# Patient Record
Sex: FEMALE | Employment: OTHER | URBAN - METROPOLITAN AREA
[De-identification: names, ages, dates, MRNs, and addresses within clinical notes are randomized per-mention and may not be internally consistent; named-entity substitution may affect disease eponyms.]

---

## 2018-02-24 ENCOUNTER — HOSPITAL ENCOUNTER (INPATIENT)
Age: 63
LOS: 12 days | Discharge: HOME | DRG: 853 | End: 2018-03-08
Attending: EMERGENCY MEDICINE | Admitting: HOSPITALIST
Payer: COMMERCIAL

## 2018-02-24 ENCOUNTER — APPOINTMENT (OUTPATIENT)
Dept: GENERAL RADIOLOGY | Age: 63
DRG: 853 | End: 2018-02-24
Attending: EMERGENCY MEDICINE
Payer: COMMERCIAL

## 2018-02-24 ENCOUNTER — APPOINTMENT (OUTPATIENT)
Dept: CT IMAGING | Age: 63
DRG: 853 | End: 2018-02-24
Attending: EMERGENCY MEDICINE
Payer: COMMERCIAL

## 2018-02-24 DIAGNOSIS — K51.00 PANCOLITIS (HCC): Primary | ICD-10-CM

## 2018-02-24 PROBLEM — E86.0 DEHYDRATION: Status: ACTIVE | Noted: 2018-02-24

## 2018-02-24 PROBLEM — R65.20 SEVERE SEPSIS (HCC): Status: ACTIVE | Noted: 2018-02-24

## 2018-02-24 PROBLEM — K52.9 COLITIS: Status: ACTIVE | Noted: 2018-02-24

## 2018-02-24 PROBLEM — E87.6 HYPOKALEMIA: Status: ACTIVE | Noted: 2018-02-24

## 2018-02-24 PROBLEM — E83.51 HYPOCALCEMIA: Status: ACTIVE | Noted: 2018-02-24

## 2018-02-24 PROBLEM — A41.9 SEVERE SEPSIS (HCC): Status: ACTIVE | Noted: 2018-02-24

## 2018-02-24 LAB
ALBUMIN SERPL-MCNC: 1.6 G/DL (ref 3.5–5)
ALBUMIN/GLOB SERPL: 0.6 {RATIO} (ref 1.1–2.2)
ALP SERPL-CCNC: 66 U/L (ref 45–117)
ALT SERPL-CCNC: 16 U/L (ref 12–78)
ANION GAP SERPL CALC-SCNC: 13 MMOL/L (ref 5–15)
ARTERIAL PATENCY WRIST A: ABNORMAL
AST SERPL-CCNC: 12 U/L (ref 15–37)
BASE DEFICIT BLDV-SCNC: 2 MMOL/L
BASOPHILS # BLD: 0 K/UL (ref 0–0.1)
BASOPHILS NFR BLD: 0 % (ref 0–1)
BDY SITE: ABNORMAL
BILIRUB DIRECT SERPL-MCNC: 0.4 MG/DL (ref 0–0.2)
BILIRUB SERPL-MCNC: 1.7 MG/DL (ref 0.2–1)
BUN SERPL-MCNC: 9 MG/DL (ref 6–20)
BUN/CREAT SERPL: 14 (ref 12–20)
CALCIUM SERPL-MCNC: 5.6 MG/DL (ref 8.5–10.1)
CHLORIDE SERPL-SCNC: 112 MMOL/L (ref 97–108)
CO2 SERPL-SCNC: 17 MMOL/L (ref 21–32)
CREAT SERPL-MCNC: 0.65 MG/DL (ref 0.55–1.02)
DIFFERENTIAL METHOD BLD: ABNORMAL
EOSINOPHIL # BLD: 0 K/UL (ref 0–0.4)
EOSINOPHIL NFR BLD: 0 % (ref 0–7)
ERYTHROCYTE [DISTWIDTH] IN BLOOD BY AUTOMATED COUNT: 11.4 % (ref 11.5–14.5)
GAS FLOW.O2 O2 DELIVERY SYS: ABNORMAL L/MIN
GLOBULIN SER CALC-MCNC: 2.6 G/DL (ref 2–4)
GLUCOSE SERPL-MCNC: 96 MG/DL (ref 65–100)
HCO3 BLDV-SCNC: 20.4 MMOL/L (ref 23–28)
HCT VFR BLD AUTO: 38.4 % (ref 35–47)
HGB BLD-MCNC: 13.3 G/DL (ref 11.5–16)
IMM GRANULOCYTES # BLD: 0 K/UL
IMM GRANULOCYTES NFR BLD AUTO: 0 %
INR PPP: 1.3 (ref 0.9–1.1)
LACTATE SERPL-SCNC: 1.5 MMOL/L (ref 0.4–2)
LACTATE SERPL-SCNC: 4.8 MMOL/L (ref 0.4–2)
LIPASE SERPL-CCNC: 49 U/L (ref 73–393)
LYMPHOCYTES # BLD: 0.8 K/UL (ref 0.8–3.5)
LYMPHOCYTES NFR BLD: 2 % (ref 12–49)
MAGNESIUM SERPL-MCNC: 1.6 MG/DL (ref 1.6–2.4)
MCH RBC QN AUTO: 32.1 PG (ref 26–34)
MCHC RBC AUTO-ENTMCNC: 34.6 G/DL (ref 30–36.5)
MCV RBC AUTO: 92.8 FL (ref 80–99)
MONOCYTES # BLD: 1.9 K/UL (ref 0–1)
MONOCYTES NFR BLD: 5 % (ref 5–13)
NEUTS BAND NFR BLD MANUAL: 10 % (ref 0–6)
NEUTS SEG # BLD: 36.2 K/UL (ref 1.8–8)
NEUTS SEG NFR BLD: 83 % (ref 32–75)
NRBC # BLD: 0 K/UL (ref 0–0.01)
NRBC BLD-RTO: 0 PER 100 WBC
PATH REV BLD -IMP: ABNORMAL
PCO2 BLDV: 24 MMHG (ref 41–51)
PH BLDV: 7.54 [PH] (ref 7.32–7.42)
PHOSPHATE SERPL-MCNC: 1.9 MG/DL (ref 2.6–4.7)
PLATELET # BLD AUTO: 367 K/UL (ref 150–400)
PMV BLD AUTO: 11.2 FL (ref 8.9–12.9)
PO2 BLDV: <19 MMHG (ref 25–40)
POTASSIUM SERPL-SCNC: 2.2 MMOL/L (ref 3.5–5.1)
PROT SERPL-MCNC: 4.2 G/DL (ref 6.4–8.2)
PROTHROMBIN TIME: 12.8 SEC (ref 9–11.1)
RBC # BLD AUTO: 4.14 M/UL (ref 3.8–5.2)
RBC MORPH BLD: ABNORMAL
SODIUM SERPL-SCNC: 142 MMOL/L (ref 136–145)
SPECIMEN TYPE: ABNORMAL
WBC # BLD AUTO: 38.9 K/UL (ref 3.6–11)
WBC MORPH BLD: ABNORMAL

## 2018-02-24 PROCEDURE — 83690 ASSAY OF LIPASE: CPT | Performed by: EMERGENCY MEDICINE

## 2018-02-24 PROCEDURE — 74011250636 HC RX REV CODE- 250/636: Performed by: HOSPITALIST

## 2018-02-24 PROCEDURE — 84100 ASSAY OF PHOSPHORUS: CPT | Performed by: HOSPITALIST

## 2018-02-24 PROCEDURE — 99285 EMERGENCY DEPT VISIT HI MDM: CPT

## 2018-02-24 PROCEDURE — 74011250636 HC RX REV CODE- 250/636: Performed by: EMERGENCY MEDICINE

## 2018-02-24 PROCEDURE — 87040 BLOOD CULTURE FOR BACTERIA: CPT | Performed by: EMERGENCY MEDICINE

## 2018-02-24 PROCEDURE — 74011000250 HC RX REV CODE- 250: Performed by: HOSPITALIST

## 2018-02-24 PROCEDURE — 74177 CT ABD & PELVIS W/CONTRAST: CPT

## 2018-02-24 PROCEDURE — 83735 ASSAY OF MAGNESIUM: CPT | Performed by: EMERGENCY MEDICINE

## 2018-02-24 PROCEDURE — 71045 X-RAY EXAM CHEST 1 VIEW: CPT

## 2018-02-24 PROCEDURE — 82803 BLOOD GASES ANY COMBINATION: CPT

## 2018-02-24 PROCEDURE — 80048 BASIC METABOLIC PNL TOTAL CA: CPT | Performed by: EMERGENCY MEDICINE

## 2018-02-24 PROCEDURE — 80076 HEPATIC FUNCTION PANEL: CPT | Performed by: EMERGENCY MEDICINE

## 2018-02-24 PROCEDURE — 83605 ASSAY OF LACTIC ACID: CPT | Performed by: EMERGENCY MEDICINE

## 2018-02-24 PROCEDURE — 85610 PROTHROMBIN TIME: CPT | Performed by: EMERGENCY MEDICINE

## 2018-02-24 PROCEDURE — 74011000258 HC RX REV CODE- 258: Performed by: HOSPITALIST

## 2018-02-24 PROCEDURE — 93005 ELECTROCARDIOGRAM TRACING: CPT

## 2018-02-24 PROCEDURE — 96374 THER/PROPH/DIAG INJ IV PUSH: CPT

## 2018-02-24 PROCEDURE — 74011250637 HC RX REV CODE- 250/637: Performed by: HOSPITALIST

## 2018-02-24 PROCEDURE — 87493 C DIFF AMPLIFIED PROBE: CPT | Performed by: EMERGENCY MEDICINE

## 2018-02-24 PROCEDURE — 86900 BLOOD TYPING SEROLOGIC ABO: CPT | Performed by: EMERGENCY MEDICINE

## 2018-02-24 PROCEDURE — 74011000250 HC RX REV CODE- 250: Performed by: EMERGENCY MEDICINE

## 2018-02-24 PROCEDURE — 96361 HYDRATE IV INFUSION ADD-ON: CPT

## 2018-02-24 PROCEDURE — 65660000000 HC RM CCU STEPDOWN

## 2018-02-24 PROCEDURE — 96375 TX/PRO/DX INJ NEW DRUG ADDON: CPT

## 2018-02-24 PROCEDURE — 74011636320 HC RX REV CODE- 636/320: Performed by: EMERGENCY MEDICINE

## 2018-02-24 PROCEDURE — 74011000258 HC RX REV CODE- 258: Performed by: EMERGENCY MEDICINE

## 2018-02-24 PROCEDURE — 3E03329 INTRODUCTION OF OTHER ANTI-INFECTIVE INTO PERIPHERAL VEIN, PERCUTANEOUS APPROACH: ICD-10-PCS | Performed by: HOSPITALIST

## 2018-02-24 PROCEDURE — 74011250637 HC RX REV CODE- 250/637: Performed by: EMERGENCY MEDICINE

## 2018-02-24 PROCEDURE — 85025 COMPLETE CBC W/AUTO DIFF WBC: CPT | Performed by: EMERGENCY MEDICINE

## 2018-02-24 PROCEDURE — 36415 COLL VENOUS BLD VENIPUNCTURE: CPT | Performed by: EMERGENCY MEDICINE

## 2018-02-24 PROCEDURE — 87045 FECES CULTURE AEROBIC BACT: CPT | Performed by: HOSPITALIST

## 2018-02-24 RX ORDER — ASPIRIN 81 MG/1
81 TABLET ORAL EVERY OTHER DAY
COMMUNITY
End: 2019-06-28 | Stop reason: DRUGHIGH

## 2018-02-24 RX ORDER — METRONIDAZOLE 500 MG/100ML
500 INJECTION, SOLUTION INTRAVENOUS EVERY 8 HOURS
Status: DISCONTINUED | OUTPATIENT
Start: 2018-02-24 | End: 2018-03-09 | Stop reason: HOSPADM

## 2018-02-24 RX ORDER — ACETAMINOPHEN 325 MG/1
650 TABLET ORAL
Status: DISCONTINUED | OUTPATIENT
Start: 2018-02-24 | End: 2018-03-09 | Stop reason: HOSPADM

## 2018-02-24 RX ORDER — SODIUM CHLORIDE 0.9 % (FLUSH) 0.9 %
5-10 SYRINGE (ML) INJECTION EVERY 8 HOURS
Status: DISCONTINUED | OUTPATIENT
Start: 2018-02-24 | End: 2018-03-09 | Stop reason: HOSPADM

## 2018-02-24 RX ORDER — METRONIDAZOLE 500 MG/100ML
500 INJECTION, SOLUTION INTRAVENOUS
Status: COMPLETED | OUTPATIENT
Start: 2018-02-24 | End: 2018-02-24

## 2018-02-24 RX ORDER — ONDANSETRON 2 MG/ML
4 INJECTION INTRAMUSCULAR; INTRAVENOUS
Status: DISCONTINUED | OUTPATIENT
Start: 2018-02-24 | End: 2018-03-09 | Stop reason: HOSPADM

## 2018-02-24 RX ORDER — MAGNESIUM SULFATE HEPTAHYDRATE 40 MG/ML
2 INJECTION, SOLUTION INTRAVENOUS ONCE
Status: COMPLETED | OUTPATIENT
Start: 2018-02-24 | End: 2018-02-24

## 2018-02-24 RX ORDER — SIMVASTATIN 20 MG/1
20 TABLET, FILM COATED ORAL
COMMUNITY
End: 2018-07-11

## 2018-02-24 RX ORDER — SODIUM CHLORIDE 0.9 % (FLUSH) 0.9 %
10 SYRINGE (ML) INJECTION
Status: COMPLETED | OUTPATIENT
Start: 2018-02-24 | End: 2018-02-24

## 2018-02-24 RX ORDER — THERA TABS 400 MCG
1 TAB ORAL DAILY
COMMUNITY
End: 2018-04-05

## 2018-02-24 RX ORDER — HYDROMORPHONE HYDROCHLORIDE 1 MG/ML
1 INJECTION, SOLUTION INTRAMUSCULAR; INTRAVENOUS; SUBCUTANEOUS
Status: COMPLETED | OUTPATIENT
Start: 2018-02-24 | End: 2018-02-24

## 2018-02-24 RX ORDER — SIMVASTATIN 20 MG/1
20 TABLET, FILM COATED ORAL
Status: DISCONTINUED | OUTPATIENT
Start: 2018-02-24 | End: 2018-03-09 | Stop reason: HOSPADM

## 2018-02-24 RX ORDER — POTASSIUM CHLORIDE 750 MG/1
80 TABLET, FILM COATED, EXTENDED RELEASE ORAL
Status: COMPLETED | OUTPATIENT
Start: 2018-02-24 | End: 2018-02-24

## 2018-02-24 RX ORDER — SODIUM CHLORIDE 9 MG/ML
150 INJECTION, SOLUTION INTRAVENOUS CONTINUOUS
Status: DISCONTINUED | OUTPATIENT
Start: 2018-02-24 | End: 2018-02-25

## 2018-02-24 RX ORDER — SODIUM CHLORIDE 0.9 % (FLUSH) 0.9 %
10 SYRINGE (ML) INJECTION
Status: DISCONTINUED | OUTPATIENT
Start: 2018-02-24 | End: 2018-02-24

## 2018-02-24 RX ORDER — GLUCOSAMINE/CHONDR SU A SOD 750-600 MG
5000 TABLET ORAL
COMMUNITY
End: 2018-04-05

## 2018-02-24 RX ORDER — POTASSIUM CHLORIDE 14.9 MG/ML
10 INJECTION INTRAVENOUS
Status: COMPLETED | OUTPATIENT
Start: 2018-02-24 | End: 2018-02-25

## 2018-02-24 RX ORDER — SODIUM CHLORIDE 0.9 % (FLUSH) 0.9 %
5-10 SYRINGE (ML) INJECTION AS NEEDED
Status: DISCONTINUED | OUTPATIENT
Start: 2018-02-24 | End: 2018-03-09 | Stop reason: HOSPADM

## 2018-02-24 RX ORDER — ONDANSETRON 2 MG/ML
4 INJECTION INTRAMUSCULAR; INTRAVENOUS
Status: COMPLETED | OUTPATIENT
Start: 2018-02-24 | End: 2018-02-24

## 2018-02-24 RX ORDER — HYDROMORPHONE HYDROCHLORIDE 2 MG/ML
1 INJECTION, SOLUTION INTRAMUSCULAR; INTRAVENOUS; SUBCUTANEOUS
Status: DISCONTINUED | OUTPATIENT
Start: 2018-02-24 | End: 2018-02-27 | Stop reason: SDUPTHER

## 2018-02-24 RX ADMIN — IOPAMIDOL 100 ML: 755 INJECTION, SOLUTION INTRAVENOUS at 12:29

## 2018-02-24 RX ADMIN — Medication 10 ML: at 17:20

## 2018-02-24 RX ADMIN — SODIUM CHLORIDE 150 ML/HR: 900 INJECTION, SOLUTION INTRAVENOUS at 17:15

## 2018-02-24 RX ADMIN — SIMVASTATIN 20 MG: 20 TABLET, FILM COATED ORAL at 22:02

## 2018-02-24 RX ADMIN — VANCOMYCIN HYDROCHLORIDE 250 MG: 1 INJECTION, POWDER, LYOPHILIZED, FOR SOLUTION INTRAVENOUS at 19:59

## 2018-02-24 RX ADMIN — HYDROMORPHONE HYDROCHLORIDE 1 MG: 1 INJECTION, SOLUTION INTRAMUSCULAR; INTRAVENOUS; SUBCUTANEOUS at 12:12

## 2018-02-24 RX ADMIN — VANCOMYCIN HYDROCHLORIDE 250 MG: 1 INJECTION, POWDER, LYOPHILIZED, FOR SOLUTION INTRAVENOUS at 13:57

## 2018-02-24 RX ADMIN — Medication 10 ML: at 22:09

## 2018-02-24 RX ADMIN — SODIUM CHLORIDE 1000 ML: 900 INJECTION, SOLUTION INTRAVENOUS at 13:50

## 2018-02-24 RX ADMIN — ONDANSETRON 4 MG: 2 INJECTION INTRAMUSCULAR; INTRAVENOUS at 18:22

## 2018-02-24 RX ADMIN — Medication 1 CAPSULE: at 17:20

## 2018-02-24 RX ADMIN — HYDROMORPHONE HYDROCHLORIDE 1 MG: 2 INJECTION INTRAMUSCULAR; INTRAVENOUS; SUBCUTANEOUS at 18:22

## 2018-02-24 RX ADMIN — SODIUM CHLORIDE 1000 ML: 900 INJECTION, SOLUTION INTRAVENOUS at 12:49

## 2018-02-24 RX ADMIN — POTASSIUM CHLORIDE 10 MEQ: 200 INJECTION, SOLUTION INTRAVENOUS at 18:00

## 2018-02-24 RX ADMIN — SODIUM CHLORIDE 100 ML: 900 INJECTION, SOLUTION INTRAVENOUS at 12:29

## 2018-02-24 RX ADMIN — POTASSIUM CHLORIDE 10 MEQ: 200 INJECTION, SOLUTION INTRAVENOUS at 19:59

## 2018-02-24 RX ADMIN — ONDANSETRON 4 MG: 2 INJECTION INTRAMUSCULAR; INTRAVENOUS at 12:13

## 2018-02-24 RX ADMIN — MAGNESIUM SULFATE HEPTAHYDRATE 2 G: 40 INJECTION, SOLUTION INTRAVENOUS at 14:41

## 2018-02-24 RX ADMIN — METRONIDAZOLE 500 MG: 500 INJECTION, SOLUTION INTRAVENOUS at 22:02

## 2018-02-24 RX ADMIN — POTASSIUM CHLORIDE 10 MEQ: 200 INJECTION, SOLUTION INTRAVENOUS at 18:07

## 2018-02-24 RX ADMIN — SODIUM CHLORIDE 1000 ML: 900 INJECTION, SOLUTION INTRAVENOUS at 12:13

## 2018-02-24 RX ADMIN — METRONIDAZOLE 500 MG: 500 INJECTION, SOLUTION INTRAVENOUS at 13:53

## 2018-02-24 RX ADMIN — POTASSIUM CHLORIDE 10 MEQ: 200 INJECTION, SOLUTION INTRAVENOUS at 16:40

## 2018-02-24 RX ADMIN — POTASSIUM CHLORIDE 80 MEQ: 750 TABLET, EXTENDED RELEASE ORAL at 13:50

## 2018-02-24 RX ADMIN — CALCIUM GLUCONATE 2 G: 98 INJECTION, SOLUTION INTRAVENOUS at 14:36

## 2018-02-24 RX ADMIN — Medication 10 ML: at 12:29

## 2018-02-24 NOTE — CONSULTS
Chief Complaint:  Colitis    HPI:  60 yo woman with no significant PMH who presented to ED with abdominal pain. Pt reportedly was diagnosed with Strept 10 days ago and prescribed an antibiotic. Pt developed diarrhea and abdominal pain on Monday. Pt then developed worsening abdominal pain with nausea and vomiting. She went to urgent care center and was noted to have leukocytosis so was sent to [facility]. She was noted to have diffuse abdominal pain. CT scan showed pancolitis without free air. She does have leukocytosis. Past Medical History:   Diagnosis Date    Hypercholesteremia        Past Surgical History:   Procedure Laterality Date    HX TUBAL LIGATION         No current facility-administered medications on file prior to encounter. No current outpatient prescriptions on file prior to encounter. No Known Allergies    Visit Vitals    /50    Pulse 64    Temp 98 °F (36.7 °C)    Resp 22    Ht 5' 4\" (1.626 m)    Wt 134 lb (60.8 kg)    SpO2 99%    BMI 23 kg/m2         Physical Exam:    Gen:  NAD  Pulm:  Unlabored  Abd:  S/ND/mild TTP in LLQ without guarding or rebound    Recent Results (from the past 24 hour(s))   MAGNESIUM    Collection Time: 02/24/18 12:21 PM   Result Value Ref Range    Magnesium 1.6 1.6 - 2.4 mg/dL   LACTIC ACID    Collection Time: 02/24/18 12:21 PM   Result Value Ref Range    Lactic acid 4.8 (HH) 0.4 - 2.0 MMOL/L   LIPASE    Collection Time: 02/24/18 12:21 PM   Result Value Ref Range    Lipase 49 (L) 73 - 393 U/L   HEPATIC FUNCTION PANEL    Collection Time: 02/24/18 12:21 PM   Result Value Ref Range    Protein, total 4.2 (L) 6.4 - 8.2 g/dL    Albumin 1.6 (L) 3.5 - 5.0 g/dL    Globulin 2.6 2.0 - 4.0 g/dL    A-G Ratio 0.6 (L) 1.1 - 2.2      Bilirubin, total 1.7 (H) 0.2 - 1.0 MG/DL    Bilirubin, direct 0.4 (H) 0.0 - 0.2 MG/DL    Alk.  phosphatase 66 45 - 117 U/L    AST (SGOT) 12 (L) 15 - 37 U/L    ALT (SGPT) 16 12 - 78 U/L   CBC WITH AUTOMATED DIFF    Collection Time: 02/24/18 12:21 PM   Result Value Ref Range    WBC 38.9 (H) 3.6 - 11.0 K/uL    RBC 4.14 3.80 - 5.20 M/uL    HGB 13.3 11.5 - 16.0 g/dL    HCT 38.4 35.0 - 47.0 %    MCV 92.8 80.0 - 99.0 FL    MCH 32.1 26.0 - 34.0 PG    MCHC 34.6 30.0 - 36.5 g/dL    RDW 11.4 (L) 11.5 - 14.5 %    PLATELET 038 735 - 829 K/uL    MPV 11.2 8.9 - 12.9 FL    NRBC 0.0 0  WBC    ABSOLUTE NRBC 0.00 0.00 - 0.01 K/uL    NEUTROPHILS 83 (H) 32 - 75 %    BAND NEUTROPHILS 10 (H) 0 - 6 %    LYMPHOCYTES 2 (L) 12 - 49 %    MONOCYTES 5 5 - 13 %    EOSINOPHILS 0 0 - 7 %    BASOPHILS 0 0 - 1 %    IMMATURE GRANULOCYTES 0 %    ABS. NEUTROPHILS 36.2 (H) 1.8 - 8.0 K/UL    ABS. LYMPHOCYTES 0.8 0.8 - 3.5 K/UL    ABS. MONOCYTES 1.9 (H) 0.0 - 1.0 K/UL    ABS. EOSINOPHILS 0.0 0.0 - 0.4 K/UL    ABS. BASOPHILS 0.0 0.0 - 0.1 K/UL    ABS. IMM.  GRANS. 0.0 K/UL    DF MANUAL      RBC COMMENTS OVALOCYTES  PRESENT        WBC COMMENTS Pathology Review Requested     METABOLIC PANEL, BASIC    Collection Time: 02/24/18 12:21 PM   Result Value Ref Range    Sodium 142 136 - 145 mmol/L    Potassium 2.2 (LL) 3.5 - 5.1 mmol/L    Chloride 112 (H) 97 - 108 mmol/L    CO2 17 (L) 21 - 32 mmol/L    Anion gap 13 5 - 15 mmol/L    Glucose 96 65 - 100 mg/dL    BUN 9 6 - 20 MG/DL    Creatinine 0.65 0.55 - 1.02 MG/DL    BUN/Creatinine ratio 14 12 - 20      GFR est AA >60 >60 ml/min/1.73m2    GFR est non-AA >60 >60 ml/min/1.73m2    Calcium 5.6 (LL) 8.5 - 10.1 MG/DL   POC VENOUS BLOOD GAS    Collection Time: 02/24/18 12:21 PM   Result Value Ref Range    Device: ROOM AIR      pH, venous (POC) 7.537 (HH) 7.32 - 7.42      pCO2, venous (POC) 24.0 (L) 41 - 51 MMHG    pO2, venous (POC) <19 (L) 25 - 40 mmHg    HCO3, venous (POC) 20.4 (L) 23.0 - 28.0 MMOL/L    Base deficit, venous (POC) 2 mmol/L    Allens test (POC) N/A      Site OTHER      Specimen type (POC) VENOUS BLOOD     EKG, 12 LEAD, INITIAL    Collection Time: 02/24/18 12:55 PM   Result Value Ref Range    Ventricular Rate 79 BPM    Atrial Rate 79 BPM    P-R Interval 152 ms    QRS Duration 74 ms    Q-T Interval 536 ms    QTC Calculation (Bezet) 614 ms    Calculated P Axis 69 degrees    Calculated R Axis 72 degrees    Calculated T Axis 63 degrees    Diagnosis       Normal sinus rhythm  T wave abnormality, consider anterior ischemia  Prolonged QT  No previous ECGs available         AP: 62 yo woman with pancolitis    - Pancolitis:  No acute indication for operation.   Pain symptoms have significantly improved since being in ED and pt is afebrile with normal hemodynamics  - Recommend continuing with IV flagyl and oral vancomycin  - Recommend ID consult for assistance with treatment of pancolitis  - No acute indication for operation

## 2018-02-24 NOTE — PROGRESS NOTES
Admission Medication Reconciliation:    Information obtained from:  Patient and a     Comments/Recommendations: Updated PTA meds/reviewed patient's allergies. 1) Medications Added:  -Simvastatin  -Aspirin  -Biotin  -Multivitamin    2)  Pt reported  Cefuroxime RX 500mg BID on 2/14 (7 day course) for suspected strip throat of which she completed all by the last dose. Significant PMH/Disease States:   Past Medical History:   Diagnosis Date    Hypercholesteremia        Chief Complaint for this Admission:    Chief Complaint   Patient presents with    Abdominal Pain    Nausea    Vomiting    Diarrhea       Allergies:  Review of patient's allergies indicates no known allergies. Prior to Admission Medications:   Prior to Admission Medications   Prescriptions Last Dose Informant Patient Reported? Taking? Biotin 2,500 mcg cap   Yes Yes   Sig: Take 5,000 mcg by mouth. aspirin delayed-release 81 mg tablet 2/22/2018  Yes Yes   Sig: Take 81 mg by mouth daily. simvastatin (ZOCOR) 20 mg tablet 2/21/2018  Yes Yes   Sig: Take 20 mg by mouth nightly. therapeutic multivitamin [facility]) tablet   Yes Yes   Sig: Take 1 Tab by mouth daily.       Facility-Administered Medications: None

## 2018-02-24 NOTE — PROGRESS NOTES
TRANSFER - IN REPORT:    Verbal report received from Julia Munguia 69 (name) on Vimal Munroe  being received from ED (unit) for routine progression of care      Report consisted of patients Situation, Background, Assessment and   Recommendations(SBAR). Information from the following report(s) ED Summary was reviewed with the receiving nurse. Opportunity for questions and clarification was provided. Assessment completed upon patients arrival to unit and care assumed.

## 2018-02-24 NOTE — ED TRIAGE NOTES
Triage note: Pt arrived via EMS from [facility] for N/V/D since Monday. LLQ pain that is constant cramping.

## 2018-02-24 NOTE — ED NOTES
TRANSFER - OUT REPORT:    Verbal report given to SYDNEE Millan (name) on Maurilio Black  being transferred to [facility] (unit) for routine progression of care       Report consisted of patients Situation, Background, Assessment and   Recommendations(SBAR). Information from the following report(s) SBAR, Kardex, ED Summary, Intake/Output, MAR, Recent Results and Cardiac Rhythm NSR was reviewed with the receiving nurse. Lines:   Peripheral IV 02/24/18 Left Wrist (Active)   Site Assessment Clean, dry, & intact 2/24/2018 11:45 AM   Phlebitis Assessment 0 2/24/2018 11:45 AM   Infiltration Assessment 0 2/24/2018 11:45 AM   Dressing Status Clean, dry, & intact 2/24/2018 11:45 AM   Dressing Type Transparent 2/24/2018 11:45 AM   Hub Color/Line Status Flushed 2/24/2018 11:45 AM       Peripheral IV 02/24/18 Right Antecubital (Active)   Site Assessment Clean, dry, & intact 2/24/2018 12:23 PM   Phlebitis Assessment 0 2/24/2018 12:23 PM   Infiltration Assessment 0 2/24/2018 12:23 PM   Dressing Status Clean, dry, & intact 2/24/2018 12:23 PM       Peripheral IV 02/24/18 Left Antecubital (Active)   Site Assessment Clean, dry, & intact 2/24/2018  2:41 PM   Phlebitis Assessment 0 2/24/2018  2:41 PM   Infiltration Assessment 0 2/24/2018  2:41 PM   Dressing Status Clean, dry, & intact 2/24/2018  2:41 PM   Dressing Type Transparent 2/24/2018  2:41 PM   Hub Color/Line Status Blue 2/24/2018  2:41 PM   Action Taken Blood drawn 2/24/2018  2:41 PM        Opportunity for questions and clarification was provided.       Patient transported with:   Monitor

## 2018-02-24 NOTE — IP AVS SNAPSHOT
[address] 
[phone] Patient: Pastor Lentz MRN: [id number] AKV:[id number] You are allergic to the following No active allergies Recent Documentation Height Weight BMI Smoking Status 1.626 m 63.9 kg 24.17 kg/m2 Never Smoker Emergency Contacts  (Rel.) Home Phone Work Phone Mobile Phone Kim Solano (Spouse) -- -- [phone] About your hospitalization You were admitted on:  February 24, 2018 You last received care in the:  [facility] You were discharged on:  March 8, 2018 Why you were hospitalized Your primary diagnosis was:  Colitis Your diagnoses also included:  Severe Sepsis (Hcc), Dehydration, Hypokalemia, Hypocalcemia Providers Seen During Your Hospitalization Provider Specialty Primary office phone Keli Sheriff DO Emergency Medicine [phone] Shannan Godfrey MD Hospitalist [phone] [facility] MD Chloe Internal Medicine [phone] Leonor Woody MD Internal Medicine [phone] Riri Boswell MD Internal Medicine [phone] Your Primary Care Physician (PCP) Primary Care Physician Office Phone Office Fax NONE ** None ** ** None ** Follow-up Information Follow up With Details Comments Contact Info Call and schedule appointment for Cardiac Rehab at [id number] [place] [address] Call in 1 week after d/c to schedule appointment [facility]  Cardiac Rehab contact is Elise Monroy [phone] Arlington Favre, MD On 3/14/2018 9 AM.  Please arrive 20 min early. [place] [address] [address] [place] [address] 
[phone] None   None (395) Patient stated that they have no PCP Johnathon Arzate. Total out-of-pocket cost is $64.17.   Prescription will be ready for pick-up at the Pharmacy at [address] [facility]. The [address] [place] [address] is located at: [address] [facility], [address] and the phone number is [phone]. [facility] Schedule an appointment as soon as possible for a visit in 5 days check bmp to monitor creatinine and potassium and repeat cxr to check on effusions. Gonzales Lopez MD Schedule an appointment as soon as possible for a visit in 2 weeks for follow up on pleural effusion [address] [facility] [address] [place] [address] 
[phone] Appointments for Next 14 days 3/14/2018  9:00 AM  ESTABLISHED PATIENT [facility] My Medications TAKE these medications as instructed Instructions Each Dose to Equal  
 Morning Noon Evening Bedtime  
 aspirin delayed-release 81 mg tablet Your last dose was:  3/8/18 Your next dose is:  3/9/18 Take 81 mg by mouth daily. 81 mg Biotin 2,500 mcg Cap Take 5,000 mcg by mouth. 5000 mcg  
    
   
   
   
  
 furosemide 40 mg tablet Commonly known as:  LASIX Start taking on:  3/9/2018 Your last dose was:  3/8/18 Your next dose is:  3/9/18 Take 1 Tab by mouth daily for 5 days. 40 mg  
    
  
   
   
   
  
 L. acidoph & paracasei- S therm- Bifido 8 billion cell Cap cap Commonly known as:  OSCAR-Q/RISAQUAD Start taking on:  3/9/2018 Your last dose was:  3/8/18 Your next dose is:  3/9/18 Take 1 Cap by mouth daily for 30 days. 1 Cap  
    
  
   
   
   
  
 metoprolol tartrate 50 mg tablet Commonly known as:  LOPRESSOR Your last dose was:  3/8/18 at [address] next dose is:  3/8/18 at 9PM  
   
 Take 1 Tab by mouth two (2) times a day for 30 days. 50 mg  
    
  
   
   
   
  
  
 potassium chloride SR 20 mEq tablet Commonly known as:  K-TAB Your last dose was:  3/8/18 at [address] next dose is:  3/9/18 in AM  
   
 Take 2 Tabs by mouth two (2) times a day for 5 days. Indications: hypokalemia prevention 40 mEq  
    
  
   
   
   
  
  
 prasugrel 10 mg tablet Commonly known as:  EFFIENT Start taking on:  3/9/2018 Your last dose was:  3/8/18 Your next dose is:  3/9/18 Take 1 Tab by mouth daily for 30 days. 10 mg  
    
  
   
   
   
  
 simvastatin 20 mg tablet Commonly known as:  ZOCOR Your last dose was:  3/7/18 Your next dose is:  3/8/18 Take 20 mg by mouth nightly. 20 mg  
    
   
   
   
  
  
 therapeutic multivitamin tablet Commonly known as:  [facility] Your last dose was:  3/8/18 Your next dose is:  3/9/18 Take 1 Tab by mouth daily. 1 Tab  
    
  
   
   
   
  
 vancomycin 50 mg/mL oral solution (compounded) Your last dose was:  3/8/18 at [address] next dose is:  3/8/18 at 8PM 
  
   
 Take 10 mL by mouth every six (6) hours for 10 days. 500 mg Where to Get Your Medications Information on where to get these meds will be given to you by the nurse or doctor. ! Ask your nurse or doctor about these medications  
  furosemide 40 mg tablet L. acidoph & paracasei- S therm- Bifido 8 billion cell Cap cap  
 metoprolol tartrate 50 mg tablet  
 potassium chloride SR 20 mEq tablet  
 prasugrel 10 mg tablet  
 vancomycin 50 mg/mL oral solution (compounded) Discharge Instructions Discharge Instructions PATIENT ID: Yana Wilkins MRN: [id number] YOB: 1955 DATE OF ADMISSION: 2/24/2018 11:29 AM   
DATE OF DISCHARGE: 3/8/2018 PRIMARY CARE PROVIDER: None ATTENDING PHYSICIAN: Kemar Noyola MD 
DISCHARGING PROVIDER: Kemar Noyola MD   
To contact this individual call [phone] and ask the  to page. If unavailable ask to be transferred the Adult Hospitalist Department. DISCHARGE DIAGNOSES Principal Problem: 
  Colitis (2/24/2018) Active Problems: 
  Severe sepsis ([facility] [address] (2/24/2018) Dehydration (2/24/2018) Hypokalemia (2/24/2018) Hypocalcemia (2/24/2018) CONSULTATIONS:  
IP CONSULT TO GENERAL SURGERY 
IP CONSULT TO HOSPITALIST 
IP CONSULT TO CARDIOLOGY 
IP CONSULT TO INFECTIOUS DISEASES 
IP CONSULT TO PULMONOLOGY PROCEDURES/SURGERIES: 
 * No surgery found * PENDING TEST RESULTS:  
At the time of discharge the following test results are still pending: none FOLLOW UP APPOINTMENTS:  
Follow-up Information Follow up With Details Comments Contact Info Call and schedule appointment for Cardiac Rehab at [facility] Call in 1 week after d/c to schedule appointment [facility]  Cardiac Rehab contact is Carina Barrett [phone] Riana Isbell MD On 3/14/2018 9 AM.  Please arrive 20 min early. [place] [address] [address] [place] [address] 
[phone] None   None (395) Patient stated that they have no PCP Johnathon 110. Total out-of-pocket cost is $64.17. Prescription will be ready for pick-up at the Pharmacy at [facility] is located at: [address] [place] [address]. [facility] and the phone number is [phone]. [facility] Schedule an appointment as soon as possible for a visit in 5 days check bmp to monitor creatinine and potassium and repeat cxr to check on effusions. Camryn Bean MD Schedule an appointment as soon as possible for a visit in 2 weeks for follow up on pleural effusion [address] [facility] [address] [place] [address] 
[phone] ADDITIONAL CARE RECOMMENDATIONS:  
1. Have pcp or primary gynecologist evaluate right adnexal cyst.   
 
DIET: resume prior diet as tolerated. ACTIVITY: Activity as tolerated WOUND CARE: none EQUIPMENT needed: none DISCHARGE MEDICATIONS: 
 See Medication Reconciliation Form · It is important that you take the medication exactly as they are prescribed. · Keep your medication in the bottles provided by the pharmacist and keep a list of the medication names, dosages, and times to be taken in your wallet. · Do not take other medications without consulting your doctor. Furosemide (Lasix) - (By mouth) Why this medicine is used:  
Treats fluid retention and high blood pressure. Contact a nurse or doctor right away if you have: · Blistering, peeling, red skin rash · Lightheadedness, dizziness, fainting · Dry mouth, increased thirst, muscle cramps, nausea or vomiting · Uneven heartbeat · Hearing loss, ringing in the ears Common side effects: 
· Loss of appetite, stomach cramps © 2017 [employer] [facility] Information is for End User's use only and may not be sold, redistributed or otherwise used for commercial purposes. Metoprolol (Lopressor, Toprol XL) - (By mouth) Why this medicine is used:  
Treats high blood pressure, chest pain, and heart failure. Contact a nurse or doctor right away if you have: · Lightheadedness, dizziness, fainting · Rapid weight gain; swelling in your hands, ankles, or feet Common side effects: · Mild dizziness · Tiredness © 2017 [employer] [facility] Information is for End User's use only and may not be sold, redistributed or otherwise used for commercial purposes. Probiotic (Acidophilus Probiotic Blend, Culturelle, Adult Probiotic, Bifidonate) - (By mouth) Why this medicine is used: May increase the number of healthy bacteria in your stomach and intestines. Common side effects: · Mild diarrhea, constipation, nausea, vomiting · Mild gas or cramps © 2017 [employer] [facility] Information is for End User's use only and may not be sold, redistributed or otherwise used for commercial purposes. Amoxicillin/Clavulanate Potassium (Augmentin, Augmentin ES-600, Augmentin XR) - (By mouth) Why this medicine is used:  
Treats infections. This medicine is a penicillin antibiotic. Contact a nurse or doctor right away if you have: · Blistering, peeling, red skin rash · Dark urine or pale stools, nausea, vomiting, loss of appetite, stomach pain, yellow eyes or skin · Severe diarrhea, especially if bloody or ongoing Common side effects: 
· Diarrhea, nausea, vomiting · Diaper rash · Tooth discoloration (in children) © 2017 [address] Information is for End User's use only and may not be sold, redistributed or otherwise used for commercial purposes. Prasugrel (Effient) - (By mouth) Why this medicine is used:  
Prevents blood clots. Contact a nurse or doctor right away if you have: 
· Sudden or severe headache · Bloody vomit or vomit that looks like coffee grounds; bloody or black, tarry stools · Bleeding that does not stop or bruises that do not heal  
 
Common side effects: · Minor bleeding or bruising © 2017 [address] Information is for End User's use only and may not be sold, redistributed or otherwise used for commercial purposes. NOTIFY YOUR PHYSICIAN FOR ANY OF THE FOLLOWING:  
Fever over 101 degrees for 24 hours. Chest pain, shortness of breath, fever, chills, nausea, vomiting, diarrhea, change in mentation, falling, weakness, bleeding. Severe pain or pain not relieved by medications. Or, any other signs or symptoms that you may have questions about. DISPOSITION: 
  Home With: 
 OT  PT  HH  RN  
  
 SNF/Inpatient Rehab/LTAC  
x Independent/assisted living Hospice Other:  
 
 
 
Signed:  
Brittany Hoffman MD 
3/8/2018 
4:03 PM 
 
Discharge Orders None [facility] Announcement We are excited to announce that we are making your provider's discharge notes available to you in [employer]. You will see these notes when they are completed and signed by the physician that discharged you from your recent hospital stay.   If you have any questions or concerns about any information you see in [employer], please call the Health Information Department where you were seen or reach out to your Primary Care Provider for more information about your plan of care. Introducing [facility]! Taty Malone introduces [employer] patient portal. Now you can access parts of your medical record, email your doctor's office, and request medication refills online. 1. In your internet browser, go to [email or web address] 2. Click on the First Time User? Click Here link in the Sign In box. You will see the New Member Sign Up page. 3. Enter your [employer]ge Access Code exactly as it appears below. You will not need to use this code after youve completed the sign-up process. If you do not sign up before the expiration date, you must request a new code. · [employer] Access Code: [email or web address] Expires: 6/2/2018 10:18 AM 
 
4. Enter the last four digits of your Social Security Number (xxxx) and Date of Birth (mm/dd/yyyy) as indicated and click Submit. You will be taken to the next sign-up page. 5. Create a [employer] ID. This will be your [employer] login ID and cannot be changed, so think of one that is secure and easy to remember. 6. Create a [employer] password. You can change your password at any time. 7. Enter your Password Reset Question and Answer. This can be used at a later time if you forget your password. 8. Enter your e-mail address. You will receive e-mail notification when new information is available in [address] 9. Click Sign Up. You can now view and download portions of your medical record. 10. Click the Download Summary menu link to download a portable copy of your medical information. If you have questions, please visit the Frequently Asked Questions section of the [employer] website. Remember, [employer] is NOT to be used for urgent needs. For medical emergencies, dial 911. Now available from your iPhone and Android! General Information Please provide this summary of care documentation to your next provider. Patient Signature:  ____________________________________________________________ Date:  ____________________________________________________________  
  
Sun Marita Provider Signature:  ____________________________________________________________ Date:  ____________________________________________________________

## 2018-02-24 NOTE — H&P
HISTORY AND PHYSICAL      PCP: None  History source: the patient      CC: abdominal pain      HPI: this is a 61 y.o lady who presents with abdominal pain and diarrhea. She developed a sore throat and was diagnosed with strep throat about 10 days ago and was prescribed cefdinir. She took this as prescribed and about a week later developed diffuse abdominal pain and loose watery diarrhea. The pain is dull and constant, associated with a fever of 101.3F, frequent moderate-volume watery diarrhea, nausea and vomiting. There are no known exacerbating or alleviating factors. Her PO intake has been poor during this time. She denies recent travel or sick contacts, denies weight loss or bleeding. PMH/PSH:  Past Medical History:   Diagnosis Date    Hypercholesteremia      Past Surgical History:   Procedure Laterality Date    HX TUBAL LIGATION         Home meds:   Prior to Admission medications    Medication Sig Start Date End Date Taking? Authorizing Provider   simvastatin (ZOCOR) 20 mg tablet Take 20 mg by mouth nightly. Yes Historical Provider   aspirin delayed-release 81 mg tablet Take 81 mg by mouth daily. Yes Historical Provider   Biotin 2,500 mcg cap Take 5,000 mcg by mouth. Yes Historical Provider   therapeutic multivitamin (THERAGRAN) tablet Take 1 Tab by mouth daily. Yes Historical Provider       Allergies:  No Known Allergies    FH:  History reviewed. No pertinent family history.     SH:  Social History   Substance Use Topics    Smoking status: Never Smoker    Smokeless tobacco: Never Used    Alcohol use No       ROS: Constitutional: negative  Eyes: negative  Ears, nose, mouth, throat, and face: negative  Respiratory: negative  Cardiovascular: negative  Gastrointestinal: negative  Genitourinary:negative  Integument/breast: negative  Hematologic/lymphatic: negative  Musculoskeletal:negative  Neurological: negative  Behavioral/Psych: negative  Endocrine: negative  Allergic/Immunologic: negative  Negative except as mentioned in the HPI    PHYSICAL EXAM:  Visit Vitals    /55    Pulse 80    Temp 98 °F (36.7 °C)    Resp 22    Ht 5' 4\" (1.626 m)    Wt 60.8 kg (134 lb)    SpO2 100%    BMI 23 kg/m2       Gen: NAD, appears ill  HEENT: anicteric sclerae, pale conjunctiva, oropharynx clear, MM dry  Neck: supple, trachea midline, no adenopathy  Heart: RRR, no MRG, no JVD, no peripheral edema  Lungs: CTA b/l, non-labored respirations  Abd: soft, mild diffuse tenderness most notably in both lower quadrants, ND, BS+, no organomegaly  Extr: warm  Skin: dry, no rash, normal turgor but delayed capillary refill  Neuro: CN II-XII grossly intact, normal speech, moves all extremities  Psych: normal mood, appropriate affect      Labs/Imaging:  Recent Labs      02/24/18   1221   WBC  38.9*   HGB  13.3   HCT  38.4   PLT  367     Recent Labs      02/24/18   1221   NA  142   K  2.2*   CL  112*   CO2  17*   BUN  9   CREA  0.65   GLU  96   CA  5.6*   MG  1.6     Recent Labs      02/24/18   1221   SGOT  12*   ALT  16   AP  66   TBILI  1.7*   TP  4.2*   ALB  1.6*   GLOB  2.6   LPSE  49*       Recent Labs      02/24/18   1221   INR  1.3*   PTP  12.8*      Lactic acid 4.3    CT abdomen:  1. Pancolitis. 2. Small right effusion. 3. Ascites. 4. Right adnexal cyst.  5. Cholelithiasis.         Assessment & Plan: this is a 61 y.o lady who presents with sepsis due to colitis after taking cefdinir for strep throat. Severe sepsis without septic shock: (POA) as evidenced by leukocytosis, lactic acidosis, history of fever. Due to colitis. High risk for c diff with recent antibiotic use  -blood cultures, stool cultures, c diff assay  -IVNS  -continue PO vancomycin and IV metronidazole. Avoiding levofloxacin due to prolonged QTc.  If c diff is negative will change abx to IV Zosyn  -probiotic  -general surgery consulted  -IV Dilaudid PRN for pain    Hypokalemia, hypocalcemia: (POA) albumin 1.6 corrects Ca to 7.7  -aggressive repletion and monitoring    Lactic acidosis: (POA) due to dehydration and sepsis  -monitor with resuscitation    Hyperbilirubinemia: mildly elevated, predominantly indirect  -monitor bilirubin, consider liver US    Right adnexal cyst noted on CT. Also notes ascites and severe hypoalbuminemia. And small right effusion.   -consider TTE depending on progression    Prolonged QTc: 614, in the setting of hypocalcemia and hypokalemia  -re-check ECG after resuscitation    Reported history of recent strep throat s/p cefdinir. Seems resolved.     Hyperlipidemia:  -continue home statin    DVT ppx: SCDs  Code status: full  Disposition: home when medically appropriate    Signed By: Fely Davis MD     February 24, 2018

## 2018-02-25 LAB
ABO + RH BLD: NORMAL
ALBUMIN SERPL-MCNC: 1.8 G/DL (ref 3.5–5)
ALBUMIN/GLOB SERPL: 0.5 {RATIO} (ref 1.1–2.2)
ALP SERPL-CCNC: 84 U/L (ref 45–117)
ALT SERPL-CCNC: 19 U/L (ref 12–78)
ANION GAP SERPL CALC-SCNC: 9 MMOL/L (ref 5–15)
AST SERPL-CCNC: 18 U/L (ref 15–37)
BILIRUB DIRECT SERPL-MCNC: 0.3 MG/DL (ref 0–0.2)
BILIRUB SERPL-MCNC: 1.1 MG/DL (ref 0.2–1)
BLOOD GROUP ANTIBODIES SERPL: NORMAL
BUN SERPL-MCNC: 10 MG/DL (ref 6–20)
BUN/CREAT SERPL: 18 (ref 12–20)
C DIFF TOX GENS STL QL NAA+PROBE: POSITIVE
CALCIUM SERPL-MCNC: 7.6 MG/DL (ref 8.5–10.1)
CHLORIDE SERPL-SCNC: 116 MMOL/L (ref 97–108)
CO2 SERPL-SCNC: 20 MMOL/L (ref 21–32)
CREAT SERPL-MCNC: 0.55 MG/DL (ref 0.55–1.02)
ERYTHROCYTE [DISTWIDTH] IN BLOOD BY AUTOMATED COUNT: 12 % (ref 11.5–14.5)
GLOBULIN SER CALC-MCNC: 3.4 G/DL (ref 2–4)
GLUCOSE SERPL-MCNC: 119 MG/DL (ref 65–100)
HCT VFR BLD AUTO: 40.6 % (ref 35–47)
HGB BLD-MCNC: 13.8 G/DL (ref 11.5–16)
LACTATE SERPL-SCNC: 1.2 MMOL/L (ref 0.4–2)
MAGNESIUM SERPL-MCNC: 1.9 MG/DL (ref 1.6–2.4)
MCH RBC QN AUTO: 32.6 PG (ref 26–34)
MCHC RBC AUTO-ENTMCNC: 34 G/DL (ref 30–36.5)
MCV RBC AUTO: 96 FL (ref 80–99)
NRBC # BLD: 0 K/UL (ref 0–0.01)
NRBC BLD-RTO: 0 PER 100 WBC
PHOSPHATE SERPL-MCNC: 2 MG/DL (ref 2.6–4.7)
PLATELET # BLD AUTO: 420 K/UL (ref 150–400)
PMV BLD AUTO: 11 FL (ref 8.9–12.9)
POTASSIUM SERPL-SCNC: 4.1 MMOL/L (ref 3.5–5.1)
PROT SERPL-MCNC: 5.2 G/DL (ref 6.4–8.2)
RBC # BLD AUTO: 4.23 M/UL (ref 3.8–5.2)
SODIUM SERPL-SCNC: 145 MMOL/L (ref 136–145)
SPECIMEN EXP DATE BLD: NORMAL
WBC # BLD AUTO: 36.8 K/UL (ref 3.6–11)

## 2018-02-25 PROCEDURE — 85027 COMPLETE CBC AUTOMATED: CPT | Performed by: HOSPITALIST

## 2018-02-25 PROCEDURE — 74011250636 HC RX REV CODE- 250/636: Performed by: HOSPITALIST

## 2018-02-25 PROCEDURE — 65660000000 HC RM CCU STEPDOWN

## 2018-02-25 PROCEDURE — 84100 ASSAY OF PHOSPHORUS: CPT | Performed by: HOSPITALIST

## 2018-02-25 PROCEDURE — 74011250637 HC RX REV CODE- 250/637: Performed by: HOSPITALIST

## 2018-02-25 PROCEDURE — 74011000258 HC RX REV CODE- 258: Performed by: HOSPITALIST

## 2018-02-25 PROCEDURE — 80076 HEPATIC FUNCTION PANEL: CPT | Performed by: HOSPITALIST

## 2018-02-25 PROCEDURE — 83605 ASSAY OF LACTIC ACID: CPT | Performed by: HOSPITALIST

## 2018-02-25 PROCEDURE — 36415 COLL VENOUS BLD VENIPUNCTURE: CPT | Performed by: HOSPITALIST

## 2018-02-25 PROCEDURE — 80048 BASIC METABOLIC PNL TOTAL CA: CPT | Performed by: HOSPITALIST

## 2018-02-25 PROCEDURE — 74011000250 HC RX REV CODE- 250: Performed by: HOSPITALIST

## 2018-02-25 PROCEDURE — 93005 ELECTROCARDIOGRAM TRACING: CPT

## 2018-02-25 PROCEDURE — 83735 ASSAY OF MAGNESIUM: CPT | Performed by: HOSPITALIST

## 2018-02-25 RX ORDER — SODIUM CHLORIDE 450 MG/100ML
75 INJECTION, SOLUTION INTRAVENOUS CONTINUOUS
Status: DISCONTINUED | OUTPATIENT
Start: 2018-02-25 | End: 2018-02-28

## 2018-02-25 RX ORDER — SODIUM,POTASSIUM PHOSPHATES 280-250MG
2 POWDER IN PACKET (EA) ORAL
Status: COMPLETED | OUTPATIENT
Start: 2018-02-25 | End: 2018-02-25

## 2018-02-25 RX ADMIN — Medication 1 CAPSULE: at 09:05

## 2018-02-25 RX ADMIN — HYDROMORPHONE HYDROCHLORIDE 1 MG: 2 INJECTION INTRAMUSCULAR; INTRAVENOUS; SUBCUTANEOUS at 06:42

## 2018-02-25 RX ADMIN — METRONIDAZOLE 500 MG: 500 INJECTION, SOLUTION INTRAVENOUS at 13:24

## 2018-02-25 RX ADMIN — Medication 10 ML: at 06:19

## 2018-02-25 RX ADMIN — ACETAMINOPHEN 650 MG: 325 TABLET, FILM COATED ORAL at 17:32

## 2018-02-25 RX ADMIN — SIMVASTATIN 20 MG: 20 TABLET, FILM COATED ORAL at 20:22

## 2018-02-25 RX ADMIN — VANCOMYCIN HYDROCHLORIDE 250 MG: 1 INJECTION, POWDER, LYOPHILIZED, FOR SOLUTION INTRAVENOUS at 01:56

## 2018-02-25 RX ADMIN — HYDROMORPHONE HYDROCHLORIDE 1 MG: 2 INJECTION INTRAMUSCULAR; INTRAVENOUS; SUBCUTANEOUS at 20:22

## 2018-02-25 RX ADMIN — VANCOMYCIN HYDROCHLORIDE 250 MG: 1 INJECTION, POWDER, LYOPHILIZED, FOR SOLUTION INTRAVENOUS at 20:22

## 2018-02-25 RX ADMIN — SODIUM CHLORIDE 150 ML/HR: 900 INJECTION, SOLUTION INTRAVENOUS at 06:19

## 2018-02-25 RX ADMIN — ONDANSETRON 4 MG: 2 INJECTION INTRAMUSCULAR; INTRAVENOUS at 01:59

## 2018-02-25 RX ADMIN — SODIUM CHLORIDE 150 ML/HR: 900 INJECTION, SOLUTION INTRAVENOUS at 00:11

## 2018-02-25 RX ADMIN — METRONIDAZOLE 500 MG: 500 INJECTION, SOLUTION INTRAVENOUS at 21:02

## 2018-02-25 RX ADMIN — Medication 10 ML: at 11:41

## 2018-02-25 RX ADMIN — ONDANSETRON 4 MG: 2 INJECTION INTRAMUSCULAR; INTRAVENOUS at 20:33

## 2018-02-25 RX ADMIN — HYDROMORPHONE HYDROCHLORIDE 1 MG: 2 INJECTION INTRAMUSCULAR; INTRAVENOUS; SUBCUTANEOUS at 11:39

## 2018-02-25 RX ADMIN — PIPERACILLIN AND TAZOBACTAM 10.12 G: 3; .375 INJECTION, POWDER, FOR SOLUTION INTRAVENOUS at 14:18

## 2018-02-25 RX ADMIN — METRONIDAZOLE 500 MG: 500 INJECTION, SOLUTION INTRAVENOUS at 06:19

## 2018-02-25 RX ADMIN — POTASSIUM & SODIUM PHOSPHATES POWDER PACK 280-160-250 MG 2 PACKET: 280-160-250 PACK at 09:05

## 2018-02-25 RX ADMIN — HYDROMORPHONE HYDROCHLORIDE 1 MG: 2 INJECTION INTRAMUSCULAR; INTRAVENOUS; SUBCUTANEOUS at 01:59

## 2018-02-25 RX ADMIN — SODIUM CHLORIDE 75 ML/HR: 450 INJECTION, SOLUTION INTRAVENOUS at 09:04

## 2018-02-25 RX ADMIN — ONDANSETRON 4 MG: 2 INJECTION INTRAMUSCULAR; INTRAVENOUS at 11:39

## 2018-02-25 RX ADMIN — PIPERACILLIN SODIUM,TAZOBACTAM SODIUM 3.38 G: 3; .375 INJECTION, POWDER, FOR SOLUTION INTRAVENOUS at 12:25

## 2018-02-25 RX ADMIN — VANCOMYCIN HYDROCHLORIDE 250 MG: 1 INJECTION, POWDER, LYOPHILIZED, FOR SOLUTION INTRAVENOUS at 09:09

## 2018-02-25 NOTE — PROGRESS NOTES
Progress Note    Patient: Delmar Silva MRN: [id number]  SSN: [id number]    YOB: 1955  Age: 61 y.o. Sex: female      Admit Date: 2018        Subjective:     No acute surgical issues. Pt reported pain has improved. Still having diarrhea. Pt also reporting some nausea. WBC trending down. No fevers. Objective:     Visit Vitals    /76 (BP 1 Location: Right arm, BP Patient Position: Sitting)    Pulse 81    Temp 97.9 °F (36.6 °C)    Resp 17    Ht 5' 4\" (1.626 m)    Wt 133 lb 2.5 oz (60.4 kg)    SpO2 100%    BMI 22.86 kg/m2       Temp (24hrs), Av.2 °F (36.8 °C), Min:97.9 °F (36.6 °C), Max:99 °F (37.2 °C)        Physical Exam:    Gen:  NAD  Pulm:  Unlabored  Abd:  S/mildly distended/mild TTP without guarding or rebound    Recent Results (from the past 24 hour(s))   MAGNESIUM    Collection Time: 18 12:21 PM   Result Value Ref Range    Magnesium 1.6 1.6 - 2.4 mg/dL   LACTIC ACID    Collection Time: 18 12:21 PM   Result Value Ref Range    Lactic acid 4.8 (HH) 0.4 - 2.0 MMOL/L   PROTHROMBIN TIME + INR    Collection Time: 18 12:21 PM   Result Value Ref Range    INR 1.3 (H) 0.9 - 1.1      Prothrombin time 12.8 (H) 9.0 - 11.1 sec   CULTURE, BLOOD, PAIRED    Collection Time: 18 12:21 PM   Result Value Ref Range    Special Requests: NO SPECIAL REQUESTS      Culture result: NO GROWTH AFTER 16 HOURS     LIPASE    Collection Time: 18 12:21 PM   Result Value Ref Range    Lipase 49 (L) 73 - 393 U/L   HEPATIC FUNCTION PANEL    Collection Time: 18 12:21 PM   Result Value Ref Range    Protein, total 4.2 (L) 6.4 - 8.2 g/dL    Albumin 1.6 (L) 3.5 - 5.0 g/dL    Globulin 2.6 2.0 - 4.0 g/dL    A-G Ratio 0.6 (L) 1.1 - 2.2      Bilirubin, total 1.7 (H) 0.2 - 1.0 MG/DL    Bilirubin, direct 0.4 (H) 0.0 - 0.2 MG/DL    Alk.  phosphatase 66 45 - 117 U/L    AST (SGOT) 12 (L) 15 - 37 U/L    ALT (SGPT) 16 12 - 78 U/L   TYPE & SCREEN    Collection Time: 18 12:21 PM Result Value Ref Range    Crossmatch Expiration 02/27/2018     ABO/Rh(D) A POSITIVE     Antibody screen NEG    CBC WITH AUTOMATED DIFF    Collection Time: 02/24/18 12:21 PM   Result Value Ref Range    WBC 38.9 (H) 3.6 - 11.0 K/uL    RBC 4.14 3.80 - 5.20 M/uL    HGB 13.3 11.5 - 16.0 g/dL    HCT 38.4 35.0 - 47.0 %    MCV 92.8 80.0 - 99.0 FL    MCH 32.1 26.0 - 34.0 PG    MCHC 34.6 30.0 - 36.5 g/dL    RDW 11.4 (L) 11.5 - 14.5 %    PLATELET 192 091 - 717 K/uL    MPV 11.2 8.9 - 12.9 FL    NRBC 0.0 0  WBC    ABSOLUTE NRBC 0.00 0.00 - 0.01 K/uL    NEUTROPHILS 83 (H) 32 - 75 %    BAND NEUTROPHILS 10 (H) 0 - 6 %    LYMPHOCYTES 2 (L) 12 - 49 %    MONOCYTES 5 5 - 13 %    EOSINOPHILS 0 0 - 7 %    BASOPHILS 0 0 - 1 %    IMMATURE GRANULOCYTES 0 %    ABS. NEUTROPHILS 36.2 (H) 1.8 - 8.0 K/UL    ABS. LYMPHOCYTES 0.8 0.8 - 3.5 K/UL    ABS. MONOCYTES 1.9 (H) 0.0 - 1.0 K/UL    ABS. EOSINOPHILS 0.0 0.0 - 0.4 K/UL    ABS. BASOPHILS 0.0 0.0 - 0.1 K/UL    ABS. IMM. GRANS. 0.0 K/UL    DF MANUAL      RBC COMMENTS OVALOCYTES  PRESENT        WBC COMMENTS Pathology Review Requested      Pathologist review       Leukocytosis with neutrophilia. WBC morphology is unremarkable. No circulating immature cells. Per Dr. Aramis Huitron.    METABOLIC PANEL, BASIC    Collection Time: 02/24/18 12:21 PM   Result Value Ref Range    Sodium 142 136 - 145 mmol/L    Potassium 2.2 (LL) 3.5 - 5.1 mmol/L    Chloride 112 (H) 97 - 108 mmol/L    CO2 17 (L) 21 - 32 mmol/L    Anion gap 13 5 - 15 mmol/L    Glucose 96 65 - 100 mg/dL    BUN 9 6 - 20 MG/DL    Creatinine 0.65 0.55 - 1.02 MG/DL    BUN/Creatinine ratio 14 12 - 20      GFR est AA >60 >60 ml/min/1.73m2    GFR est non-AA >60 >60 ml/min/1.73m2    Calcium 5.6 (LL) 8.5 - 10.1 MG/DL   POC VENOUS BLOOD GAS    Collection Time: 02/24/18 12:21 PM   Result Value Ref Range    Device: ROOM AIR      pH, venous (POC) 7.537 (HH) 7.32 - 7.42      pCO2, venous (POC) 24.0 (L) 41 - 51 MMHG    pO2, venous (POC) <19 (L) 25 - 40 mmHg    HCO3, venous (POC) 20.4 (L) 23.0 - 28.0 MMOL/L    Base deficit, venous (POC) 2 mmol/L    Allens test (POC) N/A      Site OTHER      Specimen type (POC) VENOUS BLOOD     PHOSPHORUS    Collection Time: 02/24/18 12:21 PM   Result Value Ref Range    Phosphorus 1.9 (L) 2.6 - 4.7 MG/DL   EKG, 12 LEAD, INITIAL    Collection Time: 02/24/18 12:55 PM   Result Value Ref Range    Ventricular Rate 79 BPM    Atrial Rate 79 BPM    P-R Interval 152 ms    QRS Duration 74 ms    Q-T Interval 536 ms    QTC Calculation (Bezet) 614 ms    Calculated P Axis 69 degrees    Calculated R Axis 72 degrees    Calculated T Axis 63 degrees    Diagnosis       Normal sinus rhythm  T wave abnormality, consider anterior ischemia  Prolonged QT  No previous ECGs available     LACTIC ACID    Collection Time: 02/24/18  2:42 PM   Result Value Ref Range    Lactic acid 1.5 0.4 - 2.0 MMOL/L   CULTURE, STOOL    Collection Time: 02/24/18  4:34 PM   Result Value Ref Range    Special Requests: NO SPECIAL REQUESTS      Culture result:        NO ROUTINE ENTERIC PATHOGENS ISOLATED INCLUDING SALMONELLA, SHIGELLA, YERSINIA, VIBRIO OR SHIGA TOXIN PRODUCING E. COLI , SO FAR   LACTIC ACID    Collection Time: 02/25/18  2:27 AM   Result Value Ref Range    Lactic acid 1.2 0.4 - 2.0 MMOL/L   METABOLIC PANEL, BASIC    Collection Time: 02/25/18  2:27 AM   Result Value Ref Range    Sodium 145 136 - 145 mmol/L    Potassium 4.1 3.5 - 5.1 mmol/L    Chloride 116 (H) 97 - 108 mmol/L    CO2 20 (L) 21 - 32 mmol/L    Anion gap 9 5 - 15 mmol/L    Glucose 119 (H) 65 - 100 mg/dL    BUN 10 6 - 20 MG/DL    Creatinine 0.55 0.55 - 1.02 MG/DL    BUN/Creatinine ratio 18 12 - 20      GFR est AA >60 >60 ml/min/1.73m2    GFR est non-AA >60 >60 ml/min/1.73m2    Calcium 7.6 (L) 8.5 - 10.1 MG/DL   MAGNESIUM    Collection Time: 02/25/18  2:27 AM   Result Value Ref Range    Magnesium 1.9 1.6 - 2.4 mg/dL   PHOSPHORUS    Collection Time: 02/25/18  2:27 AM   Result Value Ref Range    Phosphorus 2.0 (L) 2.6 - 4.7 MG/DL   HEPATIC FUNCTION PANEL    Collection Time: 02/25/18  2:27 AM   Result Value Ref Range    Protein, total 5.2 (L) 6.4 - 8.2 g/dL    Albumin 1.8 (L) 3.5 - 5.0 g/dL    Globulin 3.4 2.0 - 4.0 g/dL    A-G Ratio 0.5 (L) 1.1 - 2.2      Bilirubin, total 1.1 (H) 0.2 - 1.0 MG/DL    Bilirubin, direct 0.3 (H) 0.0 - 0.2 MG/DL    Alk. phosphatase 84 45 - 117 U/L    AST (SGOT) 18 15 - 37 U/L    ALT (SGPT) 19 12 - 78 U/L   CBC W/O DIFF    Collection Time: 02/25/18  9:18 AM   Result Value Ref Range    WBC 36.8 (H) 3.6 - 11.0 K/uL    RBC 4.23 3.80 - 5.20 M/uL    HGB 13.8 11.5 - 16.0 g/dL    HCT 40.6 35.0 - 47.0 %    MCV 96.0 80.0 - 99.0 FL    MCH 32.6 26.0 - 34.0 PG    MCHC 34.0 30.0 - 36.5 g/dL    RDW 12.0 11.5 - 14.5 %    PLATELET 571 (H) 857 - 400 K/uL    MPV 11.0 8.9 - 12.9 FL    NRBC 0.0 0  WBC    ABSOLUTE NRBC 0.00 0.00 - 0.01 K/uL           Assessment:     Hospital Problems  Never Reviewed          Codes Class Noted POA    * (Principal)Colitis ICD-10-CM: K52.9  ICD-9-CM: 558.9  2/24/2018 Unknown        Severe sepsis ([facility] ICD-10-CM: A41.9, R65.20  ICD-9-CM: 038.9, 995.92  2/24/2018 Unknown        Dehydration ICD-10-CM: E86.0  ICD-9-CM: 276.51  2/24/2018 Unknown        Hypokalemia ICD-10-CM: E87.6  ICD-9-CM: 276.8  2/24/2018 Unknown        Hypocalcemia ICD-10-CM: E83.51  ICD-9-CM: 275.41  2/24/2018 Unknown              Plan/Recommendations/Medical Decision Making:     - Infectious colitis:  Pt is clinically improving. No acute indication for colectomy  - continue antibiotic therapy  - Advance diet when nausea improves. - will follow on periphery.   Please call with questions    Signed By: Ok Hayward MD     February 25, 2018

## 2018-02-25 NOTE — PROGRESS NOTES
Bedside shift change report given to Kallie Galeazzi (oncoming nurse) by Jacob Dalal (offgoing nurse). Report included the following information SBAR, Intake/Output, MAR, Recent Results and Cardiac Rhythm NSR.

## 2018-02-25 NOTE — PROGRESS NOTES
Hospitalist Progress Note      Hospital summary: 61 y.o lady who presents with sepsis due to colitis after taking cefdinir for strep throat. Assessment/Plan:  Severe sepsis without septic shock: (POA) as evidenced by leukocytosis, lactic acidosis, history of fever. Due to colitis. High risk for c diff with recent antibiotic use  -f/u blood cx, stool cx, c diff assay  -continue supportive care with IV fluids and pain control  -continue PO vancomycin and IV metronidazole. Given persistent leukocytosis and lack of symptomatic improvement I will start zosyn empirically. I called the lab and the c diff result should be in later today  -probiotic  -general surgery consulted  -IV Dilaudid PRN for pain     Hypokalemia, hypocalcemia: (POA) albumin 1.6 corrects Ca to 7.7  -replenished; monitor     Lactic acidosis: (POA) due to dehydration and sepsis  -resolved     Hyperbilirubinemia: mildly elevated, predominantly indirect     Right adnexal cyst noted on CT. Also notes ascites and severe hypoalbuminemia. And small right effusion.      Prolonged QTc: 614, in the setting of hypocalcemia and hypokalemia  -repeat ECG today     Reported history of recent strep throat s/p cefdinir. Seems resolved.     Hyperlipidemia:  -continue home statin    Code status: full  DVT prophylaxis: SCDs  Disposition: TBD  ----------------------------------------------    CC: abdominal pain and diarrhea    S: states abd pain is improved but diarrhea is unchanged, no dyspnea, has mild nausea, no emesis     Review of Systems:  Pertinent items are noted in HPI.     O:  Visit Vitals    /59 (BP 1 Location: Right arm, BP Patient Position: At rest)    Pulse 87    Temp 98.1 °F (36.7 °C)    Resp 16    Ht 5' 4\" (1.626 m)    Wt 60.4 kg (133 lb 2.5 oz)    SpO2 97%    BMI 22.86 kg/m2       PHYSICAL EXAM:  Gen: NAD, appears ill  HEENT: anicteric sclerae, pale conjunctiva, oropharynx clear, MM dry  Neck: supple, trachea midline, no adenopathy  Heart: RRR, no MRG, no JVD, no peripheral edema  Lungs: CTA b/l, non-labored respirations  Abd: soft, mild diffuse tenderness most notably in both lower quadrants improved, ND, BS+  Extr: warm  Skin: dry, no rash, normal turgor  Neuro: CN II-XII grossly intact, normal speech, moves all extremities  Psych: normal mood, appropriate affect      Intake/Output Summary (Last 24 hours) at 02/25/18 1125  Last data filed at 02/25/18 0857   Gross per 24 hour   Intake           1037.5 ml   Output             1400 ml   Net           -362.5 ml        Recent labs & imaging reviewed:  Recent Labs      02/25/18   0918  02/24/18   1221   WBC  36.8*  38.9*   HGB  13.8  13.3   HCT  40.6  38.4   PLT  420*  367     Recent Labs      02/25/18   0227  02/24/18   1221   NA  145  142   K  4.1  2.2*   CL  116*  112*   CO2  20*  17*   BUN  10  9   CREA  0.55  0.65   GLU  119*  96   CA  7.6*  5.6*   MG  1.9  1.6   PHOS  2.0*  1.9*     Recent Labs      02/25/18   0227  02/24/18   1221   SGOT  18  12*   ALT  19  16   AP  84  66   TBILI  1.1*  1.7*   TP  5.2*  4.2*   ALB  1.8*  1.6*   GLOB  3.4  2.6   LPSE   --   49*     Recent Labs      02/24/18   1221   INR  1.3*   PTP  12.8*      No results for input(s): FE, TIBC, PSAT, FERR in the last 72 hours. No results found for: FOL, RBCF   No results for input(s): PH, PCO2, PO2 in the last 72 hours. No results for input(s): CPK, CKNDX, TROIQ in the last 72 hours.     No lab exists for component: CPKMB  No results found for: CHOL, CHOLX, CHLST, CHOLV, HDL, LDL, LDLC, DLDLP, TGLX, TRIGL, TRIGP, CHHD, CHHDX  No results found for: GLUCPOC  No results found for: COLOR, APPRN, SPGRU, REFSG, JASON, PROTU, GLUCU, KETU, BILU, UROU, KATHIA, LEUKU, GLUKE, EPSU, BACTU, WBCU, RBCU, CASTS, UCRY    Med list reviewed  Current Facility-Administered Medications   Medication Dose Route Frequency    0.45% sodium chloride infusion  75 mL/hr IntraVENous CONTINUOUS    lactobac ac& [email or web address] (OSCAR Q/RISAQUAD)  1 Cap Oral DAILY    simvastatin (ZOCOR) tablet 20 mg  20 mg Oral QHS    metroNIDAZOLE (FLAGYL) IVPB premix 500 mg  500 mg IntraVENous Q8H    vancomycin 50 mg/mL oral solution (compounded) 250 mg  250 mg Oral Q6H    sodium chloride (NS) flush 5-10 mL  5-10 mL IntraVENous Q8H    sodium chloride (NS) flush 5-10 mL  5-10 mL IntraVENous PRN    ondansetron (ZOFRAN) injection 4 mg  4 mg IntraVENous Q4H PRN    HYDROmorphone (DILAUDID) injection 1 mg  1 mg IntraVENous Q3H PRN    acetaminophen (TYLENOL) tablet 650 mg  650 mg Oral Q6H PRN       Care Plan discussed with:  Patient/Family    Mima Brown MD  Internal Medicine  Date of Service: 2/25/2018

## 2018-02-25 NOTE — PROGRESS NOTES
Primary Nurse Laura Ramsey, RN and Rylan allen RN performed a dual skin assessment on this patient Impairment noted- see wound doc flow sheet (mild erythema around anus). Brandyn score is 20    Bedside and Verbal shift change report given to [employer]. Report included the following information SBAR.

## 2018-02-26 LAB
ATRIAL RATE: 79 BPM
BACTERIA SPEC CULT: NORMAL
C JEJUNI+C COLI AG STL QL: NEGATIVE
CALCULATED P AXIS, ECG09: 69 DEGREES
CALCULATED R AXIS, ECG10: 72 DEGREES
CALCULATED T AXIS, ECG11: 63 DEGREES
DIAGNOSIS, 93000: NORMAL
E COLI SXT1+2 STL IA: NEGATIVE
P-R INTERVAL, ECG05: 152 MS
Q-T INTERVAL, ECG07: 536 MS
QRS DURATION, ECG06: 74 MS
QTC CALCULATION (BEZET), ECG08: 614 MS
SERVICE CMNT-IMP: NORMAL
VENTRICULAR RATE, ECG03: 79 BPM

## 2018-02-26 PROCEDURE — 74011000250 HC RX REV CODE- 250: Performed by: HOSPITALIST

## 2018-02-26 PROCEDURE — 74011250637 HC RX REV CODE- 250/637: Performed by: HOSPITALIST

## 2018-02-26 PROCEDURE — 74011000258 HC RX REV CODE- 258: Performed by: HOSPITALIST

## 2018-02-26 PROCEDURE — 74011250636 HC RX REV CODE- 250/636: Performed by: HOSPITALIST

## 2018-02-26 PROCEDURE — 65270000032 HC RM SEMIPRIVATE

## 2018-02-26 RX ORDER — SODIUM,POTASSIUM PHOSPHATES 280-250MG
2 POWDER IN PACKET (EA) ORAL
Status: COMPLETED | OUTPATIENT
Start: 2018-02-26 | End: 2018-02-26

## 2018-02-26 RX ADMIN — ONDANSETRON 4 MG: 2 INJECTION INTRAMUSCULAR; INTRAVENOUS at 08:36

## 2018-02-26 RX ADMIN — POTASSIUM & SODIUM PHOSPHATES POWDER PACK 280-160-250 MG 2 PACKET: 280-160-250 PACK at 08:36

## 2018-02-26 RX ADMIN — Medication 10 ML: at 14:07

## 2018-02-26 RX ADMIN — VANCOMYCIN HYDROCHLORIDE 500 MG: 1 INJECTION, POWDER, LYOPHILIZED, FOR SOLUTION INTRAVENOUS at 14:16

## 2018-02-26 RX ADMIN — METRONIDAZOLE 500 MG: 500 INJECTION, SOLUTION INTRAVENOUS at 05:08

## 2018-02-26 RX ADMIN — HYDROMORPHONE HYDROCHLORIDE 1 MG: 2 INJECTION INTRAMUSCULAR; INTRAVENOUS; SUBCUTANEOUS at 20:01

## 2018-02-26 RX ADMIN — SIMVASTATIN 20 MG: 20 TABLET, FILM COATED ORAL at 22:21

## 2018-02-26 RX ADMIN — HYDROMORPHONE HYDROCHLORIDE 1 MG: 2 INJECTION INTRAMUSCULAR; INTRAVENOUS; SUBCUTANEOUS at 16:26

## 2018-02-26 RX ADMIN — ONDANSETRON 4 MG: 2 INJECTION INTRAMUSCULAR; INTRAVENOUS at 01:35

## 2018-02-26 RX ADMIN — HYDROMORPHONE HYDROCHLORIDE 1 MG: 2 INJECTION INTRAMUSCULAR; INTRAVENOUS; SUBCUTANEOUS at 12:18

## 2018-02-26 RX ADMIN — METRONIDAZOLE 500 MG: 500 INJECTION, SOLUTION INTRAVENOUS at 22:21

## 2018-02-26 RX ADMIN — ONDANSETRON 4 MG: 2 INJECTION INTRAMUSCULAR; INTRAVENOUS at 23:40

## 2018-02-26 RX ADMIN — HYDROMORPHONE HYDROCHLORIDE 1 MG: 2 INJECTION INTRAMUSCULAR; INTRAVENOUS; SUBCUTANEOUS at 01:35

## 2018-02-26 RX ADMIN — Medication 10 ML: at 22:22

## 2018-02-26 RX ADMIN — VANCOMYCIN HYDROCHLORIDE 500 MG: 1 INJECTION, POWDER, LYOPHILIZED, FOR SOLUTION INTRAVENOUS at 08:36

## 2018-02-26 RX ADMIN — ONDANSETRON 4 MG: 2 INJECTION INTRAMUSCULAR; INTRAVENOUS at 16:26

## 2018-02-26 RX ADMIN — Medication 1 CAPSULE: at 08:36

## 2018-02-26 RX ADMIN — SODIUM CHLORIDE 75 ML/HR: 450 INJECTION, SOLUTION INTRAVENOUS at 14:17

## 2018-02-26 RX ADMIN — METRONIDAZOLE 500 MG: 500 INJECTION, SOLUTION INTRAVENOUS at 14:03

## 2018-02-26 RX ADMIN — ONDANSETRON 4 MG: 2 INJECTION INTRAMUSCULAR; INTRAVENOUS at 12:18

## 2018-02-26 RX ADMIN — HYDROMORPHONE HYDROCHLORIDE 1 MG: 2 INJECTION INTRAMUSCULAR; INTRAVENOUS; SUBCUTANEOUS at 08:36

## 2018-02-26 RX ADMIN — VANCOMYCIN HYDROCHLORIDE 500 MG: 1 INJECTION, POWDER, LYOPHILIZED, FOR SOLUTION INTRAVENOUS at 22:22

## 2018-02-26 RX ADMIN — VANCOMYCIN HYDROCHLORIDE 250 MG: 1 INJECTION, POWDER, LYOPHILIZED, FOR SOLUTION INTRAVENOUS at 01:40

## 2018-02-26 RX ADMIN — ONDANSETRON 4 MG: 2 INJECTION INTRAMUSCULAR; INTRAVENOUS at 20:00

## 2018-02-26 RX ADMIN — HYDROMORPHONE HYDROCHLORIDE 1 MG: 2 INJECTION INTRAMUSCULAR; INTRAVENOUS; SUBCUTANEOUS at 23:41

## 2018-02-26 RX ADMIN — Medication 10 ML: at 05:20

## 2018-02-26 NOTE — CDMP QUERY
The diagnosis of Severe Sepsis (POA) has been documented for your patient. Currently, the documentation does not meet criteria for this diagnosis. Based on the current documentation, the diagnosis of Severe Sepsis (POA)  may be challenged by an external reviewer. Please remember to include the clinical indicators to support this diagnosis. Current Documentation:Severe sepsis without septic shock: (POA) as evidenced by leukocytosis, lactic acidosis, history of fever. Due to colitis    Criteria:    1. Diagnostic Criteria for Severe Sepsis (2012)  Severe Sepsis is sepsis-induced tissue hypoperfusion or organ dysfunction, i.e., any of the following thought to be due to the infection:  sepsis-induced hypotension   lactate above upper limits of normal   urine output <0.5 mL/kg/hr for more than two hours despite adequate fluid resuscitation   acute lung injury with PaO2/FiO2 <250 in the absence of pneumonia as the infection source   acute lung injury with PaO2/FiO2 <200 in the presence of pneumonia as the infection source   creatinine >2.0 mg/dL (176.8 u mol/L)   bilirubin >2 mg/dL (34.2 u mol/L)   platelet count <913,224 u L   coagulopathy (international normalized ratio >1.5)    AND    2. Documented source of infection    Based on the noted clinical findings, the diagnosis of Severe Sepsis may be challenged by an external reviewer.    Please provide documentation to support this diagnosis or rule out this diagnosis    Thank you,    Lindsay Bryson RN, BSN, [place] [address], [facility]  [phone]

## 2018-02-26 NOTE — PROGRESS NOTES
TRANSFER - IN REPORT:    Verbal report received from ana(name) on Abhay Smith  being received from [facility](unit) for routine progression of care      Report consisted of patients Situation, Background, Assessment and   Recommendations(SBAR). Information from the following report(s) SBAR was reviewed with the receiving nurse. Opportunity for questions and clarification was provided.           Primary Nurse Rolan Conde RN and Moy Young RN performed a dual skin assessment on this patient No impairment noted  Brandyn score is 22    Bruising noted to upper extremities

## 2018-02-26 NOTE — PROGRESS NOTES
Bedside shift change report given to eliane (oncoming nurse) by Clarice Boo (offgoing nurse). Report included the following information SBAR, Kardex, MAR and Recent Results.

## 2018-02-26 NOTE — PROGRESS NOTES
Care Management: reviewed chart independently and demographics with . Couple are new to this area just 3 weeks. PCP to be determined. Have [facility] and plan to use [facility].  and wife are supportive of each other. No DME equipment.  will be transport. Emergency Contact is spouse : Laura Mahan: mobile: [phone]. To follow transition of care. Care Management Interventions  PCP Verified by CM: No (new to area by 3 weeks and will be deciding on one before discharge.)  Palliative Care Criteria Met (RRAT>21 & CHF Dx)?: No  Mode of Transport at Discharge: Other (see comment) (TBD / private auto wiht .)  Transition of Care Consult (CM Consult):  Other (TBD / preferably home.)  MyChart Signup: No  Discharge Durable Medical Equipment: No  Physical Therapy Consult: No  Occupational Therapy Consult: No  Speech Therapy Consult: No  Current Support Network: Lives with Spouse  Confirm Follow Up Transport: Family  Plan discussed with Pt/Family/Caregiver: Yes   Resource Information Provided?: No  Discharge Location  Discharge Placement: [address] RN BSN CM

## 2018-02-26 NOTE — CDMP QUERY
There is documentation of the  pt having colitis. After further review can this be further specified as:     => Enterocolitis due to Clostridium difficile (POA), not specified as recurrent in the setting of abd w/ + c-diff test post recent abx for strep requiring po Vanc and IV Flagyl, IV fluids and pain control  => Other explanation of clinical findings  => Unable to determine (no explanation for clinical findings)    The medical record reflects the following:    Risk Factors: Recently taking abx for + strep culture     Clinical Indicators: Pt c/o abd pain with watery stools. C-diff culture +. Treatment: PO Vanc, IV Flagyl, IV fluids and pain control    Please clarify and document your clinical opinion in the progress notes and discharge summary including the definitive and/or presumptive diagnosis, (suspected or probable), related to the above clinical findings. Please include clinical findings supporting your diagnosis.     Thank you,    Aditya Menendez RN, BSN, [facility], [address]  [phone]

## 2018-02-26 NOTE — PROGRESS NOTES
Bedside and Verbal shift change report given to Kirby Corbin (oncoming nurse) by Marva No (offgoing nurse). Report included the following information SBAR, ED Summary, Intake/Output, MAR, Accordion, Recent Results, Med Rec Status and Cardiac Rhythm Normal Sinus.

## 2018-02-26 NOTE — PROGRESS NOTES
TRANSFER - OUT REPORT:    Verbal report given to Donna RANDHAWA(name) on Yana Wilkins  being transferred to [facility](unit) for routine progression of care       Report consisted of patients Situation, Background, Assessment and   Recommendations(SBAR). Information from the following report(s) SBAR, Kardex and ED Summary was reviewed with the receiving nurse. Lines:   Peripheral IV 02/24/18 Right Antecubital (Active)   Site Assessment Clean, dry, & intact 2/26/2018  8:00 AM   Phlebitis Assessment 0 2/26/2018  8:00 AM   Infiltration Assessment 0 2/26/2018  8:00 AM   Dressing Status Clean, dry, & intact 2/26/2018  8:00 AM   Dressing Type Transparent 2/26/2018  8:00 AM   Hub Color/Line Status Pink; Infusing 2/26/2018  8:00 AM   Action Taken Open ports on tubing capped 2/26/2018  8:00 AM   Alcohol Cap Used Yes 2/26/2018  8:00 AM        Opportunity for questions and clarification was provided.       Patient transported with:   Tech, IV pole

## 2018-02-26 NOTE — PROGRESS NOTES
Problem: Falls - Risk of  Goal: *Absence of Falls  Document Marquita Fall Risk and appropriate interventions in the flowsheet.    Outcome: Progressing Towards Goal  Fall Risk Interventions:            Medication Interventions: Teach patient to arise slowly

## 2018-02-26 NOTE — PROGRESS NOTES
Hospitalist Progress Note      Hospital summary: 61 y.o lady who presents with sepsis due to colitis after taking cefdinir for strep throat. Assessment/Plan:  Severe sepsis without septic shock: (POA) as evidenced by leukocytosis, lactic acidosis, history of fever. Due to c. diff colitis. High risk for c diff with recent antibiotic use  -c diff (+)  -continue supportive care with IV fluids and pain control  -continue PO vancomycin and IV metronidazole. Increase PO vancomycin dose to 500 mg  -probiotic  -general surgery consulted  -IV Dilaudid PRN for pain  -if no significant improvement by tomorrow will consult ID     Hypokalemia, hypocalcemia, hypophosphatemia: (POA)  -replete phos today     Lactic acidosis: (POA) due to dehydration and sepsis  -resolved     Hyperbilirubinemia: mildly elevated, predominantly indirect     Right adnexal cyst noted on CT. Also notes ascites and severe hypoalbuminemia. And small right effusion.      Prolonged QTc: 614, in the setting of hypocalcemia and hypokalemia. ECG after electrolyte repletion shows improvement of QTc     Reported history of recent strep throat s/p cefdinir. Seems resolved.     Hyperlipidemia:  -continue home statin    Code status: full  DVT prophylaxis: SCDs  Disposition: TBD  ----------------------------------------------    CC: abdominal pain and diarrhea    S: states abd pain is improved as is diarrhea, no dyspnea, has mild nausea, no emesis     Review of Systems:  Pertinent items are noted in HPI.     O:  Visit Vitals    /66 (BP 1 Location: Left arm, BP Patient Position: At rest)    Pulse 93    Temp 98.1 °F (36.7 °C)    Resp 20    Ht 5' 4\" (1.626 m)    Wt 67.9 kg (149 lb 11.1 oz)    SpO2 96%    BMI 25.69 kg/m2       PHYSICAL EXAM:  Gen: NAD  HEENT: anicteric sclerae, pale conjunctiva, oropharynx clear, MM dry  Neck: supple, trachea midline, no adenopathy  Heart: RRR, no MRG, no JVD, no peripheral edema  Lungs: CTA b/l, non-labored respirations  Abd: soft, mild diffuse tenderness most notably in both lower quadrants improved, improved, ND, BS+  Extr: warm  Skin: dry, no rash  Neuro: CN II-XII grossly intact, normal speech, moves all extremities  Psych: normal mood, appropriate affect      Intake/Output Summary (Last 24 hours) at 02/26/18 1026  Last data filed at 02/25/18 1505   Gross per 24 hour   Intake          1051.25 ml   Output                0 ml   Net          1051.25 ml        Recent labs & imaging reviewed:  Recent Labs      02/25/18   0918  02/24/18   1221   WBC  36.8*  38.9*   HGB  13.8  13.3   HCT  40.6  38.4   PLT  420*  367     Recent Labs      02/25/18   0227  02/24/18   1221   NA  145  142   K  4.1  2.2*   CL  116*  112*   CO2  20*  17*   BUN  10  9   CREA  0.55  0.65   GLU  119*  96   CA  7.6*  5.6*   MG  1.9  1.6   PHOS  2.0*  1.9*     Recent Labs      02/25/18   0227  02/24/18   1221   SGOT  18  12*   ALT  19  16   AP  84  66   TBILI  1.1*  1.7*   TP  5.2*  4.2*   ALB  1.8*  1.6*   GLOB  3.4  2.6   LPSE   --   49*     Recent Labs      02/24/18   1221   INR  1.3*   PTP  12.8*      No results for input(s): FE, TIBC, PSAT, FERR in the last 72 hours. No results found for: FOL, RBCF   No results for input(s): PH, PCO2, PO2 in the last 72 hours. No results for input(s): CPK, CKNDX, TROIQ in the last 72 hours.     No lab exists for component: CPKMB  No results found for: CHOL, CHOLX, CHLST, CHOLV, HDL, LDL, LDLC, DLDLP, TGLX, TRIGL, TRIGP, CHHD, CHHDX  No results found for: GLUCPOC  No results found for: COLOR, APPRN, SPGRU, REFSG, JASON, PROTU, GLUCU, KETU, BILU, UROU, KATHIA, LEUKU, GLUKE, EPSU, BACTU, WBCU, RBCU, CASTS, UCRY    Med list reviewed  Current Facility-Administered Medications   Medication Dose Route Frequency    vancomycin 50 mg/mL oral solution (compounded) 500 mg  500 mg Oral Q6H    0.45% sodium chloride infusion  75 mL/hr IntraVENous CONTINUOUS    lactobac ac& pc-s.therm-b.anim (OSCAR Q/RISAQUAD)  1 Cap Oral DAILY    simvastatin (ZOCOR) tablet 20 mg  20 mg Oral QHS    metroNIDAZOLE (FLAGYL) IVPB premix 500 mg  500 mg IntraVENous Q8H    sodium chloride (NS) flush 5-10 mL  5-10 mL IntraVENous Q8H    sodium chloride (NS) flush 5-10 mL  5-10 mL IntraVENous PRN    ondansetron (ZOFRAN) injection 4 mg  4 mg IntraVENous Q4H PRN    HYDROmorphone (DILAUDID) injection 1 mg  1 mg IntraVENous Q3H PRN    acetaminophen (TYLENOL) tablet 650 mg  650 mg Oral Q6H PRN       Care Plan discussed with:  Patient/Family    Joey Velazquez MD  Internal Medicine  Date of Service: 2/26/2018

## 2018-02-26 NOTE — ROUTINE PROCESS
Bedside shift change report given to Froy Gomez (oncoming nurse) by Michael Gracia RN (offgoing nurse). Report included the following information SBAR, ED Summary, Intake/Output, Recent Results, Med Rec Status and Cardiac Rhythm NSR/ sinus tachycardia when moving .

## 2018-02-27 LAB
ALBUMIN SERPL-MCNC: 1.6 G/DL (ref 3.5–5)
ALBUMIN/GLOB SERPL: 0.5 {RATIO} (ref 1.1–2.2)
ALP SERPL-CCNC: 63 U/L (ref 45–117)
ALT SERPL-CCNC: 24 U/L (ref 12–78)
AMYLASE SERPL-CCNC: 53 U/L (ref 25–115)
ANION GAP SERPL CALC-SCNC: 10 MMOL/L (ref 5–15)
ANION GAP SERPL CALC-SCNC: 13 MMOL/L (ref 5–15)
AST SERPL-CCNC: 91 U/L (ref 15–37)
ATRIAL RATE: 103 BPM
ATRIAL RATE: 107 BPM
ATRIAL RATE: 90 BPM
ATRIAL RATE: 95 BPM
BASOPHILS # BLD: 0 K/UL (ref 0–0.1)
BASOPHILS NFR BLD: 0 % (ref 0–1)
BILIRUB SERPL-MCNC: 0.4 MG/DL (ref 0.2–1)
BUN SERPL-MCNC: 16 MG/DL (ref 6–20)
BUN SERPL-MCNC: 17 MG/DL (ref 6–20)
BUN/CREAT SERPL: 23 (ref 12–20)
BUN/CREAT SERPL: 24 (ref 12–20)
CALCIUM SERPL-MCNC: 7.2 MG/DL (ref 8.5–10.1)
CALCIUM SERPL-MCNC: 7.7 MG/DL (ref 8.5–10.1)
CALCULATED P AXIS, ECG09: 40 DEGREES
CALCULATED P AXIS, ECG09: 53 DEGREES
CALCULATED P AXIS, ECG09: 57 DEGREES
CALCULATED P AXIS, ECG09: 59 DEGREES
CALCULATED R AXIS, ECG10: 44 DEGREES
CALCULATED R AXIS, ECG10: 52 DEGREES
CALCULATED R AXIS, ECG10: 68 DEGREES
CALCULATED R AXIS, ECG10: 70 DEGREES
CALCULATED T AXIS, ECG11: 2 DEGREES
CALCULATED T AXIS, ECG11: 55 DEGREES
CALCULATED T AXIS, ECG11: 66 DEGREES
CALCULATED T AXIS, ECG11: 67 DEGREES
CHLORIDE SERPL-SCNC: 106 MMOL/L (ref 97–108)
CHLORIDE SERPL-SCNC: 107 MMOL/L (ref 97–108)
CK MB CFR SERPL CALC: 16.8 % (ref 0–2.5)
CK MB CFR SERPL CALC: 9.4 % (ref 0–2.5)
CK MB SERPL-MCNC: 128.3 NG/ML (ref 5–25)
CK MB SERPL-MCNC: 19 NG/ML (ref 5–25)
CK SERPL-CCNC: 202 U/L (ref 26–192)
CK SERPL-CCNC: 762 U/L (ref 26–192)
CO2 SERPL-SCNC: 18 MMOL/L (ref 21–32)
CO2 SERPL-SCNC: 19 MMOL/L (ref 21–32)
CREAT SERPL-MCNC: 0.68 MG/DL (ref 0.55–1.02)
CREAT SERPL-MCNC: 0.75 MG/DL (ref 0.55–1.02)
D DIMER PPP FEU-MCNC: 8.51 MG/L FEU (ref 0–0.65)
DIAGNOSIS, 93000: NORMAL
DIFFERENTIAL METHOD BLD: ABNORMAL
EOSINOPHIL # BLD: 0 K/UL (ref 0–0.4)
EOSINOPHIL NFR BLD: 0 % (ref 0–7)
ERYTHROCYTE [DISTWIDTH] IN BLOOD BY AUTOMATED COUNT: 12.2 % (ref 11.5–14.5)
ERYTHROCYTE [DISTWIDTH] IN BLOOD BY AUTOMATED COUNT: 12.2 % (ref 11.5–14.5)
GLOBULIN SER CALC-MCNC: 3 G/DL (ref 2–4)
GLUCOSE BLD STRIP.AUTO-MCNC: 110 MG/DL (ref 65–100)
GLUCOSE SERPL-MCNC: 112 MG/DL (ref 65–100)
GLUCOSE SERPL-MCNC: 124 MG/DL (ref 65–100)
HCT VFR BLD AUTO: 37.9 % (ref 35–47)
HCT VFR BLD AUTO: 39.6 % (ref 35–47)
HGB BLD-MCNC: 13 G/DL (ref 11.5–16)
HGB BLD-MCNC: 13.4 G/DL (ref 11.5–16)
IMM GRANULOCYTES # BLD: 0 K/UL
IMM GRANULOCYTES NFR BLD AUTO: 0 %
LIPASE SERPL-CCNC: 85 U/L (ref 73–393)
LYMPHOCYTES # BLD: 1.1 K/UL (ref 0.8–3.5)
LYMPHOCYTES NFR BLD: 4 % (ref 12–49)
MAGNESIUM SERPL-MCNC: 1.8 MG/DL (ref 1.6–2.4)
MAGNESIUM SERPL-MCNC: 1.9 MG/DL (ref 1.6–2.4)
MAGNESIUM SERPL-MCNC: 1.9 MG/DL (ref 1.6–2.4)
MCH RBC QN AUTO: 31.6 PG (ref 26–34)
MCH RBC QN AUTO: 31.9 PG (ref 26–34)
MCHC RBC AUTO-ENTMCNC: 33.8 G/DL (ref 30–36.5)
MCHC RBC AUTO-ENTMCNC: 34.3 G/DL (ref 30–36.5)
MCV RBC AUTO: 92.2 FL (ref 80–99)
MCV RBC AUTO: 94.3 FL (ref 80–99)
METAMYELOCYTES NFR BLD MANUAL: 1 %
MONOCYTES # BLD: 0.8 K/UL (ref 0–1)
MONOCYTES NFR BLD: 3 % (ref 5–13)
NEUTS BAND NFR BLD MANUAL: 5 % (ref 0–6)
NEUTS SEG # BLD: 24.2 K/UL (ref 1.8–8)
NEUTS SEG NFR BLD: 87 % (ref 32–75)
NRBC # BLD: 0 K/UL (ref 0–0.01)
NRBC # BLD: 0 K/UL (ref 0–0.01)
NRBC BLD-RTO: 0 PER 100 WBC
NRBC BLD-RTO: 0 PER 100 WBC
P-R INTERVAL, ECG05: 128 MS
P-R INTERVAL, ECG05: 128 MS
P-R INTERVAL, ECG05: 132 MS
P-R INTERVAL, ECG05: 136 MS
PHOSPHATE SERPL-MCNC: 2.6 MG/DL (ref 2.6–4.7)
PHOSPHATE SERPL-MCNC: 2.9 MG/DL (ref 2.6–4.7)
PLATELET # BLD AUTO: 549 K/UL (ref 150–400)
PLATELET # BLD AUTO: 572 K/UL (ref 150–400)
PMV BLD AUTO: 10.3 FL (ref 8.9–12.9)
PMV BLD AUTO: 10.8 FL (ref 8.9–12.9)
POTASSIUM SERPL-SCNC: 3.5 MMOL/L (ref 3.5–5.1)
POTASSIUM SERPL-SCNC: 3.8 MMOL/L (ref 3.5–5.1)
PROT SERPL-MCNC: 4.6 G/DL (ref 6.4–8.2)
Q-T INTERVAL, ECG07: 364 MS
Q-T INTERVAL, ECG07: 374 MS
Q-T INTERVAL, ECG07: 406 MS
Q-T INTERVAL, ECG07: 412 MS
QRS DURATION, ECG06: 110 MS
QRS DURATION, ECG06: 110 MS
QRS DURATION, ECG06: 76 MS
QRS DURATION, ECG06: 78 MS
QTC CALCULATION (BEZET), ECG08: 476 MS
QTC CALCULATION (BEZET), ECG08: 496 MS
QTC CALCULATION (BEZET), ECG08: 499 MS
QTC CALCULATION (BEZET), ECG08: 517 MS
RBC # BLD AUTO: 4.11 M/UL (ref 3.8–5.2)
RBC # BLD AUTO: 4.2 M/UL (ref 3.8–5.2)
RBC MORPH BLD: ABNORMAL
SERVICE CMNT-IMP: ABNORMAL
SODIUM SERPL-SCNC: 136 MMOL/L (ref 136–145)
SODIUM SERPL-SCNC: 137 MMOL/L (ref 136–145)
TROPONIN I SERPL-MCNC: 2.15 NG/ML
TROPONIN I SERPL-MCNC: 20.3 NG/ML
TSH SERPL DL<=0.05 MIU/L-ACNC: 0.56 UIU/ML (ref 0.36–3.74)
VENTRICULAR RATE, ECG03: 103 BPM
VENTRICULAR RATE, ECG03: 107 BPM
VENTRICULAR RATE, ECG03: 90 BPM
VENTRICULAR RATE, ECG03: 95 BPM
WBC # BLD AUTO: 26.3 K/UL (ref 3.6–11)
WBC # BLD AUTO: 28.7 K/UL (ref 3.6–11)
WBC MORPH BLD: ABNORMAL

## 2018-02-27 PROCEDURE — 74011636320 HC RX REV CODE- 636/320: Performed by: SPECIALIST

## 2018-02-27 PROCEDURE — 85027 COMPLETE CBC AUTOMATED: CPT | Performed by: HOSPITALIST

## 2018-02-27 PROCEDURE — 80053 COMPREHEN METABOLIC PANEL: CPT | Performed by: INTERNAL MEDICINE

## 2018-02-27 PROCEDURE — 84100 ASSAY OF PHOSPHORUS: CPT | Performed by: HOSPITALIST

## 2018-02-27 PROCEDURE — 82150 ASSAY OF AMYLASE: CPT | Performed by: INTERNAL MEDICINE

## 2018-02-27 PROCEDURE — 93454 CORONARY ARTERY ANGIO S&I: CPT

## 2018-02-27 PROCEDURE — 77030013797 HC KT TRNSDUC PRSSR EDWD -A

## 2018-02-27 PROCEDURE — 74011000258 HC RX REV CODE- 258: Performed by: HOSPITALIST

## 2018-02-27 PROCEDURE — B2111ZZ FLUOROSCOPY OF MULTIPLE CORONARY ARTERIES USING LOW OSMOLAR CONTRAST: ICD-10-PCS | Performed by: SPECIALIST

## 2018-02-27 PROCEDURE — 85025 COMPLETE CBC W/AUTO DIFF WBC: CPT | Performed by: INTERNAL MEDICINE

## 2018-02-27 PROCEDURE — 77030039046 HC PAD DEFIB RADIOTRNSPNT CNMD -B

## 2018-02-27 PROCEDURE — 74011000250 HC RX REV CODE- 250: Performed by: HOSPITALIST

## 2018-02-27 PROCEDURE — 85379 FIBRIN DEGRADATION QUANT: CPT | Performed by: INTERNAL MEDICINE

## 2018-02-27 PROCEDURE — 77030004533 HC CATH ANGI DX IMP BSC -B

## 2018-02-27 PROCEDURE — C1725 CATH, TRANSLUMIN NON-LASER: HCPCS

## 2018-02-27 PROCEDURE — C1894 INTRO/SHEATH, NON-LASER: HCPCS

## 2018-02-27 PROCEDURE — 80048 BASIC METABOLIC PNL TOTAL CA: CPT | Performed by: HOSPITALIST

## 2018-02-27 PROCEDURE — 84484 ASSAY OF TROPONIN QUANT: CPT | Performed by: HOSPITALIST

## 2018-02-27 PROCEDURE — 83690 ASSAY OF LIPASE: CPT | Performed by: INTERNAL MEDICINE

## 2018-02-27 PROCEDURE — 83735 ASSAY OF MAGNESIUM: CPT | Performed by: HOSPITALIST

## 2018-02-27 PROCEDURE — 84100 ASSAY OF PHOSPHORUS: CPT | Performed by: INTERNAL MEDICINE

## 2018-02-27 PROCEDURE — 74011000250 HC RX REV CODE- 250

## 2018-02-27 PROCEDURE — 93005 ELECTROCARDIOGRAM TRACING: CPT

## 2018-02-27 PROCEDURE — 74011000258 HC RX REV CODE- 258: Performed by: SPECIALIST

## 2018-02-27 PROCEDURE — 83735 ASSAY OF MAGNESIUM: CPT | Performed by: INTERNAL MEDICINE

## 2018-02-27 PROCEDURE — 74011250637 HC RX REV CODE- 250/637

## 2018-02-27 PROCEDURE — 74011250636 HC RX REV CODE- 250/636: Performed by: SPECIALIST

## 2018-02-27 PROCEDURE — 36415 COLL VENOUS BLD VENIPUNCTURE: CPT | Performed by: INTERNAL MEDICINE

## 2018-02-27 PROCEDURE — 4A023N7 MEASUREMENT OF CARDIAC SAMPLING AND PRESSURE, LEFT HEART, PERCUTANEOUS APPROACH: ICD-10-PCS | Performed by: SPECIALIST

## 2018-02-27 PROCEDURE — 74011000250 HC RX REV CODE- 250: Performed by: SPECIALIST

## 2018-02-27 PROCEDURE — 77030013406 HC CATH CTRL EDWD -B

## 2018-02-27 PROCEDURE — 82550 ASSAY OF CK (CPK): CPT | Performed by: HOSPITALIST

## 2018-02-27 PROCEDURE — 74011250637 HC RX REV CODE- 250/637: Performed by: HOSPITALIST

## 2018-02-27 PROCEDURE — 74011250636 HC RX REV CODE- 250/636: Performed by: NURSE PRACTITIONER

## 2018-02-27 PROCEDURE — 84443 ASSAY THYROID STIM HORMONE: CPT | Performed by: INTERNAL MEDICINE

## 2018-02-27 PROCEDURE — 74011250636 HC RX REV CODE- 250/636: Performed by: HOSPITALIST

## 2018-02-27 PROCEDURE — 3E03317 INTRODUCTION OF OTHER THROMBOLYTIC INTO PERIPHERAL VEIN, PERCUTANEOUS APPROACH: ICD-10-PCS | Performed by: SPECIALIST

## 2018-02-27 PROCEDURE — 74011250637 HC RX REV CODE- 250/637: Performed by: SPECIALIST

## 2018-02-27 PROCEDURE — 027034Z DILATION OF CORONARY ARTERY, ONE ARTERY WITH DRUG-ELUTING INTRALUMINAL DEVICE, PERCUTANEOUS APPROACH: ICD-10-PCS | Performed by: SPECIALIST

## 2018-02-27 PROCEDURE — C1769 GUIDE WIRE: HCPCS

## 2018-02-27 PROCEDURE — 77030013744

## 2018-02-27 PROCEDURE — 77030013715 HC INFL SYS MRTM -B

## 2018-02-27 PROCEDURE — 93306 TTE W/DOPPLER COMPLETE: CPT

## 2018-02-27 PROCEDURE — 77030002996 HC SUT SLK J&J -A

## 2018-02-27 PROCEDURE — 93041 RHYTHM ECG TRACING: CPT

## 2018-02-27 PROCEDURE — 82962 GLUCOSE BLOOD TEST: CPT

## 2018-02-27 PROCEDURE — C1887 CATHETER, GUIDING: HCPCS

## 2018-02-27 PROCEDURE — C1874 STENT, COATED/COV W/DEL SYS: HCPCS

## 2018-02-27 PROCEDURE — 77030032490 HC SLV COMPR SCD KNE COVD -B

## 2018-02-27 PROCEDURE — 65620000000 HC RM CCU GENERAL

## 2018-02-27 RX ORDER — HYDROMORPHONE HYDROCHLORIDE 1 MG/ML
1 INJECTION, SOLUTION INTRAMUSCULAR; INTRAVENOUS; SUBCUTANEOUS
Status: DISCONTINUED | OUTPATIENT
Start: 2018-02-27 | End: 2018-02-28

## 2018-02-27 RX ORDER — SODIUM CHLORIDE 0.9 % (FLUSH) 0.9 %
20 SYRINGE (ML) INJECTION
Status: COMPLETED | OUTPATIENT
Start: 2018-02-27 | End: 2018-02-27

## 2018-02-27 RX ORDER — POTASSIUM CHLORIDE 14.9 MG/ML
10 INJECTION INTRAVENOUS
Status: COMPLETED | OUTPATIENT
Start: 2018-02-27 | End: 2018-02-27

## 2018-02-27 RX ORDER — HEPARIN SODIUM 10000 [USP'U]/100ML
12-25 INJECTION, SOLUTION INTRAVENOUS
Status: DISCONTINUED | OUTPATIENT
Start: 2018-02-27 | End: 2018-03-01

## 2018-02-27 RX ORDER — HEPARIN SODIUM 200 [USP'U]/100ML
2000 INJECTION, SOLUTION INTRAVENOUS ONCE
Status: COMPLETED | OUTPATIENT
Start: 2018-02-27 | End: 2018-02-27

## 2018-02-27 RX ORDER — GUAIFENESIN 100 MG/5ML
81 LIQUID (ML) ORAL DAILY
Status: DISCONTINUED | OUTPATIENT
Start: 2018-02-27 | End: 2018-03-09 | Stop reason: HOSPADM

## 2018-02-27 RX ORDER — HEPARIN SODIUM 1000 [USP'U]/ML
5000 INJECTION, SOLUTION INTRAVENOUS; SUBCUTANEOUS AS NEEDED
Status: DISCONTINUED | OUTPATIENT
Start: 2018-02-27 | End: 2018-02-27

## 2018-02-27 RX ORDER — SODIUM CHLORIDE 9 MG/ML
1.5 INJECTION, SOLUTION INTRAVENOUS CONTINUOUS
Status: DISPENSED | OUTPATIENT
Start: 2018-02-27 | End: 2018-02-27

## 2018-02-27 RX ORDER — PRASUGREL 10 MG/1
10 TABLET, FILM COATED ORAL DAILY
Status: DISCONTINUED | OUTPATIENT
Start: 2018-02-28 | End: 2018-03-09 | Stop reason: HOSPADM

## 2018-02-27 RX ORDER — PRASUGREL 10 MG/1
60 TABLET, FILM COATED ORAL ONCE
Status: DISCONTINUED | OUTPATIENT
Start: 2018-02-27 | End: 2018-02-27

## 2018-02-27 RX ORDER — CLOPIDOGREL 300 MG/1
600 TABLET, FILM COATED ORAL AS NEEDED
Status: DISCONTINUED | OUTPATIENT
Start: 2018-02-27 | End: 2018-02-27

## 2018-02-27 RX ORDER — PHENYLEPHRINE HCL IN 0.9% NACL 0.4MG/10ML
100-400 SYRINGE (ML) INTRAVENOUS ONCE
Status: DISPENSED | OUTPATIENT
Start: 2018-02-27 | End: 2018-02-27

## 2018-02-27 RX ORDER — LIDOCAINE HYDROCHLORIDE 10 MG/ML
5-30 INJECTION INFILTRATION; PERINEURAL AS NEEDED
Status: DISCONTINUED | OUTPATIENT
Start: 2018-02-27 | End: 2018-02-27

## 2018-02-27 RX ORDER — METOPROLOL TARTRATE 25 MG/1
25 TABLET, FILM COATED ORAL EVERY 12 HOURS
Status: DISCONTINUED | OUTPATIENT
Start: 2018-02-27 | End: 2018-02-28

## 2018-02-27 RX ORDER — SODIUM CHLORIDE 9 MG/ML
1.5 INJECTION, SOLUTION INTRAVENOUS CONTINUOUS
Status: DISCONTINUED | OUTPATIENT
Start: 2018-02-27 | End: 2018-02-27 | Stop reason: HOSPADM

## 2018-02-27 RX ORDER — GUAIFENESIN 100 MG/5ML
324 LIQUID (ML) ORAL
Status: COMPLETED | OUTPATIENT
Start: 2018-02-27 | End: 2018-02-27

## 2018-02-27 RX ORDER — BACITRACIN 500 UNIT/G
PACKET (EA) TOPICAL
Status: COMPLETED
Start: 2018-02-27 | End: 2018-02-27

## 2018-02-27 RX ORDER — MIDAZOLAM HYDROCHLORIDE 1 MG/ML
1-10 INJECTION, SOLUTION INTRAMUSCULAR; INTRAVENOUS
Status: DISCONTINUED | OUTPATIENT
Start: 2018-02-27 | End: 2018-02-27

## 2018-02-27 RX ORDER — MAGNESIUM SULFATE HEPTAHYDRATE 40 MG/ML
2 INJECTION, SOLUTION INTRAVENOUS ONCE
Status: COMPLETED | OUTPATIENT
Start: 2018-02-27 | End: 2018-02-27

## 2018-02-27 RX ORDER — NITROGLYCERIN 20 MG/100ML
10 INJECTION INTRAVENOUS CONTINUOUS
Status: DISCONTINUED | OUTPATIENT
Start: 2018-02-27 | End: 2018-03-01

## 2018-02-27 RX ORDER — SODIUM CHLORIDE 0.9 % (FLUSH) 0.9 %
10 SYRINGE (ML) INJECTION AS NEEDED
Status: DISCONTINUED | OUTPATIENT
Start: 2018-02-27 | End: 2018-02-27 | Stop reason: HOSPADM

## 2018-02-27 RX ORDER — ATROPINE SULFATE 0.1 MG/ML
0.4 INJECTION INTRAVENOUS AS NEEDED
Status: DISCONTINUED | OUTPATIENT
Start: 2018-02-27 | End: 2018-02-27

## 2018-02-27 RX ORDER — SODIUM CHLORIDE 9 MG/ML
3 INJECTION, SOLUTION INTRAVENOUS CONTINUOUS
Status: DISCONTINUED | OUTPATIENT
Start: 2018-02-27 | End: 2018-02-27 | Stop reason: HOSPADM

## 2018-02-27 RX ORDER — PRASUGREL 10 MG/1
TABLET, FILM COATED ORAL
Status: COMPLETED
Start: 2018-02-27 | End: 2018-02-27

## 2018-02-27 RX ORDER — FENTANYL CITRATE 50 UG/ML
25-200 INJECTION, SOLUTION INTRAMUSCULAR; INTRAVENOUS
Status: DISCONTINUED | OUTPATIENT
Start: 2018-02-27 | End: 2018-02-27

## 2018-02-27 RX ORDER — METOPROLOL TARTRATE 5 MG/5ML
2.5 INJECTION INTRAVENOUS EVERY 6 HOURS
Status: DISCONTINUED | OUTPATIENT
Start: 2018-02-27 | End: 2018-02-27

## 2018-02-27 RX ADMIN — Medication 10 ML: at 06:30

## 2018-02-27 RX ADMIN — Medication 10 ML: at 21:05

## 2018-02-27 RX ADMIN — SODIUM CHLORIDE 1.5 ML/KG/HR: 900 INJECTION, SOLUTION INTRAVENOUS at 09:25

## 2018-02-27 RX ADMIN — ONDANSETRON 4 MG: 2 INJECTION INTRAMUSCULAR; INTRAVENOUS at 10:05

## 2018-02-27 RX ADMIN — HYDROMORPHONE HYDROCHLORIDE 1 MG: 1 INJECTION, SOLUTION INTRAMUSCULAR; INTRAVENOUS; SUBCUTANEOUS at 23:26

## 2018-02-27 RX ADMIN — Medication 20 ML: at 14:51

## 2018-02-27 RX ADMIN — HYDROMORPHONE HYDROCHLORIDE 1 MG: 2 INJECTION INTRAMUSCULAR; INTRAVENOUS; SUBCUTANEOUS at 04:57

## 2018-02-27 RX ADMIN — LIDOCAINE HYDROCHLORIDE 10 ML: 10 INJECTION, SOLUTION INFILTRATION; PERINEURAL at 08:04

## 2018-02-27 RX ADMIN — SIMVASTATIN 20 MG: 20 TABLET, FILM COATED ORAL at 21:05

## 2018-02-27 RX ADMIN — MIDAZOLAM HYDROCHLORIDE 2 MG: 1 INJECTION, SOLUTION INTRAMUSCULAR; INTRAVENOUS at 08:04

## 2018-02-27 RX ADMIN — BACITRACIN 1 PACKET: 500 OINTMENT TOPICAL at 14:23

## 2018-02-27 RX ADMIN — ONDANSETRON 4 MG: 2 INJECTION INTRAMUSCULAR; INTRAVENOUS at 15:40

## 2018-02-27 RX ADMIN — SODIUM CHLORIDE 75 ML/HR: 450 INJECTION, SOLUTION INTRAVENOUS at 06:26

## 2018-02-27 RX ADMIN — VANCOMYCIN HYDROCHLORIDE 500 MG: 1 INJECTION, POWDER, LYOPHILIZED, FOR SOLUTION INTRAVENOUS at 03:03

## 2018-02-27 RX ADMIN — VANCOMYCIN HYDROCHLORIDE 500 MG: 1 INJECTION, POWDER, LYOPHILIZED, FOR SOLUTION INTRAVENOUS at 15:01

## 2018-02-27 RX ADMIN — METRONIDAZOLE 500 MG: 500 INJECTION, SOLUTION INTRAVENOUS at 21:05

## 2018-02-27 RX ADMIN — IOPAMIDOL 95 ML: 755 INJECTION, SOLUTION INTRAVENOUS at 08:36

## 2018-02-27 RX ADMIN — PRASUGREL HYDROCHLORIDE 60 MG: 10 TABLET, FILM COATED ORAL at 08:36

## 2018-02-27 RX ADMIN — IOPAMIDOL 50 ML: 755 INJECTION, SOLUTION INTRAVENOUS at 08:30

## 2018-02-27 RX ADMIN — METOPROLOL TARTRATE 25 MG: 25 TABLET ORAL at 11:06

## 2018-02-27 RX ADMIN — POTASSIUM CHLORIDE 10 MEQ: 200 INJECTION, SOLUTION INTRAVENOUS at 16:58

## 2018-02-27 RX ADMIN — ASPIRIN 81 MG 81 MG: 81 TABLET ORAL at 06:26

## 2018-02-27 RX ADMIN — MAGNESIUM SULFATE HEPTAHYDRATE 2 G: 40 INJECTION, SOLUTION INTRAVENOUS at 15:43

## 2018-02-27 RX ADMIN — METOPROLOL TARTRATE 25 MG: 25 TABLET ORAL at 21:05

## 2018-02-27 RX ADMIN — FENTANYL CITRATE 25 MCG: 50 INJECTION, SOLUTION INTRAMUSCULAR; INTRAVENOUS at 08:04

## 2018-02-27 RX ADMIN — ASPIRIN 81 MG CHEWABLE TABLET 324 MG: 81 TABLET CHEWABLE at 08:07

## 2018-02-27 RX ADMIN — ONDANSETRON 4 MG: 2 INJECTION INTRAMUSCULAR; INTRAVENOUS at 04:55

## 2018-02-27 RX ADMIN — HYDROMORPHONE HYDROCHLORIDE 1 MG: 2 INJECTION INTRAMUSCULAR; INTRAVENOUS; SUBCUTANEOUS at 09:53

## 2018-02-27 RX ADMIN — SODIUM CHLORIDE 3 ML/KG/HR: 900 INJECTION, SOLUTION INTRAVENOUS at 08:00

## 2018-02-27 RX ADMIN — HYDROMORPHONE HYDROCHLORIDE 1 MG: 1 INJECTION, SOLUTION INTRAMUSCULAR; INTRAVENOUS; SUBCUTANEOUS at 16:11

## 2018-02-27 RX ADMIN — Medication 10 ML: at 14:51

## 2018-02-27 RX ADMIN — METRONIDAZOLE 500 MG: 500 INJECTION, SOLUTION INTRAVENOUS at 14:30

## 2018-02-27 RX ADMIN — SODIUM CHLORIDE 1.5 ML: 9 INJECTION INTRAMUSCULAR; INTRAVENOUS; SUBCUTANEOUS at 11:15

## 2018-02-27 RX ADMIN — POTASSIUM CHLORIDE 10 MEQ: 200 INJECTION, SOLUTION INTRAVENOUS at 18:00

## 2018-02-27 RX ADMIN — SODIUM CHLORIDE 75 ML/HR: 450 INJECTION, SOLUTION INTRAVENOUS at 14:28

## 2018-02-27 RX ADMIN — BIVALIRUDIN 1.75 MG/KG/HR: 250 INJECTION, POWDER, LYOPHILIZED, FOR SOLUTION INTRAVENOUS at 07:51

## 2018-02-27 RX ADMIN — METRONIDAZOLE 500 MG: 500 INJECTION, SOLUTION INTRAVENOUS at 06:26

## 2018-02-27 RX ADMIN — HEPARIN SODIUM 2000 UNITS: 200 INJECTION, SOLUTION INTRAVENOUS at 07:59

## 2018-02-27 RX ADMIN — Medication 1 CAPSULE: at 09:43

## 2018-02-27 RX ADMIN — VANCOMYCIN HYDROCHLORIDE 500 MG: 1 INJECTION, POWDER, LYOPHILIZED, FOR SOLUTION INTRAVENOUS at 11:24

## 2018-02-27 RX ADMIN — VANCOMYCIN HYDROCHLORIDE 500 MG: 1 INJECTION, POWDER, LYOPHILIZED, FOR SOLUTION INTRAVENOUS at 21:05

## 2018-02-27 NOTE — PROGRESS NOTES
Hospitalist Progress Note  Reji Aguirre MD  Answering service: [phone] from in house phone  Cell: [phone]      Date of Service:  2018  NAME:  Mari Vazquez  :  1955  MRN:  [id number]      Admission Summary:   A 61 y.o lady who presents with sepsis due to colitis after taking cefdinir for strep throat. Interval history / Subjective:   No chest pain, had watery diarrhea and abdominal discomfort     Assessment & Plan:     NSTEMI  -cardiologist is consulted and s/p cardiac cath, s/p stent distal RCA  -on aspirin, metoprolol, effient, zocor  -cardiology on board    Severe sepsis without septic shock (POA) as evidenced by leukocytosis, lactic acidosis, history of fever. Due to c. diff colitis. High risk for c diff with recent antibiotic use  -c diff (+)  -continue supportive care with IV fluids and pain control  -continue PO vancomycin and IV metronidazole. Increase PO vancomycin dose to 500 mg  -probiotic  -general surgery consulted  -IV Dilaudid PRN for pain  -if no significant improvement by tomorrow will consult ID     Hypokalemia, hypocalcemia, hypophosphatemia: (POA)  -replete   -repeat electrolytle in am      Lactic acidosis: (POA) due to dehydration and sepsis  -resolved      Hyperbilirubinemia:   -mildly elevated, predominantly indirect      Right adnexal cyst noted on CT. Also notes ascites and severe hypoalbuminemia. And small right effusion.       Prolonged QTc: 614, in the setting of hypocalcemia and hypokalemia. ECG after electrolyte repletion shows improvement of QTc      Reported history of recent strep throat s/p cefdinir.  Seems resolved.      Hyperlipidemia:  -continue home statin      Code status: Full Code  DVT prophylaxis: SCD    Care Plan discussed with: Patient/Family, Nurse and   Disposition: TBD     Hospital Problems  Never Reviewed          Codes Class Noted POA    * (Principal)Colitis ICD-10-CM: K52.9  ICD-9-CM: 558.9  2/24/2018 Unknown        Severe sepsis ([facility] [address] ICD-10-CM: A41.9, R65.20  ICD-9-CM: 038.9, 995.92  2/24/2018 Unknown        Dehydration ICD-10-CM: E86.0  ICD-9-CM: 276.51  2/24/2018 Unknown        Hypokalemia ICD-10-CM: E87.6  ICD-9-CM: 276.8  2/24/2018 Unknown        Hypocalcemia ICD-10-CM: E83.51  ICD-9-CM: 275.41  2/24/2018 Unknown                Vital Signs:    Last 24hrs VS reviewed since prior progress note. Most recent are:  Visit Vitals    /76    Pulse 90    Temp 98.5 °F (36.9 °C)    Resp 26    Ht 5' 4\" (1.626 m)    Wt 69.8 kg (153 lb 12.8 oz)    SpO2 97%    BMI 26.4 kg/m2         Intake/Output Summary (Last 24 hours) at 02/27/18 1719  Last data filed at 02/27/18 1543   Gross per 24 hour   Intake          1173.04 ml   Output                0 ml   Net          1173.04 ml        Physical Examination:             Constitutional:  No acute distress, cooperative, pleasant    ENT:  Oral mucous moist, oropharynx benign. Neck supple,    Resp:  CTA bilaterally. No wheezing/rhonchi/rales. No accessory muscle use   CV:  Regular rhythm, normal rate, no murmurs, gallops, rubs    GI:  Soft, non distended, non tender. normoactive bowel sounds, no hepatosplenomegaly     Musculoskeletal:  No edema    Neurologic:  Moves all extremities.   AAOx3, CN II-XII reviewed     Skin:  Good turgor, no rashes or ulcers       Data Review:    Review and/or order of clinical lab test      Labs:     Recent Labs      02/27/18   1232  02/27/18   0328   WBC  26.3*  28.7*   HGB  13.0  13.4   HCT  37.9  39.6   PLT  549*  572*     Recent Labs      02/27/18   1232  02/27/18   0456  02/27/18   0328  02/25/18   0227   NA  136   --   137  145   K  3.8   --   3.5  4.1   CL  107   --   106  116*   CO2  19*   --   18*  20*   BUN  16   --   17  10   CREA  0.68   --   0.75  0.55   GLU  124*   --   112*  119*   CA  7.2*   --   7.7*  7.6*   MG  1.9  1.9  1.8  1.9   PHOS  2.6   --   2.9  2.0* Recent Labs      02/27/18   1232  02/25/18   0227   SGOT  91*  18   ALT  24  19   AP  63  84   TBILI  0.4  1.1*   TP  4.6*  5.2*   ALB  1.6*  1.8*   GLOB  3.0  3.4   AML  53   --    LPSE  85   --      No results for input(s): INR, PTP, APTT in the last 72 hours. No lab exists for component: INREXT   No results for input(s): FE, TIBC, PSAT, FERR in the last 72 hours. No results found for: FOL, RBCF   No results for input(s): PH, PCO2, PO2 in the last 72 hours.   Recent Labs      02/27/18   1232  02/27/18   0456   CPK  762*  202*   CKNDX  16.8*  9.4*   TROIQ  20.30*  2.15*     No results found for: CHOL, CHOLX, CHLST, CHOLV, HDL, LDL, LDLC, DLDLP, TGLX, TRIGL, TRIGP, CHHD, CHHDX  Lab Results   Component Value Date/Time    Glucose (POC) 110 (H) 02/27/2018 04:49 AM     No results found for: COLOR, APPRN, SPGRU, REFSG, JASON, PROTU, GLUCU, KETU, BILU, UROU, KATHIA, LEUKU, GLUKE, EPSU, BACTU, WBCU, RBCU, CASTS, UCRY      Medications Reviewed:     Current Facility-Administered Medications   Medication Dose Route Frequency    aspirin chewable tablet 81 mg  81 mg Oral DAILY    heparin 25,000 units in D5W 250 ml infusion  12-25 Units/kg/hr IntraVENous TITRATE    nitroglycerin (Tridil) 200 mcg/ml infusion  10 mcg/min IntraVENous CONTINUOUS    PHENYLephrine (NEOSYNEPHRINE) in NS syringe 100-400 mcg  100-400 mcg IntraVENous ONCE    metoprolol tartrate (LOPRESSOR) tablet 25 mg  25 mg Oral Q12H    [START ON 2/28/2018] prasugrel (EFFIENT) tablet 10 mg  10 mg Oral DAILY    HYDROmorphone (PF) (DILAUDID) injection 1 mg  1 mg IntraVENous Q3H PRN    vancomycin 50 mg/mL oral solution (compounded) 500 mg  500 mg Oral Q6H    0.45% sodium chloride infusion  75 mL/hr IntraVENous CONTINUOUS    lactobac ac& pc-s.therm-b.anim (OSCAR Q/RISAQUAD)  1 Cap Oral DAILY    simvastatin (ZOCOR) tablet 20 mg  20 mg Oral QHS    metroNIDAZOLE (FLAGYL) IVPB premix 500 mg  500 mg IntraVENous Q8H    sodium chloride (NS) flush 5-10 mL  5-10 mL IntraVENous Q8H    sodium chloride (NS) flush 5-10 mL  5-10 mL IntraVENous PRN    ondansetron (ZOFRAN) injection 4 mg  4 mg IntraVENous Q4H PRN    acetaminophen (TYLENOL) tablet 650 mg  650 mg Oral Q6H PRN     ______________________________________________________________________  EXPECTED LENGTH OF STAY: 3d 16h  ACTUAL LENGTH OF STAY:          3                 Bradley Soliz MD

## 2018-02-27 NOTE — CONSULTS
Date of  Admission: 2/24/2018 11:29 AM     Oleg Mendez is a 61 y.o. female admitted for Colitis  Subjective: patient with h/o HLD, seen by cardiologist about 15 years ago for ventricular bigeminy recently moved to [facility], presented to [facility] with nausea vomiting and diarrhea, she had some abx for sore throat and then developed above symptoms  She ruled in for cdiff and treated accordingly  Cardiology called for onset of chest pain early this am  Pain is now completely  Subsided  Initially felt like indigestion  Not radiating no similar symptoms in the past  She does not smoke  She has fh for cad in both her parents in their 62s    reports chest pain, chest pressure/discomfort. Cardiac risk factors: family history, dyslipidemia, post-menopausal.    Assessment/Plan: abnormal ECG with NSR ILBBB nstt , cpt previous ILBBB now present , also previous ecg  with AT , in conjunction with her cp and abnormal troponin I suspect underlying CAD of significance   Transfer to CCU/CVSU  Start IV NTG, IV heparin ( no bleeding issues reported) aspirin and IV lopressor  Given occasional N/V iv meds preferred for now  She is currently completely pain free she tells me  she will need cardiac catheterization possibly today  Keep npo  Follow troponin  Obtain echo  Ongoing c diff colitis clearly of concern  abd ct noted as well    Patient Active Problem List    Diagnosis Date Noted    Colitis 02/24/2018    Severe sepsis ([address] 02/24/2018    Dehydration 02/24/2018    Hypokalemia 02/24/2018    Hypocalcemia 02/24/2018      None  Past Medical History:   Diagnosis Date    Hypercholesteremia       Past Surgical History:   Procedure Laterality Date    HX TUBAL LIGATION       No Known Allergies   History reviewed. No pertinent family history.    Current Facility-Administered Medications   Medication Dose Route Frequency    metoprolol (LOPRESSOR) injection 2.5 mg  2.5 mg IntraVENous Q6H    aspirin chewable tablet 81 mg  81 mg Oral DAILY    heparin 25,000 units in D5W 250 ml infusion  12-25 Units/kg/hr IntraVENous TITRATE    nitroglycerin (Tridil) 200 mcg/ml infusion  10 mcg/min IntraVENous CONTINUOUS    vancomycin 50 mg/mL oral solution (compounded) 500 mg  500 mg Oral Q6H    0.45% sodium chloride infusion  75 mL/hr IntraVENous CONTINUOUS    lactobac ac& pc-s.therm-b.anim (OSCAR Q/RISAQUAD)  1 Cap Oral DAILY    simvastatin (ZOCOR) tablet 20 mg  20 mg Oral QHS    metroNIDAZOLE (FLAGYL) IVPB premix 500 mg  500 mg IntraVENous Q8H    sodium chloride (NS) flush 5-10 mL  5-10 mL IntraVENous Q8H    sodium chloride (NS) flush 5-10 mL  5-10 mL IntraVENous PRN    ondansetron (ZOFRAN) injection 4 mg  4 mg IntraVENous Q4H PRN    HYDROmorphone (DILAUDID) injection 1 mg  1 mg IntraVENous Q3H PRN    acetaminophen (TYLENOL) tablet 650 mg  650 mg Oral Q6H PRN         Review of Symptoms:  Pertinent items are noted in HPI. Physical Exam    Visit Vitals    /84    Pulse (!) 110    Temp 97.1 °F (36.2 °C)    Resp 18    Ht 5' 4\" (1.626 m)    Wt 67.9 kg (149 lb 11.1 oz)    SpO2 94%    BMI 25.69 kg/m2     Visit Vitals    /84    Pulse (!) 110    Temp 97.1 °F (36.2 °C)    Resp 18    Ht 5' 4\" (1.626 m)    Wt 67.9 kg (149 lb 11.1 oz)    SpO2 94%    BMI 25.69 kg/m2     General Appearance:  Well developed, well nourished,alert and oriented x 3, and individual in no acute distress. Ears/Nose/Mouth/Throat:   Hearing grossly normal.         Neck: Supple. Chest:   Lungs clear to auscultation bilaterally. Cardiovascular:  Regular rate and rhythm, S1, S2 normal, no murmur. Abdomen:   Soft, non-tender, bowel sounds are active. Extremities: No edema bilaterally. Skin: Warm and dry.                Cardiographics    Telemetry: normal sinus rhythm  ECG: nonspecific ST and T waves changes, sinus tachycardia, LBBB  Echocardiogram: Not done    Recent radiology, intake/output and wt reviewed    Labs:   Lab Results   Component Value Date/Time    WBC 28.7 (H) 02/27/2018 03:28 AM    HGB 13.4 02/27/2018 03:28 AM    HCT 39.6 02/27/2018 03:28 AM    PLATELET 169 (H) 82/43/0971 03:28 AM    MCV 94.3 02/27/2018 03:28 AM     Lab Results   Component Value Date/Time    Sodium 137 02/27/2018 03:28 AM    Potassium 3.5 02/27/2018 03:28 AM    Chloride 106 02/27/2018 03:28 AM    CO2 18 (L) 02/27/2018 03:28 AM    Anion gap 13 02/27/2018 03:28 AM    Glucose 112 (H) 02/27/2018 03:28 AM    BUN 17 02/27/2018 03:28 AM    Creatinine 0.75 02/27/2018 03:28 AM    BUN/Creatinine ratio 23 (H) 02/27/2018 03:28 AM    GFR est AA >60 02/27/2018 03:28 AM    GFR est non-AA >60 02/27/2018 03:28 AM    Calcium 7.7 (L) 02/27/2018 03:28 AM     Lab Results   Component Value Date/Time     (H) 02/27/2018 04:56 AM    CK - MB 19.0 (H) 02/27/2018 04:56 AM    CK-MB Index 9.4 (H) 02/27/2018 04:56 AM    Troponin-I, Qt. 2.15 (H) 02/27/2018 04:56 AM     Lab Results   Component Value Date/Time    ALT (SGPT) 19 02/25/2018 02:27 AM    AST (SGOT) 18 02/25/2018 02:27 AM    Alk.  phosphatase 84 02/25/2018 02:27 AM    Bilirubin, direct 0.3 (H) 02/25/2018 02:27 AM    Bilirubin, total 1.1 (H) 02/25/2018 02:27 AM

## 2018-02-27 NOTE — PROGRESS NOTES
0845: TRANSFER - OUT REPORT:    Verbal report given to Caden Kim RN (name) on Em Plant  being transferred to CCU (unit) for routine progression of care       Report consisted of patients Situation, Background, Assessment and   Recommendations(SBAR). Information from the following report(s) Procedure Summary, MAR and Cardiac Rhythm ST was reviewed with the receiving nurse. Lines:   Peripheral IV 02/26/18 Left;Mid Forearm (Active)   Site Assessment Clean, dry, & intact 2/27/2018  3:12 AM   Phlebitis Assessment 0 2/26/2018  4:26 PM   Infiltration Assessment 0 2/26/2018  4:26 PM   Dressing Status Clean, dry, & intact 2/27/2018  3:12 AM   Dressing Type Transparent 2/27/2018  3:12 AM   Hub Color/Line Status Yellow; Infusing 2/27/2018  3:12 AM   Action Taken Open ports on tubing capped 2/27/2018  3:12 AM   Alcohol Cap Used Yes 2/27/2018  3:12 AM        Opportunity for questions and clarification was provided.       Patient transported with:   Monitor  O2 @ 2 liters  Registered Nurse

## 2018-02-27 NOTE — INTERDISCIPLINARY ROUNDS
IDR/SLIDR Summary          Patient: Charmaine Escobar MRN: [id number]    Age: 61 y.o. YOB: 1955 Room/Bed: [address] [place] [address]   Admit Diagnosis: Colitis  Principal Diagnosis: Colitis   Goals: No chest pain  Readmission: NO  Quality Measure: SEPSIS  VTE Prophylaxis: Mechanical  Influenza Vaccine screening completed? YES  Pneumococcal Vaccine screening completed? YES  Mobility needs: No   Nutrition plan:Yes  Consults:Case Management    Financial concerns:No  Escalated to CM? YES  RRAT Score: 5   Interventions:H2H  Testing due for pt today?  YES  LOS: 3 days Expected length of stay 3.7 days  Discharge plan: Home   PCP: None  Transportation needs: No    Days before discharge:two or more days before discharge   Discharge disposition: Home    Signed:     Michelle Reyez  2/27/2018  6:22 PM

## 2018-02-27 NOTE — PROGRESS NOTES
S: RRT called for patient because of chest pain. Patient was admitted for evaluation of C-diff associated diarrhea. The chest pain is located  at the center of chest,no radiation, no associated nausea and vomiting, 5/10 in severity.  First set of troponin is positive  O: HEENT: Normocephalic, atraumatic  Chest: Clear breath sounds  Heart: Normal S1 and S2 regular  Abdomen: Soft, non tender, normal BS  CNS: Alert, oriented X 3  Ext: No edema, pulses 2+  A/P : NSTEMI  Will transfer to tele, cardiologist on board  Will place on heparin drip, check amylase, lipase, D-dimer for atypical causes of chest pain

## 2018-02-27 NOTE — PROGRESS NOTES
Problem: Diarrhea (Adult and Pediatrics)  Goal: *Absence of diarrhea  Outcome: Not Progressing Towards Goal  Patient is being treated for cdiff

## 2018-02-27 NOTE — PROGRESS NOTES
Problem: Falls - Risk of  Goal: *Absence of Falls  Document Marquita Fall Risk and appropriate interventions in the flowsheet.    Outcome: Progressing Towards Goal  Fall Risk Interventions:            Medication Interventions: Patient to call before getting OOB, Teach patient to arise slowly    Elimination Interventions: Call light in reach

## 2018-02-27 NOTE — PROGRESS NOTES
0900 patient received from cath lab. States she has chest pain 4/10, states it is tolerable and feels better than prior to going to the cath lab. EKG obtained. 0935 EKG changes noted on the monitor with ST elevation. Suhail Reyes NP at bedside notified of changes. EKG obtained. Dr. Zoraida Berry notified of the changes. CODE STEMI called. 1592 Dr. Zoraida Berry at bedside. Does not think pt needs to return to cath lab at this time. Instructed RNs to keep the sheath in for now and treat pain with Dilaudid. Also stated there was distal embolization and small occlusion he could not fix. Will continue to monitor pt.    1157 Accelerated IVR noted on monitor, pt. Asymptomatic. EKG performed and Dr. Zoraida Berry notified. 1444 Sheath pulled, manual pressure applied for 15 mins. Primary Nurse Darlene Roy and Yousuf Hoyt RN performed a dual skin assessment on this patient No impairment noted  Brandyn score is 18. Pt on total care sport bed. Bedside and Verbal shift change report given to Maida Tatum RN (oncoming nurse) by Lula Millan RN and Yousuf Hoyt RN (offgoing nurse). Report included the following information SBAR, Kardex, Intake/Output, Recent Results and Cardiac Rhythm NSR.

## 2018-02-27 NOTE — PROGRESS NOTES
Approx 0430, pt called out c/o chest pain. Pt assessed and stated it was different from indigestion as experienced yesterday. VS documented; no change in VS. Rapid response called. Troponin was collected/sent, EKG taken(noted BBB by CCU nurse), and placed on 2L NC. Pt also given Zofran and Dilaudid. Pt did begin to feel relief. Dr Andrei Moya called about changes in EKG and TO for cardiology consult placed. Consult called--Dr Ramesh Romero called. Lab called and reported Troponin 2.15 and Dr Andrei Moya updated. Told pt needs telemetry bed. Nursing supervisor made aware. Dr Ramesh Romero visited pt at 36. Awaiting supervisor for placement. 0630, pt seen by nurse practitioner who stated pt is going for heart catheterization; Consent retrieved. Report given to CCU nurse, Moreno Melton at 0730. Pt being prepared for procedure.

## 2018-02-27 NOTE — PROCEDURES
Cardiac Catheterization Procedure Note   Patient: Eloy Echevarria  MRN: [id number]  SSN: [id number]   YOB: 1955 Age: 61 y.o. Sex: female    Date of Procedure: 2/27/2018   Pre-procedure Diagnosis: NSTEMI  Post-procedure Diagnosis: Coronary Artery Disease  Procedure: coronaries, michelle to distal rca  :  Dr. Lacho Verdin MD    Assistant(s):  None  Anesthesia: Moderate Sedation   Estimated Blood Loss: Less than 10 mL   Specimens Removed: None  Findings: left main ok, lad minor irregs, lcx small, occluded distally with faint right to left collaterals. 30% prox rca, 50% mid pda, 85% distal rca treated with 3.25 by 18mm michelle to 10 david.  No lv gram.  Complications: None   Implants:  None  Signed by:  Lacho Verdin MD  2/27/2018  8:55 AM

## 2018-02-27 NOTE — PROGRESS NOTES
0994: TRANSFER - IN REPORT:    Verbal report received from Jayy Tan RN(name) on Elizabeth Days  being received from 6E(unit) for routine progression of care      Report consisted of patients Situation, Background, Assessment and   Recommendations(SBAR). Information from the following report(s) SBAR, Kardex, ED Summary, Procedure Summary, Intake/Output, MAR, Recent Results and Cardiac Rhythm   was reviewed with the receiving nurse. Opportunity for questions and clarification was provided. Assessment completed upon patients arrival to unit and care assumed. 7914: TRANSFER - IN REPORT:    Verbal report received from Yandy Shelby RN (name) on Elizabeth Days  being received from Cath Lab(unit) for routine progression of care      Report consisted of patients Situation, Background, Assessment and   Recommendations(SBAR). Information from the following report(s) SBAR, Kardex, Procedure Summary, MAR and Cardiac Rhythm NSr was reviewed with the receiving nurse. Opportunity for questions and clarification was provided. Assessment completed upon patients arrival to unit and care assumed.

## 2018-02-28 ENCOUNTER — APPOINTMENT (OUTPATIENT)
Dept: GENERAL RADIOLOGY | Age: 63
DRG: 853 | End: 2018-02-28
Attending: NURSE PRACTITIONER
Payer: COMMERCIAL

## 2018-02-28 LAB
ALBUMIN SERPL-MCNC: 1.5 G/DL (ref 3.5–5)
ALBUMIN/GLOB SERPL: 0.5 {RATIO} (ref 1.1–2.2)
ALP SERPL-CCNC: 58 U/L (ref 45–117)
ALT SERPL-CCNC: 35 U/L (ref 12–78)
ANION GAP SERPL CALC-SCNC: 11 MMOL/L (ref 5–15)
AST SERPL-CCNC: 127 U/L (ref 15–37)
BILIRUB SERPL-MCNC: 0.4 MG/DL (ref 0.2–1)
BUN SERPL-MCNC: 19 MG/DL (ref 6–20)
BUN/CREAT SERPL: 28 (ref 12–20)
CALCIUM SERPL-MCNC: 7.3 MG/DL (ref 8.5–10.1)
CHLORIDE SERPL-SCNC: 104 MMOL/L (ref 97–108)
CO2 SERPL-SCNC: 20 MMOL/L (ref 21–32)
CREAT SERPL-MCNC: 0.69 MG/DL (ref 0.55–1.02)
ERYTHROCYTE [DISTWIDTH] IN BLOOD BY AUTOMATED COUNT: 12.1 % (ref 11.5–14.5)
GLOBULIN SER CALC-MCNC: 3 G/DL (ref 2–4)
GLUCOSE SERPL-MCNC: 98 MG/DL (ref 65–100)
HCT VFR BLD AUTO: 40.7 % (ref 35–47)
HGB BLD-MCNC: 13.8 G/DL (ref 11.5–16)
MAGNESIUM SERPL-MCNC: 2.3 MG/DL (ref 1.6–2.4)
MCH RBC QN AUTO: 31.8 PG (ref 26–34)
MCHC RBC AUTO-ENTMCNC: 33.9 G/DL (ref 30–36.5)
MCV RBC AUTO: 93.8 FL (ref 80–99)
NRBC # BLD: 0 K/UL (ref 0–0.01)
NRBC BLD-RTO: 0 PER 100 WBC
PLATELET # BLD AUTO: 550 K/UL (ref 150–400)
PMV BLD AUTO: 10.4 FL (ref 8.9–12.9)
POTASSIUM SERPL-SCNC: 3.8 MMOL/L (ref 3.5–5.1)
PROT SERPL-MCNC: 4.5 G/DL (ref 6.4–8.2)
RBC # BLD AUTO: 4.34 M/UL (ref 3.8–5.2)
SODIUM SERPL-SCNC: 135 MMOL/L (ref 136–145)
TROPONIN I SERPL-MCNC: 29.5 NG/ML
WBC # BLD AUTO: 27 K/UL (ref 3.6–11)

## 2018-02-28 PROCEDURE — 83735 ASSAY OF MAGNESIUM: CPT | Performed by: HOSPITALIST

## 2018-02-28 PROCEDURE — 71045 X-RAY EXAM CHEST 1 VIEW: CPT

## 2018-02-28 PROCEDURE — 80053 COMPREHEN METABOLIC PANEL: CPT | Performed by: HOSPITALIST

## 2018-02-28 PROCEDURE — 74011250636 HC RX REV CODE- 250/636: Performed by: HOSPITALIST

## 2018-02-28 PROCEDURE — 85027 COMPLETE CBC AUTOMATED: CPT | Performed by: HOSPITALIST

## 2018-02-28 PROCEDURE — 74011250637 HC RX REV CODE- 250/637: Performed by: HOSPITALIST

## 2018-02-28 PROCEDURE — 65620000000 HC RM CCU GENERAL

## 2018-02-28 PROCEDURE — 84484 ASSAY OF TROPONIN QUANT: CPT | Performed by: NURSE PRACTITIONER

## 2018-02-28 PROCEDURE — 36415 COLL VENOUS BLD VENIPUNCTURE: CPT | Performed by: NURSE PRACTITIONER

## 2018-02-28 PROCEDURE — 74011000250 HC RX REV CODE- 250: Performed by: HOSPITALIST

## 2018-02-28 PROCEDURE — 74011250637 HC RX REV CODE- 250/637: Performed by: NURSE PRACTITIONER

## 2018-02-28 PROCEDURE — 74011250637 HC RX REV CODE- 250/637: Performed by: SPECIALIST

## 2018-02-28 PROCEDURE — 74011250636 HC RX REV CODE- 250/636: Performed by: PHYSICIAN ASSISTANT

## 2018-02-28 PROCEDURE — 74011000258 HC RX REV CODE- 258: Performed by: HOSPITALIST

## 2018-02-28 RX ORDER — METOPROLOL TARTRATE 25 MG/1
25 TABLET, FILM COATED ORAL EVERY 6 HOURS
Status: DISCONTINUED | OUTPATIENT
Start: 2018-02-28 | End: 2018-03-02

## 2018-02-28 RX ORDER — FUROSEMIDE 10 MG/ML
40 INJECTION INTRAMUSCULAR; INTRAVENOUS 2 TIMES DAILY
Status: DISCONTINUED | OUTPATIENT
Start: 2018-02-28 | End: 2018-02-28

## 2018-02-28 RX ORDER — FUROSEMIDE 10 MG/ML
40 INJECTION INTRAMUSCULAR; INTRAVENOUS 2 TIMES DAILY
Status: DISCONTINUED | OUTPATIENT
Start: 2018-02-28 | End: 2018-03-01

## 2018-02-28 RX ADMIN — HYDROMORPHONE HYDROCHLORIDE 1 MG: 1 INJECTION, SOLUTION INTRAMUSCULAR; INTRAVENOUS; SUBCUTANEOUS at 06:09

## 2018-02-28 RX ADMIN — VANCOMYCIN HYDROCHLORIDE 500 MG: 1 INJECTION, POWDER, LYOPHILIZED, FOR SOLUTION INTRAVENOUS at 02:26

## 2018-02-28 RX ADMIN — Medication 10 ML: at 06:10

## 2018-02-28 RX ADMIN — METOPROLOL TARTRATE 25 MG: 25 TABLET ORAL at 08:30

## 2018-02-28 RX ADMIN — VANCOMYCIN HYDROCHLORIDE 500 MG: 1 INJECTION, POWDER, LYOPHILIZED, FOR SOLUTION INTRAVENOUS at 08:30

## 2018-02-28 RX ADMIN — ONDANSETRON 4 MG: 2 INJECTION INTRAMUSCULAR; INTRAVENOUS at 09:41

## 2018-02-28 RX ADMIN — Medication 10 ML: at 21:36

## 2018-02-28 RX ADMIN — HYDROMORPHONE HYDROCHLORIDE 1 MG: 1 INJECTION, SOLUTION INTRAMUSCULAR; INTRAVENOUS; SUBCUTANEOUS at 13:08

## 2018-02-28 RX ADMIN — ACETAMINOPHEN 650 MG: 325 TABLET, FILM COATED ORAL at 09:41

## 2018-02-28 RX ADMIN — ONDANSETRON 4 MG: 2 INJECTION INTRAMUSCULAR; INTRAVENOUS at 20:30

## 2018-02-28 RX ADMIN — SIMVASTATIN 20 MG: 20 TABLET, FILM COATED ORAL at 21:36

## 2018-02-28 RX ADMIN — Medication 1 CAPSULE: at 08:30

## 2018-02-28 RX ADMIN — Medication 10 ML: at 13:08

## 2018-02-28 RX ADMIN — FUROSEMIDE 40 MG: 10 INJECTION, SOLUTION INTRAMUSCULAR; INTRAVENOUS at 15:09

## 2018-02-28 RX ADMIN — ACETAMINOPHEN 650 MG: 325 TABLET, FILM COATED ORAL at 18:33

## 2018-02-28 RX ADMIN — METRONIDAZOLE 500 MG: 500 INJECTION, SOLUTION INTRAVENOUS at 21:36

## 2018-02-28 RX ADMIN — METOPROLOL TARTRATE 25 MG: 25 TABLET ORAL at 13:08

## 2018-02-28 RX ADMIN — METRONIDAZOLE 500 MG: 500 INJECTION, SOLUTION INTRAVENOUS at 13:08

## 2018-02-28 RX ADMIN — METRONIDAZOLE 500 MG: 500 INJECTION, SOLUTION INTRAVENOUS at 06:10

## 2018-02-28 RX ADMIN — METOPROLOL TARTRATE 25 MG: 25 TABLET ORAL at 20:30

## 2018-02-28 RX ADMIN — PRASUGREL 10 MG: 10 TABLET, FILM COATED ORAL at 08:30

## 2018-02-28 RX ADMIN — VANCOMYCIN HYDROCHLORIDE 500 MG: 1 INJECTION, POWDER, LYOPHILIZED, FOR SOLUTION INTRAVENOUS at 13:08

## 2018-02-28 RX ADMIN — SODIUM CHLORIDE 75 ML/HR: 450 INJECTION, SOLUTION INTRAVENOUS at 09:39

## 2018-02-28 RX ADMIN — ASPIRIN 81 MG 81 MG: 81 TABLET ORAL at 08:31

## 2018-02-28 RX ADMIN — VANCOMYCIN HYDROCHLORIDE 500 MG: 1 INJECTION, POWDER, LYOPHILIZED, FOR SOLUTION INTRAVENOUS at 20:30

## 2018-02-28 NOTE — PROGRESS NOTES
Problem: Falls - Risk of  Goal: *Absence of Falls  Document Marquita Fall Risk and appropriate interventions in the flowsheet.    Outcome: Progressing Towards Goal  Fall Risk Interventions:            Medication Interventions: Evaluate medications/consider consulting pharmacy, Assess postural VS orthostatic hypotension, Patient to call before getting OOB, Teach patient to arise slowly    Elimination Interventions: Call light in reach, Patient to call for help with toileting needs, Toilet paper/wipes in reach

## 2018-02-28 NOTE — PROGRESS NOTES
Cardiology Progress Note            Admit Date: 2/24/2018  Admit Diagnosis: Colitis  Date: 2/28/2018     Time: 10:33 AM    HPI: 61 y.o. Female w/ PMH of HLD, seen by  cardiologist about 15 years ago for ventricular bigeminy recently moved to [facility], presented to [facility] with N&V and diarrhea. Had some abx for sore throat and then developed above symptoms. Found to have Cdiff. Cardiology called for chest pain early a.m. 2/27. Had troponin of 2.15. Has abnormal ECG with NSR  incomplete LBBB. CP had subsided. Pt was to tx to CCU but prior to transfer, Indigestion discomfort returned. EKG changes noted and pt taken to cath lab with SHASHI stent to distal RCA to 85% distal RCA. left main ok, lad minor irregs, lcx small, occluded distally with faint right to left collaterals. 30% prox rca, 50% mid pda,  No lv gram.  Post procedure pt had continued but improved chest indigestion. EKG with transient IVCD. Dr. Rafael Reyes and Dr. Cindi Langley re-evaluated pt- Chest discomfort resolved by afternoon. Post procedure troponin 20. Cardiology following for new acute MI. She does not smoke  She has fh for cad in both her parents in their 62s        Assessment and Plan     1. Anterior wall MI:  AWMI likely as finding of apical hypokinesis on TTE.    -No further chest discomfort/indigestion/pain   -Troponin 29.5 post procedure. No further troponins needed   -Continue ASA, Effient, statin (aware of mildly elevated AST)   -Increase metoprolol to q 6 hours with hold parameters due to elevated HR.   -TTE 2/27: EF 50-55%, dyskinesias of apical wall (Dr. Cindi Langley does not suspect aneurysmal formation of apical wall). -IP consult to cardiac rehab- done    2. SOB with activity:  C/o SOB with turning   -Will check CXR   -had elevated D-dimer but is nonspecific and could be related to active infection/cdiff- will check CXR first before further study.     3. Hypoalbuminemia:  Albumin 1.5    4. Cdiff:  Diarrhea and abdominal discomfort improving.   -management per primary team.    Further plan per Dr. Melissa Belle. I have seen and examined the patient and agree with N.P. assessment. Cardiac wise post mi with no recurrent symptoms  EF preserved with RWMA continue meds needs lower hr increasing bblockers   CXR with b/l pleural effusion moderate  Diarrhea better  Stop IVF + FB>4000 (although difficult to calculate due to diarrhea) hold diuretics for now  Discussed at length with  and patient      Cardiac testing:  LV:  EF 50 % to 55 %. There was dyskinesis, possibly aneurysmal formation of the apical wall(s). Doppler parameters were  consistent with abnormal left ventricular relaxation (grade 1 diastolic  dysfunction). Mild TR    Subjective:   Shay Garcia denies chest pain/pressure indigestion. No palpitations. States that she feels much better than yesterday. Does c/o SOB with turning from side to side, getting on bedpan. No SOB at rest.  States abdominal pain much better than it was. No nausea now. Has not been out of bed yet. Objective:      Physical Exam:                Visit Vitals    /71    Pulse 82    Temp 97.9 °F (36.6 °C)    Resp 16    Ht 5' 4\" (1.626 m)    Wt 69.7 kg (153 lb 10.6 oz)    SpO2 95%    BMI 26.38 kg/m2          General Appearance:   Well developed, well nourished,alert and oriented x 3, and   individual in no acute distress. Ears/Nose/Mouth/Throat:    Hearing grossly normal.         Neck:  Supple. Chest:    Lungs clear to auscultation bilaterally, diminished bases. Cardiovascular:   Regular rate and rhythm, S1, S2 normal, no murmur. Abdomen:    Soft, mild TTP. Extremities:  No edema bilaterally. Skin:  Warm and dry. Telemetry: normal sinus rhythm  Had reperfusion arrhythmias yesterday.            Data Review:    Labs:    Recent Results (from the past 24 hour(s))   TROPONIN I    Collection Time: 02/28/18 2: 27 AM   Result Value Ref Range    Troponin-I, Qt. 29.50 (H) <0.05 ng/mL   MAGNESIUM    Collection Time: 02/28/18  2:27 AM   Result Value Ref Range    Magnesium 2.3 1.6 - 2.4 mg/dL   METABOLIC PANEL, COMPREHENSIVE    Collection Time: 02/28/18  2:27 AM   Result Value Ref Range    Sodium 135 (L) 136 - 145 mmol/L    Potassium 3.8 3.5 - 5.1 mmol/L    Chloride 104 97 - 108 mmol/L    CO2 20 (L) 21 - 32 mmol/L    Anion gap 11 5 - 15 mmol/L    Glucose 98 65 - 100 mg/dL    BUN 19 6 - 20 MG/DL    Creatinine 0.69 0.55 - 1.02 MG/DL    BUN/Creatinine ratio 28 (H) 12 - 20      GFR est AA >60 >60 ml/min/1.73m2    GFR est non-AA >60 >60 ml/min/1.73m2    Calcium 7.3 (L) 8.5 - 10.1 MG/DL    Bilirubin, total 0.4 0.2 - 1.0 MG/DL    ALT (SGPT) 35 12 - 78 U/L    AST (SGOT) 127 (H) 15 - 37 U/L    Alk.  phosphatase 58 45 - 117 U/L    Protein, total 4.5 (L) 6.4 - 8.2 g/dL    Albumin 1.5 (L) 3.5 - 5.0 g/dL    Globulin 3.0 2.0 - 4.0 g/dL    A-G Ratio 0.5 (L) 1.1 - 2.2     CBC W/O DIFF    Collection Time: 02/28/18  2:27 AM   Result Value Ref Range    WBC 27.0 (H) 3.6 - 11.0 K/uL    RBC 4.34 3.80 - 5.20 M/uL    HGB 13.8 11.5 - 16.0 g/dL    HCT 40.7 35.0 - 47.0 %    MCV 93.8 80.0 - 99.0 FL    MCH 31.8 26.0 - 34.0 PG    MCHC 33.9 30.0 - 36.5 g/dL    RDW 12.1 11.5 - 14.5 %    PLATELET 384 (H) 689 - 400 K/uL    MPV 10.4 8.9 - 12.9 FL    NRBC 0.0 0  WBC    ABSOLUTE NRBC 0.00 0.00 - 0.01 K/uL          Radiology:        Current Facility-Administered Medications   Medication Dose Route Frequency    metoprolol tartrate (LOPRESSOR) tablet 25 mg  25 mg Oral Q6H    aspirin chewable tablet 81 mg  81 mg Oral DAILY    heparin 25,000 units in D5W 250 ml infusion  12-25 Units/kg/hr IntraVENous TITRATE    nitroglycerin (Tridil) 200 mcg/ml infusion  10 mcg/min IntraVENous CONTINUOUS    prasugrel (EFFIENT) tablet 10 mg  10 mg Oral DAILY    HYDROmorphone (PF) (DILAUDID) injection 1 mg  1 mg IntraVENous Q3H PRN    prochlorperazine (COMPAZINE) with saline injection 5 mg  5 mg IntraVENous Q6H PRN    vancomycin 50 mg/mL oral solution (compounded) 500 mg  500 mg Oral Q6H    0.45% sodium chloride infusion  75 mL/hr IntraVENous CONTINUOUS    lactobac ac& pc-s.therm-b.anim (OSCAR Q/RISAQUAD)  1 Cap Oral DAILY    simvastatin (ZOCOR) tablet 20 mg  20 mg Oral QHS    metroNIDAZOLE (FLAGYL) IVPB premix 500 mg  500 mg IntraVENous Q8H    sodium chloride (NS) flush 5-10 mL  5-10 mL IntraVENous Q8H    sodium chloride (NS) flush 5-10 mL  5-10 mL IntraVENous PRN    ondansetron (ZOFRAN) injection 4 mg  4 mg IntraVENous Q4H PRN    acetaminophen (TYLENOL) tablet 650 mg  650 mg Oral Q6H PRN          Rachel Haq MD     [facility] [address] [place] [address] 13, [address] [facility] [address]   [address] [facility]   [phone]

## 2018-02-28 NOTE — ADT AUTH CERT NOTES
Patient Demographics        Patient Name [address] Sex  Address Phone       Eyal Zimmerman [phone] Female 1955 [address] [place]  [phone] (Home)  [phone] (Mobile)           CSN:       [id number]           Admit Date: Admit Time Room Bed       2018 11:29 AM 4220 []  []           Attending Providers        Provider Pager From To       Sury Peters DO  18       Fely Davis MD  18       Naye Ramsey MD  18            Emergency Contact(s)        Name Relation Home Work [address] Spouse   [phone]         Utilization Review           Myocardial Infarction - Care Day 4 (2018) by Rebekah Pina        Review Entered Review Status       2018 Completed       Details              Care Day: 4 Care Date: 2018 Level of Care: Telemetry       Guideline Day 1        Level Of Care       (X) ICU, [A] or intermediate care [B] after emergency treatment       (X) Discharge planning              Clinical Status       (X) * Clinical Indications met [C]              Activity       (X) Bed rest       (X) Possible commode privileges              Interventions       (X) ECG, cardiac biomarkers       (X) Fibrinolysis or percutaneous coronary intervention              Medications       (X) Anticoagulation       (X) Aspirin              2018 10:22 AM EST by Kathy Al       Subject: Additional Clinical Information       18:         RRT called for patient because of chest pain. Patient was admitted for evaluation of C-diff associated diarrhea. The chest pain is located at the center of chest,no radiation, no associated nausea and vomiting, 5/10 in severity. First set of troponin is positive. T 98.4; /86; HR 98; RR 25; 98% 2L NCECHO: LEFT VENTRICLE: Size was normal. Systolic function was normal. Ejectionfraction was estimated in the range of 50 % to 55 %.  There was dyskinesis,possibly aneurysmal formation of the apical wall(s). Wall thickness wasnormal. DOPPLER: There was an increased relative contribution of atrialcontraction to ventricular filling. Doppler parameters were consistentwith abnormal left ventricular relaxation (grade 1 diastolic dysfunction). EKG: Sinus tachycardia  Anterior infarct , age undetermined  When compared with ECG of 24-FEB-2018 12:55,  QRS duration has increased  Anterior infarct is now present  ST elevation now present in Lateral leads  Nonspecific T wave abnormality now evident in Inferior leads  T wave inversion no longer evident in Anterior leads  T wave inversion now evident in Lateral leads  QT has shortened CARDIAC CATH Findings: left main ok, lad minor irregs, lcx small, occluded distally with faint right to left collaterals. 30% prox rca, 50% mid pda, 85% distal rca treated with 3.25 by 18mm michelle to 10 david. No lv gram.LABS: ; CKMB 19; CKMB INDEX 9.4; TROPONIN 2.15. MEDS: NS @ 75ML/HR; ASA 81MG PO QD; DILAUDID 1MG IV X4; FLAGYL 500MG IV Q8HRS; ZOFRAN 4MG IV X3; CONTINUOUS HEPARIN GTT                                          * Milestone                  Gastroenteritis - Care Day 2 (2/25/2018) by Coral Romero        Review Entered Review Status       2/28/2018 Completed       Details              Care Day: 2 Care Date: 2/25/2018 Level of Care:        Guideline Day 2        Clinical Status       (X) * Hemodynamic stability       ( ) * Bleeding and vomiting absent or managed       (X) * Fever absent or improved       (X) * Electrolyte levels normal or acceptable for outpatient treatment       ( ) * Discharge plans and education understood              Activity       (X) * Ambulatory              Routes       ( ) * Oral hydration       ( ) * Liquid or advanced diet              Interventions       (X) Electrolytes              2/28/2018 10:16 AM EST by Starla Dupree       Subject: Additional Clinical Information       2/25/18:  Chris Brown 97.9; /76; HR 81; RR 17; 100%         Pt reported pain has improved. Still having diarrhea. Pt also reporting some nausea. WBC trending down. Continue antibiotic therapy.  Advance diet when nausea improves.         LABS: WBC 36.8; ; ; CA 7.6; PHOS 2; TBILI 1.1; TP 5.2; ALBUMIN 1.8         MEDS: 1/2 NACL @ 75ML/HR; FLAGYL 500MG IV Q8HRS; ZOFRAN 4MG IV X3; ZOSYN 3.375G IV X1; NS @ 150ML/HRDILAUDID 1MG IV X4; ZOSYN 10.125G IV X1                                          * Milestone

## 2018-02-28 NOTE — INTERDISCIPLINARY ROUNDS
IDR/SLIDR Summary          Patient: Parveen Bose MRN: [id number]    Age: 61 y.o. YOB: 1955 Room/Bed: [address] [place] [address]   Admit Diagnosis: Colitis  Principal Diagnosis: Colitis   Goals: No chest pain, stable heart rhythm   Readmission: NO  Quality Measure: SEPSIS  VTE Prophylaxis: Mechanical  Influenza Vaccine screening completed? YES  Pneumococcal Vaccine screening completed? YES  Mobility needs: No   Nutrition plan:Yes  Consults:Case Management    Financial concerns:No  Escalated to CM? YES  RRAT Score: 5   Interventions:H2H  Testing due for pt today?  YES  LOS: 3 days Expected length of stay 3.7 days  Discharge plan: Home   PCP: None  Transportation needs: No    Days before discharge:two or more days before discharge   Discharge disposition: Home    Signed:     Valente Sprague RN  2/27/2018  6:22 PM

## 2018-02-28 NOTE — PROGRESS NOTES
0730: Report received from Saul Choudhary RN.     7859: Dr. Shad Dick, cardiology, at bedside. 1930: Bedside shift change report given to Ibis Melchor RN (oncoming nurse) by Luann Howard RN (offgoing nurse). Report included the following information SBAR, Kardex, ED Summary, Procedure Summary, Intake/Output, MAR, Recent Results, Med Rec Status and Cardiac Rhythm NSR.

## 2018-02-28 NOTE — PROGRESS NOTES
Hospitalist Progress Note  Leah Gardner MD  Answering service: [phone] from in house phone  Cell: [phone]      Date of Service:  2018  NAME:  Hank David  :  1955  MRN:  [id number]      Admission Summary:   A 61 y.o lady who presents with sepsis due to colitis after taking cefdinir for strep throat. Interval history / Subjective:   No chest pain, abdominal pain improved, bowel movement frequency improving     Assessment & Plan:     NSTEMI  -cardiologist is consulted and s/p cardiac cath, s/p stent distal RCA  -on aspirin, metoprolol, effient, zocor  -cardiology on board    Severe sepsis without septic shock C Diff Colitis (POA)   -as evidenced by leukocytosis, lactic acidosis, history of fever due to c. diff colitis. High risk for c diff with recent antibiotic use  -c diff (+)  -continue PO vancomycin and IV metronidazole. Increase PO vancomycin dose to 500 mg and probiotic  -Leukocytosis worse on , but clinically some improvement  -continue supportive care with IV fluids and pain control  -she said she feels better, less frequent bowel movment  -general surgery consulted     Hypokalemia, hypocalcemia, hypophosphatemia: (POA)  -replete   -improved      Lactic acidosis: (POA) due to dehydration and sepsis  -resolved      Hyperbilirubinemia:   -mildly elevated, predominantly indirect  -resolved      Right adnexal cyst noted on CT. Also notes ascites and severe hypoalbuminemia. And small right effusion.       Prolonged QTc: 614, in the setting of hypocalcemia and hypokalemia. ECG after electrolyte repletion shows improvement of QTc      Reported history of recent strep throat s/p cefdinir.    Seems resolved.      Hyperlipidemia:  -continue home statin      Code status: Full Code  DVT prophylaxis: SCD    Care Plan discussed with: Patient/Family, Nurse and   Disposition: TBD     Hospital Problems Never Reviewed          Codes Class Noted POA    * (Principal)Colitis ICD-10-CM: K52.9  ICD-9-CM: 558.9  2/24/2018 Unknown        Severe sepsis ([facility] [address] ICD-10-CM: A41.9, R65.20  ICD-9-CM: 038.9, 995.92  2/24/2018 Unknown        Dehydration ICD-10-CM: E86.0  ICD-9-CM: 276.51  2/24/2018 Unknown        Hypokalemia ICD-10-CM: E87.6  ICD-9-CM: 276.8  2/24/2018 Unknown        Hypocalcemia ICD-10-CM: E83.51  ICD-9-CM: 275.41  2/24/2018 Unknown                Vital Signs:    Last 24hrs VS reviewed since prior progress note. Most recent are:  Visit Vitals    /71    Pulse 82    Temp 97.9 °F (36.6 °C)    Resp 16    Ht 5' 4\" (1.626 m)    Wt 69.7 kg (153 lb 10.6 oz)    SpO2 95%    BMI 26.38 kg/m2         Intake/Output Summary (Last 24 hours) at 02/28/18 1501  Last data filed at 02/28/18 0700   Gross per 24 hour   Intake              275 ml   Output                0 ml   Net              275 ml        Physical Examination:             Constitutional:  No acute distress, cooperative, pleasant    ENT:  Oral mucous moist, oropharynx benign. Neck supple,    Resp:  CTA bilaterally. No wheezing/rhonchi/rales. No accessory muscle use   CV:  Regular rhythm, normal rate, no murmurs, gallops, rubs    GI:  Soft, non distended, non tender. normoactive bowel sounds, no hepatosplenomegaly     Musculoskeletal:  No edema    Neurologic:  Moves all extremities.   AAOx3, CN II-XII reviewed     Skin:  Good turgor, no rashes or ulcers       Data Review:    Review and/or order of clinical lab test      Labs:     Recent Labs      02/28/18 0227 02/27/18   1232   WBC  27.0*  26.3*   HGB  13.8  13.0   HCT  40.7  37.9   PLT  550*  549*     Recent Labs      02/28/18 0227 02/27/18   1232  02/27/18   0456  02/27/18   0328   NA  135*  136   --   137   K  3.8  3.8   --   3.5   CL  104  107   --   106   CO2  20*  19*   --   18*   BUN  19  16   --   17   CREA  0.69  0.68   --   0.75   GLU  98  124*   --   112*   CA  7.3*  7.2*   --   7.7*   MG 2.3  1.9  1.9  1.8   PHOS   --   2.6   --   2.9     Recent Labs      02/28/18 0227 02/27/18   1232   SGOT  127*  91*   ALT  35  24   AP  58  63   TBILI  0.4  0.4   TP  4.5*  4.6*   ALB  1.5*  1.6*   GLOB  3.0  3.0   AML   --   53   LPSE   --   85     No results for input(s): INR, PTP, APTT in the last 72 hours. No lab exists for component: INREXT, INREXT   No results for input(s): FE, TIBC, PSAT, FERR in the last 72 hours. No results found for: FOL, RBCF   No results for input(s): PH, PCO2, PO2 in the last 72 hours.   Recent Labs      02/28/18 0227 02/27/18 1232 02/27/18   0456   CPK   --   762*  202*   CKNDX   --   16.8*  9.4*   TROIQ  29.50*  20.30*  2.15*     No results found for: CHOL, CHOLX, CHLST, CHOLV, HDL, LDL, LDLC, DLDLP, TGLX, TRIGL, TRIGP, CHHD, CHHDX  Lab Results   Component Value Date/Time    Glucose (POC) 110 (H) 02/27/2018 04:49 AM     No results found for: COLOR, APPRN, SPGRU, REFSG, JASON, PROTU, GLUCU, KETU, BILU, UROU, KATHIA, LEUKU, GLUKE, EPSU, BACTU, WBCU, RBCU, CASTS, UCRY      Medications Reviewed:     Current Facility-Administered Medications   Medication Dose Route Frequency    metoprolol tartrate (LOPRESSOR) tablet 25 mg  25 mg Oral Q6H    furosemide (LASIX) injection 40 mg  40 mg IntraVENous BID    aspirin chewable tablet 81 mg  81 mg Oral DAILY    heparin 25,000 units in D5W 250 ml infusion  12-25 Units/kg/hr IntraVENous TITRATE    nitroglycerin (Tridil) 200 mcg/ml infusion  10 mcg/min IntraVENous CONTINUOUS    prasugrel (EFFIENT) tablet 10 mg  10 mg Oral DAILY    HYDROmorphone (PF) (DILAUDID) injection 1 mg  1 mg IntraVENous Q3H PRN    prochlorperazine (COMPAZINE) with saline injection 5 mg  5 mg IntraVENous Q6H PRN    vancomycin 50 mg/mL oral solution (compounded) 500 mg  500 mg Oral Q6H    lactobac ac& pc-s.therm-b.anim (OSCAR Q/RISAQUAD)  1 Cap Oral DAILY    simvastatin (ZOCOR) tablet 20 mg  20 mg Oral QHS    metroNIDAZOLE (FLAGYL) IVPB premix 500 mg  500 mg IntraVENous Q8H    sodium chloride (NS) flush 5-10 mL  5-10 mL IntraVENous Q8H    sodium chloride (NS) flush 5-10 mL  5-10 mL IntraVENous PRN    ondansetron (ZOFRAN) injection 4 mg  4 mg IntraVENous Q4H PRN    acetaminophen (TYLENOL) tablet 650 mg  650 mg Oral Q6H PRN     ______________________________________________________________________  EXPECTED LENGTH OF STAY: 3d 16h  ACTUAL LENGTH OF STAY:          4                 Mary Corado MD

## 2018-02-28 NOTE — PROGRESS NOTES
Problem: Falls - Risk of  Goal: *Absence of Falls  Document Marquita Fall Risk and appropriate interventions in the flowsheet. Outcome: Progressing Towards Goal  Fall Risk Interventions:            Medication Interventions: Evaluate medications/consider consulting pharmacy, Patient to call before getting OOB    Elimination Interventions: Call light in reach, Patient to call for help with toileting needs, Toileting schedule/hourly rounds             Problem: Pressure Injury - Risk of  Goal: *Prevention of pressure ulcer  Outcome: Progressing Towards Goal  Patient educated on repositioning every two hours.

## 2018-02-28 NOTE — PROGRESS NOTES
1930: SBAR received from Tish/Michelle RN. 2000: Patient alert and oriented, cath site clean, dry, and intact. Pulses palpable. VSS. Call bell within reach. [id number]:  Patient had uneventful nigh with intermittent abdominal pain which was treated with PRN medication with relief. No chest pain throughout night. 0730: Bedside and Verbal shift change report given to [address] [place] [address]  (oncoming nurse) by Regina Vaz (offgoing nurse). Report included the following information SBAR, Kardex, Intake/Output, MAR and Recent Results.

## 2018-02-28 NOTE — CARDIO/PULMONARY
Cardiac Rehab: MI education folder, with catheterization brochure, to bedside of Yeison Vigil. Educated using teach back method. Reviewed MI diagnosis definition and purpose of intervention. Discussed risk factors for CAD to include the following: family history, elevated BMI, hyperlipidemia, hypertension, diabetes, stress, and smoking. . Discussed Heart Healthy/Low Sodium (2000 mg) diet. Reviewed the importance of medication compliance, the purpose of effient, lopressor, and potential side effects. Discussed follow up appointments with cardiologist, signs and symptoms of angina, and what to report to physician after discharge. Emphasized the value of cardiac rehab. Discussed Cardiac Rehab Program format, benefits, and encouraged enrollment to assist with risk modification and management. She has just moved here and lives closer to [facility] so the contact information will be documented on her AVS.    Yeison Vigil verbalized understanding with questions answered.   Asia Goldstein RN

## 2018-03-01 ENCOUNTER — APPOINTMENT (OUTPATIENT)
Dept: GENERAL RADIOLOGY | Age: 63
DRG: 853 | End: 2018-03-01
Attending: PHYSICIAN ASSISTANT
Payer: COMMERCIAL

## 2018-03-01 ENCOUNTER — APPOINTMENT (OUTPATIENT)
Dept: GENERAL RADIOLOGY | Age: 63
DRG: 853 | End: 2018-03-01
Attending: SPECIALIST
Payer: COMMERCIAL

## 2018-03-01 LAB
ALBUMIN SERPL-MCNC: 1.6 G/DL (ref 3.5–5)
ALBUMIN/GLOB SERPL: 0.6 {RATIO} (ref 1.1–2.2)
ALP SERPL-CCNC: 50 U/L (ref 45–117)
ALT SERPL-CCNC: 25 U/L (ref 12–78)
ANION GAP SERPL CALC-SCNC: 10 MMOL/L (ref 5–15)
AST SERPL-CCNC: 46 U/L (ref 15–37)
ATRIAL RATE: 52 BPM
ATRIAL RATE: 84 BPM
BACTERIA SPEC CULT: NORMAL
BILIRUB SERPL-MCNC: 0.5 MG/DL (ref 0.2–1)
BUN SERPL-MCNC: 18 MG/DL (ref 6–20)
BUN/CREAT SERPL: 28 (ref 12–20)
CALCIUM SERPL-MCNC: 7.2 MG/DL (ref 8.5–10.1)
CALCULATED P AXIS, ECG09: 65 DEGREES
CALCULATED R AXIS, ECG10: -105 DEGREES
CALCULATED R AXIS, ECG10: 82 DEGREES
CALCULATED T AXIS, ECG11: 64 DEGREES
CALCULATED T AXIS, ECG11: 77 DEGREES
CHLORIDE SERPL-SCNC: 106 MMOL/L (ref 97–108)
CO2 SERPL-SCNC: 22 MMOL/L (ref 21–32)
CREAT SERPL-MCNC: 0.64 MG/DL (ref 0.55–1.02)
DIAGNOSIS, 93000: NORMAL
DIAGNOSIS, 93000: NORMAL
ERYTHROCYTE [DISTWIDTH] IN BLOOD BY AUTOMATED COUNT: 12.2 % (ref 11.5–14.5)
GLOBULIN SER CALC-MCNC: 2.7 G/DL (ref 2–4)
GLUCOSE SERPL-MCNC: 99 MG/DL (ref 65–100)
HCT VFR BLD AUTO: 37.3 % (ref 35–47)
HGB BLD-MCNC: 13 G/DL (ref 11.5–16)
MCH RBC QN AUTO: 32.3 PG (ref 26–34)
MCHC RBC AUTO-ENTMCNC: 34.9 G/DL (ref 30–36.5)
MCV RBC AUTO: 92.8 FL (ref 80–99)
NRBC # BLD: 0.02 K/UL (ref 0–0.01)
NRBC BLD-RTO: 0.1 PER 100 WBC
P-R INTERVAL, ECG05: 136 MS
PHOSPHATE SERPL-MCNC: 2.1 MG/DL (ref 2.6–4.7)
PLATELET # BLD AUTO: 543 K/UL (ref 150–400)
PMV BLD AUTO: 10.2 FL (ref 8.9–12.9)
POTASSIUM SERPL-SCNC: 3.2 MMOL/L (ref 3.5–5.1)
PROT SERPL-MCNC: 4.3 G/DL (ref 6.4–8.2)
Q-T INTERVAL, ECG07: 388 MS
Q-T INTERVAL, ECG07: 458 MS
QRS DURATION, ECG06: 144 MS
QRS DURATION, ECG06: 62 MS
QTC CALCULATION (BEZET), ECG08: 458 MS
QTC CALCULATION (BEZET), ECG08: 581 MS
RBC # BLD AUTO: 4.02 M/UL (ref 3.8–5.2)
SERVICE CMNT-IMP: NORMAL
SODIUM SERPL-SCNC: 138 MMOL/L (ref 136–145)
VENTRICULAR RATE, ECG03: 84 BPM
VENTRICULAR RATE, ECG03: 97 BPM
WBC # BLD AUTO: 24.6 K/UL (ref 3.6–11)

## 2018-03-01 PROCEDURE — 74011000250 HC RX REV CODE- 250: Performed by: HOSPITALIST

## 2018-03-01 PROCEDURE — 74011250636 HC RX REV CODE- 250/636: Performed by: HOSPITALIST

## 2018-03-01 PROCEDURE — 65660000000 HC RM CCU STEPDOWN

## 2018-03-01 PROCEDURE — 71045 X-RAY EXAM CHEST 1 VIEW: CPT

## 2018-03-01 PROCEDURE — 74011250636 HC RX REV CODE- 250/636: Performed by: PHYSICIAN ASSISTANT

## 2018-03-01 PROCEDURE — 80053 COMPREHEN METABOLIC PANEL: CPT | Performed by: HOSPITALIST

## 2018-03-01 PROCEDURE — 74011250637 HC RX REV CODE- 250/637: Performed by: NURSE PRACTITIONER

## 2018-03-01 PROCEDURE — 36415 COLL VENOUS BLD VENIPUNCTURE: CPT | Performed by: HOSPITALIST

## 2018-03-01 PROCEDURE — 74011250637 HC RX REV CODE- 250/637: Performed by: SPECIALIST

## 2018-03-01 PROCEDURE — 85027 COMPLETE CBC AUTOMATED: CPT | Performed by: HOSPITALIST

## 2018-03-01 PROCEDURE — 77030020291 HC FLEXSEAL FMS BMS -C

## 2018-03-01 PROCEDURE — 84100 ASSAY OF PHOSPHORUS: CPT | Performed by: NURSE PRACTITIONER

## 2018-03-01 PROCEDURE — 74019 RADEX ABDOMEN 2 VIEWS: CPT

## 2018-03-01 PROCEDURE — 74011250637 HC RX REV CODE- 250/637: Performed by: HOSPITALIST

## 2018-03-01 RX ORDER — SIMETHICONE 80 MG
80 TABLET,CHEWABLE ORAL
Status: DISCONTINUED | OUTPATIENT
Start: 2018-03-01 | End: 2018-03-09 | Stop reason: HOSPADM

## 2018-03-01 RX ORDER — MAGNESIUM SULFATE HEPTAHYDRATE 40 MG/ML
2 INJECTION, SOLUTION INTRAVENOUS ONCE
Status: COMPLETED | OUTPATIENT
Start: 2018-03-01 | End: 2018-03-04

## 2018-03-01 RX ORDER — HYDROMORPHONE HYDROCHLORIDE 1 MG/ML
1 INJECTION, SOLUTION INTRAMUSCULAR; INTRAVENOUS; SUBCUTANEOUS
Status: DISCONTINUED | OUTPATIENT
Start: 2018-03-01 | End: 2018-03-09 | Stop reason: HOSPADM

## 2018-03-01 RX ORDER — POTASSIUM CHLORIDE 14.9 MG/ML
10 INJECTION INTRAVENOUS
Status: DISCONTINUED | OUTPATIENT
Start: 2018-03-01 | End: 2018-03-01

## 2018-03-01 RX ORDER — POTASSIUM CHLORIDE 750 MG/1
40 TABLET, FILM COATED, EXTENDED RELEASE ORAL
Status: COMPLETED | OUTPATIENT
Start: 2018-03-01 | End: 2018-03-01

## 2018-03-01 RX ADMIN — VANCOMYCIN HYDROCHLORIDE 500 MG: 1 INJECTION, POWDER, LYOPHILIZED, FOR SOLUTION INTRAVENOUS at 07:44

## 2018-03-01 RX ADMIN — MAGNESIUM SULFATE HEPTAHYDRATE 2 G: 40 INJECTION, SOLUTION INTRAVENOUS at 07:45

## 2018-03-01 RX ADMIN — VANCOMYCIN HYDROCHLORIDE 500 MG: 1 INJECTION, POWDER, LYOPHILIZED, FOR SOLUTION INTRAVENOUS at 03:00

## 2018-03-01 RX ADMIN — HYDROMORPHONE HYDROCHLORIDE 1 MG: 1 INJECTION, SOLUTION INTRAMUSCULAR; INTRAVENOUS; SUBCUTANEOUS at 07:44

## 2018-03-01 RX ADMIN — SIMVASTATIN 20 MG: 20 TABLET, FILM COATED ORAL at 21:16

## 2018-03-01 RX ADMIN — Medication 10 ML: at 14:43

## 2018-03-01 RX ADMIN — HYDROMORPHONE HYDROCHLORIDE 1 MG: 1 INJECTION, SOLUTION INTRAMUSCULAR; INTRAVENOUS; SUBCUTANEOUS at 12:37

## 2018-03-01 RX ADMIN — METRONIDAZOLE 500 MG: 500 INJECTION, SOLUTION INTRAVENOUS at 14:42

## 2018-03-01 RX ADMIN — HYDROMORPHONE HYDROCHLORIDE 1 MG: 1 INJECTION, SOLUTION INTRAMUSCULAR; INTRAVENOUS; SUBCUTANEOUS at 21:16

## 2018-03-01 RX ADMIN — METOPROLOL TARTRATE 25 MG: 25 TABLET ORAL at 21:16

## 2018-03-01 RX ADMIN — METRONIDAZOLE 500 MG: 500 INJECTION, SOLUTION INTRAVENOUS at 21:16

## 2018-03-01 RX ADMIN — VANCOMYCIN HYDROCHLORIDE 500 MG: 1 INJECTION, POWDER, LYOPHILIZED, FOR SOLUTION INTRAVENOUS at 14:42

## 2018-03-01 RX ADMIN — FUROSEMIDE 40 MG: 10 INJECTION, SOLUTION INTRAMUSCULAR; INTRAVENOUS at 08:00

## 2018-03-01 RX ADMIN — ONDANSETRON 4 MG: 2 INJECTION INTRAMUSCULAR; INTRAVENOUS at 12:37

## 2018-03-01 RX ADMIN — PRASUGREL 10 MG: 10 TABLET, FILM COATED ORAL at 08:00

## 2018-03-01 RX ADMIN — METOPROLOL TARTRATE 25 MG: 25 TABLET ORAL at 03:00

## 2018-03-01 RX ADMIN — Medication 10 ML: at 21:18

## 2018-03-01 RX ADMIN — Medication 1 CAPSULE: at 08:00

## 2018-03-01 RX ADMIN — SIMETHICONE CHEW TAB 80 MG 80 MG: 80 TABLET ORAL at 21:16

## 2018-03-01 RX ADMIN — METOPROLOL TARTRATE 25 MG: 25 TABLET ORAL at 14:42

## 2018-03-01 RX ADMIN — METOPROLOL TARTRATE 25 MG: 25 TABLET ORAL at 07:45

## 2018-03-01 RX ADMIN — POTASSIUM CHLORIDE 40 MEQ: 750 TABLET, EXTENDED RELEASE ORAL at 11:35

## 2018-03-01 RX ADMIN — ASPIRIN 81 MG 81 MG: 81 TABLET ORAL at 08:00

## 2018-03-01 RX ADMIN — ONDANSETRON 4 MG: 2 INJECTION INTRAMUSCULAR; INTRAVENOUS at 17:22

## 2018-03-01 RX ADMIN — VANCOMYCIN HYDROCHLORIDE 500 MG: 1 INJECTION, POWDER, LYOPHILIZED, FOR SOLUTION INTRAVENOUS at 21:16

## 2018-03-01 RX ADMIN — METRONIDAZOLE 500 MG: 500 INJECTION, SOLUTION INTRAVENOUS at 06:48

## 2018-03-01 RX ADMIN — HYDROMORPHONE HYDROCHLORIDE 1 MG: 1 INJECTION, SOLUTION INTRAMUSCULAR; INTRAVENOUS; SUBCUTANEOUS at 17:22

## 2018-03-01 RX ADMIN — Medication 10 ML: at 06:48

## 2018-03-01 NOTE — PROGRESS NOTES
1930 Bedside shift change report given to Ramila Ortega (oncoming nurse) by Christiano Lloyd (offgoing nurse). Report included the following information SBAR, Kardex, Procedure Summary, Intake/Output, MAR, Recent Results and Cardiac Rhythm NSR.    2000 Patient awake and alert. Complaining of nausea. 0100 Patient has stooled 10x since 2000. Called hospitalist. Order for flexiseal.    0500 Patient awake. Complaining of 10/10 pain in abdomen. 0730 Bedside shift change report given to Nery Dueñas (oncoming nurse) by Ramila Ortega (offgoing nurse).  Report included the following information SBAR, Kardex, Procedure Summary, Intake/Output, MAR, Recent Results and Cardiac Rhythm SB / NSR.

## 2018-03-01 NOTE — PROGRESS NOTES
0730 Bedside and Verbal shift change report given to Paulina Nickerson (oncoming nurse) by Lisa Betancourt (offgoing nurse). Report included the following information SBAR, Kardex, ED Summary, Procedure Summary, Intake/Output, MAR, Accordion, Recent Results and Cardiac Rhythm NSR.     1930 Bedside and Verbal shift change report given to Lisa Betancourt (oncoming nurse) by Paulina Nickerson (offgoing nurse). Report included the following information SBAR, Kardex, Procedure Summary, Intake/Output, MAR, Accordion and Recent Results.

## 2018-03-01 NOTE — PROGRESS NOTES
CM continues to follow for transition of care needs. Patient lives with her  Braydon Certain [phone]. PCP appointment will be set up by CM specialist Zohra Arias prior to discharge as patient is new to the area and has no PCP. Patient plans to return home when discharged. Cm remains available to assist with any needs that may arise.

## 2018-03-01 NOTE — PROGRESS NOTES
Hospitalist Progress Note  Naye Ramsey MD  Answering service: [phone] from in house phone  Cell: [phone]      Date of Service:  3/1/2018  NAME:  Yisel Perdomo  :  1955  MRN:  [id number]      Admission Summary:   A 61 y.o lady who presents with sepsis due to colitis after taking cefdinir for strep throat. Interval history / Subjective: Had more frequent diarrhea last night and flex seal placed, patient stated that she feels better, abdominal pain 5/5 with movement     Assessment & Plan:     NSTEMI  -cardiologist is consulted and s/p cardiac cath, s/p stent distal RCA  -on aspirin, metoprolol, effient and zocor  -no chest pain  -cardiology on board    Severe sepsis without septic shock C Diff Colitis (POA)   -as evidenced by leukocytosis, lactic acidosis, history of fever due to c. diff colitis. High risk for c diff with recent antibiotic use  -c diff (+)  -continue PO vancomycin and IV metronidazole and vancomycin dose to 500 mg and probiotic  -Leukocytosis trending down  -continue supportive care with IV fluids and pain control  -she said she feels better, less frequent bowel movment  -general surgery consulted  -Consult ID     Hypokalemia, hypocalcemia, hypophosphatemia: (POA)  -replete   -improved      Lactic acidosis (POA) due to dehydration and sepsis  -resolved      Hyperbilirubinemia:   -mildly elevated, predominantly indirect  -resolved      Right adnexal cyst noted on CT. Also notes ascites and severe hypoalbuminemia. And small right effusion.       Prolonged QTc: 614, in the setting of hypocalcemia and hypokalemia. ECG after electrolyte repletion shows improvement of QTc      Reported history of recent strep throat s/p cefdinir.    Seems resolved.      Hyperlipidemia:  -continue home statin      Code status: Full Code  DVT prophylaxis: SCD    Care Plan discussed with: Patient/Family, Nurse and Case Manager  Disposition: TBD    at [address] [place] [address] Problems  Never Reviewed          Codes Class Noted POA    * (Principal)Colitis ICD-10-CM: K52.9  ICD-9-CM: 558.9  2/24/2018 Unknown        Severe sepsis ([facility] [address] ICD-10-CM: A41.9, R65.20  ICD-9-CM: 038.9, 995.92  2/24/2018 Unknown        Dehydration ICD-10-CM: E86.0  ICD-9-CM: 276.51  2/24/2018 Unknown        Hypokalemia ICD-10-CM: E87.6  ICD-9-CM: 276.8  2/24/2018 Unknown        Hypocalcemia ICD-10-CM: E83.51  ICD-9-CM: 275.41  2/24/2018 Unknown                Vital Signs:    Last 24hrs VS reviewed since prior progress note. Most recent are:  Visit Vitals    /70    Pulse 84    Temp 97.3 °F (36.3 °C)    Resp 17    Ht 5' 4\" (1.626 m)    Wt 70.3 kg (155 lb)    SpO2 94%    BMI 26.61 kg/m2         Intake/Output Summary (Last 24 hours) at 03/01/18 1035  Last data filed at 03/01/18 0803   Gross per 24 hour   Intake              300 ml   Output              450 ml   Net             -150 ml        Physical Examination:             Constitutional:  No acute distress, cooperative, pleasant    ENT:  Oral mucous moist, oropharynx benign. Neck supple,    Resp:  CTA bilaterally. No wheezing/rhonchi/rales. No accessory muscle use   CV:  Regular rhythm, normal rate, no murmurs, gallops, rubs    GI:  Soft, non distended, non tender. normoactive bowel sounds, no hepatosplenomegaly     Musculoskeletal:  No edema    Neurologic:  Moves all extremities.   AAOx3, CN II-XII reviewed     Skin:  Good turgor, no rashes or ulcers       Data Review:    Review and/or order of clinical lab test      Labs:     Recent Labs      03/01/18 0343  02/28/18   0227   WBC  24.6*  27.0*   HGB  13.0  13.8   HCT  37.3  40.7   PLT  543*  550*     Recent Labs      03/01/18   0343  02/28/18   0227  02/27/18   1232  02/27/18   0456  02/27/18   0328   NA  138  135*  136   --   137   K  3.2*  3.8  3.8   --   3.5   CL  106  104  107   --   106   CO2  22  20*  19*   --   18*   BUN  18  19  16 --   17   CREA  0.64  0.69  0.68   --   0.75   GLU  99  98  124*   --   112*   CA  7.2*  7.3*  7.2*   --   7.7*   MG   --   2.3  1.9  1.9  1.8   PHOS   --    --   2.6   --   2.9     Recent Labs      03/01/18   0343  02/28/18 0227 02/27/18   1232   SGOT  46*  127*  91*   ALT  25  35  24   AP  50  58  63   TBILI  0.5  0.4  0.4   TP  4.3*  4.5*  4.6*   ALB  1.6*  1.5*  1.6*   GLOB  2.7  3.0  3.0   AML   --    --   53   LPSE   --    --   85     No results for input(s): INR, PTP, APTT in the last 72 hours. No lab exists for component: INREXT, INREXT   No results for input(s): FE, TIBC, PSAT, FERR in the last 72 hours. No results found for: FOL, RBCF   No results for input(s): PH, PCO2, PO2 in the last 72 hours.   Recent Labs      02/28/18 0227 02/27/18   1232  02/27/18   0456   CPK   --   762*  202*   CKNDX   --   16.8*  9.4*   TROIQ  29.50*  20.30*  2.15*     No results found for: CHOL, CHOLX, CHLST, CHOLV, HDL, LDL, LDLC, DLDLP, TGLX, TRIGL, TRIGP, CHHD, CHHDX  Lab Results   Component Value Date/Time    Glucose (POC) 110 (H) 02/27/2018 04:49 AM     No results found for: COLOR, APPRN, SPGRU, REFSG, JASON, PROTU, GLUCU, KETU, BILU, UROU, KATHIA, LEUKU, GLUKE, EPSU, BACTU, WBCU, RBCU, CASTS, UCRY      Medications Reviewed:     Current Facility-Administered Medications   Medication Dose Route Frequency    HYDROmorphone (PF) (DILAUDID) injection 1 mg  1 mg IntraVENous Q4H PRN    potassium chloride 10 mEq in 50 ml IVPB  10 mEq IntraVENous Q1H    metoprolol tartrate (LOPRESSOR) tablet 25 mg  25 mg Oral Q6H    aspirin chewable tablet 81 mg  81 mg Oral DAILY    prasugrel (EFFIENT) tablet 10 mg  10 mg Oral DAILY    prochlorperazine (COMPAZINE) with saline injection 5 mg  5 mg IntraVENous Q6H PRN    vancomycin 50 mg/mL oral solution (compounded) 500 mg  500 mg Oral Q6H    lactobac ac& pc-s.therm-b.anim (OSCAR Q/RISAQUAD)  1 Cap Oral DAILY    simvastatin (ZOCOR) tablet 20 mg  20 mg Oral QHS    metroNIDAZOLE (FLAGYL) IVPB premix 500 mg  500 mg IntraVENous Q8H    sodium chloride (NS) flush 5-10 mL  5-10 mL IntraVENous Q8H    sodium chloride (NS) flush 5-10 mL  5-10 mL IntraVENous PRN    ondansetron (ZOFRAN) injection 4 mg  4 mg IntraVENous Q4H PRN    acetaminophen (TYLENOL) tablet 650 mg  650 mg Oral Q6H PRN     ______________________________________________________________________  EXPECTED LENGTH OF STAY: 3d 16h  ACTUAL LENGTH OF STAY:          5                 Mcneil Harada, MD

## 2018-03-01 NOTE — PROGRESS NOTES
Problem: Falls - Risk of  Goal: *Absence of Falls  Document Marquita Fall Risk and appropriate interventions in the flowsheet.    Outcome: Progressing Towards Goal  Fall Risk Interventions:            Medication Interventions: Assess postural VS orthostatic hypotension, Evaluate medications/consider consulting pharmacy, Patient to call before getting OOB    Elimination Interventions: Call light in reach, Patient to call for help with toileting needs

## 2018-03-01 NOTE — PROGRESS NOTES
Problem: Falls - Risk of  Goal: *Absence of Falls  Document Marquita Fall Risk and appropriate interventions in the flowsheet.    Outcome: Progressing Towards Goal  Fall Risk Interventions:            Medication Interventions: Evaluate medications/consider consulting pharmacy    Elimination Interventions: Call light in reach

## 2018-03-01 NOTE — PROGRESS NOTES
[facility] Progress Note         NAME:  Amanda Noe   :   1955   MRN:   [id number]     Assessment/Plan:  1. Anterior wall MI:  AWMI likely as finding of apical hypokinesis on TTE. This was likely related to large RCA wrapping around apex with a small LAD                        -No further chest discomfort/indigestion/pain                        -Troponin 29.5 post procedure. No further troponins needed                        -Continue ASA, Effient, statin (AST back to normal )                        -continue q 6 h metoprolol                        -TTE : EF 50-55%, dyskinesias of apical wall                        -IP consult to cardiac rehab- done     2. SOB with activity:  better and much less sob, I suspect related to pleural effusions. Pleural effusions related to low albumin/ascites and ivf. Nonetheless she continues to have frequent diarrhea, to avoid dehydration I will stop diuretics and possible we will need to resume  IVF if po intake not matching output, check cxr again and abd xray also given some abdominal discomfort    3. Hypoalbuminemia:  Albumin 1.6 may need albumin if no improvement soon     4. Cdiff:  Diarrhea and abdominal discomfort still present unfortunately.                        -management per primary team but also Consider ID/GI involvement as well  Obtain abdominal xray to determine if any colon distension  No bloody diarrhea reported  5. Replete k     Subjective:   Cardiac ROS:sob better, no cp or indigestion reported. Review of Systems:   Pertinent items are noted in the History of Present Illness.      Objective:       701 - 1900  In: -   Out: 150 [Urine:150]    1901 -  07  In: 500 [I.V.:500]  Out: 300 [Drains:300]    Telemetry: normal sinus rhythm    Physical Exam:  Visit Vitals    /70    Pulse 84    Temp 97.3 °F (36.3 °C)    Resp 17    Ht 5' 4\" (1.626 m)    Wt 70.3 kg (155 lb)  SpO2 94%    BMI 26.61 kg/m2       Neck: no JVD  Heart: regular rate and rhythm  Lungs: diminished breath sounds R base, L base  Abdomen: abnormal findings:  tenderness mild in the periumbilical area  Extremities: no edema    Additional comments: None    Care Plan discussed with:    Comments   Patient x    Family  x    RN x    Care Manager                    Consultant:        Data Review:     No results for input(s): TNIPOC in the last 72 hours. No lab exists for component: ITNL   Recent Labs      02/28/18 0227 02/27/18   1232  02/27/18 0456   CPK   --   762*  202*   CKMB   --   128.3*  19.0*   TROIQ  29.50*  20.30*  2.15*     Recent Labs      03/01/18   0343  02/28/18 0227 02/27/18 1232 02/27/18 0456  02/27/18 0328   NA  138  135*  136   --   137   K  3.2*  3.8  3.8   --   3.5   CL  106  104  107   --   106   CO2  22  20*  19*   --   18*   BUN  18  19  16   --   17   CREA  0.64  0.69  0.68   --   0.75   GLU  99  98  124*   --   112*   PHOS   --    --   2.6   --   2.9   MG   --   2.3  1.9  1.9  1.8   ALB  1.6*  1.5*  1.6*   --    --    WBC  24.6*  27.0*  26.3*   --   28.7*   HGB  13.0  13.8  13.0   --   13.4   HCT  37.3  40.7  37.9   --   39.6   PLT  543*  550*  549*   --   572*     No results for input(s): INR, PTP, APTT in the last 72 hours.     No lab exists for component: INREXT    Medications reviewed  Current Facility-Administered Medications   Medication Dose Route Frequency    HYDROmorphone (PF) (DILAUDID) injection 1 mg  1 mg IntraVENous Q4H PRN    metoprolol tartrate (LOPRESSOR) tablet 25 mg  25 mg Oral Q6H    aspirin chewable tablet 81 mg  81 mg Oral DAILY    prasugrel (EFFIENT) tablet 10 mg  10 mg Oral DAILY    prochlorperazine (COMPAZINE) with saline injection 5 mg  5 mg IntraVENous Q6H PRN    vancomycin 50 mg/mL oral solution (compounded) 500 mg  500 mg Oral Q6H    lactobac ac& pc-s.therm-b.anim (OSCAR Q/RISAQUAD)  1 Cap Oral DAILY    simvastatin (ZOCOR) tablet 20 mg  20 mg Oral QHS    metroNIDAZOLE (FLAGYL) IVPB premix 500 mg  500 mg IntraVENous Q8H    sodium chloride (NS) flush 5-10 mL  5-10 mL IntraVENous Q8H    sodium chloride (NS) flush 5-10 mL  5-10 mL IntraVENous PRN    ondansetron (ZOFRAN) injection 4 mg  4 mg IntraVENous Q4H PRN    acetaminophen (TYLENOL) tablet 650 mg  650 mg Oral Q6H PRN       Data Reviewed: current meds, labs,recent radiology, intake/output/weight and problem list reviewed    Rachel Haq MD

## 2018-03-01 NOTE — INTERDISCIPLINARY ROUNDS
IDR/SLIDR Summary          Patient: Amanda Noe MRN: [id number]    Age: 61 y.o. YOB: 1955 Room/Bed: [address] [place] [address]   Admit Diagnosis: Colitis  Principal Diagnosis: Colitis   Goals: No chest pain, stable heart rhythm   Readmission: NO  Quality Measure: SEPSIS  VTE Prophylaxis: Mechanical  Influenza Vaccine screening completed? YES  Pneumococcal Vaccine screening completed? YES  Mobility needs: No   Nutrition plan:Yes  Consults:Case Management    Financial concerns:No  Escalated to CM? YES  RRAT Score: 5   Interventions:H2H  Testing due for pt today?  YES  LOS: 4 days Expected length of stay 3.7 days  Discharge plan: Home   PCP: None  Transportation needs: No    Days before discharge:two or more days before discharge   Discharge disposition: Home    Signed:     Baldemar Gomez RN  2/28/2018  6:22 PM

## 2018-03-02 ENCOUNTER — APPOINTMENT (OUTPATIENT)
Dept: CT IMAGING | Age: 63
DRG: 853 | End: 2018-03-02
Attending: HOSPITALIST
Payer: COMMERCIAL

## 2018-03-02 LAB
ALBUMIN SERPL-MCNC: 1.5 G/DL (ref 3.5–5)
ALBUMIN/GLOB SERPL: 0.5 {RATIO} (ref 1.1–2.2)
ALP SERPL-CCNC: 44 U/L (ref 45–117)
ALT SERPL-CCNC: 21 U/L (ref 12–78)
ANION GAP SERPL CALC-SCNC: 8 MMOL/L (ref 5–15)
AST SERPL-CCNC: 36 U/L (ref 15–37)
ATRIAL RATE: 73 BPM
BILIRUB SERPL-MCNC: 0.4 MG/DL (ref 0.2–1)
BUN SERPL-MCNC: 20 MG/DL (ref 6–20)
BUN/CREAT SERPL: 35 (ref 12–20)
CALCIUM SERPL-MCNC: 7.4 MG/DL (ref 8.5–10.1)
CALCULATED P AXIS, ECG09: 39 DEGREES
CALCULATED R AXIS, ECG10: 62 DEGREES
CALCULATED T AXIS, ECG11: 66 DEGREES
CHLORIDE SERPL-SCNC: 104 MMOL/L (ref 97–108)
CO2 SERPL-SCNC: 24 MMOL/L (ref 21–32)
CREAT SERPL-MCNC: 0.57 MG/DL (ref 0.55–1.02)
DIAGNOSIS, 93000: NORMAL
ERYTHROCYTE [DISTWIDTH] IN BLOOD BY AUTOMATED COUNT: 12.3 % (ref 11.5–14.5)
GLOBULIN SER CALC-MCNC: 3 G/DL (ref 2–4)
GLUCOSE SERPL-MCNC: 118 MG/DL (ref 65–100)
HCT VFR BLD AUTO: 38 % (ref 35–47)
HGB BLD-MCNC: 13 G/DL (ref 11.5–16)
MAGNESIUM SERPL-MCNC: 2.2 MG/DL (ref 1.6–2.4)
MCH RBC QN AUTO: 31.5 PG (ref 26–34)
MCHC RBC AUTO-ENTMCNC: 34.2 G/DL (ref 30–36.5)
MCV RBC AUTO: 92 FL (ref 80–99)
NRBC # BLD: 0 K/UL (ref 0–0.01)
NRBC BLD-RTO: 0 PER 100 WBC
P-R INTERVAL, ECG05: 140 MS
PLATELET # BLD AUTO: 610 K/UL (ref 150–400)
PMV BLD AUTO: 10.2 FL (ref 8.9–12.9)
POTASSIUM SERPL-SCNC: 3.5 MMOL/L (ref 3.5–5.1)
PROT SERPL-MCNC: 4.5 G/DL (ref 6.4–8.2)
Q-T INTERVAL, ECG07: 446 MS
QRS DURATION, ECG06: 72 MS
QTC CALCULATION (BEZET), ECG08: 491 MS
RBC # BLD AUTO: 4.13 M/UL (ref 3.8–5.2)
SODIUM SERPL-SCNC: 136 MMOL/L (ref 136–145)
VENTRICULAR RATE, ECG03: 73 BPM
WBC # BLD AUTO: 26.1 K/UL (ref 3.6–11)

## 2018-03-02 PROCEDURE — 74011000250 HC RX REV CODE- 250: Performed by: HOSPITALIST

## 2018-03-02 PROCEDURE — 74176 CT ABD & PELVIS W/O CONTRAST: CPT

## 2018-03-02 PROCEDURE — 36415 COLL VENOUS BLD VENIPUNCTURE: CPT | Performed by: HOSPITALIST

## 2018-03-02 PROCEDURE — 74011250637 HC RX REV CODE- 250/637: Performed by: NURSE PRACTITIONER

## 2018-03-02 PROCEDURE — 65660000000 HC RM CCU STEPDOWN

## 2018-03-02 PROCEDURE — 74011250636 HC RX REV CODE- 250/636: Performed by: HOSPITALIST

## 2018-03-02 PROCEDURE — 80053 COMPREHEN METABOLIC PANEL: CPT | Performed by: HOSPITALIST

## 2018-03-02 PROCEDURE — P9047 ALBUMIN (HUMAN), 25%, 50ML: HCPCS | Performed by: HOSPITALIST

## 2018-03-02 PROCEDURE — 74011250637 HC RX REV CODE- 250/637: Performed by: SPECIALIST

## 2018-03-02 PROCEDURE — 83735 ASSAY OF MAGNESIUM: CPT | Performed by: HOSPITALIST

## 2018-03-02 PROCEDURE — 74011250637 HC RX REV CODE- 250/637: Performed by: HOSPITALIST

## 2018-03-02 PROCEDURE — 85027 COMPLETE CBC AUTOMATED: CPT | Performed by: HOSPITALIST

## 2018-03-02 PROCEDURE — 71250 CT THORAX DX C-: CPT

## 2018-03-02 PROCEDURE — 93005 ELECTROCARDIOGRAM TRACING: CPT

## 2018-03-02 RX ORDER — ALBUMIN HUMAN 250 G/1000ML
25 SOLUTION INTRAVENOUS EVERY 6 HOURS
Status: DISCONTINUED | OUTPATIENT
Start: 2018-03-02 | End: 2018-03-02

## 2018-03-02 RX ORDER — METOPROLOL TARTRATE 50 MG/1
50 TABLET ORAL 2 TIMES DAILY
Status: DISCONTINUED | OUTPATIENT
Start: 2018-03-02 | End: 2018-03-09 | Stop reason: HOSPADM

## 2018-03-02 RX ORDER — IPRATROPIUM BROMIDE AND ALBUTEROL SULFATE 2.5; .5 MG/3ML; MG/3ML
3 SOLUTION RESPIRATORY (INHALATION)
Status: DISCONTINUED | OUTPATIENT
Start: 2018-03-02 | End: 2018-03-07

## 2018-03-02 RX ORDER — FUROSEMIDE 10 MG/ML
40 INJECTION INTRAMUSCULAR; INTRAVENOUS 2 TIMES DAILY
Status: DISCONTINUED | OUTPATIENT
Start: 2018-03-02 | End: 2018-03-07

## 2018-03-02 RX ORDER — ALBUMIN HUMAN 250 G/1000ML
25 SOLUTION INTRAVENOUS EVERY 6 HOURS
Status: DISPENSED | OUTPATIENT
Start: 2018-03-02 | End: 2018-03-03

## 2018-03-02 RX ADMIN — FUROSEMIDE 40 MG: 10 INJECTION, SOLUTION INTRAMUSCULAR; INTRAVENOUS at 19:09

## 2018-03-02 RX ADMIN — PRASUGREL 10 MG: 10 TABLET, FILM COATED ORAL at 09:26

## 2018-03-02 RX ADMIN — METRONIDAZOLE 500 MG: 500 INJECTION, SOLUTION INTRAVENOUS at 14:32

## 2018-03-02 RX ADMIN — Medication 10 ML: at 21:09

## 2018-03-02 RX ADMIN — ONDANSETRON 4 MG: 2 INJECTION INTRAMUSCULAR; INTRAVENOUS at 18:12

## 2018-03-02 RX ADMIN — VANCOMYCIN HYDROCHLORIDE 500 MG: 1 INJECTION, POWDER, LYOPHILIZED, FOR SOLUTION INTRAVENOUS at 03:25

## 2018-03-02 RX ADMIN — METOPROLOL TARTRATE 25 MG: 25 TABLET ORAL at 03:25

## 2018-03-02 RX ADMIN — HYDROMORPHONE HYDROCHLORIDE 1 MG: 1 INJECTION, SOLUTION INTRAMUSCULAR; INTRAVENOUS; SUBCUTANEOUS at 16:31

## 2018-03-02 RX ADMIN — HYDROMORPHONE HYDROCHLORIDE 1 MG: 1 INJECTION, SOLUTION INTRAMUSCULAR; INTRAVENOUS; SUBCUTANEOUS at 06:35

## 2018-03-02 RX ADMIN — METRONIDAZOLE 500 MG: 500 INJECTION, SOLUTION INTRAVENOUS at 06:35

## 2018-03-02 RX ADMIN — ASPIRIN 81 MG 81 MG: 81 TABLET ORAL at 09:26

## 2018-03-02 RX ADMIN — VANCOMYCIN HYDROCHLORIDE 500 MG: 1 INJECTION, POWDER, LYOPHILIZED, FOR SOLUTION INTRAVENOUS at 10:46

## 2018-03-02 RX ADMIN — ALBUMIN (HUMAN) 25 G: 0.25 INJECTION, SOLUTION INTRAVENOUS at 18:14

## 2018-03-02 RX ADMIN — Medication 1 CAPSULE: at 09:26

## 2018-03-02 RX ADMIN — Medication 10 ML: at 14:32

## 2018-03-02 RX ADMIN — METOPROLOL TARTRATE 50 MG: 50 TABLET ORAL at 18:11

## 2018-03-02 RX ADMIN — VANCOMYCIN HYDROCHLORIDE 500 MG: 1 INJECTION, POWDER, LYOPHILIZED, FOR SOLUTION INTRAVENOUS at 21:08

## 2018-03-02 RX ADMIN — ONDANSETRON 4 MG: 2 INJECTION INTRAMUSCULAR; INTRAVENOUS at 09:22

## 2018-03-02 RX ADMIN — Medication 10 ML: at 09:27

## 2018-03-02 RX ADMIN — SIMETHICONE CHEW TAB 80 MG 80 MG: 80 TABLET ORAL at 03:25

## 2018-03-02 RX ADMIN — METRONIDAZOLE 500 MG: 500 INJECTION, SOLUTION INTRAVENOUS at 21:09

## 2018-03-02 RX ADMIN — HYDROMORPHONE HYDROCHLORIDE 1 MG: 1 INJECTION, SOLUTION INTRAMUSCULAR; INTRAVENOUS; SUBCUTANEOUS at 21:23

## 2018-03-02 RX ADMIN — SIMVASTATIN 20 MG: 20 TABLET, FILM COATED ORAL at 21:08

## 2018-03-02 RX ADMIN — Medication 10 ML: at 16:32

## 2018-03-02 RX ADMIN — HYDROMORPHONE HYDROCHLORIDE 1 MG: 1 INJECTION, SOLUTION INTRAMUSCULAR; INTRAVENOUS; SUBCUTANEOUS at 11:10

## 2018-03-02 NOTE — INTERDISCIPLINARY ROUNDS
IDR/SLIDR Summary          Patient: Delmar Silva MRN: [id number]    Age: 61 y.o. YOB: 1955 Room/Bed: [address] [place] [address]   Admit Diagnosis: Colitis  Principal Diagnosis: Colitis   Goals: No chest pain, stable heart rhythm   Readmission: NO  Quality Measure: SEPSIS  VTE Prophylaxis: Mechanical  Influenza Vaccine screening completed? YES  Pneumococcal Vaccine screening completed? YES  Mobility needs: No   Nutrition plan:Yes  Consults:Case Management    Financial concerns:No  Escalated to CM? YES  RRAT Score: 5   Interventions:H2H  Testing due for pt today?  YES  LOS: 6 days Expected length of stay 3.7 days  Discharge plan: Home   PCP: None  Transportation needs: No    Days before discharge:two or more days before discharge   Discharge disposition: Home    Signed:     Brooklyn Swann RN  3/2/2018  6:22 PM

## 2018-03-02 NOTE — PROGRESS NOTES
1930 Bedside shift change report given to Ryder Williamson (oncoming nurse) by Ole Fairchild (offgoing nurse). Report included the following information SBAR, Kardex, Procedure Summary, Intake/Output, MAR, Recent Results and Cardiac Rhythm NSR.    0615 Pt complaining of chest pain. EKG showed no ST elevation. Pain subsided on its own.    0730 Bedside shift change report given to Murali Alcocer (oncoming nurse) by Ryder Williamson (offgoing nurse). Report included the following information SBAR, Kardex, Procedure Summary, Intake/Output, MAR, Recent Results and Cardiac Rhythm NSR. Problem: Falls - Risk of  Goal: *Absence of Falls  Document Marquita Fall Risk and appropriate interventions in the flowsheet.    Outcome: Progressing Towards Goal  Fall Risk Interventions:            Medication Interventions: Assess postural VS orthostatic hypotension    Elimination Interventions: Call light in reach

## 2018-03-02 NOTE — PROGRESS NOTES
Cardiology Progress Note            Admit Date: 2/24/2018  Admit Diagnosis: Colitis  Date: 3/2/2018     Time: 10:33 AM    HPI: 61 y.o. Female w/ PMH of HLD, seen by  cardiologist about 15 years ago for ventricular bigeminy recently moved to [facility], presented to [facility] with N&V and diarrhea. Had some abx for sore throat and then developed above symptoms. Found to have Cdiff. Cardiology called for chest pain early a.m. 2/27. Had elevated troponin of 2.15 and EKG changes incomplete LBBB/ MI,  Went to cath lab with SHASHI stent to distal RCA to 85% distal RCA. left main ok, lad minor irregs, lcx small, occluded distally with faint right to left collaterals. 30% prox rca, 50% mid pda,  No lv gram.  Post procedure pt had continued but improved chest indigestion. EKG with transient IVCD. Dr. Philippe Diehl and Dr. Jey Almanza re-evaluated pt- Chest discomfort resolved by afternoon. Post procedure troponin 20. Cardiology following for new MI. Subjective:  Pt denies having chest pain last night. No chest pain now. Had epigastric pain last night. State abdominal discomfort is better after pain medications. Having some belching. Still with some diarrhea but improved. No nausea now- says she ate good dinner last night. Slept well last night. Still has some SOB. Has not been out of bed yet. Says today getting OOB is her goal.      Assessment and Plan     1. Myocardial infarction:  AWMI likely given finding of apical hypokinesis on TTE. Likely related to large RCA wrapping around apex with a small LAD. -No further chest discomfort/indigestion/pain   -Troponin 29.5 post procedure. No further troponins needed   -Continue ASA, Effient, statin (aware of mildly elevated AST)   -IChange metoprolol to 50 mg BID    -TTE 2/27: EF 50-55%, dyskinesias of apical wall (Do not suspect aneurysmal formation of apical wall).    -12 lead EKG today with NSR, loss of R wave progression anterior leads/V2/V3.    -IP consult to cardiac rehab- done   -Will arrange for follow up with Dr. Coco Oviedo in 1 week. 2.  SOB: Still present. Suspect may be due to pleural effusion   -CXR with left pleural effusion- can't rule out hydropneumothorax- PA and lateral recommended by radiology. - will defer to primary team.     3.  Hypoalbuminemia:  Albumin 1.5    4. Cdiff:  Diarrhea and abdominal discomfort improving. Abdominal xray with nl bowel gas pattern, no free air per report.    -management per primary team.   -ID following. No change in antibx- flagyl and vancomycin      Pt with no chest pain- still with some SOB, may be related to pleural effusion- will defer to primary team. Continue ASA, statin, Effient. Change metoprolol to 50 mg BID. Dr. Osiris Pastor covering cardiology service over weekend. [id number] Richa Glover for pt to transfer to floor. Cardiology attending: seen and examined. Agree with assess and plan  No further cardiac symptoms. Heart exam normal, diminished bs. Cardiac status stable. Continue current meds. No further cardiac testing at this time. Cardiac testing:  LV:  EF 50 % to 55 %. There was dyskinesis, possibly aneurysmal formation of the apical wall(s). Doppler parameters were  consistent with abnormal left ventricular relaxation (grade 1 diastolic  dysfunction). Mild TR    Subjective:   Catha People denies chest pain/pressure indigestion. No palpitations. States that she feels much better than yesterday. Does c/o SOB with turning from side to side, getting on bedpan. No SOB at rest.  States abdominal pain much better than it was. No nausea now. Has not been out of bed yet.       Objective:      Physical Exam:                Visit Vitals    /60    Pulse 72    Temp 96.6 °F (35.9 °C)    Resp 18    Ht 5' 4\" (1.626 m)    Wt 70.8 kg (156 lb)    SpO2 94%    BMI 26.78 kg/m2          General Appearance:   Well developed, well nourished,alert and oriented x 3, and individual in no acute distress. Ears/Nose/Mouth/Throat:    Hearing grossly normal. On NC         Neck:  Supple. Chest:    Lungs clear to auscultation bilaterally,    Cardiovascular:   Regular rate and rhythm, S1, S2 normal, no murmur. Abdomen:    Soft, mild TTP. Extremities:  No edema bilaterally. Skin:  Warm and dry. Telemetry: normal sinus rhythm             Data Review:    Labs:    Recent Results (from the past 24 hour(s))   CBC W/O DIFF    Collection Time: 03/02/18  3:20 AM   Result Value Ref Range    WBC 26.1 (H) 3.6 - 11.0 K/uL    RBC 4.13 3.80 - 5.20 M/uL    HGB 13.0 11.5 - 16.0 g/dL    HCT 38.0 35.0 - 47.0 %    MCV 92.0 80.0 - 99.0 FL    MCH 31.5 26.0 - 34.0 PG    MCHC 34.2 30.0 - 36.5 g/dL    RDW 12.3 11.5 - 14.5 %    PLATELET 651 (H) 275 - 400 K/uL    MPV 10.2 8.9 - 12.9 FL    NRBC 0.0 0  WBC    ABSOLUTE NRBC 0.00 0.00 - 4.72 K/uL   METABOLIC PANEL, COMPREHENSIVE    Collection Time: 03/02/18  3:20 AM   Result Value Ref Range    Sodium 136 136 - 145 mmol/L    Potassium 3.5 3.5 - 5.1 mmol/L    Chloride 104 97 - 108 mmol/L    CO2 24 21 - 32 mmol/L    Anion gap 8 5 - 15 mmol/L    Glucose 118 (H) 65 - 100 mg/dL    BUN 20 6 - 20 MG/DL    Creatinine 0.57 0.55 - 1.02 MG/DL    BUN/Creatinine ratio 35 (H) 12 - 20      GFR est AA >60 >60 ml/min/1.73m2    GFR est non-AA >60 >60 ml/min/1.73m2    Calcium 7.4 (L) 8.5 - 10.1 MG/DL    Bilirubin, total 0.4 0.2 - 1.0 MG/DL    ALT (SGPT) 21 12 - 78 U/L    AST (SGOT) 36 15 - 37 U/L    Alk.  phosphatase 44 (L) 45 - 117 U/L    Protein, total 4.5 (L) 6.4 - 8.2 g/dL    Albumin 1.5 (L) 3.5 - 5.0 g/dL    Globulin 3.0 2.0 - 4.0 g/dL    A-G Ratio 0.5 (L) 1.1 - 2.2     MAGNESIUM    Collection Time: 03/02/18  3:20 AM   Result Value Ref Range    Magnesium 2.2 1.6 - 2.4 mg/dL   EKG, 12 LEAD, INITIAL    Collection Time: 03/02/18  6:20 AM   Result Value Ref Range    Ventricular Rate 73 BPM    Atrial Rate 73 BPM    P-R Interval 140 ms    QRS Duration 72 ms    Q-T Interval 446 ms    QTC Calculation (Bezet) 491 ms    Calculated P Axis 39 degrees    Calculated R Axis 62 degrees    Calculated T Axis 66 degrees    Diagnosis       Normal sinus rhythm  Low voltage QRS  Nonspecific ST abnormality  Prolonged QT  When compared with ECG of 27-FEB-2018 11:57,  Sinus rhythm has replaced Idioventricular rhythm  Confirmed by Maria Alejandra Bird M.D., Mundo Villafana ([id number]) on 3/2/2018 8:51:13 AM            Radiology:        Current Facility-Administered Medications   Medication Dose Route Frequency    HYDROmorphone (PF) (DILAUDID) injection 1 mg  1 mg IntraVENous Q4H PRN    simethicone (MYLICON) tablet 80 mg  80 mg Oral QID PRN    metoprolol tartrate (LOPRESSOR) tablet 25 mg  25 mg Oral Q6H    aspirin chewable tablet 81 mg  81 mg Oral DAILY    prasugrel (EFFIENT) tablet 10 mg  10 mg Oral DAILY    prochlorperazine (COMPAZINE) with saline injection 5 mg  5 mg IntraVENous Q6H PRN    vancomycin 50 mg/mL oral solution (compounded) 500 mg  500 mg Oral Q6H    lactobac ac& pc-s.therm-b.anim (OSCAR Q/RISAQUAD)  1 Cap Oral DAILY    simvastatin (ZOCOR) tablet 20 mg  20 mg Oral QHS    metroNIDAZOLE (FLAGYL) IVPB premix 500 mg  500 mg IntraVENous Q8H    sodium chloride (NS) flush 5-10 mL  5-10 mL IntraVENous Q8H    sodium chloride (NS) flush 5-10 mL  5-10 mL IntraVENous PRN    ondansetron (ZOFRAN) injection 4 mg  4 mg IntraVENous Q4H PRN    acetaminophen (TYLENOL) tablet 650 mg  650 mg Oral Q6H PRN          Tracee Smith.  MARTINA Alvarez MD       [facility] [address] [place] [address], [address] [place] [address] [address]   [facility]t CabinsAnkit felixMercy hospital springfield   [phone]

## 2018-03-02 NOTE — PROGRESS NOTES
1600- Report obtained from [facility]. 1830- Albumin given. Will give lasix in between doses. 1930- Uneventful evening. Bedside report given to Tamika Duarte.

## 2018-03-02 NOTE — PROGRESS NOTES
ID Progress Note  3/2/2018    Subjective:     She states that she is feeling a bit better today. Has been able to eat more. Diarrhea persists. Objective:     Antibiotics:  1. Vancomycin po  2. Flagyl IV      Vitals:   Visit Vitals    /64 (BP 1 Location: Right arm, BP Patient Position: Post activity)    Pulse 81    Temp 97.7 °F (36.5 °C)    Resp 19    Ht 5' 4\" (1.626 m)    Wt 70.8 kg (156 lb)    SpO2 93%    BMI 26.78 kg/m2        Tmax:  Temp (24hrs), Av.1 °F (36.2 °C), Min:96.6 °F (35.9 °C), Max:97.7 °F (36.5 °C)      Exam:  Lungs:  clear to auscultation bilaterally  Heart:  regular rate and rhythm  Abdomen:  soft, non-tender. Bowel sounds normal. No masses,  no organomegaly  Skin:  no rash or abnormalities    Labs:      Recent Labs      18   0320  18   0343  18   0227   WBC  26.1*  24.6*  27.0*   HGB  13.0  13.0  13.8   PLT  610*  543*  550*   BUN  20  18  19   CREA  0.57  0.64  0.69   SGOT  36  46*  127*   AP  44*  50  58   TBILI  0.4  0.5  0.4       Cultures:     No results found for: [facility]  Lab Results   Component Value Date/Time    Culture result:  2018 04:34 PM     NO ROUTINE ENTERIC PATHOGENS ISOLATED INCLUDING SALMONELLA, SHIGELLA, YERSINIA, VIBRIO OR SHIGA TOXIN PRODUCING E. COLI    Culture result: NO GROWTH 5 DAYS 2018 12:21 PM       Radiology:     Line/Insert Date:           Assessment:     1. C diff--clinically improving--still with leukocytosis and diarrhea, but is subjectively better and she looks improved from yesterday  2. AMI    Objective:     1. Continue current therapy  2.  Group will see intermittently over the weekend--call if she specifically needs to be seen    Adam Cervantes MD

## 2018-03-02 NOTE — CONSULTS
Infectious Disease Consult    Today's Date: 3/1/2018   Admit Date: 2/24/2018    Impression:   · C diff secondary to outpatient antibiotic therapy for strep  · Leukocytosis  · AMI  · Improving overall    Plan:   · Continue current therapy  · IV Flagyl  · PO vancomycin     Anti-infectives:     Inpat Anti-Infectives     Start     Ordered Stop    02/26/18 0800  vancomycin 50 mg/mL oral solution (compounded) 500 mg  500 mg,   Oral,   EVERY 6 HOURS      02/26/18 0742 --    02/24/18 2200  metroNIDAZOLE (FLAGYL) IVPB premix 500 mg  500 mg,   IntraVENous,   EVERY 8 HOURS      02/24/18 1646 --          Subjective:   Date of Consultation:  March 1, 2018  Referring Physician: Dr Esperanza Blackmon    Patient is a 61 y.o. female admitted with diarrhea and abdominal pain. She had been on cefdinir for strep throat, and developed frequent, non-bloody diarrhea and fever before she finished the course of therapy. She has been found to have C diff and also had an AMI during this hospital stay. She is improving overall. Patient Active Problem List   Diagnosis Code    Colitis K52.9    Severe sepsis ([facility] [address] A41.9, R65.20    Dehydration E86.0    Hypokalemia E87.6    Hypocalcemia E83.51     Past Medical History:   Diagnosis Date    Hypercholesteremia       History reviewed. No pertinent family history. Social History   Substance Use Topics    Smoking status: Never Smoker    Smokeless tobacco: Never Used    Alcohol use No     Past Surgical History:   Procedure Laterality Date    HX TUBAL LIGATION        Prior to Admission medications    Medication Sig Start Date End Date Taking? Authorizing Provider   simvastatin (ZOCOR) 20 mg tablet Take 20 mg by mouth nightly. Yes Historical Provider   aspirin delayed-release 81 mg tablet Take 81 mg by mouth daily. Yes Historical Provider   Biotin 2,500 mcg cap Take 5,000 mcg by mouth. Yes Historical Provider   therapeutic multivitamin (THERAGRAN) tablet Take 1 Tab by mouth daily.    Yes Historical Provider       No Known Allergies     Review of Systems:  Pertinent items are noted in the History of Present Illness.     Objective:     Visit Vitals    /66    Pulse 81    Temp 97.3 °F (36.3 °C)    Resp 25    Ht 5' 4\" (1.626 m)    Wt 70.3 kg (155 lb)    SpO2 95%    BMI 26.61 kg/m2     Temp (24hrs), Av.1 °F (36.2 °C), Min:96 °F (35.6 °C), Max:97.6 °F (36.4 °C)       Lines:  Peripheral IV:            Physical Exam:  Lungs:  clear to auscultation bilaterally  Heart:  regular rate and rhythm  Abdomen:  abnormal findings:  tenderness mild in the entire abdomen  Skin:  no rash or abnormalities    Data Review:     CBC:  Recent Labs      18   1232   WBC  24.6*  27.0*  26.3*   GRANS   --    --   87*   MONOS   --    --   3*   EOS   --    --   0   ANEU   --    --   24.2*   ABL   --    --   1.1   HGB  13.0  13.8  13.0   HCT  37.3  40.7  37.9   PLT  543*  550*  549*       BMP:  Recent Labs      18   1232   CREA  0.64  0.69  0.68   BUN  18  19  16   NA  138  135*  136   K  3.2*  3.8  3.8   CL  106  104  107   CO2  22  20*  19*   AGAP  10  11  10   GLU  99  98  124*       LFTS:  Recent Labs      18   1232   TBILI  0.5  0.4  0.4   ALT  25  35  24   SGOT  46*  127*  91*   AP  50  58  63   TP  4.3*  4.5*  4.6*   ALB  1.6*  1.5*  1.6*       Microbiology:     All Micro Results     Procedure Component Value Units Date/Time    CULTURE, BLOOD, PAIRED [[id number]] Collected:  18 1221    Order Status:  Completed Specimen:  Blood Updated:  18 0558     Special Requests: NO SPECIAL REQUESTS        Culture result: NO GROWTH 5 DAYS       CULTURE, STOOL [[id number]] Collected:  18 1634    Order Status:  Completed Specimen:  Stool Updated:  18 0902     Special Requests: NO SPECIAL REQUESTS        Campylobacter antigen NEGATIVE        Shiga toxin-producing E. coli Ag NEGATIVE Culture result:         NO ROUTINE ENTERIC PATHOGENS ISOLATED INCLUDING SALMONELLA, SHIGELLA, YERSINIA, VIBRIO OR SHIGA TOXIN PRODUCING E. COLI    C. DIFFICILE (DNA) [[id number]]  (Abnormal) Collected:  02/24/18 1634    Order Status:  Completed Specimen:  Stool Updated:  02/25/18 1508     C. difficile (DNA) POSITIVE (A)         CALLED TO AND READ BACK BY  ISABELLE HERMAN RN AT 15:08 ON 2/25/18.(TBH)  This specimen is positive for toxigenic C difficile by DNA amplification. Repeat testing is not recommended, samples received within 7 days of this positive result will be rejected. Assay is not approved to test for cure, since nucleic acids may persist after effective treatment and may prompt false positive results.                Imaging:   Reviewed     Signed By: Jose German MD     March 1, 2018

## 2018-03-02 NOTE — PROGRESS NOTES
Hospitalist Progress Note  Lawson Sherman MD  Answering service: [phone] from in house phone  Cell: [phone]      Date of Service:  3/2/2018  NAME:  Marley Canavan  :  1955  MRN:  [id number]      Admission Summary:   A 61 y.o lady who presents with sepsis due to colitis after taking cefdinir for strep throat. Interval history / Subjective:   She said she has still nausea, shortness of breath worse with movement     Assessment & Plan:     NSTEMI  -cardiologist is consulted and s/p cardiac cath, s/p stent distal RCA  -on aspirin, metoprolol, effient and zocor  -no chest pain  -cardiology on board    Severe sepsis without septic shock C Diff Colitis (POA)   -as evidenced by leukocytosis, lactic acidosis, history of fever due to c. diff colitis. High risk for c diff with recent antibiotic use  -c diff (+)  -continue PO vancomycin and IV metronidazole and vancomycin dose to 500 mg and probiotic  -persistent Leukocytosis   -has flex seal  -continue supportive care with IV fluids and pain control  -repeated CT scan of abdomen and pelvis show persistent pancolitis  -general surgery consulted  -ID on board    Shortness of breath possible due to large bilateral pleural effusion  -has cough, persistent leukocytosis  -CXR 3/ilateral pleural effusions persist. The  -CT chest 3/ large bilateral pleural effusion with atelectasis  -Echo on  LV EF 50-55% , there was dyskinesis and abnormal relaxation  -patient also with significant leukocytosis but afebrile and she has c difficile  -start lasix 40 mg iv q 12 with albumin, may need tap  -consult pulmonologist     Hypokalemia, hypocalcemia, hypophosphatemia: (POA)  -replete   -improved      Lactic acidosis (POA) due to dehydration and sepsis  -resolved      Hyperbilirubinemia:   -mildly elevated, predominantly indirect  -resolved      Right adnexal cyst noted on CT.  Also notes ascites and severe hypoalbuminemia. And small right effusion.       Prolonged QTc: 614, in the setting of hypocalcemia and hypokalemia. ECG after electrolyte repletion shows improvement of QTc      Reported history of recent strep throat s/p cefdinir. Seems resolved.      Hyperlipidemia:  -continue home statin      Code status: Full Code  DVT prophylaxis: SCD    Care Plan discussed with: Patient/Family, Nurse and   Disposition: TBD    at [facility]  Never Reviewed          Codes Class Noted POA    * (Principal)Colitis ICD-10-CM: K52.9  ICD-9-CM: 558.9  2/24/2018 Unknown        Severe sepsis ([facility] [address] ICD-10-CM: A41.9, R65.20  ICD-9-CM: 038.9, 995.92  2/24/2018 Unknown        Dehydration ICD-10-CM: E86.0  ICD-9-CM: 276.51  2/24/2018 Unknown        Hypokalemia ICD-10-CM: E87.6  ICD-9-CM: 276.8  2/24/2018 Unknown        Hypocalcemia ICD-10-CM: E83.51  ICD-9-CM: 275.41  2/24/2018 Unknown                Vital Signs:    Last 24hrs VS reviewed since prior progress note. Most recent are:  Visit Vitals    /60    Pulse 72    Temp 96.6 °F (35.9 °C)    Resp 18    Ht 5' 4\" (1.626 m)    Wt 70.8 kg (156 lb)    SpO2 94%    BMI 26.78 kg/m2         Intake/Output Summary (Last 24 hours) at 03/02/18 0948  Last data filed at 03/02/18 0600   Gross per 24 hour   Intake              680 ml   Output              810 ml   Net             -130 ml        Physical Examination:             Constitutional:  No acute distress, cooperative, pleasant    ENT:  Oral mucous moist, oropharynx benign. Neck supple,    Resp:  CTA bilaterally. No wheezing/rhonchi/rales. No accessory muscle use   CV:  Regular rhythm, normal rate, no murmurs, gallops, rubs    GI:  Soft, non distended, non tender. normoactive bowel sounds, no hepatosplenomegaly     Musculoskeletal:  No edema    Neurologic:  Moves all extremities.   AAOx3, CN II-XII reviewed     Skin:  Good turgor, no rashes or ulcers       Data Review: Review and/or order of clinical lab test      Labs:     Recent Labs      03/02/18 0320 03/01/18 0343   WBC  26.1*  24.6*   HGB  13.0  13.0   HCT  38.0  37.3   PLT  610*  543*     Recent Labs      03/02/18 0320 03/01/18 0343 02/28/18 0227 02/27/18   1232   NA  136  138  135*  136   K  3.5  3.2*  3.8  3.8   CL  104  106  104  107   CO2  24  22  20*  19*   BUN  20  18  19  16   CREA  0.57  0.64  0.69  0.68   GLU  118*  99  98  124*   CA  7.4*  7.2*  7.3*  7.2*   MG  2.2   --   2.3  1.9   PHOS   --   2.1*   --   2.6     Recent Labs      03/02/18 0320 03/01/18 0343 02/28/18 0227 02/27/18   1232   SGOT  36  46*  127*  91*   ALT  21  25  35  24   AP  44*  50  58  63   TBILI  0.4  0.5  0.4  0.4   TP  4.5*  4.3*  4.5*  4.6*   ALB  1.5*  1.6*  1.5*  1.6*   GLOB  3.0  2.7  3.0  3.0   AML   --    --    --   53   LPSE   --    --    --   85     No results for input(s): INR, PTP, APTT in the last 72 hours. No lab exists for component: INREXT, INREXT   No results for input(s): FE, TIBC, PSAT, FERR in the last 72 hours. No results found for: FOL, RBCF   No results for input(s): PH, PCO2, PO2 in the last 72 hours.   Recent Labs      02/28/18 0227 02/27/18   1232   CPK   --   762*   CKNDX   --   16.8*   TROIQ  29.50*  20.30*     No results found for: CHOL, CHOLX, CHLST, CHOLV, HDL, LDL, LDLC, DLDLP, TGLX, TRIGL, TRIGP, CHHD, CHHDX  Lab Results   Component Value Date/Time    Glucose (POC) 110 (H) 02/27/2018 04:49 AM     No results found for: COLOR, APPRN, SPGRU, REFSG, JASON, PROTU, GLUCU, KETU, BILU, UROU, KATHIA, LEUKU, GLUKE, EPSU, BACTU, WBCU, RBCU, CASTS, UCRY      Medications Reviewed:     Current Facility-Administered Medications   Medication Dose Route Frequency    metoprolol tartrate (LOPRESSOR) tablet 50 mg  50 mg Oral BID    HYDROmorphone (PF) (DILAUDID) injection 1 mg  1 mg IntraVENous Q4H PRN    simethicone (MYLICON) tablet 80 mg  80 mg Oral QID PRN    aspirin chewable tablet 81 mg  81 mg Oral DAILY    prasugrel (EFFIENT) tablet 10 mg  10 mg Oral DAILY    prochlorperazine (COMPAZINE) with saline injection 5 mg  5 mg IntraVENous Q6H PRN    vancomycin 50 mg/mL oral solution (compounded) 500 mg  500 mg Oral Q6H    lactobac ac& pc-s.therm-b.anim (OSCAR Q/RISAQUAD)  1 Cap Oral DAILY    simvastatin (ZOCOR) tablet 20 mg  20 mg Oral QHS    metroNIDAZOLE (FLAGYL) IVPB premix 500 mg  500 mg IntraVENous Q8H    sodium chloride (NS) flush 5-10 mL  5-10 mL IntraVENous Q8H    sodium chloride (NS) flush 5-10 mL  5-10 mL IntraVENous PRN    ondansetron (ZOFRAN) injection 4 mg  4 mg IntraVENous Q4H PRN    acetaminophen (TYLENOL) tablet 650 mg  650 mg Oral Q6H PRN     ______________________________________________________________________  EXPECTED LENGTH OF STAY: 3d 16h  ACTUAL LENGTH OF STAY:          6                 Jalen Bowie MD

## 2018-03-03 LAB
ERYTHROCYTE [DISTWIDTH] IN BLOOD BY AUTOMATED COUNT: 12.3 % (ref 11.5–14.5)
HCT VFR BLD AUTO: 30.3 % (ref 35–47)
HGB BLD-MCNC: 10.7 G/DL (ref 11.5–16)
MCH RBC QN AUTO: 32.5 PG (ref 26–34)
MCHC RBC AUTO-ENTMCNC: 35.3 G/DL (ref 30–36.5)
MCV RBC AUTO: 92.1 FL (ref 80–99)
NRBC # BLD: 0 K/UL (ref 0–0.01)
NRBC BLD-RTO: 0 PER 100 WBC
PLATELET # BLD AUTO: 524 K/UL (ref 150–400)
PMV BLD AUTO: 9.6 FL (ref 8.9–12.9)
RBC # BLD AUTO: 3.29 M/UL (ref 3.8–5.2)
WBC # BLD AUTO: 21.2 K/UL (ref 3.6–11)

## 2018-03-03 PROCEDURE — 74011250637 HC RX REV CODE- 250/637: Performed by: SPECIALIST

## 2018-03-03 PROCEDURE — 74011000250 HC RX REV CODE- 250: Performed by: HOSPITALIST

## 2018-03-03 PROCEDURE — P9047 ALBUMIN (HUMAN), 25%, 50ML: HCPCS | Performed by: HOSPITALIST

## 2018-03-03 PROCEDURE — 36415 COLL VENOUS BLD VENIPUNCTURE: CPT | Performed by: HOSPITALIST

## 2018-03-03 PROCEDURE — 74011250637 HC RX REV CODE- 250/637: Performed by: HOSPITALIST

## 2018-03-03 PROCEDURE — 74011250637 HC RX REV CODE- 250/637: Performed by: NURSE PRACTITIONER

## 2018-03-03 PROCEDURE — 74011250636 HC RX REV CODE- 250/636: Performed by: HOSPITALIST

## 2018-03-03 PROCEDURE — 65660000000 HC RM CCU STEPDOWN

## 2018-03-03 PROCEDURE — 85027 COMPLETE CBC AUTOMATED: CPT | Performed by: HOSPITALIST

## 2018-03-03 RX ADMIN — PRASUGREL 10 MG: 10 TABLET, FILM COATED ORAL at 09:27

## 2018-03-03 RX ADMIN — ONDANSETRON 4 MG: 2 INJECTION INTRAMUSCULAR; INTRAVENOUS at 16:06

## 2018-03-03 RX ADMIN — METRONIDAZOLE 500 MG: 500 INJECTION, SOLUTION INTRAVENOUS at 13:49

## 2018-03-03 RX ADMIN — ASPIRIN 81 MG 81 MG: 81 TABLET ORAL at 09:27

## 2018-03-03 RX ADMIN — FUROSEMIDE 40 MG: 10 INJECTION, SOLUTION INTRAMUSCULAR; INTRAVENOUS at 09:25

## 2018-03-03 RX ADMIN — VANCOMYCIN HYDROCHLORIDE 500 MG: 1 INJECTION, POWDER, LYOPHILIZED, FOR SOLUTION INTRAVENOUS at 20:27

## 2018-03-03 RX ADMIN — VANCOMYCIN HYDROCHLORIDE 500 MG: 1 INJECTION, POWDER, LYOPHILIZED, FOR SOLUTION INTRAVENOUS at 02:57

## 2018-03-03 RX ADMIN — HYDROMORPHONE HYDROCHLORIDE 1 MG: 1 INJECTION, SOLUTION INTRAMUSCULAR; INTRAVENOUS; SUBCUTANEOUS at 18:33

## 2018-03-03 RX ADMIN — Medication 10 ML: at 23:05

## 2018-03-03 RX ADMIN — SIMVASTATIN 20 MG: 20 TABLET, FILM COATED ORAL at 23:03

## 2018-03-03 RX ADMIN — VANCOMYCIN HYDROCHLORIDE 500 MG: 1 INJECTION, POWDER, LYOPHILIZED, FOR SOLUTION INTRAVENOUS at 13:49

## 2018-03-03 RX ADMIN — METOPROLOL TARTRATE 50 MG: 50 TABLET ORAL at 23:03

## 2018-03-03 RX ADMIN — METRONIDAZOLE 500 MG: 500 INJECTION, SOLUTION INTRAVENOUS at 23:03

## 2018-03-03 RX ADMIN — HYDROMORPHONE HYDROCHLORIDE 1 MG: 1 INJECTION, SOLUTION INTRAMUSCULAR; INTRAVENOUS; SUBCUTANEOUS at 09:25

## 2018-03-03 RX ADMIN — HYDROMORPHONE HYDROCHLORIDE 1 MG: 1 INJECTION, SOLUTION INTRAMUSCULAR; INTRAVENOUS; SUBCUTANEOUS at 13:47

## 2018-03-03 RX ADMIN — Medication 10 ML: at 07:34

## 2018-03-03 RX ADMIN — METOPROLOL TARTRATE 50 MG: 50 TABLET ORAL at 09:27

## 2018-03-03 RX ADMIN — Medication 10 ML: at 03:01

## 2018-03-03 RX ADMIN — VANCOMYCIN HYDROCHLORIDE 500 MG: 1 INJECTION, POWDER, LYOPHILIZED, FOR SOLUTION INTRAVENOUS at 09:27

## 2018-03-03 RX ADMIN — ALBUMIN (HUMAN) 25 G: 0.25 INJECTION, SOLUTION INTRAVENOUS at 12:34

## 2018-03-03 RX ADMIN — METRONIDAZOLE 500 MG: 500 INJECTION, SOLUTION INTRAVENOUS at 07:34

## 2018-03-03 RX ADMIN — HYDROMORPHONE HYDROCHLORIDE 1 MG: 1 INJECTION, SOLUTION INTRAMUSCULAR; INTRAVENOUS; SUBCUTANEOUS at 02:59

## 2018-03-03 RX ADMIN — ALBUMIN (HUMAN) 25 G: 0.25 INJECTION, SOLUTION INTRAVENOUS at 02:58

## 2018-03-03 RX ADMIN — Medication 1 CAPSULE: at 09:00

## 2018-03-03 RX ADMIN — FUROSEMIDE 40 MG: 10 INJECTION, SOLUTION INTRAMUSCULAR; INTRAVENOUS at 18:32

## 2018-03-03 NOTE — PROGRESS NOTES
2000  Bedside shift change report given to Sweta Min (oncoming nurse) by Sukhi Wilson (offgoing nurse). Report included the following information SBAR, Kardex, Intake/Output, MAR, Recent Results and Cardiac Rhythm  NSR. Complete assessment done. IV gtt rate confirmed.  at side. Joelle care done for void. 2123 Sched meds given. Dilaudid 1 mg given IVP for comfort. Pt voiding well post lasix dose. Cont to monitor. 2245  TRANSFER - OUT REPORT:    Verbal report given to Joaquín(name) on Marley Canavan  being transferred to [place] [address](unit) for routine progression of care       Report consisted of patients Situation, Background, Assessment and   Recommendations(SBAR). Information from the following report(s) SBAR, Kardex, Intake/Output, MAR, Recent Results and Cardiac Rhythm NSR was reviewed with the receiving nurse. Lines:   Peripheral IV 02/26/18 Left;Mid Forearm (Active)   Site Assessment Clean, dry, & intact 3/2/2018  4:00 PM   Phlebitis Assessment 0 3/2/2018  4:00 PM   Infiltration Assessment 0 3/2/2018  4:00 PM   Dressing Status Clean, dry, & intact 3/2/2018  4:00 PM   Dressing Type Transparent 3/2/2018  4:00 PM   Hub Color/Line Status Yellow 3/2/2018  4:00 PM   Action Taken Open ports on tubing capped 3/2/2018  4:00 PM   Alcohol Cap Used Yes 3/2/2018  4:00 PM        Opportunity for questions and clarification was provided.       Patient transported with:   Monitor  Patient-specific medications from Pharmacy  Registered Nurse

## 2018-03-03 NOTE — PROGRESS NOTES
Hospitalist Progress Note  Bradley Soliz MD  Answering service: [phone] from in house phone  Cell: [phone]      Date of Service:  3/3/2018  NAME:  Luna Thorne  :  1955  MRN:  [id number]      Admission Summary:   A 61 y.o lady who presents with sepsis due to colitis after taking cefdinir for strep throat. Interval history / Subjective:   She said she feels better, still shortness of breath, no abdominal or chest pain     Assessment & Plan:     Severe sepsis without septic shock C Diff Colitis (POA)   -as evidenced by leukocytosis, lactic acidosis, history of fever due to c. diff colitis.  High risk for c diff with recent antibiotic use  -c diff (+)  -continue PO vancomycin and IV metronidazole and vancomycin dose to 500 mg and probiotic  -persistent Leukocytosis   -has flex seal, 300 ml watery output overnight  -continue supportive care with IV fluids and pain control  -repeated CT scan of abdomen and pelvis show persistent pancolitis  -general surgery consulted  -ID on board    NSTEMI  -cardiologist is consulted and s/p cardiac cath, s/p stent distal RCA  -on aspirin, metoprolol, effient and zocor  -no chest pain  -cardiology on board    Shortness of breath due to large bilateral pleural effusion and atelectais  -shortness of breath, persistent leukocytosis but improving  -CXR 3/1 bilateral pleural effusions persist. The  -CT chest 3/ large bilateral pleural effusion with atelectasis  -Echo on  LV EF 50-55% , there was dyskinesis and abnormal relaxation  -patient also with significant leukocytosis but afebrile and she has c difficile  -continue lasix 40 mg iv q 12 with albumin, may need tap   -continue oxygen support, and pulse ox monitoring  -consult pulmonologist     Hypokalemia, hypocalcemia, hypophosphatemia: (POA)  -replete   -improved      Lactic acidosis (POA) due to dehydration and sepsis  -resolved      Hyperbilirubinemia:   -mildly elevated, predominantly indirect  -resolved      Right adnexal cyst noted on CT. Also notes ascites and severe hypoalbuminemia. And small right effusion.       Prolonged QTc: 614, in the setting of hypocalcemia and hypokalemia. ECG after electrolyte repletion shows improvement of QTc      Reported history of recent strep throat s/p cefdinir. Seems resolved.      Hyperlipidemia:  -continue home statin      Code status: Full Code  DVT prophylaxis: SCD    Care Plan discussed with: Patient/Family, Nurse and   Disposition: TBD   Family at bedside questions answered     Hospital Problems  Never Reviewed          Codes Class Noted POA    * (Principal)Colitis ICD-10-CM: K52.9  ICD-9-CM: 558.9  2/24/2018 Unknown        Severe sepsis ([address] ICD-10-CM: A41.9, R65.20  ICD-9-CM: 038.9, 995.92  2/24/2018 Unknown        Dehydration ICD-10-CM: E86.0  ICD-9-CM: 276.51  2/24/2018 Unknown        Hypokalemia ICD-10-CM: E87.6  ICD-9-CM: 276.8  2/24/2018 Unknown        Hypocalcemia ICD-10-CM: E83.51  ICD-9-CM: 275.41  2/24/2018 Unknown                Vital Signs:    Last 24hrs VS reviewed since prior progress note. Most recent are:  Visit Vitals    /61 (BP 1 Location: Right arm, BP Patient Position: At rest)    Pulse 82    Temp 98.6 °F (37 °C)    Resp 18    Ht 5' 4\" (1.626 m)    Wt 72.7 kg (160 lb 4.4 oz)    SpO2 93%    BMI 27.51 kg/m2         Intake/Output Summary (Last 24 hours) at 03/03/18 1133  Last data filed at 03/03/18 0745   Gross per 24 hour   Intake              540 ml   Output             1735 ml   Net            -1195 ml        Physical Examination:             Constitutional:  No acute distress, cooperative, pleasant    ENT:  Oral mucous moist, oropharynx benign. Neck supple,    Resp:  Decrease bronchial breath sound bilaterally, no wheezing/rhonchi/rales.  No accessory muscle use   CV:  Regular rhythm, normal rate, no murmurs, gallops, rubs GI:  Soft, non distended, non tender. normoactive bowel sounds, no hepatosplenomegaly     Musculoskeletal:  No edema    Neurologic:  Moves all extremities. AAOx3, CN II-XII reviewed     Skin:  Good turgor, no rashes or ulcers       Data Review:    Review and/or order of clinical lab test      Labs:     Recent Labs      03/03/18   1037  03/02/18   0320   WBC  21.2*  26.1*   HGB  10.7*  13.0   HCT  30.3*  38.0   PLT  524*  610*     Recent Labs      03/02/18   0320  03/01/18   0343   NA  136  138   K  3.5  3.2*   CL  104  106   CO2  24  22   BUN  20  18   CREA  0.57  0.64   GLU  118*  99   CA  7.4*  7.2*   MG  2.2   --    PHOS   --   2.1*     Recent Labs      03/02/18   0320  03/01/18   0343   SGOT  36  46*   ALT  21  25   AP  44*  50   TBILI  0.4  0.5   TP  4.5*  4.3*   ALB  1.5*  1.6*   GLOB  3.0  2.7     No results for input(s): INR, PTP, APTT in the last 72 hours. No lab exists for component: INREXT, INREXT   No results for input(s): FE, TIBC, PSAT, FERR in the last 72 hours. No results found for: FOL, RBCF   No results for input(s): PH, PCO2, PO2 in the last 72 hours. No results for input(s): CPK, CKNDX, TROIQ in the last 72 hours.     No lab exists for component: CPKMB  No results found for: CHOL, CHOLX, CHLST, CHOLV, HDL, LDL, LDLC, DLDLP, TGLX, TRIGL, TRIGP, CHHD, CHHDX  Lab Results   Component Value Date/Time    Glucose (POC) 110 (H) 02/27/2018 04:49 AM     No results found for: COLOR, APPRN, SPGRU, REFSG, JASON, PROTU, GLUCU, KETU, BILU, UROU, KATHIA, LEUKU, GLUKE, EPSU, BACTU, WBCU, RBCU, CASTS, UCRY      Medications Reviewed:     Current Facility-Administered Medications   Medication Dose Route Frequency    metoprolol tartrate (LOPRESSOR) tablet 50 mg  50 mg Oral BID    albuterol-ipratropium (DUO-NEB) 2.5 MG-0.5 MG/3 ML  3 mL Nebulization Q4H PRN    furosemide (LASIX) injection 40 mg  40 mg IntraVENous BID    albumin human 25% (BUMINATE) solution 25 g  25 g IntraVENous Q6H    HYDROmorphone (PF) (DILAUDID) injection 1 mg  1 mg IntraVENous Q4H PRN    simethicone (MYLICON) tablet 80 mg  80 mg Oral QID PRN    aspirin chewable tablet 81 mg  81 mg Oral DAILY    prasugrel (EFFIENT) tablet 10 mg  10 mg Oral DAILY    prochlorperazine (COMPAZINE) with saline injection 5 mg  5 mg IntraVENous Q6H PRN    vancomycin 50 mg/mL oral solution (compounded) 500 mg  500 mg Oral Q6H    lactobac ac& pc-s.therm-b.anim (OSCAR Q/RISAQUAD)  1 Cap Oral DAILY    simvastatin (ZOCOR) tablet 20 mg  20 mg Oral QHS    metroNIDAZOLE (FLAGYL) IVPB premix 500 mg  500 mg IntraVENous Q8H    sodium chloride (NS) flush 5-10 mL  5-10 mL IntraVENous Q8H    sodium chloride (NS) flush 5-10 mL  5-10 mL IntraVENous PRN    ondansetron (ZOFRAN) injection 4 mg  4 mg IntraVENous Q4H PRN    acetaminophen (TYLENOL) tablet 650 mg  650 mg Oral Q6H PRN     ______________________________________________________________________  EXPECTED LENGTH OF STAY: 3d 16h  ACTUAL LENGTH OF STAY:          7                 Romulo Vaz MD

## 2018-03-03 NOTE — ROUTINE PROCESS
Primary Nurse Hilton Lopez RN and SYDNEE Wood performed a dual skin assessment on this patient No impairment noted  Brandyn score is 16.

## 2018-03-03 NOTE — PROGRESS NOTES
Verbal shift change report given to Baldev Claire (oncoming nurse) by Carlene Mcghee (offgoing nurse). Report included the following information SBAR.

## 2018-03-03 NOTE — CONSULTS
PULMONARY/CRITICAL CARE/SLEEP MEDICINE    Initial Physician Consultation Note    Name: Yisel Perdomo   : 1955   MRN: [id number]   Date: 3/3/2018      Subjective:   Consult Note: 3/3/2018   Requesting Physician: Dr. Zara Hogan  Reason for consult: Pleural effusions    Medical records and data reviewed. Patient is a 61 y.o. female who is in the hospital for treatment of C diff colitis. Patient had received outpatient antibiotics and presented to the emergency room with diarrhea and hypotension. She was found to have C. difficile colitis and has been in the hospital receiving treatment. She complained of chest pain and underwent catheter and PCI to distal right coronary artery 85% lesion. Cath Lab records do not show that left ventricle was entered and LVEDP is unknown. She has received volume resuscitation and albumin is 1.5. Chest imaging showed bilateral pleural effusions and we were asked to see her. Patient reports she gets breathless which is worsened during supine position. She denies purulent cough or hemoptysis. She denied chest pain at the time of evaluation. She denies prior history of chronic lung disease.     Review chest imaging shows bilateral simple pleural effusions most likely related to volume, heart failure or hypoalbuminemia    Review of Systems:     A comprehensive 12 system review of systems was negative except for as documented in HPI    Assessment:     Acute hypoxic pulmonary insufficiency  Bilateral pleural effusions, appears simple, likely related to 3rd spacing from hypoalbuminemia, volume resuscitation and or acute diastolic heart failure secondary to ischemia  Coronary artery disease status post PCI  C. difficile colitis status post shock  Anemia        Recommendations:   Would continue diuretics  Hope pleural effusions respond to diuretic therapy  If there is lack of response to diuretics or if diuretics are not well tolerated because of hypotension or renal failure, or if there are worsening symptoms despite diuretics, thoracentesis can be considered  Discussed with patient and her family  We will check back on Monday      Active Problem List:     Problem List  Never Reviewed          Codes Class    * (Principal)Colitis ICD-10-CM: K52.9  ICD-9-CM: 558.9         Severe sepsis ([facility] [address] ICD-10-CM: A41.9, R65.20  ICD-9-CM: 038.9, 995.92         Dehydration ICD-10-CM: E86.0  ICD-9-CM: 276.51         Hypokalemia ICD-10-CM: E87.6  ICD-9-CM: 276.8         Hypocalcemia ICD-10-CM: E83.51  ICD-9-CM: 275.41               Past Medical History:      has a past medical history of Hypercholesteremia. Past Surgical History:      has a past surgical history that includes hx tubal ligation. Home Medications:     Prior to Admission medications    Medication Sig Start Date End Date Taking? Authorizing Provider   simvastatin (ZOCOR) 20 mg tablet Take 20 mg by mouth nightly. Yes Historical Provider   aspirin delayed-release 81 mg tablet Take 81 mg by mouth daily. Yes Historical Provider   Biotin 2,500 mcg cap Take 5,000 mcg by mouth. Yes Historical Provider   therapeutic multivitamin (THERAGRAN) tablet Take 1 Tab by mouth daily. Yes Historical Provider       Allergies/Social/Family History:     No Known Allergies   Social History   Substance Use Topics    Smoking status: Never Smoker    Smokeless tobacco: Never Used    Alcohol use No      History reviewed. No pertinent family history.          Objective:   Vital Signs:  Visit Vitals    /61    Pulse 77    Temp 98.3 °F (36.8 °C)    Resp 16    Ht 5' 4\" (1.626 m)    Wt 72.7 kg (160 lb 4.4 oz)    SpO2 96%    BMI 27.51 kg/m2    O2 Flow Rate (L/min): 1 l/min O2 Device: Nasal cannula Temp (24hrs), Av.5 °F (36.9 °C), Min:98.3 °F (36.8 °C), Max:98.6 °F (37 °C)           Intake/Output:     Intake/Output Summary (Last 24 hours) at 18 1811  Last data filed at 18 1349   Gross per 24 hour   Intake              300 ml   Output 2785 ml   Net            -2485 ml       Physical Exam:   General:  Alert, cooperative, no distress, appears stated age. Head:  Normocephalic, without obvious abnormality, atraumatic. Eyes:  Conjunctivae/corneas clear. PERRL   Neck: Supple, symmetrical, no adenopathy, no carotid bruit and no JVD. Lungs:   Decreased BS at bases   Chest wall:  No tenderness or deformity. Heart:  Regular rate and rhythm, S1-S2 normal, no murmur, no click, rub or gallop. Abdomen:   Soft, non-tender. Bowel sounds present. No masses,  No organomegaly. Extremities: Atraumatic, no cyanosis +edema. Pulses: Palpable   Skin: No rashes or lesions   Neurologic: Grossly nonfocal         LABS AND  DATA: Personally reviewed  Recent Labs      03/03/18   1037  03/02/18   0320   WBC  21.2*  26.1*   HGB  10.7*  13.0   HCT  30.3*  38.0   PLT  524*  610*     Recent Labs      03/02/18   0320  03/01/18   0343   NA  136  138   K  3.5  3.2*   CL  104  106   CO2  24  22   BUN  20  18   CREA  0.57  0.64   GLU  118*  99   CA  7.4*  7.2*   MG  2.2   --    PHOS   --   2.1*     Recent Labs      03/02/18   0320  03/01/18   0343   SGOT  36  46*   AP  44*  50   TP  4.5*  4.3*   ALB  1.5*  1.6*   GLOB  3.0  2.7     No results for input(s): INR, PTP, APTT in the last 72 hours. No lab exists for component: INREXT   No results for input(s): PHI, PCO2I, PO2I, FIO2I in the last 72 hours. No results for input(s): CPK, CKMB, TROIQ, BNPP in the last 72 hours. MEDS: Reviewed    Chest Imaging: personally reviewed and report checked    Tele- reviewed    Medical decision making:   I have reviewed the flowsheet and previous day's notes  Acute or chronic illness that poses a threat to life or bodily function  Review and order of Clinical lab tests  Review and Order of Radiology tests  Independent visualization of Image        Thank you for allowing me to participate in this patient's care.     MD Shelby Agudelo MD, [facility]  [facility]

## 2018-03-03 NOTE — ROUTINE PROCESS
Bedside and Verbal shift change report given to Christy Holbrook (oncoming nurse) by Holly Gama RN (offgoing nurse). Report included the following information SBAR, Kardex, Intake/Output, MAR, Recent Results and Cardiac Rhythm NSR. Pt transferred from CCU. Pt accompanied by . Pt experiencing abdominal pain relived by Dilaudid. Redness noted to perineal area. Joelle care performed and Barrier cream applied. Continue to monitor pt's respiratory status and follow collaborative plan of care.

## 2018-03-04 LAB
ANION GAP SERPL CALC-SCNC: 8 MMOL/L (ref 5–15)
BUN SERPL-MCNC: 10 MG/DL (ref 6–20)
BUN/CREAT SERPL: 19 (ref 12–20)
CALCIUM SERPL-MCNC: 7.5 MG/DL (ref 8.5–10.1)
CHLORIDE SERPL-SCNC: 102 MMOL/L (ref 97–108)
CO2 SERPL-SCNC: 30 MMOL/L (ref 21–32)
CREAT SERPL-MCNC: 0.54 MG/DL (ref 0.55–1.02)
ERYTHROCYTE [DISTWIDTH] IN BLOOD BY AUTOMATED COUNT: 12.1 % (ref 11.5–14.5)
GLUCOSE SERPL-MCNC: 124 MG/DL (ref 65–100)
HCT VFR BLD AUTO: 31.2 % (ref 35–47)
HGB BLD-MCNC: 10.7 G/DL (ref 11.5–16)
MCH RBC QN AUTO: 31.5 PG (ref 26–34)
MCHC RBC AUTO-ENTMCNC: 34.3 G/DL (ref 30–36.5)
MCV RBC AUTO: 91.8 FL (ref 80–99)
NRBC # BLD: 0 K/UL (ref 0–0.01)
NRBC BLD-RTO: 0 PER 100 WBC
PLATELET # BLD AUTO: 565 K/UL (ref 150–400)
PMV BLD AUTO: 9.8 FL (ref 8.9–12.9)
POTASSIUM SERPL-SCNC: 2.6 MMOL/L (ref 3.5–5.1)
RBC # BLD AUTO: 3.4 M/UL (ref 3.8–5.2)
SODIUM SERPL-SCNC: 140 MMOL/L (ref 136–145)
WBC # BLD AUTO: 21.1 K/UL (ref 3.6–11)

## 2018-03-04 PROCEDURE — P9047 ALBUMIN (HUMAN), 25%, 50ML: HCPCS | Performed by: HOSPITALIST

## 2018-03-04 PROCEDURE — 74011000250 HC RX REV CODE- 250: Performed by: HOSPITALIST

## 2018-03-04 PROCEDURE — 74011250636 HC RX REV CODE- 250/636: Performed by: HOSPITALIST

## 2018-03-04 PROCEDURE — 77010033678 HC OXYGEN DAILY

## 2018-03-04 PROCEDURE — 80048 BASIC METABOLIC PNL TOTAL CA: CPT | Performed by: HOSPITALIST

## 2018-03-04 PROCEDURE — 74011250637 HC RX REV CODE- 250/637: Performed by: NURSE PRACTITIONER

## 2018-03-04 PROCEDURE — 85027 COMPLETE CBC AUTOMATED: CPT | Performed by: HOSPITALIST

## 2018-03-04 PROCEDURE — 74011250637 HC RX REV CODE- 250/637: Performed by: HOSPITALIST

## 2018-03-04 PROCEDURE — 36415 COLL VENOUS BLD VENIPUNCTURE: CPT | Performed by: HOSPITALIST

## 2018-03-04 PROCEDURE — 74011250637 HC RX REV CODE- 250/637: Performed by: INTERNAL MEDICINE

## 2018-03-04 PROCEDURE — 74011250637 HC RX REV CODE- 250/637: Performed by: SPECIALIST

## 2018-03-04 PROCEDURE — 65660000000 HC RM CCU STEPDOWN

## 2018-03-04 RX ORDER — POTASSIUM CHLORIDE 750 MG/1
40 TABLET, FILM COATED, EXTENDED RELEASE ORAL
Status: COMPLETED | OUTPATIENT
Start: 2018-03-04 | End: 2018-03-04

## 2018-03-04 RX ORDER — POTASSIUM CHLORIDE 14.9 MG/ML
10 INJECTION INTRAVENOUS
Status: DISPENSED | OUTPATIENT
Start: 2018-03-04 | End: 2018-03-04

## 2018-03-04 RX ORDER — ALBUMIN HUMAN 250 G/1000ML
25 SOLUTION INTRAVENOUS EVERY 6 HOURS
Status: COMPLETED | OUTPATIENT
Start: 2018-03-04 | End: 2018-03-05

## 2018-03-04 RX ORDER — POTASSIUM CHLORIDE 750 MG/1
40 TABLET, FILM COATED, EXTENDED RELEASE ORAL EVERY 4 HOURS
Status: COMPLETED | OUTPATIENT
Start: 2018-03-04 | End: 2018-03-04

## 2018-03-04 RX ADMIN — POTASSIUM CHLORIDE 40 MEQ: 750 TABLET, EXTENDED RELEASE ORAL at 13:25

## 2018-03-04 RX ADMIN — PRASUGREL 10 MG: 10 TABLET, FILM COATED ORAL at 09:11

## 2018-03-04 RX ADMIN — Medication 10 ML: at 01:22

## 2018-03-04 RX ADMIN — METRONIDAZOLE 500 MG: 500 INJECTION, SOLUTION INTRAVENOUS at 13:25

## 2018-03-04 RX ADMIN — ONDANSETRON 4 MG: 2 INJECTION INTRAMUSCULAR; INTRAVENOUS at 15:39

## 2018-03-04 RX ADMIN — HYDROMORPHONE HYDROCHLORIDE 1 MG: 1 INJECTION, SOLUTION INTRAMUSCULAR; INTRAVENOUS; SUBCUTANEOUS at 07:51

## 2018-03-04 RX ADMIN — Medication 1 CAPSULE: at 09:11

## 2018-03-04 RX ADMIN — FUROSEMIDE 40 MG: 10 INJECTION, SOLUTION INTRAMUSCULAR; INTRAVENOUS at 17:38

## 2018-03-04 RX ADMIN — POTASSIUM CHLORIDE 10 MEQ: 200 INJECTION, SOLUTION INTRAVENOUS at 09:10

## 2018-03-04 RX ADMIN — POTASSIUM CHLORIDE 40 MEQ: 750 TABLET, EXTENDED RELEASE ORAL at 15:38

## 2018-03-04 RX ADMIN — POTASSIUM CHLORIDE 10 MEQ: 200 INJECTION, SOLUTION INTRAVENOUS at 09:46

## 2018-03-04 RX ADMIN — ACETAMINOPHEN 650 MG: 325 TABLET, FILM COATED ORAL at 09:13

## 2018-03-04 RX ADMIN — VANCOMYCIN HYDROCHLORIDE 500 MG: 1 INJECTION, POWDER, LYOPHILIZED, FOR SOLUTION INTRAVENOUS at 15:01

## 2018-03-04 RX ADMIN — POTASSIUM CHLORIDE 40 MEQ: 750 TABLET, EXTENDED RELEASE ORAL at 04:59

## 2018-03-04 RX ADMIN — SIMVASTATIN 20 MG: 20 TABLET, FILM COATED ORAL at 21:02

## 2018-03-04 RX ADMIN — HYDROMORPHONE HYDROCHLORIDE 1 MG: 1 INJECTION, SOLUTION INTRAMUSCULAR; INTRAVENOUS; SUBCUTANEOUS at 01:21

## 2018-03-04 RX ADMIN — Medication 10 ML: at 17:38

## 2018-03-04 RX ADMIN — METRONIDAZOLE 500 MG: 500 INJECTION, SOLUTION INTRAVENOUS at 21:03

## 2018-03-04 RX ADMIN — VANCOMYCIN HYDROCHLORIDE 500 MG: 1 INJECTION, POWDER, LYOPHILIZED, FOR SOLUTION INTRAVENOUS at 07:47

## 2018-03-04 RX ADMIN — Medication 10 ML: at 21:11

## 2018-03-04 RX ADMIN — FUROSEMIDE 40 MG: 10 INJECTION, SOLUTION INTRAMUSCULAR; INTRAVENOUS at 09:12

## 2018-03-04 RX ADMIN — ASPIRIN 81 MG 81 MG: 81 TABLET ORAL at 09:11

## 2018-03-04 RX ADMIN — VANCOMYCIN HYDROCHLORIDE 500 MG: 1 INJECTION, POWDER, LYOPHILIZED, FOR SOLUTION INTRAVENOUS at 21:01

## 2018-03-04 RX ADMIN — Medication 10 ML: at 09:12

## 2018-03-04 RX ADMIN — VANCOMYCIN HYDROCHLORIDE 500 MG: 1 INJECTION, POWDER, LYOPHILIZED, FOR SOLUTION INTRAVENOUS at 01:21

## 2018-03-04 RX ADMIN — ALBUMIN (HUMAN) 25 G: 0.25 INJECTION, SOLUTION INTRAVENOUS at 17:37

## 2018-03-04 RX ADMIN — METOPROLOL TARTRATE 50 MG: 50 TABLET ORAL at 09:11

## 2018-03-04 RX ADMIN — METOPROLOL TARTRATE 50 MG: 50 TABLET ORAL at 21:02

## 2018-03-04 RX ADMIN — Medication 10 ML: at 07:52

## 2018-03-04 NOTE — PROGRESS NOTES
Bedside report to [facility], RN. Kardex, SBAR, and results reviewed, opportunity for questions to be asked/answered. [facility], [facility].

## 2018-03-04 NOTE — PROGRESS NOTES
11:59  Paged Dr. Jon Aragon, patient is complaining about the IV Potassium second run is running at 25 ml.   [facility]  4273 advised Dr. Jon Aragon about the IV K he will discontinue and start her Q 4 hours on PO meds  Sergey Everett RN

## 2018-03-04 NOTE — PROGRESS NOTES
1132- Pt up to use Bedside commode assisted and Fecal tube fell out. Output total 625 ml. No complaints of pain or discomfort. 1150- Sitting up in recliner with family at bedside.

## 2018-03-04 NOTE — PROGRESS NOTES
Infectious Diseases Progress Note    Antibiotic Summary:  Zosyn   --   Flagyl   -- present  Vanc 250 mg  --   Vanc 500 mg  -- present      Subjective:     She feels better. Still with significant abd pain but bloating less. Tolerating diet. Objective:     Vitals:   Visit Vitals    /61    Pulse 77    Temp 98.3 °F (36.8 °C)    Resp 16    Ht 5' 4\" (1.626 m)    Wt 72.7 kg (160 lb 4.4 oz)    SpO2 96%    BMI 27.51 kg/m2        Tmax:  Temp (24hrs), Av.5 °F (36.9 °C), Min:98.3 °F (36.8 °C), Max:98.6 °F (37 °C)      Exam:  General appearance: alert, no distress  Lungs: few basilar rales  Heart: regular rate and rhythm  Abdomen: soft; minimal distention; mild diffuse tenderness; BS +; Flexiseal still in place  Skin: no rash  Neurologic: Grossly normal    IV Lines: peripheral    Labs:    Recent Labs      18   1037  18   0320  18   0343   WBC  21.2*  26.1*  24.6*   HGB  10.7*  13.0  13.0   PLT  524*  610*  543*   BUN   --   20  18   CREA   --   0.57  0.64   TBILI   --   0.4  0.5   SGOT   --   36  46*   AP   --   44*  50       Assessment:     1. PMC/C diff -- She feels better. WBC has fallen from 38.9k down to 21k    2. Leukocytosis -- as above    3. AMI    Plan:     1.  Continue Flagyl IV and Vanc po      Discussed with the patient and her  -- reviewed risk of relapse of Maryan Logan MD

## 2018-03-04 NOTE — PROGRESS NOTES
Bedside and Verbal shift change report given to SYDNEE Nagy (oncoming nurse) by Leopoldo Bailey (offgoing nurse). Report included the following information SBAR, Kardex, Intake/Output, MAR, Recent Results, Cardiac Rhythm NSR and Alarm Parameters .

## 2018-03-04 NOTE — PROGRESS NOTES
Hospitalist Progress Note  Pablo Webster MD  Answering service: [phone] from in house phone  Cell: [phone]      Date of Service:  3/4/2018  NAME:  Charmaine Escobar  :  1955  MRN:  [id number]      Admission Summary:   A 61 y.o lady who presents with sepsis due to colitis after taking cefdinir for strep throat. Interval history / Subjective:   She said she feels better, but still shortness of breath, no abdominal or chest pain     Assessment & Plan:     Severe sepsis without septic shock C Diff Colitis (POA)   -as evidenced by leukocytosis, lactic acidosis, history of fever due to c. diff colitis.    -c diff (+)  -continue PO vancomycin, IV metronidazole and probiotic  -persistent Leukocytosis   -has flex seal in place 400 ml watery output overnight  -continue supportive care with IV fluids and pain control  -repeated CT scan of abdomen and pelvis show persistent pancolitis  -seen by general surgery  -ID on board    NSTEMI  -cardiologist is consulted and s/p cardiac cath, s/p stent distal RCA  -on aspirin, metoprolol, effient and zocor  -no chest pain  -cardiology on board    Shortness of breath due to large bilateral pleural effusion and atelectais  -shortness of breath, persistent leukocytosis but improving  -CXR 3/1 bilateral pleural effusions persist. The  -CT chest 3/ large bilateral pleural effusion with atelectasis  -Echo on  LV EF 50-55% , there was dyskinesis and abnormal relaxation  -patient also with significant leukocytosis but afebrile and she has c difficile  -continue lasix 40 mg iv q 12, received albumin  -continue oxygen support, and pulse ox monitoring  -consult pulmonologist     Hypokalemia, hypocalcemia, hypophosphatemia: (POA)  -replete with iv kcl and repeat k level   -improved      Lactic acidosis (POA) due to dehydration and sepsis  -resolved      Hyperbilirubinemia:   -mildly elevated, predominantly indirect  -resolved      Right adnexal cyst noted on CT. Also notes ascites and severe hypoalbuminemia. And small right effusion.       Prolonged QTc: 614, in the setting of hypocalcemia and hypokalemia. ECG after electrolyte repletion shows improvement of QTc      Reported history of recent strep throat s/p cefdinir. Seems resolved.      Hyperlipidemia:  -continue home statin      Code status: Full Code  DVT prophylaxis: SCD    Care Plan discussed with: Patient/Family, Nurse and   Disposition: TBD   Family at bedside questions answered     Hospital Problems  Never Reviewed          Codes Class Noted POA    * (Principal)Colitis ICD-10-CM: K52.9  ICD-9-CM: 558.9  2/24/2018 Unknown        Severe sepsis ([facility] [address] ICD-10-CM: A41.9, R65.20  ICD-9-CM: 038.9, 995.92  2/24/2018 Unknown        Dehydration ICD-10-CM: E86.0  ICD-9-CM: 276.51  2/24/2018 Unknown        Hypokalemia ICD-10-CM: E87.6  ICD-9-CM: 276.8  2/24/2018 Unknown        Hypocalcemia ICD-10-CM: E83.51  ICD-9-CM: 275.41  2/24/2018 Unknown                Vital Signs:    Last 24hrs VS reviewed since prior progress note. Most recent are:  Visit Vitals    /58    Pulse 87    Temp 97.5 °F (36.4 °C)    Resp 16    Ht 5' 4\" (1.626 m)    Wt 71.8 kg (158 lb 4.8 oz)    SpO2 99%    BMI 27.17 kg/m2         Intake/Output Summary (Last 24 hours) at 03/04/18 0940  Last data filed at 03/04/18 0556   Gross per 24 hour   Intake                0 ml   Output             2250 ml   Net            -2250 ml        Physical Examination:             Constitutional:  No acute distress, cooperative, pleasant    ENT:  Oral mucous moist, oropharynx benign. Neck supple,    Resp:  Decrease bronchial breath sound bilaterally, no wheezing/rhonchi/rales. No accessory muscle use   CV:  Regular rhythm, normal rate, no murmurs, gallops, rubs    GI:  Soft, non distended, non tender.  normoactive bowel sounds, no hepatosplenomegaly     Musculoskeletal:  No edema Neurologic:  Moves all extremities. AAOx3, CN II-XII reviewed     Skin:  Good turgor, no rashes or ulcers       Data Review:    Review and/or order of clinical lab test      Labs:     Recent Labs      03/04/18   0131  03/03/18   1037   WBC  21.1*  21.2*   HGB  10.7*  10.7*   HCT  31.2*  30.3*   PLT  565*  524*     Recent Labs      03/04/18   0131  03/02/18   0320   NA  140  136   K  2.6*  3.5   CL  102  104   CO2  30  24   BUN  10  20   CREA  0.54*  0.57   GLU  124*  118*   CA  7.5*  7.4*   MG   --   2.2     Recent Labs      03/02/18   0320   SGOT  36   ALT  21   AP  44*   TBILI  0.4   TP  4.5*   ALB  1.5*   GLOB  3.0     No results for input(s): INR, PTP, APTT in the last 72 hours. No lab exists for component: INREXT, INREXT   No results for input(s): FE, TIBC, PSAT, FERR in the last 72 hours. No results found for: FOL, RBCF   No results for input(s): PH, PCO2, PO2 in the last 72 hours. No results for input(s): CPK, CKNDX, TROIQ in the last 72 hours.     No lab exists for component: CPKMB  No results found for: CHOL, CHOLX, CHLST, CHOLV, HDL, LDL, LDLC, DLDLP, TGLX, TRIGL, TRIGP, CHHD, CHHDX  Lab Results   Component Value Date/Time    Glucose (POC) 110 (H) 02/27/2018 04:49 AM     No results found for: COLOR, APPRN, SPGRU, REFSG, JASON, PROTU, GLUCU, KETU, BILU, UROU, KATHIA, LEUKU, GLUKE, EPSU, BACTU, WBCU, RBCU, CASTS, UCRY      Medications Reviewed:     Current Facility-Administered Medications   Medication Dose Route Frequency    potassium chloride 10 mEq in 50 ml IVPB  10 mEq IntraVENous Q1H    metoprolol tartrate (LOPRESSOR) tablet 50 mg  50 mg Oral BID    albuterol-ipratropium (DUO-NEB) 2.5 MG-0.5 MG/3 ML  3 mL Nebulization Q4H PRN    furosemide (LASIX) injection 40 mg  40 mg IntraVENous BID    HYDROmorphone (PF) (DILAUDID) injection 1 mg  1 mg IntraVENous Q4H PRN    simethicone (MYLICON) tablet 80 mg  80 mg Oral QID PRN    aspirin chewable tablet 81 mg  81 mg Oral DAILY    prasugrel (EFFIENT) tablet 10 mg  10 mg Oral DAILY    prochlorperazine (COMPAZINE) with saline injection 5 mg  5 mg IntraVENous Q6H PRN    vancomycin 50 mg/mL oral solution (compounded) 500 mg  500 mg Oral Q6H    lactobac ac& pc-s.therm-b.anim (OSCAR Q/RISAQUAD)  1 Cap Oral DAILY    simvastatin (ZOCOR) tablet 20 mg  20 mg Oral QHS    metroNIDAZOLE (FLAGYL) IVPB premix 500 mg  500 mg IntraVENous Q8H    sodium chloride (NS) flush 5-10 mL  5-10 mL IntraVENous Q8H    sodium chloride (NS) flush 5-10 mL  5-10 mL IntraVENous PRN    ondansetron (ZOFRAN) injection 4 mg  4 mg IntraVENous Q4H PRN    acetaminophen (TYLENOL) tablet 650 mg  650 mg Oral Q6H PRN     ______________________________________________________________________  EXPECTED LENGTH OF STAY: 3d 16h  ACTUAL LENGTH OF STAY:          8                 Fausto Chase MD

## 2018-03-05 ENCOUNTER — APPOINTMENT (OUTPATIENT)
Dept: GENERAL RADIOLOGY | Age: 63
DRG: 853 | End: 2018-03-05
Attending: HOSPITALIST
Payer: COMMERCIAL

## 2018-03-05 LAB
ALBUMIN SERPL-MCNC: 3.1 G/DL (ref 3.5–5)
ALBUMIN/GLOB SERPL: 1.5 {RATIO} (ref 1.1–2.2)
ALP SERPL-CCNC: 28 U/L (ref 45–117)
ALT SERPL-CCNC: 14 U/L (ref 12–78)
ANION GAP SERPL CALC-SCNC: 6 MMOL/L (ref 5–15)
AST SERPL-CCNC: 26 U/L (ref 15–37)
BILIRUB SERPL-MCNC: 0.8 MG/DL (ref 0.2–1)
BUN SERPL-MCNC: 8 MG/DL (ref 6–20)
BUN/CREAT SERPL: 13 (ref 12–20)
CALCIUM SERPL-MCNC: 7.9 MG/DL (ref 8.5–10.1)
CHLORIDE SERPL-SCNC: 103 MMOL/L (ref 97–108)
CO2 SERPL-SCNC: 32 MMOL/L (ref 21–32)
CREAT SERPL-MCNC: 0.63 MG/DL (ref 0.55–1.02)
ERYTHROCYTE [DISTWIDTH] IN BLOOD BY AUTOMATED COUNT: 12.7 % (ref 11.5–14.5)
GLOBULIN SER CALC-MCNC: 2.1 G/DL (ref 2–4)
GLUCOSE SERPL-MCNC: 110 MG/DL (ref 65–100)
HCT VFR BLD AUTO: 28.9 % (ref 35–47)
HGB BLD-MCNC: 9.8 G/DL (ref 11.5–16)
MAGNESIUM SERPL-MCNC: 1.6 MG/DL (ref 1.6–2.4)
MCH RBC QN AUTO: 32 PG (ref 26–34)
MCHC RBC AUTO-ENTMCNC: 33.9 G/DL (ref 30–36.5)
MCV RBC AUTO: 94.4 FL (ref 80–99)
NRBC # BLD: 0 K/UL (ref 0–0.01)
NRBC BLD-RTO: 0 PER 100 WBC
PHOSPHATE SERPL-MCNC: 2.1 MG/DL (ref 2.6–4.7)
PLATELET # BLD AUTO: 501 K/UL (ref 150–400)
PMV BLD AUTO: 10.1 FL (ref 8.9–12.9)
POTASSIUM SERPL-SCNC: 2.9 MMOL/L (ref 3.5–5.1)
PROT SERPL-MCNC: 5.2 G/DL (ref 6.4–8.2)
RBC # BLD AUTO: 3.06 M/UL (ref 3.8–5.2)
SODIUM SERPL-SCNC: 141 MMOL/L (ref 136–145)
WBC # BLD AUTO: 21.9 K/UL (ref 3.6–11)

## 2018-03-05 PROCEDURE — 85027 COMPLETE CBC AUTOMATED: CPT | Performed by: HOSPITALIST

## 2018-03-05 PROCEDURE — 74011250637 HC RX REV CODE- 250/637: Performed by: SPECIALIST

## 2018-03-05 PROCEDURE — 71045 X-RAY EXAM CHEST 1 VIEW: CPT

## 2018-03-05 PROCEDURE — 84100 ASSAY OF PHOSPHORUS: CPT | Performed by: HOSPITALIST

## 2018-03-05 PROCEDURE — 74011000250 HC RX REV CODE- 250: Performed by: HOSPITALIST

## 2018-03-05 PROCEDURE — 74011250636 HC RX REV CODE- 250/636: Performed by: HOSPITALIST

## 2018-03-05 PROCEDURE — 74011250637 HC RX REV CODE- 250/637: Performed by: HOSPITALIST

## 2018-03-05 PROCEDURE — 83735 ASSAY OF MAGNESIUM: CPT | Performed by: HOSPITALIST

## 2018-03-05 PROCEDURE — 65660000000 HC RM CCU STEPDOWN

## 2018-03-05 PROCEDURE — 36415 COLL VENOUS BLD VENIPUNCTURE: CPT | Performed by: HOSPITALIST

## 2018-03-05 PROCEDURE — 74011250637 HC RX REV CODE- 250/637: Performed by: NURSE PRACTITIONER

## 2018-03-05 PROCEDURE — 80053 COMPREHEN METABOLIC PANEL: CPT | Performed by: HOSPITALIST

## 2018-03-05 PROCEDURE — P9047 ALBUMIN (HUMAN), 25%, 50ML: HCPCS | Performed by: HOSPITALIST

## 2018-03-05 RX ORDER — MAGNESIUM SULFATE HEPTAHYDRATE 40 MG/ML
2 INJECTION, SOLUTION INTRAVENOUS ONCE
Status: COMPLETED | OUTPATIENT
Start: 2018-03-05 | End: 2018-03-05

## 2018-03-05 RX ORDER — POTASSIUM CHLORIDE 750 MG/1
40 TABLET, FILM COATED, EXTENDED RELEASE ORAL EVERY 4 HOURS
Status: COMPLETED | OUTPATIENT
Start: 2018-03-05 | End: 2018-03-05

## 2018-03-05 RX ADMIN — METOPROLOL TARTRATE 50 MG: 50 TABLET ORAL at 09:14

## 2018-03-05 RX ADMIN — POTASSIUM CHLORIDE 40 MEQ: 750 TABLET, FILM COATED, EXTENDED RELEASE ORAL at 15:43

## 2018-03-05 RX ADMIN — Medication 1 CAPSULE: at 09:13

## 2018-03-05 RX ADMIN — HYDROMORPHONE HYDROCHLORIDE 1 MG: 1 INJECTION, SOLUTION INTRAMUSCULAR; INTRAVENOUS; SUBCUTANEOUS at 00:06

## 2018-03-05 RX ADMIN — METRONIDAZOLE 500 MG: 500 INJECTION, SOLUTION INTRAVENOUS at 06:36

## 2018-03-05 RX ADMIN — DIBASIC SODIUM PHOSPHATE, MONOBASIC POTASSIUM PHOSPHATE AND MONOBASIC SODIUM PHOSPHATE 2 TABLET: 852; 155; 130 TABLET ORAL at 11:25

## 2018-03-05 RX ADMIN — VANCOMYCIN HYDROCHLORIDE 500 MG: 1 INJECTION, POWDER, LYOPHILIZED, FOR SOLUTION INTRAVENOUS at 19:05

## 2018-03-05 RX ADMIN — Medication 8 ML: at 06:00

## 2018-03-05 RX ADMIN — ALBUMIN (HUMAN) 25 G: 0.25 INJECTION, SOLUTION INTRAVENOUS at 06:35

## 2018-03-05 RX ADMIN — METOPROLOL TARTRATE 50 MG: 50 TABLET ORAL at 20:44

## 2018-03-05 RX ADMIN — MAGNESIUM SULFATE HEPTAHYDRATE 2 G: 40 INJECTION, SOLUTION INTRAVENOUS at 11:23

## 2018-03-05 RX ADMIN — ALBUMIN (HUMAN) 25 G: 0.25 INJECTION, SOLUTION INTRAVENOUS at 12:24

## 2018-03-05 RX ADMIN — METRONIDAZOLE 500 MG: 500 INJECTION, SOLUTION INTRAVENOUS at 20:45

## 2018-03-05 RX ADMIN — SIMVASTATIN 20 MG: 20 TABLET, FILM COATED ORAL at 20:45

## 2018-03-05 RX ADMIN — FUROSEMIDE 40 MG: 10 INJECTION, SOLUTION INTRAMUSCULAR; INTRAVENOUS at 19:05

## 2018-03-05 RX ADMIN — ASPIRIN 81 MG 81 MG: 81 TABLET ORAL at 09:14

## 2018-03-05 RX ADMIN — DIBASIC SODIUM PHOSPHATE, MONOBASIC POTASSIUM PHOSPHATE AND MONOBASIC SODIUM PHOSPHATE 2 TABLET: 852; 155; 130 TABLET ORAL at 19:59

## 2018-03-05 RX ADMIN — VANCOMYCIN HYDROCHLORIDE 500 MG: 1 INJECTION, POWDER, LYOPHILIZED, FOR SOLUTION INTRAVENOUS at 14:48

## 2018-03-05 RX ADMIN — VANCOMYCIN HYDROCHLORIDE 500 MG: 1 INJECTION, POWDER, LYOPHILIZED, FOR SOLUTION INTRAVENOUS at 02:17

## 2018-03-05 RX ADMIN — VANCOMYCIN HYDROCHLORIDE 500 MG: 1 INJECTION, POWDER, LYOPHILIZED, FOR SOLUTION INTRAVENOUS at 09:24

## 2018-03-05 RX ADMIN — Medication 10 ML: at 14:51

## 2018-03-05 RX ADMIN — ALBUMIN (HUMAN) 25 G: 0.25 INJECTION, SOLUTION INTRAVENOUS at 00:06

## 2018-03-05 RX ADMIN — METRONIDAZOLE 500 MG: 500 INJECTION, SOLUTION INTRAVENOUS at 14:48

## 2018-03-05 RX ADMIN — PRASUGREL 10 MG: 10 TABLET, FILM COATED ORAL at 09:13

## 2018-03-05 RX ADMIN — Medication 10 ML: at 20:45

## 2018-03-05 RX ADMIN — FUROSEMIDE 40 MG: 10 INJECTION, SOLUTION INTRAMUSCULAR; INTRAVENOUS at 09:13

## 2018-03-05 RX ADMIN — POTASSIUM CHLORIDE 40 MEQ: 750 TABLET, FILM COATED, EXTENDED RELEASE ORAL at 12:23

## 2018-03-05 RX ADMIN — POTASSIUM CHLORIDE 40 MEQ: 750 TABLET, FILM COATED, EXTENDED RELEASE ORAL at 09:13

## 2018-03-05 NOTE — PROGRESS NOTES
NUTRITION- DIETETIC tECHnICIAN    Pt seen for:       [x]                  Rescreen  []                  Food preferences/tolerances  []                  Food Allergies  []                  PO intake check  []                  Supplements  []                  Diet order clarification  []                  Education  []                  Other     Rescreen:    [x]                  Not at Nutrition Risk, rescreen per screening protocol  []                  At Nutrition Risk- RD referral         SUBJECTIVE/OBJECTIVE:     Information obtained from:  patient      Diet:  Dental Soft    Intake: excellent    Patient Vitals for the past 100 hrs:   % Diet Eaten   03/05/18 0900 75 %   03/04/18 1251 100 %   03/04/18 0752 40 %   03/02/18 1900 100 %   03/02/18 1300 45 %       Weight Changes: Wt Readings from Last 3 Encounters:   03/05/18 71.9 kg (158 lb 8.2 oz)       Problems Identified:      [x]                  Intake and appetite much better and patient hungry for both breakfast and lunch today.    []                  Specified food preferences   []                  Dislikes supplements              []                  Allergies:   []                  Difficulty chewing      []                  Dentition    []                  Nausea/Vomiting   []                  Constipation   []                  Diarrhea    PLAN:     [x]                   Continue current diet and encourage intake  []                   Obtained/adjusted food preferences/tolerances and/or snacks options   []                   Dislikes supplements will try a substitution  []                   Modify diet for food allergies  []                   Adjust texture due to difficulty chewing   []                   Educated patient  []                   RD Referral  [x]                   Rescreen per screening protocol          Boogie Tineo DTR

## 2018-03-05 NOTE — PROGRESS NOTES
CM continues to follow patient for transition of care planning. Reviewed CM assessment 2/26/18. Patient is being treated for C-Diff. Had STEMI. Patient lives with her  Hailey Zaman  [phone]. Bhargavi Salazar The couple  recently moved to [address] [place] [address] PCP-- CM specialist will assist in securing new PCP when patient is ready for discharge. Patient is receiving IV abx-- May need home IV abx when discharged. Therapy ordered to evaluate and make recommendations. Not determined if patient will need home IV abx when discharged. Cm will  make referrals when appropriate.  (rehab// HH or home infusion for IV abx.)   Patient has [employer].

## 2018-03-05 NOTE — PROGRESS NOTES
Pulmonary, Critical Care, and Sleep Medicine~Progress Note    Name: Yisel Perdomo MRN: [id number]   : 1955 Hospital: [address]   Date: 3/5/2018 11:17 AM Admission: 2018     IMPRESSION:   · Acute hypoxic pulmonary insufficiency, now resolved   · Bilateral pleural effusions; felt to be secondary to hypoalbuminemia, volume resuscitation and/or diastolic HF  · C diff  · CAD, s/p PCI      PLAN:   · We discussed today about thoracentesis; she felt since she is feeling better and not dyspnea she would like to defer for now. I agree. · Follow up with chest film in 1-2 wks following diuretics   · O2 titration above 90%   · OOB as tolerated  · If dyspnea were to worsen, would consider thoracentesis. Daily Progression:    Breathing is doing better. Not on supplemental O2. I have reviewed the labs and previous days notes. Pertinent items are noted in HPI. OBJECTIVE:     Vital Signs:       Visit Vitals    /59 (BP 1 Location: Right arm, BP Patient Position: At rest)    Pulse 84    Temp 98.9 °F (37.2 °C)    Resp 18    Ht 5' 4\" (1.626 m)    Wt 71.9 kg (158 lb 8.2 oz)    SpO2 92%    BMI 27.21 kg/m2      Temp (24hrs), Av.6 °F (37 °C), Min:97.9 °F (36.6 °C), Max:99.1 °F (37.3 °C)     Intake/Output:     Last shift: 701 - 1900  In: 340 [P.O.:240;  I.V.:100]  Out: 500 [Urine:500]    Last 3 shifts: 1901 -  0700  In: 1980 [P.O.:1080; I.V.:900]  Out: 2625 [Urine:1800; Drains:825]        Intake/Output Summary (Last 24 hours) at 18 1117  Last data filed at 18 1000   Gross per 24 hour   Intake             1580 ml   Output             1175 ml   Net              405 ml       Physical Exam:                                        Exam Findings Other   General: No resp distress noted, appears stated age    HEENT:  No ulcers, JVD not elevated, no cervical LAD    Chest: No pectus deformity, normal chest rise b/l    HEART:  RRR, no murmurs/rubs/gallops    Lungs:  CTA b/l, no rhonchi/crackles/wheeze, diminished BS at bases    ABD: Soft/NT, non rigid mildly distended    EXT: No cyanosis/clubbing/edema, normal peripheral pulses    Skin: No rashes or ulcers, no mottling    Neuro: A/O x 3        Medications:  Current Facility-Administered Medications   Medication Dose Route Frequency    potassium chloride SR (KLOR-CON 10) tablet 40 mEq  40 mEq Oral Q4H    magnesium sulfate 2 g/50 ml IVPB (premix or compounded)  2 g IntraVENous ONCE    phosphorus (K PHOS NEUTRAL) 250 mg tablet 2 Tab  2 Tab Oral BID    albumin human 25% (BUMINATE) solution 25 g  25 g IntraVENous Q6H    metoprolol tartrate (LOPRESSOR) tablet 50 mg  50 mg Oral BID    albuterol-ipratropium (DUO-NEB) 2.5 MG-0.5 MG/3 ML  3 mL Nebulization Q4H PRN    furosemide (LASIX) injection 40 mg  40 mg IntraVENous BID    HYDROmorphone (PF) (DILAUDID) injection 1 mg  1 mg IntraVENous Q4H PRN    simethicone (MYLICON) tablet 80 mg  80 mg Oral QID PRN    aspirin chewable tablet 81 mg  81 mg Oral DAILY    prasugrel (EFFIENT) tablet 10 mg  10 mg Oral DAILY    prochlorperazine (COMPAZINE) with saline injection 5 mg  5 mg IntraVENous Q6H PRN    vancomycin 50 mg/mL oral solution (compounded) 500 mg  500 mg Oral Q6H    lactobac ac& pc-s.therm-b.anim (OSCAR Q/RISAQUAD)  1 Cap Oral DAILY    simvastatin (ZOCOR) tablet 20 mg  20 mg Oral QHS    metroNIDAZOLE (FLAGYL) IVPB premix 500 mg  500 mg IntraVENous Q8H    sodium chloride (NS) flush 5-10 mL  5-10 mL IntraVENous Q8H    sodium chloride (NS) flush 5-10 mL  5-10 mL IntraVENous PRN    ondansetron (ZOFRAN) injection 4 mg  4 mg IntraVENous Q4H PRN    acetaminophen (TYLENOL) tablet 650 mg  650 mg Oral Q6H PRN       Labs:  ABG No results for input(s): PHI, PCO2I, PO2I, HCO3I, SO2I, FIO2I in the last 72 hours.      CBC Recent Labs      03/05/18   0429  03/04/18   0131  03/03/18   1037   WBC  21.9* 21.1*  21.2*   HGB  9.8*  10.7*  10.7*   HCT  28.9*  31.2*  30.3*   PLT  501*  565*  524*   MCV  94.4  91.8  92.1   MCH  32.0  31.5  55.0        Metabolic  Panel Recent Labs      03/05/18   0429  03/04/18   0131   NA  141  140   K  2.9*  2.6*   CL  103  102   CO2  32  30   GLU  110*  124*   BUN  8  10   CREA  0.63  0.54*   CA  7.9*  7.5*   MG  1.6   --    PHOS  2.1*   --    ALB  3.1*   --    SGOT  26   --    ALT  14   --         Pertinent Labs                Ladarius Alba, PA  3/5/2018

## 2018-03-05 NOTE — ADT AUTH CERT NOTES
Lawson Sherman MD Physician Addendum Internal Medicine Progress Notes Date of Service: 18 0948         []Hide copied text                                                                                                                                                                                       Hospitalist Progress Note  Lawson Sherman MD  Answering service: [phone] from in house phone  Cell: [phone]  Date of Service:  3/2/2018  NAME:  Marley Canavan  :  1955  MRN:  [id number]        Admission Summary:   A 61 y.o lady who presents with sepsis due to colitis after taking cefdinir for strep throat.      Interval history / Subjective:   She said she has still nausea, shortness of breath worse with movement      Assessment & Plan:      NSTEMI  -cardiologist is consulted and s/p cardiac cath, s/p stent distal RCA  -on aspirin, metoprolol, effient and zocor  -no chest pain  -cardiology on board     Severe sepsis without septic shock C Diff Colitis (POA)   -as evidenced by leukocytosis, lactic acidosis, history of fever due to c. diff colitis.  High risk for c diff with recent antibiotic use  -c diff (+)  -continue PO vancomycin and IV metronidazole and vancomycin dose to 500 mg and probiotic  -persistent Leukocytosis   -has flex seal  -continue supportive care with IV fluids and pain control  -repeated CT scan of abdomen and pelvis show persistent pancolitis  -general surgery consulted  -ID on board     Shortness of breath possible due to large bilateral pleural effusion  -has cough, persistent leukocytosis  -CXR 3/ilateral pleural effusions persist. The  -CT chest 3/2 large bilateral pleural effusion with atelectasis  -Echo on  LV EF 50-55% , there was dyskinesis and abnormal relaxation  -patient also with significant leukocytosis but afebrile and she has c difficile  -start lasix 40 mg iv q 12 with albumin, may need tap  -consult pulmonologist      Hypokalemia, hypocalcemia, hypophosphatemia: (POA)  -replete   -improved      Lactic acidosis (POA) due to dehydration and sepsis  -resolved      Hyperbilirubinemia:   -mildly elevated, predominantly indirect  -resolved      Right adnexal cyst noted on CT. Also notes ascites and severe hypoalbuminemia. And small right effusion.       Prolonged QTc: 614, in the setting of hypocalcemia and hypokalemia. ECG after electrolyte repletion shows improvement of QTc      Reported history of recent strep throat s/p cefdinir. Seems resolved.      Hyperlipidemia:  -continue home statin        Code status: Full Code  DVT prophylaxis: SCD     Care Plan discussed with: Patient/Family, Nurse and   Disposition: TBD    at [address] Problems  Never Reviewed             Codes Class Noted POA     * (Principal)Colitis ICD-10-CM: K52.9  ICD-9-CM: 391. 9   2/24/2018 Unknown           Severe sepsis ([facility] [address] ICD-10-CM: A41.9, R65.20  ICD-9-CM: 038.9, 995.92   2/24/2018 Unknown           Dehydration ICD-10-CM: E86.0  ICD-9-CM: 276.51   2/24/2018 Unknown           Hypokalemia ICD-10-CM: E87.6  ICD-9-CM: 276.8   2/24/2018 Unknown           Hypocalcemia ICD-10-CM: E83.51  ICD-9-CM: 275.41   2/24/2018 Unknown                     Vital Signs:    Last 24hrs VS reviewed since prior progress note. Most recent are:       Visit Vitals    /60    Pulse 72    Temp 96.6 °F (35.9 °C)    Resp 18    Ht 5' 4\" (1.626 m)    Wt 70.8 kg (156 lb)    SpO2 94%    BMI 26.78 kg/m2            Intake/Output Summary (Last 24 hours) at 03/02/18 0948  Last data filed at 03/02/18 0600    Gross per 24 hour   Intake              680 ml   Output              810 ml   Net             -130 ml         Physical Examination:             Constitutional:  No acute distress, cooperative, pleasant    ENT:  Oral mucous moist, oropharynx benign. Neck supple,    Resp:  CTA bilaterally. No wheezing/rhonchi/rales.  No accessory muscle use   CV:  Regular rhythm, normal rate, no murmurs, gallops, rubs    GI:  Soft, non distended, non tender. normoactive bowel sounds, no hepatosplenomegaly     Musculoskeletal:  No edema    Neurologic:  Moves all extremities. AAOx3, CN II-XII reviewed                                             Skin:  Good turgor, no rashes or ulcers         Data Review:    Review and/or order of clinical lab test        Labs:      Recent Labs       03/02/18 0320 03/01/18 0343   WBC  26.1*  24.6*   HGB  13.0  13.0   HCT  38.0  37.3   PLT  610*  543*             Recent Labs       03/02/18 0320 03/01/18 0343 02/28/18 0227 02/27/18   1232   NA  136  138  135*  136   K  3.5  3.2*  3.8  3.8   CL  104  106  104  107   CO2  24  22  20*  19*   BUN  20  18  19  16   CREA  0.57  0.64  0.69  0.68   GLU  118*  99  98  124*   CA  7.4*  7.2*  7.3*  7.2*   MG  2.2   --   2.3  1.9   PHOS   --   2.1*   --   2.6             Recent Labs       03/02/18 0320 03/01/18 0343 02/28/18 0227 02/27/18   1232   SGOT  36  46*  127*  91*   ALT  21  25  35  24   AP  44*  50  58  63   TBILI  0.4  0.5  0.4  0.4   TP  4.5*  4.3*  4.5*  4.6*   ALB  1.5*  1.6*  1.5*  1.6*   GLOB  3.0  2.7  3.0  3.0   AML   --    --    --   53   LPSE   --    --    --   85      No results for input(s): INR, PTP, APTT in the last 72 hours.     No lab exists for component: INREXT, INREXT   No results for input(s): FE, TIBC, PSAT, FERR in the last 72 hours. No results found for: FOL, RBCF   No results for input(s): PH, PCO2, PO2 in the last 72 hours.        Recent Labs       02/28/18 0227 02/27/18   1232   CPK   --   762*   CKNDX   --   16.8*   TROIQ  29.50*  20.30*      No results found for: CHOL, CHOLX, CHLST, CHOLV, HDL, LDL, LDLC, DLDLP, TGLX, TRIGL, TRIGP, CHHD, CHHDX        Lab Results   Component Value Date/Time     Glucose (POC) 110 (H) 02/27/2018 04:49 AM      No results found for: COLOR, APPRN, SPGRU, REFSG, JASON, PROTU, GLUCU, KETU, BILU, UROU, KATHIA, LEUKU, GLUKE, EPSU, BACTU, WBCU, RBCU, CASTS, UCRY        Medications Reviewed:             Current Facility-Administered Medications   Medication Dose Route Frequency    metoprolol tartrate (LOPRESSOR) tablet 50 mg  50 mg Oral BID    HYDROmorphone (PF) (DILAUDID) injection 1 mg  1 mg IntraVENous Q4H PRN    simethicone (MYLICON) tablet 80 mg  80 mg Oral QID PRN    aspirin chewable tablet 81 mg  81 mg Oral DAILY    prasugrel (EFFIENT) tablet 10 mg  10 mg Oral DAILY    prochlorperazine (COMPAZINE) with saline injection 5 mg  5 mg IntraVENous Q6H PRN    vancomycin 50 mg/mL oral solution (compounded) 500 mg  500 mg Oral Q6H    lactobac ac& pc-s.therm-b.anim (OSCAR Q/RISAQUAD)  1 Cap Oral DAILY    simvastatin (ZOCOR) tablet 20 mg  20 mg Oral QHS    metroNIDAZOLE (FLAGYL) IVPB premix 500 mg  500 mg IntraVENous Q8H    sodium chloride (NS) flush 5-10 mL  5-10 mL IntraVENous Q8H    sodium chloride (NS) flush 5-10 mL  5-10 mL IntraVENous PRN    ondansetron (ZOFRAN) injection 4 mg  4 mg IntraVENous Q4H PRN    acetaminophen (TYLENOL) tablet 650 mg  650 mg Oral Q6H PRN      ______________________________________________________________________  EXPECTED LENGTH OF STAY: 3d 16h  ACTUAL LENGTH OF STAY:          6         Edilma Santiago MD

## 2018-03-05 NOTE — PROGRESS NOTES
ID Progress Note  3/5/2018    Subjective:     She looks/feels a lot better today. Objective:     Antibiotics:  1. Vancomycin po  2. Flagyl IV      Vitals:   Visit Vitals    /55 (BP 1 Location: Left arm, BP Patient Position: Sitting)    Pulse 84    Temp 98.9 °F (37.2 °C)    Resp 17    Ht 5' 4\" (1.626 m)    Wt 71.9 kg (158 lb 8.2 oz)    SpO2 92%    BMI 27.21 kg/m2        Tmax:  Temp (24hrs), Av.7 °F (37.1 °C), Min:98 °F (36.7 °C), Max:99.1 °F (37.3 °C)      Exam:  Lungs:  clear to auscultation bilaterally  Heart:  regular rate and rhythm  Abdomen:  soft, non-tender. Bowel sounds normal. No masses,  no organomegaly  Skin:  no rash or abnormalities    Labs:      Recent Labs      18   0429  18   0131  18   1037   WBC  21.9*  21.1*  21.2*   HGB  9.8*  10.7*  10.7*   PLT  501*  565*  524*   BUN  8  10   --    CREA  0.63  0.54*   --    SGOT  26   --    --    AP  28*   --    --    TBILI  0.8   --    --        Cultures:     No results found for: [facility]  Lab Results   Component Value Date/Time    Culture result:  2018 04:34 PM     NO ROUTINE ENTERIC PATHOGENS ISOLATED INCLUDING SALMONELLA, SHIGELLA, YERSINIA, VIBRIO OR SHIGA TOXIN PRODUCING E. COLI    Culture result: NO GROWTH 5 DAYS 2018 12:21 PM       Radiology:     Line/Insert Date:           Assessment:     1. C diff--clinically improving--still with leukocytosis and diarrhea, but is subjectively better and she looks improved overall  2. AMI    Objective:     1. Continue current therapy  2.  Would like to see a continued trend of wbc downward prior to discharge    Kristin Umana MD

## 2018-03-05 NOTE — ROUTINE PROCESS
Bedside shift change report given to Barbra (oncoming nurse) by Marie Chanel (offgoing nurse). Report included the following information SBAR, ED Summary, Intake/Output, Med Rec Status and Cardiac Rhythm NSR.

## 2018-03-05 NOTE — PROGRESS NOTES
Infectious Diseases Progress Note    Antibiotic Summary:  Zosyn   --   Flagyl   -- present  Vanc 250 mg  --   Vanc 500 mg  -- present      Subjective:     She is in good spirits. She sat up for about 4 hours today. She ate well. The Flexiseal came out at about 10 AM today -- she has had 3 BMs in the 10 hours since then. No SOB. O2 sats are 95% on RA    Objective:     Vitals:   Visit Vitals    /56 (BP 1 Location: Left arm, BP Patient Position: At rest)    Pulse 73    Temp 98.4 °F (36.9 °C)    Resp 18    Ht 5' 4\" (1.626 m)    Wt 71.8 kg (158 lb 4.8 oz)    SpO2 95%    BMI 27.17 kg/m2        Tmax:  Temp (24hrs), Av °F (36.7 °C), Min:97.5 °F (36.4 °C), Max:98.4 °F (36.9 °C)      Exam:  General appearance: alert, no distress  Lungs: few basilar rales bilaterally  Heart: regular rate and rhythm  Abdomen: soft; minimal distention; nontender; BS +; Flexiseal came out at 10 AM on 3/4  Skin: no rash  Neurologic: Grossly normal    IV Lines: peripheral    Labs:    Recent Labs      18   0131  18   1037  18   0320   WBC  21.1*  21.2*  26.1*   HGB  10.7*  10.7*  13.0   PLT  565*  524*  610*   BUN  10   --   20   CREA  0.54*   --   0.57   TBILI   --    --   0.4   SGOT   --    --   36   AP   --    --   44*       Assessment:     1. PMC/C diff -- She feels better. WBC has fallen from 38.9k down to 21k on 3/3 and still 21k today    2. Leukocytosis -- as above    3. AMI    Plan:     1. Continue Flagyl IV and Vanc po    2. Monitor diarrhea without the Flexiseal      Discussed with the patient and her  -- I explained the treatment likely will be longer than the typical 10-14 day course for a 1st episode of [facility] because of the severity of this episode. Risk of relapse likely ~ 30%.  Discussed plans with her nurse    Lucila Johnson MD

## 2018-03-05 NOTE — PROGRESS NOTES
Hospitalist Progress Note  Yolande Mchugh MD  Answering service: [phone] from in house phone  Cell: [phone]      Date of Service:  3/5/2018  NAME:  Yeison Vigil  :  1955  MRN:  [id number]      Admission Summary:   A 61 y.o lady who presents with sepsis due to colitis after taking cefdinir for strep throat. Interval history / Subjective:   She said she feels better, but still shortness of breath, no abdominal or chest pain     Assessment & Plan:     Severe sepsis without septic shock C Diff Colitis (POA)   -as evidenced by leukocytosis, lactic acidosis, history of fever due to c. diff colitis.    -c diff (+)  -continue PO vancomycin, IV metronidazole and probiotic  -persistent Leukocytosis   -has flex seal in place 400 ml watery output overnight  -continue supportive care with IV fluids and pain control  -repeated CT scan of abdomen and pelvis show persistent pancolitis  -seen by general surgery  -ID on board    NSTEMI  -cardiologist is consulted and s/p cardiac cath, s/p stent distal RCA  -on aspirin, metoprolol, effient and zocor  -no chest pain  -cardiology on board    Shortness of breath due to large bilateral pleural effusion and atelectais  -shortness of breath, persistent leukocytosis but improving  -CXR 3/2 presistent bilateral pleural effusions persist. The  -CT chest 3/2 large bilateral pleural effusion with atelectasis  -Echo on  LV EF 50-55% , there was dyskinesis and abnormal relaxation  -patient also with significant leukocytosis but afebrile and she has c difficile  -continue lasix 40 mg iv q 12, received albumin  -continue oxygen support, and pulse ox monitoring  -  -consult pulmonologist     Hypokalemia, hypocalcemia, hypophosphatemia: (POA)  -replete with iv kcl and repeat k level   -improved      Lactic acidosis (POA) due to dehydration and sepsis  -resolved      Hyperbilirubinemia:   -mildly elevated, predominantly indirect  -resolved      Right adnexal cyst noted on CT. Also notes ascites and severe hypoalbuminemia. And small right effusion.       Prolonged QTc: 614, in the setting of hypocalcemia and hypokalemia. ECG after electrolyte repletion shows improvement of QTc      Reported history of recent strep throat s/p cefdinir. Seems resolved.      Hyperlipidemia:  -continue home statin      Code status: Full Code  DVT prophylaxis: SCD    Care Plan discussed with: Patient/Family, Nurse and   Disposition: TBD   Family at bedside questions answered     Hospital Problems  Never Reviewed          Codes Class Noted POA    * (Principal)Colitis ICD-10-CM: K52.9  ICD-9-CM: 558.9  2/24/2018 Unknown        Severe sepsis ([facility] [address] ICD-10-CM: A41.9, R65.20  ICD-9-CM: 038.9, 995.92  2/24/2018 Unknown        Dehydration ICD-10-CM: E86.0  ICD-9-CM: 276.51  2/24/2018 Unknown        Hypokalemia ICD-10-CM: E87.6  ICD-9-CM: 276.8  2/24/2018 Unknown        Hypocalcemia ICD-10-CM: E83.51  ICD-9-CM: 275.41  2/24/2018 Unknown                Vital Signs:    Last 24hrs VS reviewed since prior progress note. Most recent are:  Visit Vitals    /59 (BP 1 Location: Right arm, BP Patient Position: At rest)    Pulse 84    Temp 98.9 °F (37.2 °C)    Resp 18    Ht 5' 4\" (1.626 m)    Wt 71.9 kg (158 lb 8.2 oz)    SpO2 92%    BMI 27.21 kg/m2         Intake/Output Summary (Last 24 hours) at 03/05/18 1025  Last data filed at 03/05/18 0900   Gross per 24 hour   Intake             1580 ml   Output             1075 ml   Net              505 ml        Physical Examination:             Constitutional:  No acute distress, cooperative, pleasant    ENT:  Oral mucous moist, oropharynx benign. Neck supple,    Resp:  Decrease bronchial breath sound bilaterally, no wheezing/rhonchi/rales. No accessory muscle use   CV:  Regular rhythm, normal rate, no murmurs, gallops, rubs    GI:  Soft, non distended, non tender.  normoactive bowel sounds, no hepatosplenomegaly     Musculoskeletal:  No edema    Neurologic:  Moves all extremities. AAOx3, CN II-XII reviewed     Skin:  Good turgor, no rashes or ulcers       Data Review:    Review and/or order of clinical lab test      Labs:     Recent Labs      03/05/18 0429 03/04/18 0131   WBC  21.9*  21.1*   HGB  9.8*  10.7*   HCT  28.9*  31.2*   PLT  501*  565*     Recent Labs      03/05/18 0429 03/04/18 0131   NA  141  140   K  2.9*  2.6*   CL  103  102   CO2  32  30   BUN  8  10   CREA  0.63  0.54*   GLU  110*  124*   CA  7.9*  7.5*   MG  1.6   --    PHOS  2.1*   --      Recent Labs      03/05/18 0429   SGOT  26   ALT  14   AP  28*   TBILI  0.8   TP  5.2*   ALB  3.1*   GLOB  2.1     No results for input(s): INR, PTP, APTT in the last 72 hours. No lab exists for component: INREXT, INREXT   No results for input(s): FE, TIBC, PSAT, FERR in the last 72 hours. No results found for: FOL, RBCF   No results for input(s): PH, PCO2, PO2 in the last 72 hours. No results for input(s): CPK, CKNDX, TROIQ in the last 72 hours.     No lab exists for component: CPKMB  No results found for: CHOL, CHOLX, CHLST, CHOLV, HDL, LDL, LDLC, DLDLP, TGLX, TRIGL, TRIGP, CHHD, CHHDX  Lab Results   Component Value Date/Time    Glucose (POC) 110 (H) 02/27/2018 04:49 AM     No results found for: COLOR, APPRN, SPGRU, REFSG, JASON, PROTU, GLUCU, KETU, BILU, UROU, KATHIA, LEUKU, GLUKE, EPSU, BACTU, WBCU, RBCU, CASTS, UCRY      Medications Reviewed:     Current Facility-Administered Medications   Medication Dose Route Frequency    potassium chloride SR (KLOR-CON 10) tablet 40 mEq  40 mEq Oral Q4H    albumin human 25% (BUMINATE) solution 25 g  25 g IntraVENous Q6H    metoprolol tartrate (LOPRESSOR) tablet 50 mg  50 mg Oral BID    albuterol-ipratropium (DUO-NEB) 2.5 MG-0.5 MG/3 ML  3 mL Nebulization Q4H PRN    furosemide (LASIX) injection 40 mg  40 mg IntraVENous BID    HYDROmorphone (PF) (DILAUDID) injection 1 mg  1 mg IntraVENous Q4H PRN    simethicone (MYLICON) tablet 80 mg  80 mg Oral QID PRN    aspirin chewable tablet 81 mg  81 mg Oral DAILY    prasugrel (EFFIENT) tablet 10 mg  10 mg Oral DAILY    prochlorperazine (COMPAZINE) with saline injection 5 mg  5 mg IntraVENous Q6H PRN    vancomycin 50 mg/mL oral solution (compounded) 500 mg  500 mg Oral Q6H    lactobac ac& pc-s.therm-b.anim (OSCAR Q/RISAQUAD)  1 Cap Oral DAILY    simvastatin (ZOCOR) tablet 20 mg  20 mg Oral QHS    metroNIDAZOLE (FLAGYL) IVPB premix 500 mg  500 mg IntraVENous Q8H    sodium chloride (NS) flush 5-10 mL  5-10 mL IntraVENous Q8H    sodium chloride (NS) flush 5-10 mL  5-10 mL IntraVENous PRN    ondansetron (ZOFRAN) injection 4 mg  4 mg IntraVENous Q4H PRN    acetaminophen (TYLENOL) tablet 650 mg  650 mg Oral Q6H PRN     ______________________________________________________________________  EXPECTED LENGTH OF STAY: 3d 16h  ACTUAL LENGTH OF STAY:          9                 Romulo Vaz MD

## 2018-03-05 NOTE — PROGRESS NOTES
Bedside and Verbal shift change report given to [facility] AT [facility] (oncoming nurse) by José Vieira (offgoing nurse). Report included the following information SBAR, ED Summary, Procedure Summary, Intake/Output, MAR, Accordion, Recent Results, Med Rec Status and Cardiac Rhythm normal sinus.

## 2018-03-06 LAB
ALBUMIN SERPL-MCNC: 3.2 G/DL (ref 3.5–5)
ALBUMIN/GLOB SERPL: 1.5 {RATIO} (ref 1.1–2.2)
ALP SERPL-CCNC: 31 U/L (ref 45–117)
ALT SERPL-CCNC: 14 U/L (ref 12–78)
ANION GAP SERPL CALC-SCNC: 6 MMOL/L (ref 5–15)
AST SERPL-CCNC: 24 U/L (ref 15–37)
BILIRUB SERPL-MCNC: 0.8 MG/DL (ref 0.2–1)
BUN SERPL-MCNC: 7 MG/DL (ref 6–20)
BUN/CREAT SERPL: 12 (ref 12–20)
CALCIUM SERPL-MCNC: 8.1 MG/DL (ref 8.5–10.1)
CHLORIDE SERPL-SCNC: 102 MMOL/L (ref 97–108)
CO2 SERPL-SCNC: 32 MMOL/L (ref 21–32)
CREAT SERPL-MCNC: 0.59 MG/DL (ref 0.55–1.02)
ERYTHROCYTE [DISTWIDTH] IN BLOOD BY AUTOMATED COUNT: 13 % (ref 11.5–14.5)
GLOBULIN SER CALC-MCNC: 2.1 G/DL (ref 2–4)
GLUCOSE SERPL-MCNC: 121 MG/DL (ref 65–100)
HCT VFR BLD AUTO: 29.9 % (ref 35–47)
HGB BLD-MCNC: 10 G/DL (ref 11.5–16)
MAGNESIUM SERPL-MCNC: 1.8 MG/DL (ref 1.6–2.4)
MCH RBC QN AUTO: 31.5 PG (ref 26–34)
MCHC RBC AUTO-ENTMCNC: 33.4 G/DL (ref 30–36.5)
MCV RBC AUTO: 94.3 FL (ref 80–99)
NRBC # BLD: 0 K/UL (ref 0–0.01)
NRBC BLD-RTO: 0 PER 100 WBC
PLATELET # BLD AUTO: 536 K/UL (ref 150–400)
PMV BLD AUTO: 10.2 FL (ref 8.9–12.9)
POTASSIUM SERPL-SCNC: 3.1 MMOL/L (ref 3.5–5.1)
PROT SERPL-MCNC: 5.3 G/DL (ref 6.4–8.2)
RBC # BLD AUTO: 3.17 M/UL (ref 3.8–5.2)
SODIUM SERPL-SCNC: 140 MMOL/L (ref 136–145)
WBC # BLD AUTO: 23.8 K/UL (ref 3.6–11)

## 2018-03-06 PROCEDURE — 85027 COMPLETE CBC AUTOMATED: CPT | Performed by: HOSPITALIST

## 2018-03-06 PROCEDURE — 80053 COMPREHEN METABOLIC PANEL: CPT | Performed by: HOSPITALIST

## 2018-03-06 PROCEDURE — 74011250637 HC RX REV CODE- 250/637: Performed by: SPECIALIST

## 2018-03-06 PROCEDURE — 74011250636 HC RX REV CODE- 250/636: Performed by: HOSPITALIST

## 2018-03-06 PROCEDURE — 74011250637 HC RX REV CODE- 250/637: Performed by: HOSPITALIST

## 2018-03-06 PROCEDURE — 74011250637 HC RX REV CODE- 250/637: Performed by: NURSE PRACTITIONER

## 2018-03-06 PROCEDURE — 65270000029 HC RM PRIVATE

## 2018-03-06 PROCEDURE — 97161 PT EVAL LOW COMPLEX 20 MIN: CPT

## 2018-03-06 PROCEDURE — 74011000250 HC RX REV CODE- 250: Performed by: HOSPITALIST

## 2018-03-06 PROCEDURE — 36415 COLL VENOUS BLD VENIPUNCTURE: CPT | Performed by: HOSPITALIST

## 2018-03-06 PROCEDURE — 83735 ASSAY OF MAGNESIUM: CPT | Performed by: HOSPITALIST

## 2018-03-06 RX ORDER — POTASSIUM CHLORIDE 750 MG/1
40 TABLET, FILM COATED, EXTENDED RELEASE ORAL DAILY
Status: DISCONTINUED | OUTPATIENT
Start: 2018-03-07 | End: 2018-03-08

## 2018-03-06 RX ORDER — POTASSIUM CHLORIDE 750 MG/1
40 TABLET, FILM COATED, EXTENDED RELEASE ORAL
Status: COMPLETED | OUTPATIENT
Start: 2018-03-06 | End: 2018-03-06

## 2018-03-06 RX ADMIN — HYDROMORPHONE HYDROCHLORIDE 1 MG: 1 INJECTION, SOLUTION INTRAMUSCULAR; INTRAVENOUS; SUBCUTANEOUS at 01:08

## 2018-03-06 RX ADMIN — METOPROLOL TARTRATE 50 MG: 50 TABLET ORAL at 10:10

## 2018-03-06 RX ADMIN — METRONIDAZOLE 500 MG: 500 INJECTION, SOLUTION INTRAVENOUS at 21:51

## 2018-03-06 RX ADMIN — VANCOMYCIN HYDROCHLORIDE 500 MG: 1 INJECTION, POWDER, LYOPHILIZED, FOR SOLUTION INTRAVENOUS at 20:39

## 2018-03-06 RX ADMIN — METRONIDAZOLE 500 MG: 500 INJECTION, SOLUTION INTRAVENOUS at 13:49

## 2018-03-06 RX ADMIN — FUROSEMIDE 40 MG: 10 INJECTION, SOLUTION INTRAMUSCULAR; INTRAVENOUS at 10:10

## 2018-03-06 RX ADMIN — Medication 10 ML: at 06:10

## 2018-03-06 RX ADMIN — PRASUGREL 10 MG: 10 TABLET, FILM COATED ORAL at 10:09

## 2018-03-06 RX ADMIN — ONDANSETRON 4 MG: 2 INJECTION INTRAMUSCULAR; INTRAVENOUS at 23:56

## 2018-03-06 RX ADMIN — Medication 10 ML: at 18:21

## 2018-03-06 RX ADMIN — VANCOMYCIN HYDROCHLORIDE 500 MG: 1 INJECTION, POWDER, LYOPHILIZED, FOR SOLUTION INTRAVENOUS at 10:23

## 2018-03-06 RX ADMIN — ACETAMINOPHEN 650 MG: 325 TABLET, FILM COATED ORAL at 20:41

## 2018-03-06 RX ADMIN — Medication 10 ML: at 21:51

## 2018-03-06 RX ADMIN — POTASSIUM CHLORIDE 40 MEQ: 750 TABLET, EXTENDED RELEASE ORAL at 14:44

## 2018-03-06 RX ADMIN — METOPROLOL TARTRATE 50 MG: 50 TABLET ORAL at 20:39

## 2018-03-06 RX ADMIN — Medication 1 CAPSULE: at 10:09

## 2018-03-06 RX ADMIN — VANCOMYCIN HYDROCHLORIDE 500 MG: 1 INJECTION, POWDER, LYOPHILIZED, FOR SOLUTION INTRAVENOUS at 01:12

## 2018-03-06 RX ADMIN — METRONIDAZOLE 500 MG: 500 INJECTION, SOLUTION INTRAVENOUS at 06:09

## 2018-03-06 RX ADMIN — Medication 10 ML: at 23:56

## 2018-03-06 RX ADMIN — ASPIRIN 81 MG 81 MG: 81 TABLET ORAL at 10:09

## 2018-03-06 RX ADMIN — VANCOMYCIN HYDROCHLORIDE 500 MG: 1 INJECTION, POWDER, LYOPHILIZED, FOR SOLUTION INTRAVENOUS at 13:49

## 2018-03-06 RX ADMIN — SIMVASTATIN 20 MG: 20 TABLET, FILM COATED ORAL at 21:51

## 2018-03-06 RX ADMIN — FUROSEMIDE 40 MG: 10 INJECTION, SOLUTION INTRAMUSCULAR; INTRAVENOUS at 18:20

## 2018-03-06 NOTE — PROGRESS NOTES
TRANSFER - OUT REPORT:    Verbal report given to Tahmina Little RN(name) on Elizabeth Days  being transferred to (unit) for routine progression of care       Report consisted of patients Situation, Background, Assessment and   Recommendations(SBAR). Information from the following report(s) SBAR, MAR, Accordion, Recent Results and Cardiac Rhythm NSR was reviewed with the receiving nurse. Lines:   Peripheral IV 03/06/18 Right;Upper Arm (Active)   Site Assessment Clean, dry, & intact 3/6/2018 10:30 AM   Phlebitis Assessment 0 3/6/2018 10:30 AM   Infiltration Assessment 0 3/6/2018 10:30 AM   Dressing Status Clean, dry, & intact 3/6/2018 10:30 AM   Dressing Type Transparent;Tape 3/6/2018 10:30 AM   Hub Color/Line Status Pink; Infusing;Flushed 3/6/2018  3:49 AM   Alcohol Cap Used Yes 3/6/2018  3:49 AM        Opportunity for questions and clarification was provided.       Patient transported with:   [employer]

## 2018-03-06 NOTE — PROGRESS NOTES
Assumed care of pt this am. Pt is alert and oriented with no known needs at this time. Pt is resting in bed at this time.  Pt working with PT.

## 2018-03-06 NOTE — PROGRESS NOTES
Hospitalist Progress Note  Orlando Mcgovern MD  Answering service: [phone] OR [phone] from in house phone  Cell: [phone]      Date of Service:  3/6/2018  NAME:  Souleymane Irizarry  :  1955  MRN:  [id number]      Admission Summary:   A 61 y.o lady who presents with sepsis due to colitis after taking cefdinir for strep throat. Interval history / Subjective:     F/u for Sepsis and C difficile Colitis    No acute overnight events. Feels overall better. Abdominal pain is less. Diarrhea is also less. She denies fever or chills. No N/V. Assessment & Plan:     Severe sepsis without septic shock due to C Diff Colitis (POA)   -as evidenced by leukocytosis, lactic acidosis, history of fever due to c. diff colitis. -c diff (+)  -continue PO vancomycin, IV metronidazole and probiotic  - I.D following  - contact isolation  - Ct abdomen 3/2: large bilateral pleura effusion. Persistent pancolitis. No megacolon  - leucocytosis appears to be worsening. Monitor    NSTEMI  -cardiologist is consulted and s/p cardiac cath, s/p stent distal RCA  -on aspirin, metoprolol, effient and zocor  -no chest pain  -cardiology following    Shortness of breath due to large bilateral pleural effusion and atelectais  -shortness of breath, persistent leukocytosis but improving  -CXR 3/ presistent bilateral pleural effusions persist. The  -CT chest 3/ large bilateral pleural effusion with atelectasis  -Echo on  LV EF 50-55% , there was dyskinesis and abnormal relaxation  -patient also with significant leukocytosis but afebrile and she has c difficile  -continue lasix 40 mg iv q 12, received albumin  -continue oxygen support, and pulse ox monitoring  -Pulmonary following.  Patient declines thoracentesis for now  Will follow up with pulmonary as an out-patient     Hypokalemia, hypocalcemia, hypophosphatemia: (POA)  -replete prn and monitor      Lactic acidosis (POA) due to dehydration and sepsis  -resolved      Hyperbilirubinemia:   -mildly elevated, predominantly indirect  -resolved      Right adnexal cyst noted on CT. Also notes ascites and severe hypoalbuminemia. And small right effusion.       Prolonged QTc: 614, in the setting of hypocalcemia and hypokalemia. ECG after electrolyte repletion shows improvement of QTc      Reported history of recent strep throat s/p cefdinir. Seems resolved.      Hyperlipidemia:  -continue home statin      Code status: Full Code  DVT prophylaxis: SCD    Care Plan discussed with: Patient/Family, Nurse and   Disposition: TBD , continue inpatient care     Hospital Problems  Never Reviewed          Codes Class Noted POA    * (Principal)Colitis ICD-10-CM: K52.9  ICD-9-CM: 558.9  2/24/2018 Unknown        Severe sepsis ([facility] [address] ICD-10-CM: A41.9, R65.20  ICD-9-CM: 038.9, 995.92  2/24/2018 Unknown        Dehydration ICD-10-CM: E86.0  ICD-9-CM: 276.51  2/24/2018 Unknown        Hypokalemia ICD-10-CM: E87.6  ICD-9-CM: 276.8  2/24/2018 Unknown        Hypocalcemia ICD-10-CM: E83.51  ICD-9-CM: 275.41  2/24/2018 Unknown                Vital Signs:    Last 24hrs VS reviewed since prior progress note. Most recent are:  Visit Vitals    /60 (BP 1 Location: Right arm, BP Patient Position: Sitting)    Pulse 81    Temp 98.5 °F (36.9 °C)    Resp 18    Ht 5' 4\" (1.626 m)    Wt 67.7 kg (149 lb 3.2 oz)    SpO2 94%    BMI 25.61 kg/m2         Intake/Output Summary (Last 24 hours) at 03/06/18 1429  Last data filed at 03/06/18 0935   Gross per 24 hour   Intake              540 ml   Output             1601 ml   Net            -1061 ml        Physical Examination:             Constitutional:  No acute distress, cooperative, pleasant    ENT:  Oral mucous moist, oropharynx benign. Neck supple,    Resp:  Decrease bronchial breath sound bilaterally, no wheezing/rhonchi/rales.  No accessory muscle use   CV:  Regular rhythm, normal rate, no murmurs, gallops, rubs    GI:  Soft, non distended, non tender. normoactive bowel sounds, no hepatosplenomegaly     Musculoskeletal:  No edema    Neurologic:  Moves all extremities. AAOx3, CN II-XII reviewed     Skin:  Good turgor, no rashes or ulcers       Data Review:    Review and/or order of clinical lab test      Labs:     Recent Labs      03/06/18 0308 03/05/18 0429   WBC  23.8*  21.9*   HGB  10.0*  9.8*   HCT  29.9*  28.9*   PLT  536*  501*     Recent Labs      03/06/18 0308 03/05/18 0429 03/04/18   0131   NA  140  141  140   K  3.1*  2.9*  2.6*   CL  102  103  102   CO2  32  32  30   BUN  7  8  10   CREA  0.59  0.63  0.54*   GLU  121*  110*  124*   CA  8.1*  7.9*  7.5*   MG  1.8  1.6   --    PHOS   --   2.1*   --      Recent Labs      03/06/18 0308 03/05/18 0429   SGOT  24  26   ALT  14  14   AP  31*  28*   TBILI  0.8  0.8   TP  5.3*  5.2*   ALB  3.2*  3.1*   GLOB  2.1  2.1     No results for input(s): INR, PTP, APTT in the last 72 hours. No lab exists for component: INREXT, INREXT   No results for input(s): FE, TIBC, PSAT, FERR in the last 72 hours. No results found for: FOL, RBCF   No results for input(s): PH, PCO2, PO2 in the last 72 hours. No results for input(s): CPK, CKNDX, TROIQ in the last 72 hours.     No lab exists for component: CPKMB  No results found for: CHOL, CHOLX, CHLST, CHOLV, HDL, LDL, LDLC, DLDLP, TGLX, TRIGL, TRIGP, CHHD, CHHDX  Lab Results   Component Value Date/Time    Glucose (POC) 110 (H) 02/27/2018 04:49 AM     No results found for: COLOR, APPRN, SPGRU, REFSG, JASON, PROTU, GLUCU, KETU, BILU, UROU, KATHIA, LEUKU, GLUKE, EPSU, BACTU, WBCU, RBCU, CASTS, UCRY      Medications Reviewed:     Current Facility-Administered Medications   Medication Dose Route Frequency    metoprolol tartrate (LOPRESSOR) tablet 50 mg  50 mg Oral BID    albuterol-ipratropium (DUO-NEB) 2.5 MG-0.5 MG/3 ML  3 mL Nebulization Q4H PRN    furosemide (LASIX) injection 40 mg  40 mg IntraVENous BID    HYDROmorphone (PF) (DILAUDID) injection 1 mg  1 mg IntraVENous Q4H PRN    simethicone (MYLICON) tablet 80 mg  80 mg Oral QID PRN    aspirin chewable tablet 81 mg  81 mg Oral DAILY    prasugrel (EFFIENT) tablet 10 mg  10 mg Oral DAILY    prochlorperazine (COMPAZINE) with saline injection 5 mg  5 mg IntraVENous Q6H PRN    vancomycin 50 mg/mL oral solution (compounded) 500 mg  500 mg Oral Q6H    lactobac ac& pc-s.therm-b.anim (OSCAR Q/RISAQUAD)  1 Cap Oral DAILY    simvastatin (ZOCOR) tablet 20 mg  20 mg Oral QHS    metroNIDAZOLE (FLAGYL) IVPB premix 500 mg  500 mg IntraVENous Q8H    sodium chloride (NS) flush 5-10 mL  5-10 mL IntraVENous Q8H    sodium chloride (NS) flush 5-10 mL  5-10 mL IntraVENous PRN    ondansetron (ZOFRAN) injection 4 mg  4 mg IntraVENous Q4H PRN    acetaminophen (TYLENOL) tablet 650 mg  650 mg Oral Q6H PRN     ______________________________________________________________________  EXPECTED LENGTH OF STAY: 4d 4h  ACTUAL LENGTH OF STAY:          10                 Carmel Thompson MD

## 2018-03-06 NOTE — PROGRESS NOTES
TRANSFER - IN REPORT:    Verbal report received from Monserrat Stinson RN (name) on Oleg Mendez  being received from [facility](unit) for routine progression of care      Report consisted of patients Situation, Background, Assessment and   Recommendations(SBAR). Information from the following report(s) SBAR was reviewed with the receiving nurse. Opportunity for questions and clarification was provided. Await patient arrival on [facility] to Room 602.

## 2018-03-06 NOTE — PROGRESS NOTES
Attempted to call report for the second time (offgoing nurse attempted to give report before leaving) and was told RN needs to receive report on another pt and will take report when she's done.

## 2018-03-06 NOTE — PROGRESS NOTES
ID Progress Note  3/6/2018    Subjective:     She looks/feels a lot better today. Eating better, up and around some. Objective:     Antibiotics:  1. Vancomycin po  2. Flagyl IV      Vitals:   Visit Vitals    /61 (BP 1 Location: Left arm, BP Patient Position: At rest)    Pulse 90    Temp 99.4 °F (37.4 °C)    Resp 20    Ht 5' 4\" (1.626 m)    Wt 67.7 kg (149 lb 3.2 oz)    SpO2 91%    BMI 25.61 kg/m2        Tmax:  Temp (24hrs), Av.8 °F (37.1 °C), Min:98.5 °F (36.9 °C), Max:99.4 °F (37.4 °C)      Exam:  Lungs:  clear to auscultation bilaterally  Heart:  regular rate and rhythm  Abdomen:  soft, non-tender. Bowel sounds normal. No masses,  no organomegaly  Skin:  no rash or abnormalities    Labs:      Recent Labs      18   0308  18   0429  18   0131   WBC  23.8*  21.9*  21.1*   HGB  10.0*  9.8*  10.7*   PLT  536*  501*  565*   BUN  7  8  10   CREA  0.59  0.63  0.54*   SGOT  24  26   --    AP  31*  28*   --    TBILI  0.8  0.8   --        Cultures:     No results found for: [facility]  Lab Results   Component Value Date/Time    Culture result:  2018 04:34 PM     NO ROUTINE ENTERIC PATHOGENS ISOLATED INCLUDING SALMONELLA, SHIGELLA, YERSINIA, VIBRIO OR SHIGA TOXIN PRODUCING E. COLI    Culture result: NO GROWTH 5 DAYS 2018 12:21 PM       Radiology:     Line/Insert Date:           Assessment:     1. C diff--clinically improving--still with leukocytosis and diarrhea, but is subjectively better and she looks improved overall  2. AMI    Objective:     1. Continue current therapy  2.  Would like to see a continued trend of wbc downward prior to discharge    Deisi Taylor MD

## 2018-03-07 LAB
ANION GAP SERPL CALC-SCNC: 8 MMOL/L (ref 5–15)
BASOPHILS # BLD: 0.1 K/UL (ref 0–0.1)
BASOPHILS NFR BLD: 0 % (ref 0–1)
BUN SERPL-MCNC: 7 MG/DL (ref 6–20)
BUN/CREAT SERPL: 13 (ref 12–20)
CALCIUM SERPL-MCNC: 8 MG/DL (ref 8.5–10.1)
CHLORIDE SERPL-SCNC: 98 MMOL/L (ref 97–108)
CO2 SERPL-SCNC: 32 MMOL/L (ref 21–32)
CREAT SERPL-MCNC: 0.54 MG/DL (ref 0.55–1.02)
DIFFERENTIAL METHOD BLD: ABNORMAL
EOSINOPHIL # BLD: 0.1 K/UL (ref 0–0.4)
EOSINOPHIL NFR BLD: 0 % (ref 0–7)
ERYTHROCYTE [DISTWIDTH] IN BLOOD BY AUTOMATED COUNT: 13.7 % (ref 11.5–14.5)
GLUCOSE SERPL-MCNC: 118 MG/DL (ref 65–100)
HCT VFR BLD AUTO: 30.4 % (ref 35–47)
HGB BLD-MCNC: 10.6 G/DL (ref 11.5–16)
IMM GRANULOCYTES # BLD: 0.2 K/UL (ref 0–0.04)
IMM GRANULOCYTES NFR BLD AUTO: 1 % (ref 0–0.5)
LYMPHOCYTES # BLD: 1.8 K/UL (ref 0.8–3.5)
LYMPHOCYTES NFR BLD: 9 % (ref 12–49)
MAGNESIUM SERPL-MCNC: 1.5 MG/DL (ref 1.6–2.4)
MCH RBC QN AUTO: 32.5 PG (ref 26–34)
MCHC RBC AUTO-ENTMCNC: 34.9 G/DL (ref 30–36.5)
MCV RBC AUTO: 93.3 FL (ref 80–99)
MONOCYTES # BLD: 1.5 K/UL (ref 0–1)
MONOCYTES NFR BLD: 8 % (ref 5–13)
NEUTS SEG # BLD: 16.5 K/UL (ref 1.8–8)
NEUTS SEG NFR BLD: 82 % (ref 32–75)
NRBC # BLD: 0 K/UL (ref 0–0.01)
NRBC BLD-RTO: 0 PER 100 WBC
PLATELET # BLD AUTO: 507 K/UL (ref 150–400)
PMV BLD AUTO: 9.8 FL (ref 8.9–12.9)
POTASSIUM SERPL-SCNC: 2.8 MMOL/L (ref 3.5–5.1)
RBC # BLD AUTO: 3.26 M/UL (ref 3.8–5.2)
SODIUM SERPL-SCNC: 138 MMOL/L (ref 136–145)
WBC # BLD AUTO: 20.2 K/UL (ref 3.6–11)

## 2018-03-07 PROCEDURE — 36415 COLL VENOUS BLD VENIPUNCTURE: CPT | Performed by: HOSPITALIST

## 2018-03-07 PROCEDURE — 65270000029 HC RM PRIVATE

## 2018-03-07 PROCEDURE — 74011250636 HC RX REV CODE- 250/636: Performed by: FAMILY MEDICINE

## 2018-03-07 PROCEDURE — 74011250636 HC RX REV CODE- 250/636: Performed by: HOSPITALIST

## 2018-03-07 PROCEDURE — 74011250637 HC RX REV CODE- 250/637: Performed by: HOSPITALIST

## 2018-03-07 PROCEDURE — 74011250637 HC RX REV CODE- 250/637: Performed by: NURSE PRACTITIONER

## 2018-03-07 PROCEDURE — 74011250637 HC RX REV CODE- 250/637: Performed by: SPECIALIST

## 2018-03-07 PROCEDURE — 85025 COMPLETE CBC W/AUTO DIFF WBC: CPT | Performed by: HOSPITALIST

## 2018-03-07 PROCEDURE — 74011000250 HC RX REV CODE- 250: Performed by: HOSPITALIST

## 2018-03-07 PROCEDURE — 83735 ASSAY OF MAGNESIUM: CPT | Performed by: HOSPITALIST

## 2018-03-07 PROCEDURE — 80048 BASIC METABOLIC PNL TOTAL CA: CPT | Performed by: HOSPITALIST

## 2018-03-07 RX ORDER — MAGNESIUM SULFATE HEPTAHYDRATE 40 MG/ML
2 INJECTION, SOLUTION INTRAVENOUS ONCE
Status: COMPLETED | OUTPATIENT
Start: 2018-03-07 | End: 2018-03-07

## 2018-03-07 RX ORDER — POTASSIUM CHLORIDE 14.9 MG/ML
10 INJECTION INTRAVENOUS
Status: COMPLETED | OUTPATIENT
Start: 2018-03-07 | End: 2018-03-07

## 2018-03-07 RX ADMIN — FUROSEMIDE 40 MG: 10 INJECTION, SOLUTION INTRAMUSCULAR; INTRAVENOUS at 08:58

## 2018-03-07 RX ADMIN — SIMVASTATIN 20 MG: 20 TABLET, FILM COATED ORAL at 21:29

## 2018-03-07 RX ADMIN — Medication 10 ML: at 04:27

## 2018-03-07 RX ADMIN — Medication 1 CAPSULE: at 08:57

## 2018-03-07 RX ADMIN — VANCOMYCIN HYDROCHLORIDE 500 MG: 1 INJECTION, POWDER, LYOPHILIZED, FOR SOLUTION INTRAVENOUS at 13:50

## 2018-03-07 RX ADMIN — POTASSIUM CHLORIDE 10 MEQ: 200 INJECTION, SOLUTION INTRAVENOUS at 05:20

## 2018-03-07 RX ADMIN — Medication 10 ML: at 21:19

## 2018-03-07 RX ADMIN — ASPIRIN 81 MG 81 MG: 81 TABLET ORAL at 08:57

## 2018-03-07 RX ADMIN — POTASSIUM CHLORIDE 10 MEQ: 200 INJECTION, SOLUTION INTRAVENOUS at 08:38

## 2018-03-07 RX ADMIN — METRONIDAZOLE 500 MG: 500 INJECTION, SOLUTION INTRAVENOUS at 13:50

## 2018-03-07 RX ADMIN — PRASUGREL 10 MG: 10 TABLET, FILM COATED ORAL at 08:58

## 2018-03-07 RX ADMIN — VANCOMYCIN HYDROCHLORIDE 500 MG: 1 INJECTION, POWDER, LYOPHILIZED, FOR SOLUTION INTRAVENOUS at 02:16

## 2018-03-07 RX ADMIN — METOPROLOL TARTRATE 50 MG: 50 TABLET ORAL at 21:29

## 2018-03-07 RX ADMIN — POTASSIUM CHLORIDE 10 MEQ: 200 INJECTION, SOLUTION INTRAVENOUS at 07:00

## 2018-03-07 RX ADMIN — ACETAMINOPHEN 650 MG: 325 TABLET, FILM COATED ORAL at 21:28

## 2018-03-07 RX ADMIN — METRONIDAZOLE 500 MG: 500 INJECTION, SOLUTION INTRAVENOUS at 05:28

## 2018-03-07 RX ADMIN — METRONIDAZOLE 500 MG: 500 INJECTION, SOLUTION INTRAVENOUS at 21:19

## 2018-03-07 RX ADMIN — VANCOMYCIN HYDROCHLORIDE 500 MG: 1 INJECTION, POWDER, LYOPHILIZED, FOR SOLUTION INTRAVENOUS at 07:12

## 2018-03-07 RX ADMIN — Medication 10 ML: at 13:50

## 2018-03-07 RX ADMIN — METOPROLOL TARTRATE 50 MG: 50 TABLET ORAL at 08:57

## 2018-03-07 RX ADMIN — POTASSIUM CHLORIDE 40 MEQ: 750 TABLET, EXTENDED RELEASE ORAL at 08:57

## 2018-03-07 RX ADMIN — MAGNESIUM SULFATE HEPTAHYDRATE 2 G: 40 INJECTION, SOLUTION INTRAVENOUS at 04:20

## 2018-03-07 RX ADMIN — VANCOMYCIN HYDROCHLORIDE 500 MG: 1 INJECTION, POWDER, LYOPHILIZED, FOR SOLUTION INTRAVENOUS at 19:27

## 2018-03-07 NOTE — PROGRESS NOTES
Verbal shift change report given to Beba Winchester RN and Cher Rosenberg, Student Nurse (oncoming nurse) by Tahmina Little RN (offgoing nurse). Report included the following information SBAR, Intake/Output and MAR.

## 2018-03-07 NOTE — PROGRESS NOTES
Results for Dimitris Jimenez (MRN [id number]) as of 3/7/2018 03:49   Ref. Range 3/6/2018 03:08 3/7/2018 02:18   Potassium Latest Ref Range: 3.5 - 5.1 mmol/L 3.1 (L) 2.8 (L)   Results for Dimitris Jimenez (MRN [id number]) as of 3/7/2018 03:49   Ref. Range 3/6/2018 03:08 3/7/2018 02:18   Magnesium Latest Ref Range: 1.6 - 2.4 mg/dL 1.8 1.5 (L)       0356- Paged Dr. Rony Benoit for low K and slightly low mag. He ordered 3 runs 10mEq per hour K and one dose of mag sulfate 2 mg.

## 2018-03-07 NOTE — PROGRESS NOTES
10:24 AM  Patient reviewed in IDR rounds. CM informed patient receiving IV antibiotics at this time. CM was further informed that patient's WBC's are being monitored at this time. When [facility] are low discharge anticipated. Mode of transport at time of discharge to be provided by patient's family. CM to assist with scheduling a new PCP appointment prior to discharge. CM will continue to follow and assist with disposition needs as they arise.     GLO Oliveira/PARTH

## 2018-03-07 NOTE — PROGRESS NOTES
Primary Nurse Jennifer Elizondo RN and Wale Davis RN performed a dual skin assessment on this patient No impairment noted  Brandyn score is 21    Some redness in between gluteal folds.

## 2018-03-07 NOTE — PROGRESS NOTES
Bedside shift change report given to [address] (oncoming nurse) by Claudy Angela (offgoing nurse). Report included the following information SBAR and Kardex.

## 2018-03-07 NOTE — PROGRESS NOTES
ID Progress Note  3/7/2018    Subjective:     She looks/feels a lot better today. Eating better, up and around some. Objective:     Antibiotics:  1. Vancomycin po  2. Flagyl IV      Vitals:   Visit Vitals    /64 (BP 1 Location: Left arm, BP Patient Position: At rest)    Pulse 85    Temp 98.3 °F (36.8 °C)    Resp 18    Ht 5' 4\" (1.626 m)    Wt 66.5 kg (146 lb 9.7 oz)    SpO2 95%    BMI 25.16 kg/m2        Tmax:  Temp (24hrs), Av °F (37.2 °C), Min:97.6 °F (36.4 °C), Max:100 °F (37.8 °C)      Exam:  Lungs:  clear to auscultation bilaterally  Heart:  regular rate and rhythm  Abdomen:  soft, non-tender. Bowel sounds normal. No masses,  no organomegaly  Skin:  no rash or abnormalities    Labs:      Recent Labs      18   0218  18   0308  18   0429   WBC  20.2*  23.8*  21.9*   HGB  10.6*  10.0*  9.8*   PLT  507*  536*  501*   BUN  7  7  8   CREA  0.54*  0.59  0.63   SGOT   --   24  26   AP   --   31*  28*   TBILI   --   0.8  0.8       Cultures:     No results found for: [facility]  Lab Results   Component Value Date/Time    Culture result:  2018 04:34 PM     NO ROUTINE ENTERIC PATHOGENS ISOLATED INCLUDING SALMONELLA, SHIGELLA, YERSINIA, VIBRIO OR SHIGA TOXIN PRODUCING E. COLI    Culture result: NO GROWTH 5 DAYS 2018 12:21 PM       Radiology:     Line/Insert Date:           Assessment:     1. C diff--clinically improving--still with leukocytosis and diarrhea, but is subjectively better and she looks improved overall--labs and symptoms are improving  2. AMI    Objective:     1. Continue current therapy  2.  Would like to see a continued trend of wbc downward prior to discharge--if still improving tomorrow--home on 10 more days of oral vancomycin     Talha Fitzpatrick MD

## 2018-03-07 NOTE — PROGRESS NOTES
Hospitalist Progress Note  Remberto Coker MD  Answering service: [phone] OR [phone] from in house phone        Date of Service:  3/7/2018  NAME:  Yasmin Mckenna  :  1955  MRN:  [id number]      Reason for follow up:   C.diff colitis    Interval history / Subjective:     Decreasing diarrhea. Assessment & Plan:   Severe sepsis without septic shock due to C Diff Colitis (POA)   -as evidenced by leukocytosis, lactic acidosis, history of fever due to c. diff colitis. -c diff (+)  -continue PO vancomycin, IV metronidazole and probiotic  - I.D following  - contact isolation  - Ct abdomen 3/2: large bilateral pleura effusion. Persistent pancolitis. No megacolon  - leucocytosis appears to be worsening. Monitor  : WBC down today. Per ID notes looks better. Cont oral vancomycin, iv flagyl.      NSTEMI  -cardiologist is consulted and s/p cardiac cath, s/p stent distal RCA  -on aspirin, metoprolol, effient and zocor  -no chest pain  -cardiology following  : cont asa, metoprolol, effient, zocor.      Shortness of breath due to large bilateral pleural effusion and atelectais  -shortness of breath, persistent leukocytosis but improving  -CXR 3/ presistent bilateral pleural effusions persist. The  -CT chest 3/2 large bilateral pleural effusion with atelectasis  -Echo on  LV EF 50-55% , there was dyskinesis and abnormal relaxation  -patient also with significant leukocytosis but afebrile and she has c difficile  -continue lasix 40 mg iv q 12, received albumin  -continue oxygen support, and pulse ox monitoring  -Pulmonary following. Patient declines thoracentesis for now  Will follow up with pulmonary as an out-patient  : on room air. D/c iv lasix.       Hypokalemia, hypocalcemia, hypophosphatemia, hypomagnesemia (POA)  -replete prn and monitor  : still low.   Given magnesium iv and potassium and recheck in am.       Lactic acidosis (POA) due to dehydration and sepsis  -resolved      Hyperbilirubinemia:   -mildly elevated, predominantly indirect  -resolved      Right adnexal cyst noted on CT. Also notes ascites and severe hypoalbuminemia. And small right effusion.       Prolonged QTc: 614, in the setting of hypocalcemia and hypokalemia. ECG after electrolyte repletion shows improvement of QTc      Reported history of recent strep throat s/p cefdinir. Seems resolved.      Hyperlipidemia:  -continue home statin       Code Status: full  DVT prophylaxis: scd    Care Plan discussed with : pt and  an nurse. Disposition: home   Estimated date of d/c: thurs/fri depending on wbc. Hospital Problems  Never Reviewed          Codes Class Noted POA    * (Principal)Colitis ICD-10-CM: K52.9  ICD-9-CM: 558.9  2/24/2018 Unknown        Severe sepsis ([facility] [address] ICD-10-CM: A41.9, R65.20  ICD-9-CM: 038.9, 995.92  2/24/2018 Unknown        Dehydration ICD-10-CM: E86.0  ICD-9-CM: 276.51  2/24/2018 Unknown        Hypokalemia ICD-10-CM: E87.6  ICD-9-CM: 276.8  2/24/2018 Unknown        Hypocalcemia ICD-10-CM: E83.51  ICD-9-CM: 275.41  2/24/2018 Unknown                Review of Systems:   Review of Systems   Constitutional: Negative for chills and fever. Respiratory: Negative for cough and shortness of breath. Cardiovascular: Negative for chest pain and leg swelling. Gastrointestinal: Positive for diarrhea. Negative for nausea and vomiting. Vital Signs:    Last 24hrs VS reviewed since prior progress note.  Most recent are:  Visit Vitals    /64 (BP 1 Location: Left arm, BP Patient Position: At rest)    Pulse 85    Temp 98.3 °F (36.8 °C)    Resp 18    Ht 5' 4\" (1.626 m)    Wt 66.5 kg (146 lb 9.7 oz)    SpO2 95%    BMI 25.16 kg/m2         Intake/Output Summary (Last 24 hours) at 03/07/18 1644  Last data filed at 03/07/18 0649   Gross per 24 hour   Intake           318.55 ml   Output                0 ml   Net           318.55 ml        Physical Examination:   Physical Exam   Constitutional: She is oriented to person, place, and time and well-developed, well-nourished, and in no distress. No distress. Neck: Normal range of motion. Neck supple. Cardiovascular: Normal rate and regular rhythm. No murmur heard. Pulmonary/Chest: Effort normal and breath sounds normal. No respiratory distress. She has no wheezes. Abdominal: Soft. Bowel sounds are normal. She exhibits no distension. There is no tenderness. Musculoskeletal: Normal range of motion. She exhibits no edema or tenderness. Neurological: She is alert and oriented to person, place, and time. No cranial nerve deficit. Skin: Skin is warm and dry. She is not diaphoretic. Psychiatric: Mood and affect normal.          Data Review:    Review and/or order of clinical lab test      Labs:     Recent Labs      03/07/18 0218 03/06/18   0308   WBC  20.2*  23.8*   HGB  10.6*  10.0*   HCT  30.4*  29.9*   PLT  507*  536*     Recent Labs      03/07/18 0218 03/06/18 0308 03/05/18   0429   NA  138  140  141   K  2.8*  3.1*  2.9*   CL  98  102  103   CO2  32  32  32   BUN  7  7  8   CREA  0.54*  0.59  0.63   GLU  118*  121*  110*   CA  8.0*  8.1*  7.9*   MG  1.5*  1.8  1.6   PHOS   --    --   2.1*     Recent Labs      03/06/18 0308 03/05/18   0429   SGOT  24  26   ALT  14  14   AP  31*  28*   TBILI  0.8  0.8   TP  5.3*  5.2*   ALB  3.2*  3.1*   GLOB  2.1  2.1     No results for input(s): INR, PTP, APTT in the last 72 hours. No lab exists for component: INREXT   No results for input(s): FE, TIBC, PSAT, FERR in the last 72 hours. No results found for: FOL, RBCF   No results for input(s): PH, PCO2, PO2 in the last 72 hours. No results for input(s): CPK, CKNDX, TROIQ in the last 72 hours.     No lab exists for component: CPKMB  No results found for: CHOL, CHOLX, CHLST, CHOLV, HDL, LDL, LDLC, DLDLP, TGLX, TRIGL, TRIGP, CHHD, CHHDX  Lab Results   Component Value Date/Time    Glucose (POC) 110 (H) 02/27/2018 04:49 AM     No results found for: COLOR, APPRN, SPGRU, REFSG, JASON, PROTU, GLUCU, KETU, BILU, UROU, KATHIA, LEUKU, GLUKE, EPSU, BACTU, WBCU, RBCU, CASTS, UCRY      Medications Reviewed:     Current Facility-Administered Medications   Medication Dose Route Frequency    potassium chloride SR (KLOR-CON 10) tablet 40 mEq  40 mEq Oral DAILY    metoprolol tartrate (LOPRESSOR) tablet 50 mg  50 mg Oral BID    furosemide (LASIX) injection 40 mg  40 mg IntraVENous BID    HYDROmorphone (PF) (DILAUDID) injection 1 mg  1 mg IntraVENous Q4H PRN    simethicone (MYLICON) tablet 80 mg  80 mg Oral QID PRN    aspirin chewable tablet 81 mg  81 mg Oral DAILY    prasugrel (EFFIENT) tablet 10 mg  10 mg Oral DAILY    prochlorperazine (COMPAZINE) with saline injection 5 mg  5 mg IntraVENous Q6H PRN    vancomycin 50 mg/mL oral solution (compounded) 500 mg  500 mg Oral Q6H    lactobac ac& pc-s.therm-b.anim (OSCAR Q/RISAQUAD)  1 Cap Oral DAILY    simvastatin (ZOCOR) tablet 20 mg  20 mg Oral QHS    metroNIDAZOLE (FLAGYL) IVPB premix 500 mg  500 mg IntraVENous Q8H    sodium chloride (NS) flush 5-10 mL  5-10 mL IntraVENous Q8H    sodium chloride (NS) flush 5-10 mL  5-10 mL IntraVENous PRN    ondansetron (ZOFRAN) injection 4 mg  4 mg IntraVENous Q4H PRN    acetaminophen (TYLENOL) tablet 650 mg  650 mg Oral Q6H PRN     ______________________________________________________________________  EXPECTED LENGTH OF STAY: 4d 4h  ACTUAL LENGTH OF STAY:          11                 Raquel Justice MD

## 2018-03-07 NOTE — PROGRESS NOTES
Problem: Nausea/Vomiting (Adult)  Goal: *Absence of nausea/vomiting  Outcome: Progressing Towards Goal  Patient at 0000 on 3/7/18 reported nausea. Gave PRN Zofran 4 mg. Patient's nausea has been sporadic as her last dose was on 3/4/2018.

## 2018-03-07 NOTE — PROGRESS NOTES
Problem: Falls - Risk of  Goal: *Absence of Falls  Document Marquita Fall Risk and appropriate interventions in the flowsheet.    Outcome: Progressing Towards Goal  Fall Risk Interventions:  Mobility Interventions: PT Consult for mobility concerns         Medication Interventions: Evaluate medications/consider consulting pharmacy, Patient to call before getting OOB, Teach patient to arise slowly    Elimination Interventions: Call light in reach, Patient to call for help with toileting needs, Toileting schedule/hourly rounds

## 2018-03-07 NOTE — PROGRESS NOTES
Bedside shift change report given to Gale Moore RN (oncoming nurse) by Justine Merida RN (offgoing nurse). Report included the following information SBAR.

## 2018-03-08 ENCOUNTER — APPOINTMENT (OUTPATIENT)
Dept: GENERAL RADIOLOGY | Age: 63
DRG: 853 | End: 2018-03-08
Attending: HOSPITALIST
Payer: COMMERCIAL

## 2018-03-08 VITALS
SYSTOLIC BLOOD PRESSURE: 115 MMHG | HEART RATE: 91 BPM | RESPIRATION RATE: 18 BRPM | DIASTOLIC BLOOD PRESSURE: 63 MMHG | TEMPERATURE: 99.4 F | WEIGHT: 140.8 LBS | HEIGHT: 64 IN | BODY MASS INDEX: 24.04 KG/M2 | OXYGEN SATURATION: 93 %

## 2018-03-08 LAB
ANION GAP SERPL CALC-SCNC: 6 MMOL/L (ref 5–15)
BASOPHILS # BLD: 0.1 K/UL (ref 0–0.1)
BASOPHILS NFR BLD: 1 % (ref 0–1)
BUN SERPL-MCNC: 7 MG/DL (ref 6–20)
BUN/CREAT SERPL: 11 (ref 12–20)
CALCIUM SERPL-MCNC: 8 MG/DL (ref 8.5–10.1)
CHLORIDE SERPL-SCNC: 101 MMOL/L (ref 97–108)
CO2 SERPL-SCNC: 30 MMOL/L (ref 21–32)
CREAT SERPL-MCNC: 0.62 MG/DL (ref 0.55–1.02)
DIFFERENTIAL METHOD BLD: ABNORMAL
EOSINOPHIL # BLD: 0.1 K/UL (ref 0–0.4)
EOSINOPHIL NFR BLD: 0 % (ref 0–7)
ERYTHROCYTE [DISTWIDTH] IN BLOOD BY AUTOMATED COUNT: 14.1 % (ref 11.5–14.5)
GLUCOSE SERPL-MCNC: 112 MG/DL (ref 65–100)
HCT VFR BLD AUTO: 32.3 % (ref 35–47)
HGB BLD-MCNC: 10.9 G/DL (ref 11.5–16)
IMM GRANULOCYTES # BLD: 0.1 K/UL (ref 0–0.04)
IMM GRANULOCYTES NFR BLD AUTO: 1 % (ref 0–0.5)
LYMPHOCYTES # BLD: 1.4 K/UL (ref 0.8–3.5)
LYMPHOCYTES NFR BLD: 8 % (ref 12–49)
MAGNESIUM SERPL-MCNC: 1.9 MG/DL (ref 1.6–2.4)
MCH RBC QN AUTO: 31.9 PG (ref 26–34)
MCHC RBC AUTO-ENTMCNC: 33.7 G/DL (ref 30–36.5)
MCV RBC AUTO: 94.4 FL (ref 80–99)
MONOCYTES # BLD: 1.5 K/UL (ref 0–1)
MONOCYTES NFR BLD: 9 % (ref 5–13)
NEUTS SEG # BLD: 13.2 K/UL (ref 1.8–8)
NEUTS SEG NFR BLD: 80 % (ref 32–75)
NRBC # BLD: 0 K/UL (ref 0–0.01)
NRBC BLD-RTO: 0 PER 100 WBC
PLATELET # BLD AUTO: 488 K/UL (ref 150–400)
PMV BLD AUTO: 10 FL (ref 8.9–12.9)
POTASSIUM SERPL-SCNC: 3 MMOL/L (ref 3.5–5.1)
RBC # BLD AUTO: 3.42 M/UL (ref 3.8–5.2)
SODIUM SERPL-SCNC: 137 MMOL/L (ref 136–145)
WBC # BLD AUTO: 16.4 K/UL (ref 3.6–11)

## 2018-03-08 PROCEDURE — 71046 X-RAY EXAM CHEST 2 VIEWS: CPT

## 2018-03-08 PROCEDURE — 74011000250 HC RX REV CODE- 250: Performed by: HOSPITALIST

## 2018-03-08 PROCEDURE — 36415 COLL VENOUS BLD VENIPUNCTURE: CPT

## 2018-03-08 PROCEDURE — 74011250637 HC RX REV CODE- 250/637: Performed by: SPECIALIST

## 2018-03-08 PROCEDURE — 74011250637 HC RX REV CODE- 250/637: Performed by: HOSPITALIST

## 2018-03-08 PROCEDURE — 74011250637 HC RX REV CODE- 250/637: Performed by: FAMILY MEDICINE

## 2018-03-08 PROCEDURE — 83735 ASSAY OF MAGNESIUM: CPT

## 2018-03-08 PROCEDURE — 80048 BASIC METABOLIC PNL TOTAL CA: CPT

## 2018-03-08 PROCEDURE — 74011250637 HC RX REV CODE- 250/637: Performed by: NURSE PRACTITIONER

## 2018-03-08 PROCEDURE — 94618 PULMONARY STRESS TESTING: CPT

## 2018-03-08 PROCEDURE — 85025 COMPLETE CBC W/AUTO DIFF WBC: CPT

## 2018-03-08 RX ORDER — FUROSEMIDE 40 MG/1
40 TABLET ORAL DAILY
Qty: 5 TAB | Refills: 0 | Status: SHIPPED | OUTPATIENT
Start: 2018-03-09 | End: 2018-03-14 | Stop reason: DRUGHIGH

## 2018-03-08 RX ORDER — METOPROLOL TARTRATE 50 MG/1
50 TABLET ORAL 2 TIMES DAILY
Qty: 60 TAB | Refills: 0 | Status: SHIPPED | OUTPATIENT
Start: 2018-03-08 | End: 2018-03-14 | Stop reason: DRUGHIGH

## 2018-03-08 RX ORDER — POTASSIUM CHLORIDE 750 MG/1
40 TABLET, FILM COATED, EXTENDED RELEASE ORAL ONCE
Status: COMPLETED | OUTPATIENT
Start: 2018-03-08 | End: 2018-03-08

## 2018-03-08 RX ORDER — POTASSIUM CHLORIDE 1500 MG/1
40 TABLET, FILM COATED, EXTENDED RELEASE ORAL 2 TIMES DAILY
Qty: 20 TAB | Refills: 0 | Status: SHIPPED | OUTPATIENT
Start: 2018-03-08 | End: 2018-03-13

## 2018-03-08 RX ORDER — FUROSEMIDE 40 MG/1
40 TABLET ORAL DAILY
Status: DISCONTINUED | OUTPATIENT
Start: 2018-03-08 | End: 2018-03-09 | Stop reason: HOSPADM

## 2018-03-08 RX ORDER — POTASSIUM CHLORIDE 750 MG/1
40 TABLET, FILM COATED, EXTENDED RELEASE ORAL 2 TIMES DAILY
Status: DISCONTINUED | OUTPATIENT
Start: 2018-03-08 | End: 2018-03-09 | Stop reason: HOSPADM

## 2018-03-08 RX ORDER — PRASUGREL 10 MG/1
10 TABLET, FILM COATED ORAL DAILY
Qty: 30 TAB | Refills: 0 | Status: SHIPPED | OUTPATIENT
Start: 2018-03-09 | End: 2018-03-14 | Stop reason: SDUPTHER

## 2018-03-08 RX ADMIN — PRASUGREL 10 MG: 10 TABLET, FILM COATED ORAL at 08:32

## 2018-03-08 RX ADMIN — POTASSIUM CHLORIDE 40 MEQ: 750 TABLET, EXTENDED RELEASE ORAL at 07:35

## 2018-03-08 RX ADMIN — FUROSEMIDE 40 MG: 40 TABLET ORAL at 11:47

## 2018-03-08 RX ADMIN — Medication 1 CAPSULE: at 08:32

## 2018-03-08 RX ADMIN — Medication 10 ML: at 15:05

## 2018-03-08 RX ADMIN — ASPIRIN 81 MG 81 MG: 81 TABLET ORAL at 08:32

## 2018-03-08 RX ADMIN — Medication 10 ML: at 05:23

## 2018-03-08 RX ADMIN — VANCOMYCIN HYDROCHLORIDE 500 MG: 1 INJECTION, POWDER, LYOPHILIZED, FOR SOLUTION INTRAVENOUS at 15:05

## 2018-03-08 RX ADMIN — VANCOMYCIN HYDROCHLORIDE 500 MG: 1 INJECTION, POWDER, LYOPHILIZED, FOR SOLUTION INTRAVENOUS at 07:34

## 2018-03-08 RX ADMIN — VANCOMYCIN HYDROCHLORIDE 500 MG: 1 INJECTION, POWDER, LYOPHILIZED, FOR SOLUTION INTRAVENOUS at 03:08

## 2018-03-08 RX ADMIN — METOPROLOL TARTRATE 50 MG: 50 TABLET ORAL at 08:33

## 2018-03-08 RX ADMIN — METRONIDAZOLE 500 MG: 500 INJECTION, SOLUTION INTRAVENOUS at 15:05

## 2018-03-08 RX ADMIN — METRONIDAZOLE 500 MG: 500 INJECTION, SOLUTION INTRAVENOUS at 05:22

## 2018-03-08 RX ADMIN — POTASSIUM CHLORIDE 40 MEQ: 750 TABLET, EXTENDED RELEASE ORAL at 05:23

## 2018-03-08 NOTE — PROGRESS NOTES
Bedside and Verbal shift change report given to Love Suresh RN (oncoming nurse) by Sergei Jarrett RN (offgoing nurse). Report included the following information SBAR, Kardex, Intake/Output, MAR and Recent Results.

## 2018-03-08 NOTE — DISCHARGE SUMMARY
Discharge Summary       PATIENT ID: Shay Garcia  MRN: [id number]   YOB: 1955    DATE OF ADMISSION: 2/24/2018 11:29 AM    DATE OF DISCHARGE:  03/08/18    PRIMARY CARE PROVIDER: None     ATTENDING PHYSICIAN: Tanya Knowles MD  DISCHARGING PROVIDER: Tanya Knowles MD    To contact this individual call [phone] and ask the  to page. If unavailable ask to be transferred the Adult Hospitalist Department. CONSULTATIONS: IP CONSULT TO GENERAL SURGERY  IP CONSULT TO HOSPITALIST  IP CONSULT TO CARDIOLOGY  IP CONSULT TO INFECTIOUS DISEASES  IP CONSULT TO PULMONOLOGY    PROCEDURES/SURGERIES: * No surgery found *    ADMITTING DIAGNOSES & HOSPITAL COURSE:   Colitis        DISCHARGE DIAGNOSES / PLAN:    Severe sepsis without septic shock due to C Diff Colitis (POA)   As evidenced by leukocytosis, lactic acidosis, history of fever. Due to c. diff colitis. Treated with aggressive IVF resuscitation and treatment of c.diff colitis. Resolved.      C.diff colitis:   CT a/p on admission revealed pan colitis. 10 days prior to admission Mrs. Annamaria Miller had been treated for strep with antibiotics. Surgery consulted from the ER (Dr. Lidia Chatman) and evaluated and indicated no urgent surgical needs. Placed on empiric iv flagyl and oral vancomycin. Pt was slow to improve so empiric zosyn added but was discontinued once c.diff came back positive. Vancomycin increased to 500mg po qid. ID consulted (Dr. Alba Blackmon) on 03/01 as pt was slow to improve. He recommended continued current therapy. Leucocytosis initially dropped from > 30K to the 20K range but did not change significantly for several days. Repeat Ct abdomen obtained on 3/2 and revealed \"Persistent pancolitis. There is no generalized ascites in the abdomen and pelvis. \" . With continued antibiotics wbc slowly trended down. On day of d/c WBC down again, bm less today, more bulk.   Per Dr. Alba Blackmon note from 3/7 would recommend 10 more days of oral vancomycin at discharge. D/w Dr. Idania Reese and agrees with dc.        NSTEMI  On 02/27 RRT called for chest pain. Troponin positive at 2.15. Cardiologist consulted, placed on heparin drip and moved to ccu. Underwent cath on 02/27 by Dr. Angela Brunner with the following findings \"left main ok, lad minor irregs, lcx small, occluded distally with faint right to left collaterals. 30% prox rca, 50% mid pda, 85% distal rca treated with 3.25 by 18mm michelle to 10 david. No lv gram\". Continued on asa, zocor and added metoprolol, Effient per cardiology. No further chest pain. Per cardiology to f/u with Dr. Telma Multani in one week.       Shortness of breath and hypoxia due to large bilateral pleural effusion and atelectais  Small right effusion noted on CT a/p on 2/24. With aggressive fluid resuscitation needed for C.diff she developed increasing shortness of breath. Etiology thought to be combination of hypoalbuminemia (albumin < 2 on admission) in combination with aggressive fluid resuscitation. On 2/27 iv lasix initiated. Intermittently on oxygen and documented hypoxia first on 02/28-03/01. Despite iv lasix dyspnea persisted. CXR on 3/2 revealed presistent bilateral pleural effusions. CT chest on  3/2 revealed large bilateral pleural effusion with atelectasis. In addition Echo on 2/27 LV EF 50-55% , there was dyskinesis and abnormal relaxation. Placed on lasix 40 mg iv q 12 with albumin. Continued oxygen support, and pulse ox monitoring. Pulmonary (Dr. Anthony Concepcion) consulted on 03/03 and agreed with iv lasix. Patient declined thoracentesis for now as improving. Pulmonary recommended continued lasix and repeat cxr in one week. Will follow up with pulmonary as an out-patient  As of 03/07 on room air.  On day of d/c check PA/lat cxr and effusion decreased in size. Sent out on  lasix for 5 more days at 40mg po daily. Albumin 3.2 on last check. D/w PARISH Vázquez Imus and would like another week of diuresis and recheck cxr.  If continues to have effusion may need thoracentesis.     Hypokalemia, hypocalcemia, hypophosphatemia, hypomagnesemia (POA)  With electrolyte abnormalities a prolonged QTc was noted. All lytes repleted and QTc improved. Continued to replete and sent home on 40meq bid x 5 days while on oral lasix and close f/u with pcp for recheck.       Lactic acidosis (POA) due to dehydration and sepsis  -resolved      Hyperbilirubinemia:   -mildly elevated, predominantly indirect  -resolved      Right adnexal cyst noted on CT:   Noted on admission CT a/p. Can be further evaluated by pcp.         Hyperlipidemia:  -continue home statin    PENDING TEST RESULTS:   At the time of discharge the following test results are still pending: none    FOLLOW UP APPOINTMENTS:    Follow-up Information     Follow up With Details Comments Contact Info    Call and schedule appointment for Cardiac Rehab at [facility] Call in 1 week after d/c to schedule appointment [facility]  Cardiac Rehab contact is Carl Balderas [phone]    Parish Smith MD On 3/14/2018 9 AM.  Please arrive 20 min early. [address]      None   None (098) Patient stated that they have no PCP      [address]. Total out-of-pocket cost is $64.17. Prescription will be ready for pick-up at the Pharmacy at [facility] is located at: [facility] [place], [place]. [address] and the phone number is [phone]. [facility] Schedule an appointment as soon as possible for a visit in 5 days check bmp to monitor creatinine and potassium and repeat cxr to check on effusions. David Storm MD Schedule an appointment as soon as possible for a visit in 2 weeks for follow up on pleural effusion [address] [facility]  [address]:   1.  Have pcp or primary gynecologist evaluate right adnexal cyst.      DIET: resume prior diet.     ACTIVITY: Activity as tolerated    DISCHARGE MEDICATIONS:  Current Discharge Medication List      START taking these medications    Details   L. acidoph & paracasei- S therm- Bifido (OSCAR-Q/RISAQUAD) 8 billion cell cap cap Take 1 Cap by mouth daily for 30 days. Qty: 30 Cap, Refills: 0      metoprolol tartrate (LOPRESSOR) 50 mg tablet Take 1 Tab by mouth two (2) times a day for 30 days. Qty: 60 Tab, Refills: 0      prasugrel (EFFIENT) 10 mg tablet Take 1 Tab by mouth daily for 30 days. Qty: 30 Tab, Refills: 0      vancomycin 50 mg/mL oral solution (compounded) Take 10 mL by mouth every six (6) hours for 10 days. Qty: 400 mL, Refills: 0      potassium chloride SR (K-TAB) 20 mEq tablet Take 2 Tabs by mouth two (2) times a day for 5 days. Indications: hypokalemia prevention  Qty: 20 Tab, Refills: 0      furosemide (LASIX) 40 mg tablet Take 1 Tab by mouth daily for 5 days. Qty: 5 Tab, Refills: 0         CONTINUE these medications which have NOT CHANGED    Details   simvastatin (ZOCOR) 20 mg tablet Take 20 mg by mouth nightly. aspirin delayed-release 81 mg tablet Take 81 mg by mouth daily. Biotin 2,500 mcg cap Take 5,000 mcg by mouth. therapeutic multivitamin (THERAGRAN) tablet Take 1 Tab by mouth daily. NOTIFY YOUR PHYSICIAN FOR ANY OF THE FOLLOWING:   Fever over 101 degrees for 24 hours. Chest pain, shortness of breath, fever, chills, nausea, vomiting, diarrhea, change in mentation, falling, weakness, bleeding. Severe pain or pain not relieved by medications. Or, any other signs or symptoms that you may have questions about.     DISPOSITION:    Home With:   OT  PT  HH  RN       Long term SNF/Inpatient Rehab   x Independent/assisted living    Hospice    Other:       PATIENT CONDITION AT DISCHARGE:     Functional status    Poor     Deconditioned    x Independent      Cognition    x Lucid     Forgetful     Dementia      Catheters/lines (plus indication)    Amaya     PICC PEG    x None      Code status   x  Full code     DNR      PHYSICAL EXAMINATION AT DISCHARGE:   Patient Vitals for the past 24 hrs:   Temp Pulse Resp BP SpO2   03/08/18 1525 99.4 °F (37.4 °C) 91 18 115/63 93 %   03/08/18 0831 98.8 °F (37.1 °C) 89 18 129/78 92 %   03/08/18 0653 98.1 °F (36.7 °C) - - - -   03/08/18 0529 98.1 °F (36.7 °C) - - - -   03/08/18 0305 98.8 °F (37.1 °C) 79 18 124/77 91 %   03/07/18 2332 97.7 °F (36.5 °C) - - - -   03/07/18 2123 100.1 °F (37.8 °C) 96 18 126/67 93 %       Gen: alert and oriented, no apparent distress  CV: regular rhythm and normal rate, no murmurs, rubs, gallops  Pulmonary: clear to ausculation bilateral, no rhonchi, crackles, wheezing. Abdomen: soft, non tender, normal bowel sounds.      CHRONIC MEDICAL DIAGNOSES:  Problem List as of 3/8/2018  Never Reviewed          Codes Class Noted - Resolved    * (Principal)Colitis ICD-10-CM: K52.9  ICD-9-CM: 558.9  2/24/2018 - Present        Severe sepsis ([facility] [address] ICD-10-CM: A41.9, R65.20  ICD-9-CM: 038.9, 995.92  2/24/2018 - Present        Dehydration ICD-10-CM: E86.0  ICD-9-CM: 276.51  2/24/2018 - Present        Hypokalemia ICD-10-CM: E87.6  ICD-9-CM: 276.8  2/24/2018 - Present        Hypocalcemia ICD-10-CM: E83.51  ICD-9-CM: 275.41  2/24/2018 - Present              Greater than 55 minutes were spent with the patient on counseling and coordination of care    Signed:   Alayna Manrique MD  3/8/2018  3:58 PM

## 2018-03-08 NOTE — PROGRESS NOTES
Pulmonary, Critical Care, and Sleep Medicine~Progress Note    Name: Maurilio Black MRN: [id number]   : 1955 [facility]   Date: 3/8/2018 11:17 AM Admission: 2018     IMPRESSION:   · Acute hypoxic pulmonary insufficiency, now resolved   · Bilateral pleural effusions; felt to be secondary to hypoalbuminemia, volume resuscitation and/or diastolic HF  · C diff  · CAD, s/p PCI      PLAN:   · Chest film ordered by hospitalist for today. · Follow up with chest film in 1-2 wks following diuretics; if effusion is still there we can consider thoracentesis at that time    · O2 titration above 90%   · OOB as tolerated  · If dyspnea were to worsen, would then consider thoracentesis. She is asymptomatic   · We will be available again to see if needed     Daily Progression:    Not short of breath. I have reviewed the labs and previous days notes. Pertinent items are noted in HPI. OBJECTIVE:     Vital Signs:       Visit Vitals    /78 (BP 1 Location: Right arm, BP Patient Position: At rest)    Pulse 89    Temp 98.8 °F (37.1 °C)    Resp 18    Ht 5' 4\" (1.626 m)    Wt 63.9 kg (140 lb 12.8 oz)    SpO2 92%    BMI 24.17 kg/m2      Temp (24hrs), Av.6 °F (37 °C), Min:97.7 °F (36.5 °C), Max:100.1 °F (37.8 °C)     Intake/Output:     Last shift:      Last 3 shifts:  1901 -  0700  In: 558.6 [P.O.:240;  I.V.:318.6]  Out: -           Intake/Output Summary (Last 24 hours) at 18 9466  Last data filed at 18 1856   Gross per 24 hour   Intake              240 ml   Output                0 ml   Net              240 ml       Physical Exam:                                        Exam Findings Other   General: No resp distress noted, appears stated age    [de-identified]:  No ulcers, JVD not elevated, no cervical LAD    Chest: No pectus deformity, normal chest rise b/l    HEART:  RRR, no murmurs/rubs/gallops Lungs:  CTA b/l, no rhonchi/crackles/wheeze, diminished BS at bases    ABD: Soft/NT, non rigid mildly distended    EXT: No cyanosis/clubbing/edema, normal peripheral pulses    Skin: No rashes or ulcers, no mottling    Neuro: A/O x 3        Medications:  Current Facility-Administered Medications   Medication Dose Route Frequency    potassium chloride SR (KLOR-CON 10) tablet 40 mEq  40 mEq Oral BID    furosemide (LASIX) tablet 40 mg  40 mg Oral DAILY    metoprolol tartrate (LOPRESSOR) tablet 50 mg  50 mg Oral BID    HYDROmorphone (PF) (DILAUDID) injection 1 mg  1 mg IntraVENous Q4H PRN    simethicone (MYLICON) tablet 80 mg  80 mg Oral QID PRN    aspirin chewable tablet 81 mg  81 mg Oral DAILY    prasugrel (EFFIENT) tablet 10 mg  10 mg Oral DAILY    prochlorperazine (COMPAZINE) with saline injection 5 mg  5 mg IntraVENous Q6H PRN    vancomycin 50 mg/mL oral solution (compounded) 500 mg  500 mg Oral Q6H    lactobac ac& pc-s.therm-b.anim (OSCAR Q/RISAQUAD)  1 Cap Oral DAILY    simvastatin (ZOCOR) tablet 20 mg  20 mg Oral QHS    metroNIDAZOLE (FLAGYL) IVPB premix 500 mg  500 mg IntraVENous Q8H    sodium chloride (NS) flush 5-10 mL  5-10 mL IntraVENous Q8H    sodium chloride (NS) flush 5-10 mL  5-10 mL IntraVENous PRN    ondansetron (ZOFRAN) injection 4 mg  4 mg IntraVENous Q4H PRN    acetaminophen (TYLENOL) tablet 650 mg  650 mg Oral Q6H PRN       Labs:  ABG No results for input(s): PHI, PCO2I, PO2I, HCO3I, SO2I, FIO2I in the last 72 hours.      CBC Recent Labs      03/08/18 0322 03/07/18 0218 03/06/18   0308   WBC  16.4*  20.2*  23.8*   HGB  10.9*  10.6*  10.0*   HCT  32.3*  30.4*  29.9*   PLT  488*  507*  536*   MCV  94.4  93.3  94.3   MCH  31.9  32.5  16.4        Metabolic  Panel Recent Labs      03/08/18   0322  03/07/18 0218  03/06/18   0308   NA  137  138  140   K  3.0*  2.8*  3.1*   CL  101  98  102   CO2  30  32  32   GLU  112*  118*  121*   BUN  7  7  7   CREA  0.62  0.54*  0.59   CA 8. 0*  8.0*  8.1*   MG  1.9  1.5*  1.8   ALB   --    --   3.2*   SGOT   --    --   24   ALT   --    --   14        Pertinent Labs                PARISH Carbone  3/8/2018

## 2018-03-08 NOTE — PROGRESS NOTES
Physical Therapy  Attempted PT treatment. Patient states she has been up ad heather in the room and was able to complete a 6 MWT without difficulty this morning. Patient feels she is at her baseline for functional mobility. Does not require further PT services at this time. PT to signoff. Please re-consult if patient status changes.   Wendy Garcia, PT, DPT

## 2018-03-08 NOTE — PROGRESS NOTES
Patient completed six minute walk on room air; results below. 03/08/18 0900   Six Minute Walk Report (PRE)   Heart Rate 80   O2 Saturation 95   Six Minute Walk Report (POST)   Heart Rate 85   O2 Saturation 93   Distance Walked in Feet (ft) 654 ft.    Distance in Meters 199.34 meters       Preeti Brandt, RRT

## 2018-03-08 NOTE — PROGRESS NOTES
I have reviewed discharge instructions with the patient and spouse. The patient and spouse verbalized understanding. Patient provided with hard copies of prescriptions to fill at her preferred pharmacy. Patient and spouse aware of where to  vancomycin prescription and will be heading to the pharmacy as soon as they leave the hospital. All belongings sent home with patient. Patient taken to vehicle via wheelchair accompanied by spouse and volunteer staff. No s/s of distress noted upon discharge.

## 2018-03-08 NOTE — PROGRESS NOTES
CM spoke with Dr. Bharati Ayala this morning and received prescription for vanc. CM will forward prescription to [facility] now to determine patient's out of pocket costs. CM spoke with Jacques Whitley at the pharmacy. Prescription and Insurance information faxed to [facility] at [phone]. Assigned RN Paula Mendoza) spoke with patient who states her spouse may have prescription insurance card at home. CM called the mobile number ([phone]) and left message for Mr. Eleonora Noble to return call to this  if he has the prescription insurance card in his possession. CM also asked if patient has a local pharmacy to have card faxed to  at [phone]. Continuing to follow. LELA Azul, CRM    12p CM made multiple attempts in faxing packet to [facility]. Confirmation sheets reflect information did not go through for the 3 fax attempts. CM called the pharmacy to verify fax number and number verified. Jacques Whitley (pharmacists) stats he recently found that their fax machine was broken. CM asked for his email address to [address] [place] [address]. [email or web address]. [employer] and emailed information to him. CM awaiting response. LELA Azul CRM  12:28p  Follow-up call placed to Jacques Whitley at [facility]. He apologized with delay in processing request.  He confirms receipt of email with prescription and insurance cards. He has this CM's phone number to call with patient's out-of-pocket cost.  CM awaiting response. LELA Azul, CRM    1:41p  CM received return call from [facility]. Total out-of-pocket cost is $64.17. Prescription will be ready for pick-up at the Pharmacy at [facility] is located at: [address], [place]. [address] and the phone number is [phone]. CM will inform patient and spouse. Spouse has questions regarding CXR being requested and when it will be done. He also wants to know what time patient will be ready for discharge.     1:53p CM had hospitalist (Dr. Bharati Ayala paged to this CM's phone [phone]).   Sin Sanchez, LAWRENCEW, CRM

## 2018-03-08 NOTE — PROGRESS NOTES
Bedside and Verbal shift change report given to Alysha RANDHAWA (oncoming nurse) by Rishabh Chávez (offgoing nurse). Report included the following information SBAR, Kardex, Intake/Output, MAR, Recent Results and Med Rec Status.

## 2018-03-08 NOTE — PROGRESS NOTES
Hospitalist Progress Note  Dain Major MD  Answering service: [phone] OR [phone] from in house phone        Date of Service:  3/8/2018  NAME:  Jesús Rayo  :  1955  MRN:  [id number]      Reason for follow up:   c.diff    Interval history / Subjective:     Diarrhea resolving. Breathing unlabored. Assessment & Plan:   Severe sepsis without septic shock due to C Diff Colitis (POA)   As evidenced by leukocytosis, lactic acidosis, history of fever. Due to c. diff colitis. Treated with aggressive IVF resuscitation and treatment of c.diff colitis. : resolved. C.diff colitis:   CT a/p on admission revealed pan colitis. 10 days prior to admission Mrs. Sorin Winslow had been treated for strep with antibiotics. Surgery consulted from the ER (Dr. Grecia Ramirez) and evaluated and indicated no urgent surgical needs. Placed on empiric iv flagyl and oral vancomycin. Pt was slow to improve so empiric zosyn added but was discontinued once c.diff came back positive. Vancomycin increased to 500mg po qid. ID consulted (Dr. Idania Reese) on  as pt was slow to improve. He recommended continued current therapy. Leucocytosis initially dropped from > 30K to the 20K range but did not change significantly for several days. Repeat Ct abdomen obtained on 3/2 and revealed \"Persistent pancolitis. There is no generalized ascites in the abdomen and pelvis. \" . With continued antibiotics wbc slowly trended down. : WBC down today. Per ID notes looks better. Cont oral vancomycin, iv flagyl. : WBC down again, bm less today, more bulk. Per Dr. Idania Reese note from 3/7 would recommend 10 more days of oral vancomycin at discharge. D/w Dr. Idania Reese and agrees with HI, send vancomycin script for cost.       NSTEMI  On  RRT called for chest pain. Troponin positive at 2.15. Cardiologist consulted, placed on heparin drip and moved to ccu.   Underwent cath on  by  Hansel Shorts with the following findings \"left main ok, lad minor irregs, lcx small, occluded distally with faint right to left collaterals. 30% prox rca, 50% mid pda, 85% distal rca treated with 3.25 by 18mm michelle to 10 david. No lv gram\". Continued on asa, zocor and added metoprolol, Effient per cardiology. No further chest pain. 03/07: cont asa, metoprolol, effient, zocor. 03/08: Per cardiology to f/u with Dr. Rakesh Benson in one week.       Shortness of breath and hypoxia due to large bilateral pleural effusion and atelectais  Small right effusion noted on CT a/p on 2/24. With aggressive fluid resuscitation needed for C.diff she developed increasing shortness of breath. Etiology thought to be combination of hypoalbuminemia (albumin < 2 on admission) in combination with aggressive fluid resuscitation. On 2/27 iv lasix initiated. Intermittently on oxygen and documented hypoxia first on 02/28-03/01. Despite iv lasix dyspnea persisted. CXR on 3/2 revealed presistent bilateral pleural effusions. CT chest on  3/2 revealed large bilateral pleural effusion with atelectasis. In addition Echo on 2/27 LV EF 50-55% , there was dyskinesis and abnormal relaxation. Placed on lasix 40 mg iv q 12 with albumin. Continued oxygen support, and pulse ox monitoring. Pulmonary (Dr. Luiz Woodson) consulted on 03/03 and agreed with iv lasix. Patient declined thoracentesis for now as improving. Pulmonary recommended continued lasix and repeat cxr in one week. Will follow up with pulmonary as an out-patient  03/07: on room air. D/c iv lasix. 03/08: check cxr pa/lat. Plan on continued lasix for 5 more days at 40mg po if effusions stable and improving. Albumin 3.2 on last check. D/w PARISH Cervantes and would like another week of diuresis and recheck cxr. If continues to have effusion may need thoracentesis.     Hypokalemia, hypocalcemia, hypophosphatemia, hypomagnesemia (POA)  With electrolyte abnormalities a prolonged QTc was noted.   All lytes repleted and QTc improved. 03/07: still low. Given magnesium iv and potassium and recheck in am.   03/08: mag repleted. Potassium still low, give 40 meq po x 2 this am.  Plan on sending home on 40meq bid x 5 days while on oral lasix and close f/u with pcp for recheck.       Lactic acidosis (POA) due to dehydration and sepsis  -resolved      Hyperbilirubinemia:   -mildly elevated, predominantly indirect  -resolved      Right adnexal cyst noted on CT:   Noted on admission CT a/p. Can be further evaluated by pcp.         Hyperlipidemia:  -continue home statin    Code Status: full  DVT prophylaxis: [address] discussed with : pt  Disposition: home  Estimated date of d/c: plan today once oral vancomycin prescription affordability worked out. Hospital Problems  Never Reviewed          Codes Class Noted POA    * (Principal)Colitis ICD-10-CM: K52.9  ICD-9-CM: 558.9  2/24/2018 Unknown        Severe sepsis ([facility] [address] ICD-10-CM: A41.9, R65.20  ICD-9-CM: 038.9, 995.92  2/24/2018 Unknown        Dehydration ICD-10-CM: E86.0  ICD-9-CM: 276.51  2/24/2018 Unknown        Hypokalemia ICD-10-CM: E87.6  ICD-9-CM: 276.8  2/24/2018 Unknown        Hypocalcemia ICD-10-CM: E83.51  ICD-9-CM: 275.41  2/24/2018 Unknown                Review of Systems:   Review of Systems   Constitutional: Negative for chills and fever. Respiratory: Negative for cough and shortness of breath. Cardiovascular: Negative for chest pain and leg swelling. Gastrointestinal: Negative for abdominal pain, nausea and vomiting. Vital Signs:    Last 24hrs VS reviewed since prior progress note.  Most recent are:  Visit Vitals    /78 (BP 1 Location: Right arm, BP Patient Position: At rest)    Pulse 89    Temp 98.8 °F (37.1 °C)    Resp 18    Ht 5' 4\" (1.626 m)    Wt 63.9 kg (140 lb 12.8 oz)    SpO2 92%    BMI 24.17 kg/m2         Intake/Output Summary (Last 24 hours) at 03/08/18 0850  Last data filed at 03/07/18 1856   Gross per 24 hour   Intake              240 ml   Output                0 ml   Net              240 ml        Physical Examination:   Physical Exam   Constitutional: She is oriented to person, place, and time and well-developed, well-nourished, and in no distress. No distress. Cardiovascular: Normal rate, regular rhythm and normal heart sounds. Pulmonary/Chest: Effort normal. She has decreased breath sounds in the right lower field and the left lower field. Abdominal: Soft. Bowel sounds are normal. She exhibits no distension. There is no tenderness. Musculoskeletal: Normal range of motion. She exhibits no edema or tenderness. Neurological: She is alert and oriented to person, place, and time. No cranial nerve deficit. Skin: Skin is warm and dry. She is not diaphoretic. Psychiatric: Mood and affect normal.   Nursing note and vitals reviewed. Data Review:    Review and/or order of clinical lab test      Labs:     Recent Labs      03/08/18 0322 03/07/18   0218   WBC  16.4*  20.2*   HGB  10.9*  10.6*   HCT  32.3*  30.4*   PLT  488*  507*     Recent Labs      03/08/18 0322 03/07/18   0218  03/06/18   0308   NA  137  138  140   K  3.0*  2.8*  3.1*   CL  101  98  102   CO2  30  32  32   BUN  7  7  7   CREA  0.62  0.54*  0.59   GLU  112*  118*  121*   CA  8.0*  8.0*  8.1*   MG  1.9  1.5*  1.8     Recent Labs      03/06/18   0308   SGOT  24   ALT  14   AP  31*   TBILI  0.8   TP  5.3*   ALB  3.2*   GLOB  2.1     No results for input(s): INR, PTP, APTT in the last 72 hours. No lab exists for component: INREXT   No results for input(s): FE, TIBC, PSAT, FERR in the last 72 hours. No results found for: FOL, RBCF   No results for input(s): PH, PCO2, PO2 in the last 72 hours. No results for input(s): CPK, CKNDX, TROIQ in the last 72 hours.     No lab exists for component: CPKMB  No results found for: CHOL, CHOLX, CHLST, CHOLV, HDL, LDL, LDLC, DLDLP, TGLX, TRIGL, TRIGP, CHHD, CHHDX  Lab Results   Component Value Date/Time    Glucose (POC) 110 (H) 02/27/2018 04:49 AM     No results found for: COLOR, APPRN, SPGRU, REFSG, JSAON, PROTU, GLUCU, KETU, BILU, UROU, KATHIA, LEUKU, GLUKE, EPSU, BACTU, WBCU, RBCU, CASTS, UCRY      Medications Reviewed:     Current Facility-Administered Medications   Medication Dose Route Frequency    potassium chloride SR (KLOR-CON 10) tablet 40 mEq  40 mEq Oral BID    metoprolol tartrate (LOPRESSOR) tablet 50 mg  50 mg Oral BID    HYDROmorphone (PF) (DILAUDID) injection 1 mg  1 mg IntraVENous Q4H PRN    simethicone (MYLICON) tablet 80 mg  80 mg Oral QID PRN    aspirin chewable tablet 81 mg  81 mg Oral DAILY    prasugrel (EFFIENT) tablet 10 mg  10 mg Oral DAILY    prochlorperazine (COMPAZINE) with saline injection 5 mg  5 mg IntraVENous Q6H PRN    vancomycin 50 mg/mL oral solution (compounded) 500 mg  500 mg Oral Q6H    lactobac ac& pc-s.therm-b.anim (OSCAR Q/RISAQUAD)  1 Cap Oral DAILY    simvastatin (ZOCOR) tablet 20 mg  20 mg Oral QHS    metroNIDAZOLE (FLAGYL) IVPB premix 500 mg  500 mg IntraVENous Q8H    sodium chloride (NS) flush 5-10 mL  5-10 mL IntraVENous Q8H    sodium chloride (NS) flush 5-10 mL  5-10 mL IntraVENous PRN    ondansetron (ZOFRAN) injection 4 mg  4 mg IntraVENous Q4H PRN    acetaminophen (TYLENOL) tablet 650 mg  650 mg Oral Q6H PRN     ______________________________________________________________________  EXPECTED LENGTH OF STAY: 4d 4h  ACTUAL LENGTH OF STAY:          12                 Ella Jessica MD

## 2018-03-08 NOTE — DISCHARGE INSTRUCTIONS
Discharge Instructions       PATIENT ID: Johanny Fernandez  MRN: [id number]   YOB: 1955    DATE OF ADMISSION: 2/24/2018 11:29 AM    DATE OF DISCHARGE: 3/8/2018    PRIMARY CARE PROVIDER: None     ATTENDING PHYSICIAN: Alayna Manrique MD  DISCHARGING PROVIDER: Alayna Manrique MD    To contact this individual call [phone] and ask the  to page. If unavailable ask to be transferred the Adult Hospitalist Department. DISCHARGE DIAGNOSES   Principal Problem:    Colitis (2/24/2018)    Active Problems:    Severe sepsis ([address] (2/24/2018)      Dehydration (2/24/2018)      Hypokalemia (2/24/2018)      Hypocalcemia (2/24/2018)          CONSULTATIONS:   IP CONSULT TO GENERAL SURGERY  IP CONSULT TO HOSPITALIST  IP CONSULT TO CARDIOLOGY  IP CONSULT TO INFECTIOUS DISEASES  IP CONSULT TO PULMONOLOGY    PROCEDURES/SURGERIES:   * No surgery found *    PENDING TEST RESULTS:   At the time of discharge the following test results are still pending: none    FOLLOW UP APPOINTMENTS:   Follow-up Information     Follow up With Details Comments Contact Info    Call and schedule appointment for Cardiac Rehab at [facility] Call in 1 week after d/c to schedule appointment [facility]  Cardiac Rehab contact is Timoteo Flores [phone]    Daiana Nassar MD On 3/14/2018 9 AM.  Please arrive 20 min early. [address]      None   None (281) Patient stated that they have no PCP      [address]. Total out-of-pocket cost is $64.17. Prescription will be ready for pick-up at the Pharmacy at [facility] is located at: [address] [place] [address]. [phone] and the phone number is [phone]. [facility] Schedule an appointment as soon as possible for a visit in 5 days check bmp to monitor creatinine and potassium and repeat cxr to check on effusions.       Randy Limon MD Schedule an appointment as soon as possible for a visit in 2 weeks for follow up on pleural effusion [address] [facility]  [address] [facility]             ADDITIONAL CARE RECOMMENDATIONS:   1. Have pcp or primary gynecologist evaluate right adnexal cyst.      DIET: resume prior diet as tolerated. ACTIVITY: Activity as tolerated    WOUND CARE: none    EQUIPMENT needed: none      DISCHARGE MEDICATIONS:   See Medication Reconciliation Form    · It is important that you take the medication exactly as they are prescribed. · Keep your medication in the bottles provided by the pharmacist and keep a list of the medication names, dosages, and times to be taken in your wallet. · Do not take other medications without consulting your doctor. Furosemide (Lasix) - (By mouth)   Why this medicine is used:   Treats fluid retention and high blood pressure. Contact a nurse or doctor right away if you have:  · Blistering, peeling, red skin rash  · Lightheadedness, dizziness, fainting  · Dry mouth, increased thirst, muscle cramps, nausea or vomiting  · Uneven heartbeat  · Hearing loss, ringing in the ears     Common side effects:  · Loss of appetite, stomach cramps  © 2017 [facility] Information is for End User's use only and may not be sold, redistributed or otherwise used for commercial purposes. Metoprolol (Lopressor, Toprol XL) - (By mouth)   Why this medicine is used:   Treats high blood pressure, chest pain, and heart failure. Contact a nurse or doctor right away if you have:  · Lightheadedness, dizziness, fainting  · Rapid weight gain; swelling in your hands, ankles, or feet     Common side effects:  · Mild dizziness  · Tiredness  © 2017 [address] Information is for End User's use only and may not be sold, redistributed or otherwise used for commercial purposes. Probiotic (Acidophilus Probiotic Blend, Culturelle, Adult Probiotic, Bifidonate) - (By mouth)   Why this medicine is used:    May increase the number of healthy bacteria in your stomach and intestines. Common side effects:  · Mild diarrhea, constipation, nausea, vomiting  · Mild gas or cramps  © 2017 [address] [facility] Information is for End User's use only and may not be sold, redistributed or otherwise used for commercial purposes. Amoxicillin/Clavulanate Potassium (Augmentin, Augmentin ES-600, Augmentin XR) - (By mouth)   Why this medicine is used:   Treats infections. This medicine is a penicillin antibiotic. Contact a nurse or doctor right away if you have:  · Blistering, peeling, red skin rash  · Dark urine or pale stools, nausea, vomiting, loss of appetite, stomach pain, yellow eyes or skin  · Severe diarrhea, especially if bloody or ongoing     Common side effects:  · Diarrhea, nausea, vomiting  · Diaper rash  · Tooth discoloration (in children)  © 2017 [address] [facility] Information is for End User's use only and may not be sold, redistributed or otherwise used for commercial purposes. Prasugrel (Effient) - (By mouth)   Why this medicine is used:   Prevents blood clots. Contact a nurse or doctor right away if you have:  · Sudden or severe headache  · Bloody vomit or vomit that looks like coffee grounds; bloody or black, tarry stools  · Bleeding that does not stop or bruises that do not heal     Common side effects:  · Minor bleeding or bruising  © 2017 [address] is for End User's use only and may not be sold, redistributed or otherwise used for commercial purposes. NOTIFY YOUR PHYSICIAN FOR ANY OF THE FOLLOWING:   Fever over 101 degrees for 24 hours. Chest pain, shortness of breath, fever, chills, nausea, vomiting, diarrhea, change in mentation, falling, weakness, bleeding. Severe pain or pain not relieved by medications. Or, any other signs or symptoms that you may have questions about.       DISPOSITION:    Home With:   OT  PT  [facility]  RN       SNF/Inpatient Rehab/LTAC   x Independent/assisted living    Hospice    Other:         Signed:   Dain Major MD  3/8/2018  4:03 PM

## 2018-03-08 NOTE — PROGRESS NOTES
2779- pt only had one medium, soft BM in the past 12 hours. Results for Serenity Lozano (MRN [id number]) as of 3/8/2018 04:44   Ref. Range 3/7/2018 02:18 3/8/2018 03:22   Potassium Latest Ref Range: 3.5 - 5.1 mmol/L 2.8 (L) 3.0 (L)     0445- Paging oncall to see if any additional K to be added with her 40 mEq Daily coming up at 0900.   0512- Jack oncall ordered 40mEq K PO bid and to cancel the daily order for K.

## 2018-03-12 ENCOUNTER — TELEPHONE (OUTPATIENT)
Dept: CARDIAC REHAB | Age: 63
End: 2018-03-12

## 2018-03-12 NOTE — TELEPHONE ENCOUNTER
3/12/2018 Cardiac Rehab: Called Ms. Erma Carr  to discuss participation in the Cardiac Rehab Program following NSTEMI/ cardiac stent on 2/27/2018. Left voicemail message.  Preeti Castillo RN

## 2018-03-14 ENCOUNTER — OFFICE VISIT (OUTPATIENT)
Dept: CARDIOLOGY CLINIC | Age: 63
End: 2018-03-14

## 2018-03-14 VITALS
BODY MASS INDEX: 22.77 KG/M2 | HEIGHT: 64 IN | OXYGEN SATURATION: 96 % | DIASTOLIC BLOOD PRESSURE: 74 MMHG | SYSTOLIC BLOOD PRESSURE: 122 MMHG | HEART RATE: 107 BPM | WEIGHT: 133.4 LBS

## 2018-03-14 DIAGNOSIS — I07.1 TRICUSPID VALVE INSUFFICIENCY, UNSPECIFIED ETIOLOGY: Primary | ICD-10-CM

## 2018-03-14 DIAGNOSIS — Z09 FOLLOW UP: ICD-10-CM

## 2018-03-14 RX ORDER — PRASUGREL 10 MG/1
10 TABLET, FILM COATED ORAL DAILY
Qty: 90 TAB | Refills: 3 | Status: SHIPPED | OUTPATIENT
Start: 2018-03-14 | End: 2019-03-18 | Stop reason: SDUPTHER

## 2018-03-14 RX ORDER — FUROSEMIDE 20 MG/1
20 TABLET ORAL DAILY
COMMUNITY
End: 2018-03-14 | Stop reason: SDUPTHER

## 2018-03-14 RX ORDER — METOPROLOL TARTRATE 50 MG/1
50 TABLET ORAL 3 TIMES DAILY
Qty: 270 TAB | Refills: 2 | Status: SHIPPED | OUTPATIENT
Start: 2018-03-14 | End: 2018-05-23

## 2018-03-14 RX ORDER — FUROSEMIDE 20 MG/1
20 TABLET ORAL DAILY
Qty: 30 TAB | Refills: 1 | Status: SHIPPED | OUTPATIENT
Start: 2018-03-14 | End: 2018-03-14 | Stop reason: SDUPTHER

## 2018-03-14 RX ORDER — FUROSEMIDE 20 MG/1
20 TABLET ORAL DAILY
Qty: 90 TAB | Refills: 2 | Status: SHIPPED | OUTPATIENT
Start: 2018-03-14 | End: 2018-05-16 | Stop reason: CLARIF

## 2018-03-14 RX ORDER — METOPROLOL TARTRATE 50 MG/1
50 TABLET ORAL 3 TIMES DAILY
Qty: 270 TAB | Refills: 2 | Status: SHIPPED | OUTPATIENT
Start: 2018-03-14 | End: 2018-03-14 | Stop reason: SDUPTHER

## 2018-03-14 RX ORDER — FUROSEMIDE 20 MG/1
20 TABLET ORAL DAILY
Qty: 90 TAB | Refills: 2 | Status: SHIPPED | OUTPATIENT
Start: 2018-03-14 | End: 2018-03-14 | Stop reason: SDUPTHER

## 2018-03-14 RX ORDER — METOPROLOL TARTRATE 50 MG/1
50 TABLET ORAL 3 TIMES DAILY
COMMUNITY
End: 2018-03-14 | Stop reason: SDUPTHER

## 2018-03-14 NOTE — MR AVS SNAPSHOT
[address] [address] [facility] 
[phone] Patient: Avelino Blankenship MRN: [id number]:[id number] Visit Information Date & Time Provider Department Dept. Phone Encounter #  
 3/14/2018  9:00 AM Parish Smith MD [facility] [place] [phone] [id number] Follow-up Instructions Return in about 3 months (around 6/14/2018). Follow-up and Disposition History Upcoming Health Maintenance Date Due Hepatitis C Screening 1955 DTaP/Tdap/Td series (1 - Tdap) 1/13/1976 PAP AKA CERVICAL CYTOLOGY 1/13/1976 BREAST CANCER SCRN MAMMOGRAM 1/13/2005 FOBT Q 1 YEAR AGE 50-75 1/13/2005 ZOSTER VACCINE AGE 60> 11/13/2014 Influenza Age 5 to Adult 8/1/2017 Allergies as of 3/14/2018  Review Complete On: 3/14/2018 By: Parish Smith MD  
 No Known Allergies Current Immunizations  Reviewed on 3/5/2018 No immunizations on file. Not reviewed this visit You Were Diagnosed With   
  
 Codes Comments Follow up    -  Primary ICD-10-CM: Z09 ICD-9-CM: V67.9 Vitals BP Pulse Height(growth percentile) Weight(growth percentile) SpO2 BMI  
 122/74 (BP 1 Location: Left arm, BP Patient Position: Sitting) (!) 107 5' 4\" (1.626 m) 133 lb 6.4 oz (60.5 kg) 96% 22.9 kg/m2 Smoking Status Never Smoker BMI and BSA Data Body Mass Index Body Surface Area  
 22.9 kg/m 2 1.65 m 2 Your Updated Medication List  
  
   
This list is accurate as of 3/14/18 10:26 AM.  Always use your most recent med list.  
  
  
  
  
 aspirin delayed-release 81 mg tablet Take 81 mg by mouth daily. Biotin 2,500 mcg Cap Take 5,000 mcg by mouth. L. acidoph & paracasei- S therm- Bifido 8 billion cell Cap cap Commonly known as:  OSCAR-Q/RISAQUAD Take 1 Cap by mouth daily for 30 days. LASIX 20 mg tablet Generic drug:  furosemide Take 20 mg by mouth daily. metoprolol tartrate 50 mg tablet Commonly known as:  LOPRESSOR Take 50 mg by mouth three (3) times daily. prasugrel 10 mg tablet Commonly known as:  EFFIENT Take 1 Tab by mouth daily for 30 days. simvastatin 20 mg tablet Commonly known as:  ZOCOR Take 20 mg by mouth nightly. therapeutic multivitamin tablet Commonly known as:  [facility] Take 1 Tab by mouth daily. vancomycin 50 mg/mL oral solution (compounded) Take 10 mL by mouth every six (6) hours for 10 days. We Performed the Following AMB POC EKG ROUTINE W/ 12 LEADS, INTER & REP [43667 CPT(R)] Follow-up Instructions Return in about 3 months (around 6/14/2018). To-Do List   
 04/30/2018 9:00 AM  
  Appointment with [facility] CARDIOPULM SPECIALTY at [facility] ([phone]) Patient Instructions Increase Metoprolol 50 mg three times a day Refer to Cardiac wellness Echo in 2 months Follow up with Maye Miguel in 3 months Decrease Lasix 20 mg daily Patient Instructions History Introducing [facility] [place] [facility]! [facility] introduces [employer] patient portal. Now you can access parts of your medical record, email your doctor's office, and request medication refills online. 1. In your internet browser, go to [email or web address] 2. Click on the First Time User? Click Here link in the Sign In box. You will see the New Member Sign Up page. 3. Enter your StoryPress Access Code exactly as it appears below. You will not need to use this code after youve completed the sign-up process. If you do not sign up before the expiration date, you must request a new code. · Story[employer] Access Code: [email or web address] Expires: 6/2/2018 11:18 AM 
 
4. Enter the last four digits of your Social Security Number (xxxx) and Date of Birth (mm/dd/yyyy) as indicated and click Submit. You will be taken to the next sign-up page. 5. Create a CloudDock ID. This will be your CloudD[employer] login ID and cannot be changed, so think of one that is secure and easy to remember. 6. Create a CloudD[employer] password. You can change your password at any time. 7. Enter your Password Reset Question and Answer. This can be used at a later time if you forget your password. 8. Enter your e-mail address. You will receive e-mail notification when new information is available in [address] 9. Click Sign Up. You can now view and download portions of your medical record. 10. Click the Download Summary menu link to download a portable copy of your medical information. If you have questions, please visit the Frequently Asked Questions section of the [employer] website. Remember, [employer] is NOT to be used for urgent needs. For medical emergencies, dial 911. Now available from your iPhone and Android! Please provide this summary of care documentation to your next provider. Your primary care clinician is listed as NONE. If you have any questions after today's visit, please call [phone].

## 2018-03-14 NOTE — PATIENT INSTRUCTIONS
Increase Metoprolol 50 mg three times a day    Refer to Cardiac wellness    Echo in 2 months    Follow up with  in 3 months    Decrease Lasix 20 mg daily

## 2018-03-14 NOTE — PROGRESS NOTES
HISTORY OF PRESENT ILLNESS  Parveen Bose is a 61 y.o. female. patient with h/o HLD, seen by cardiologist about 15 years ago for ventricular bigeminy recently moved to [facility], presented to [facility] with nausea vomiting and diarrhea, she had some abx for sore throat and then developed above symptoms  She was diagnosed with cdiff  While in hospital she developed cp and eventually ruled in for MI  Cardiac catheterization on 2/18:left main ok, lad minor irregs, lcx small, occluded distally with faint right to left collaterals. 30% prox rca, 50% mid pda, 85% distal rca treated with 3.25 by 18mm michelle to 10 david. No lv gram.  Echo on 2/18:Systolic function was normal. Ejection fraction was  estimated in the range of 50 % to 55 %. There was dyskinesis, possibly  aneurysmal formation of the apical wall(s). Doppler parameters were  consistent with abnormal left ventricular relaxation (grade 1 diastolic  dysfunction). Tricuspid valve: There was mild regurgitation. HPI  She feels good no cp or sob reported a bit fatigue but not much  Review of Systems   Respiratory: Negative. Cardiovascular: Negative. Visit Vitals    /74 (BP 1 Location: Left arm, BP Patient Position: Sitting)    Pulse (!) 107    Ht 5' 4\" (1.626 m)    Wt 60.5 kg (133 lb 6.4 oz)    SpO2 96%    BMI 22.9 kg/m2       Physical Exam   Neck: No JVD present. Carotid bruit is not present. Cardiovascular: Normal rate and regular rhythm. Pulmonary/Chest: She has decreased breath sounds in the right lower field and the left lower field. Abdominal: Soft. Musculoskeletal: She exhibits no edema. Psychiatric: She has a normal mood and affect. Current Outpatient Prescriptions on File Prior to Visit   Medication Sig Dispense Refill    L. acidoph & paracasei- S therm- Bifido (OSCAR-Q/RISAQUAD) 8 billion cell cap cap Take 1 Cap by mouth daily for 30 days.  30 Cap 0    metoprolol tartrate (LOPRESSOR) 50 mg tablet Take 1 Tab by mouth two (2) times a day for 30 days. 60 Tab 0    prasugrel (EFFIENT) 10 mg tablet Take 1 Tab by mouth daily for 30 days. 30 Tab 0    vancomycin 50 mg/mL oral solution (compounded) Take 10 mL by mouth every six (6) hours for 10 days. 400 mL 0    furosemide (LASIX) 40 mg tablet Take 1 Tab by mouth daily for 5 days. 5 Tab 0    simvastatin (ZOCOR) 20 mg tablet Take 20 mg by mouth nightly.  aspirin delayed-release 81 mg tablet Take 81 mg by mouth daily.  [] potassium chloride SR (K-TAB) 20 mEq tablet Take 2 Tabs by mouth two (2) times a day for 5 days. Indications: hypokalemia prevention 20 Tab 0    Biotin 2,500 mcg cap Take 5,000 mcg by mouth.  therapeutic multivitamin (THERAGRAN) tablet Take 1 Tab by mouth daily. No current facility-administered medications on file prior to visit. ASSESSMENT and PLAN  CAD: Status post MI in 2018 and PCI and stent of PDA. She seems to be relatively asymptomatic at this time. Her EKG shows normal sinus rhythm rate of 94 with ST-T wave abnormalities in the inferolateral leads suggestive of rvnbmcag-gdpzhyxx-gn old myocardial infarction. Continue aspirin and Effient. I feel that her heart rate is a little too fast.  There is no evidence by recent blood tests for dehydration or any significant anemia. Increase her metoprolol at least temporarily to 50 mg a 3 times a day. Proceed with follow-up echo in approximately 2 months. Start cardiac wellness as soon as possible. Side effects of increased metoprolol discussed    Hypertension well-controlled    Hyperlipidemia: On simvastatin. Closely followed by her primary care physician she tells me. C. difficile: Much better on still on antibiotics. See her back in 3 months with follow-up echocardiogram as above.

## 2018-03-20 ENCOUNTER — TELEPHONE (OUTPATIENT)
Dept: CARDIOLOGY CLINIC | Age: 63
End: 2018-03-20

## 2018-03-20 RX ORDER — ISOSORBIDE MONONITRATE 30 MG/1
30 TABLET, EXTENDED RELEASE ORAL DAILY
COMMUNITY
End: 2018-03-20 | Stop reason: SDUPTHER

## 2018-03-20 RX ORDER — ISOSORBIDE MONONITRATE 30 MG/1
30 TABLET, EXTENDED RELEASE ORAL DAILY
Qty: 30 TAB | Refills: 3 | Status: SHIPPED | OUTPATIENT
Start: 2018-03-20 | End: 2018-04-05

## 2018-03-20 NOTE — TELEPHONE ENCOUNTER
Verified patient with two patient identifiers. Spoke with patient's ,he said that she's been having twinges on her arms,left arm heavy and burning sensation. Symptoms off and on and started at 6 AM today. Upon talking to him he said she was pain free HR 70-80. Discussed with Aspen Azevedo and he said to start on Imdur 30 mg daily,Rx sent to pharmacy and he was instructed to start Imdur today. If symptoms gets worse and re-occur to go to ED.

## 2018-03-23 ENCOUNTER — HOSPITAL ENCOUNTER (OUTPATIENT)
Dept: GENERAL RADIOLOGY | Age: 63
Discharge: HOME | End: 2018-03-23
Payer: COMMERCIAL

## 2018-03-23 DIAGNOSIS — J90 BILATERAL PLEURAL EFFUSION: ICD-10-CM

## 2018-03-23 PROCEDURE — 71046 X-RAY EXAM CHEST 2 VIEWS: CPT

## 2018-03-28 ENCOUNTER — PATIENT OUTREACH (OUTPATIENT)
Dept: CARDIOLOGY CLINIC | Age: 63
End: 2018-03-28

## 2018-03-28 NOTE — PROGRESS NOTES
Nurse navigator note - cardiology  LM for pt - request return call - to check on bp and heart rate post increase of Metoprolol -   Metoprolol increased to tid -   Pt called with some arm and chest pressure - was advised to start Imdur - note 3/20 -  \" Spoke with patient's ,he said that she's been having twinges on her arms,left arm heavy and burning sensation. Symptoms off and on and started at 6 AM today. Upon talking to him he said she was pain free HR 70-80. Discussed with Caryle Bending and he said to start on Imdur 30 mg daily,Rx sent to pharmacy and he was instructed to start Imdur today. If symptoms gets worse and re-occur to go to ED. \"    Plan: Check on vital signs and symptoms   Daily weights- confirmed 3/29   Echo and MD follow up pulmonary follow up / next visit 4/9- watching pleural effusion   Check on resolution of above symptoms with Imdur - pt stopped d/t headache   Check on bp and pulse with increased Metoprolol. 121/70/61    Updates - Pt and  return call - she is doing better - taking Metoprolol tid as ordered - bp today 120/70, pulse 61. She started Imdur but stopped b/c of intolerable headache - she has only had infrequent twinges since, infrequent and no arm/chest pain. She saw pulmonary and had CXR on 3/23 - with results:  INDICATION: Pleural effusions   COMPARISON: March 8, 2018     FINDINGS: PA and lateral views of the chest demonstrate a stable cardiomediastinal silhouette. Small to moderate right pleural effusion is slightly improved. Left pleural effusion has almost completely resolved. There is persistent right basilar atelectasis. The visualized osseous structures are unremarkable.     IMPRESSION: Near-complete interval resolution of left pleural effusion.  Slightly improved small to moderate right pleural effusion with right basilar atelectasis.      Imaging   XR CHEST PA LAT (Order #[id number]) on 3/23/2018 - Imaging Information   3/23/2018  6:43 PM - [place], Rad Results In Narrative   INDICATION: Pleural effusions    COMPARISON: March 8, 2018    FINDINGS: PA and lateral views of the chest demonstrate a stable cardiomediastinal silhouette. Small to moderate right pleural effusion is slightly improved. Left pleural effusion has almost completely resolved. There is persistent right basilar atelectasis. The visualized osseous structures are unremarkable. She will have repeat CXR on Monday 4/2 and pulmonary follow up on 4/9   Pulmonary discussed thoracentesis risk on anticoag - should that be needed - will confer with Dr. Paty Toledo -     Note to provider re: the above.     Teri Meek RN , CHFN, [facility]  NN CAV Ruby Zimmer  [phone]    5/14/2018 10:00 AM YULI AMADO [facility]   6/11/2018 2:20 PM Dewayne Connor MD [facility]      Teri Meek RN , [facility], [address] [place] [address]   [facility]  [phone]

## 2018-03-29 ENCOUNTER — TELEPHONE (OUTPATIENT)
Dept: CARDIAC REHAB | Age: 63
End: 2018-03-29

## 2018-03-29 NOTE — TELEPHONE ENCOUNTER
Telephone encounters from Evie Márquez at [facility]:    3-19-18  8:43am left vm for pt to come to [facility] for appt 3-19-18 at 1pm. awaiting pt response    3-26-18 @ 11:51am left pt vm of cancellation for  3-27-18 @ 1:30pm; awaiting cb  Evie Márquez

## 2018-04-03 ENCOUNTER — TELEPHONE (OUTPATIENT)
Dept: CARDIAC REHAB | Age: 63
End: 2018-04-03

## 2018-04-03 NOTE — TELEPHONE ENCOUNTER
4/3/2018 Cardiac Rehab: Called Ms. Tobe Koyanagi to remind of intake appointment on Thursday, April 5, 2018. Confirmed appointment with patient's . Provided patient with contact information for [facility]. Also, reminded patient to bring a list of current medications, a personal schedule, and to wear comfortable clothes and shoes.  [facility] Shelley

## 2018-04-05 ENCOUNTER — HOSPITAL ENCOUNTER (OUTPATIENT)
Dept: CARDIAC REHAB | Age: 63
Discharge: HOME | End: 2018-04-05
Payer: COMMERCIAL

## 2018-04-05 ENCOUNTER — HOSPITAL ENCOUNTER (OUTPATIENT)
Dept: GENERAL RADIOLOGY | Age: 63
Discharge: HOME | End: 2018-04-05
Payer: COMMERCIAL

## 2018-04-05 VITALS — BODY MASS INDEX: 22.53 KG/M2 | HEIGHT: 64 IN | WEIGHT: 132 LBS

## 2018-04-05 VITALS — WEIGHT: 132 LBS | BODY MASS INDEX: 22.66 KG/M2

## 2018-04-05 DIAGNOSIS — J90 PLEURAL EFFUSION: ICD-10-CM

## 2018-04-05 PROCEDURE — 71046 X-RAY EXAM CHEST 2 VIEWS: CPT

## 2018-04-05 PROCEDURE — 93798 PHYS/QHP OP CAR RHAB W/ECG: CPT

## 2018-04-05 NOTE — CARDIO/PULMONARY
Walter Holloway  61 y.o.     Mrs. Tate Solders presented to cardiac wellness for orientation and exercise tolerance test today with a primary diagnosis of a NSTEMI and 1 stent. Walter Holloway has no significant cardiac history. Back in February, she got placed on antiobiotics for strep throat which ended up giving her severe diarrhea and ended up with Cdiff. Developed CP in the [address] [place] [address] and was ruled for an MI, ended up with 1 stent. Also developed pulmonary edema d/t the electrolytes and fluids she was given to help with the diarrhea. Had a PA/LAT x ray today to follow up on her lungs. Cardiac risk factors include family history, dyslipidemia and these were reviewed with the patient. Walter Holloway is  and lives with her . They moved from [address] [place] [address] down to [place] to be closer to their children and grandchildren back at the end of January. PHQ9, depression score, is 3 and this is considered normal.   Patient denied chest pain or SOB during 6 minute walk and was in SB/SR with inverted T waves (not new). Has a hx of vertigo when her allergies flare up - usually takes antivert for a few days to help with this. Has a call in to Dr. Louis Mclain office to verify it is ok for her to take this PRN. Pt listed as a fall risk d/t Mrs. Tate Soldjacquelin reporting having some slight vertigo this AM.   Walter Holloway will attend Cardiac Wellness at [facility] because it is closer to her home. She will attend one session here at [facility] with an hr 1:1 with the dietician next Wednesday, but will then transfer. Education manual given. EF is 50-55%.     Jackie Martinez, RN  4/5/2018

## 2018-04-11 ENCOUNTER — HOSPITAL ENCOUNTER (OUTPATIENT)
Dept: CARDIAC REHAB | Age: 63
Discharge: HOME | End: 2018-04-11
Payer: COMMERCIAL

## 2018-04-11 VITALS — BODY MASS INDEX: 22.66 KG/M2 | WEIGHT: 132 LBS

## 2018-04-11 PROCEDURE — 93798 PHYS/QHP OP CAR RHAB W/ECG: CPT

## 2018-04-11 PROCEDURE — 93797 PHYS/QHP OP CAR RHAB WO ECG: CPT | Performed by: DIETITIAN, REGISTERED

## 2018-04-16 ENCOUNTER — HOSPITAL ENCOUNTER (OUTPATIENT)
Dept: CARDIAC REHAB | Age: 63
Discharge: HOME | End: 2018-04-16
Payer: COMMERCIAL

## 2018-04-16 VITALS — WEIGHT: 131.2 LBS | BODY MASS INDEX: 22.52 KG/M2

## 2018-04-16 PROCEDURE — 93798 PHYS/QHP OP CAR RHAB W/ECG: CPT

## 2018-04-18 ENCOUNTER — HOSPITAL ENCOUNTER (OUTPATIENT)
Dept: CARDIAC REHAB | Age: 63
Discharge: HOME | End: 2018-04-18
Payer: COMMERCIAL

## 2018-04-18 VITALS — BODY MASS INDEX: 22.66 KG/M2 | WEIGHT: 132 LBS

## 2018-04-18 PROCEDURE — 93798 PHYS/QHP OP CAR RHAB W/ECG: CPT

## 2018-04-20 ENCOUNTER — APPOINTMENT (OUTPATIENT)
Dept: CARDIAC REHAB | Age: 63
End: 2018-04-20
Payer: COMMERCIAL

## 2018-04-20 ENCOUNTER — HOSPITAL ENCOUNTER (OUTPATIENT)
Dept: CARDIAC REHAB | Age: 63
Discharge: HOME | End: 2018-04-20
Payer: COMMERCIAL

## 2018-04-20 VITALS — BODY MASS INDEX: 22.83 KG/M2 | WEIGHT: 133 LBS

## 2018-04-20 PROCEDURE — 93798 PHYS/QHP OP CAR RHAB W/ECG: CPT

## 2018-04-23 ENCOUNTER — HOSPITAL ENCOUNTER (OUTPATIENT)
Dept: CARDIAC REHAB | Age: 63
Discharge: HOME | End: 2018-04-23
Payer: COMMERCIAL

## 2018-04-23 VITALS — BODY MASS INDEX: 22.69 KG/M2 | WEIGHT: 132.2 LBS

## 2018-04-23 PROCEDURE — 93798 PHYS/QHP OP CAR RHAB W/ECG: CPT

## 2018-04-25 ENCOUNTER — HOSPITAL ENCOUNTER (OUTPATIENT)
Dept: CARDIAC REHAB | Age: 63
Discharge: HOME | End: 2018-04-25
Payer: COMMERCIAL

## 2018-04-25 VITALS — BODY MASS INDEX: 22.66 KG/M2 | WEIGHT: 132 LBS

## 2018-04-25 PROCEDURE — 93798 PHYS/QHP OP CAR RHAB W/ECG: CPT

## 2018-04-27 ENCOUNTER — HOSPITAL ENCOUNTER (OUTPATIENT)
Dept: CARDIAC REHAB | Age: 63
Discharge: HOME | End: 2018-04-27
Payer: COMMERCIAL

## 2018-04-27 VITALS — BODY MASS INDEX: 22.8 KG/M2 | WEIGHT: 132.8 LBS

## 2018-04-27 PROCEDURE — 93797 PHYS/QHP OP CAR RHAB WO ECG: CPT

## 2018-04-27 PROCEDURE — 93798 PHYS/QHP OP CAR RHAB W/ECG: CPT

## 2018-04-30 ENCOUNTER — HOSPITAL ENCOUNTER (OUTPATIENT)
Dept: CARDIAC REHAB | Age: 63
Discharge: HOME | End: 2018-04-30
Payer: COMMERCIAL

## 2018-04-30 VITALS — BODY MASS INDEX: 22.83 KG/M2 | WEIGHT: 133 LBS

## 2018-04-30 PROCEDURE — 93798 PHYS/QHP OP CAR RHAB W/ECG: CPT

## 2018-05-02 ENCOUNTER — HOSPITAL ENCOUNTER (OUTPATIENT)
Dept: CARDIAC REHAB | Age: 63
Discharge: HOME | End: 2018-05-02
Payer: COMMERCIAL

## 2018-05-02 VITALS — BODY MASS INDEX: 22.73 KG/M2 | WEIGHT: 132.4 LBS

## 2018-05-02 PROCEDURE — 93798 PHYS/QHP OP CAR RHAB W/ECG: CPT

## 2018-05-04 ENCOUNTER — APPOINTMENT (OUTPATIENT)
Dept: CARDIAC REHAB | Age: 63
End: 2018-05-04
Payer: COMMERCIAL

## 2018-05-04 ENCOUNTER — HOSPITAL ENCOUNTER (OUTPATIENT)
Dept: CARDIAC REHAB | Age: 63
Discharge: HOME | End: 2018-05-04
Payer: COMMERCIAL

## 2018-05-04 VITALS — BODY MASS INDEX: 22.83 KG/M2 | WEIGHT: 133 LBS

## 2018-05-04 PROCEDURE — 93798 PHYS/QHP OP CAR RHAB W/ECG: CPT

## 2018-05-07 ENCOUNTER — HOSPITAL ENCOUNTER (OUTPATIENT)
Dept: CARDIAC REHAB | Age: 63
Discharge: HOME | End: 2018-05-07
Payer: COMMERCIAL

## 2018-05-07 VITALS — BODY MASS INDEX: 23.14 KG/M2 | WEIGHT: 134.8 LBS

## 2018-05-07 PROCEDURE — 93798 PHYS/QHP OP CAR RHAB W/ECG: CPT

## 2018-05-09 ENCOUNTER — HOSPITAL ENCOUNTER (OUTPATIENT)
Dept: CARDIAC REHAB | Age: 63
Discharge: HOME | End: 2018-05-09
Payer: COMMERCIAL

## 2018-05-09 VITALS — WEIGHT: 133.6 LBS | BODY MASS INDEX: 22.93 KG/M2

## 2018-05-09 PROCEDURE — 93798 PHYS/QHP OP CAR RHAB W/ECG: CPT

## 2018-05-11 ENCOUNTER — APPOINTMENT (OUTPATIENT)
Dept: CARDIAC REHAB | Age: 63
End: 2018-05-11
Payer: COMMERCIAL

## 2018-05-14 ENCOUNTER — CLINICAL SUPPORT (OUTPATIENT)
Dept: CARDIOLOGY CLINIC | Age: 63
End: 2018-05-14

## 2018-05-14 DIAGNOSIS — I25.119 CORONARY ARTERY DISEASE INVOLVING NATIVE HEART WITH ANGINA PECTORIS, UNSPECIFIED VESSEL OR LESION TYPE (HCC): Primary | ICD-10-CM

## 2018-05-14 DIAGNOSIS — I07.1 TRICUSPID VALVE INSUFFICIENCY, UNSPECIFIED ETIOLOGY: ICD-10-CM

## 2018-05-16 ENCOUNTER — HOSPITAL ENCOUNTER (OUTPATIENT)
Dept: CARDIAC REHAB | Age: 63
Discharge: HOME | End: 2018-05-16
Payer: COMMERCIAL

## 2018-05-16 VITALS — WEIGHT: 133.4 LBS | BODY MASS INDEX: 22.9 KG/M2

## 2018-05-16 PROCEDURE — 93798 PHYS/QHP OP CAR RHAB W/ECG: CPT

## 2018-05-18 ENCOUNTER — HOSPITAL ENCOUNTER (OUTPATIENT)
Dept: CARDIAC REHAB | Age: 63
Discharge: HOME | End: 2018-05-18
Payer: COMMERCIAL

## 2018-05-18 VITALS — WEIGHT: 135 LBS | BODY MASS INDEX: 23.17 KG/M2

## 2018-05-18 PROCEDURE — 93798 PHYS/QHP OP CAR RHAB W/ECG: CPT

## 2018-05-21 ENCOUNTER — PATIENT OUTREACH (OUTPATIENT)
Dept: CARDIOLOGY CLINIC | Age: 63
End: 2018-05-21

## 2018-05-21 ENCOUNTER — HOSPITAL ENCOUNTER (OUTPATIENT)
Dept: CARDIAC REHAB | Age: 63
Discharge: HOME | End: 2018-05-21
Payer: COMMERCIAL

## 2018-05-21 VITALS — BODY MASS INDEX: 23.14 KG/M2 | WEIGHT: 134.8 LBS

## 2018-05-21 PROCEDURE — 93798 PHYS/QHP OP CAR RHAB W/ECG: CPT

## 2018-05-21 NOTE — PROGRESS NOTES
Nurse navigator note - cardiology  Incoming call from pt's  - she is currently in cardiac rehab -   He said last week - she work up with some numbness in her left leg - whole leg - resolved with movement and walking around - duration - unknown -  Her pulse was 47 and her bp was 157/90 -     Today her bp is 131/74, pulse 50 -   He is asking about the Metoprolol 50 mg tid - (increased in March office visit for pulse 107). She is going to cardiac rehab 3x/week and is walking on other days -     She had echo done 5/14/18 -   LEFT VENTRICLE: Size was normal. Systolic function was normal. Ejection raction was estimated in the range of 55 % to 60 %. No obvious wall motion abnormalities identified in the views obtained. Wall thickness was normal.    RIGHT VENTRICLE: The size was normal. Systolic function was normal. Wall thickness was normal.    LEFT ATRIUM: Size was normal.    Plan: Mr. Shaista Rodriguez is asking about the Lopressor dose of 50 mg tid - with noted heart rate (at night 47),    Pt denies dyspnea, fatigue or other s/s. Cardiac rehab notes reviewed and message to nurses there -    Note to provider - re: question presented. Follow up appt -      Provider Department Encounter # Center   6/1/2018 2:40 PM MD Cherelle Goncalves, SYDNEE , [facility]  NN [facility] Jonathon Median  [phone]    Cardiac rehab exercise strips are noted in media.   4/5  HR 54-72  4/16    4/23  62  5/2  69-80   5/16  56-85  ttk

## 2018-05-23 ENCOUNTER — APPOINTMENT (OUTPATIENT)
Dept: GENERAL RADIOLOGY | Age: 63
End: 2018-05-23
Attending: EMERGENCY MEDICINE
Payer: COMMERCIAL

## 2018-05-23 ENCOUNTER — HOSPITAL ENCOUNTER (OUTPATIENT)
Age: 63
Setting detail: OBSERVATION
Discharge: HOME | End: 2018-05-23
Attending: EMERGENCY MEDICINE | Admitting: HOSPITALIST
Payer: COMMERCIAL

## 2018-05-23 ENCOUNTER — APPOINTMENT (OUTPATIENT)
Dept: CT IMAGING | Age: 63
End: 2018-05-23
Attending: EMERGENCY MEDICINE
Payer: COMMERCIAL

## 2018-05-23 ENCOUNTER — APPOINTMENT (OUTPATIENT)
Dept: CARDIAC REHAB | Age: 63
End: 2018-05-23
Payer: COMMERCIAL

## 2018-05-23 VITALS
TEMPERATURE: 97.9 F | HEART RATE: 52 BPM | BODY MASS INDEX: 22.71 KG/M2 | DIASTOLIC BLOOD PRESSURE: 54 MMHG | OXYGEN SATURATION: 96 % | RESPIRATION RATE: 15 BRPM | SYSTOLIC BLOOD PRESSURE: 115 MMHG | WEIGHT: 132.28 LBS

## 2018-05-23 DIAGNOSIS — R07.9 CHEST PAIN, UNSPECIFIED TYPE: Primary | ICD-10-CM

## 2018-05-23 PROBLEM — R20.2 NUMBNESS AND TINGLING IN LEFT UPPER EXTREMITY: Status: ACTIVE | Noted: 2018-05-23

## 2018-05-23 PROBLEM — R20.0 NUMBNESS AND TINGLING IN LEFT UPPER EXTREMITY: Status: ACTIVE | Noted: 2018-05-23

## 2018-05-23 PROBLEM — I25.10 CAD (CORONARY ARTERY DISEASE): Status: ACTIVE | Noted: 2018-05-23

## 2018-05-23 LAB
ALBUMIN SERPL-MCNC: 3.9 G/DL (ref 3.5–5)
ALBUMIN/GLOB SERPL: 1.1 {RATIO} (ref 1.1–2.2)
ALP SERPL-CCNC: 72 U/L (ref 45–117)
ALT SERPL-CCNC: 25 U/L (ref 12–78)
ANION GAP SERPL CALC-SCNC: 5 MMOL/L (ref 5–15)
AST SERPL-CCNC: 17 U/L (ref 15–37)
ATRIAL RATE: 50 BPM
ATRIAL RATE: 59 BPM
BASOPHILS # BLD: 0.1 K/UL (ref 0–0.1)
BASOPHILS NFR BLD: 1 % (ref 0–1)
BILIRUB DIRECT SERPL-MCNC: 0.2 MG/DL (ref 0–0.2)
BILIRUB SERPL-MCNC: 1.2 MG/DL (ref 0.2–1)
BUN SERPL-MCNC: 13 MG/DL (ref 6–20)
BUN/CREAT SERPL: 14 (ref 12–20)
CALCIUM SERPL-MCNC: 9 MG/DL (ref 8.5–10.1)
CALCULATED P AXIS, ECG09: 33 DEGREES
CALCULATED P AXIS, ECG09: 45 DEGREES
CALCULATED R AXIS, ECG10: 67 DEGREES
CALCULATED R AXIS, ECG10: 70 DEGREES
CALCULATED T AXIS, ECG11: 144 DEGREES
CALCULATED T AXIS, ECG11: 31 DEGREES
CHLORIDE SERPL-SCNC: 109 MMOL/L (ref 97–108)
CO2 SERPL-SCNC: 30 MMOL/L (ref 21–32)
CREAT SERPL-MCNC: 0.93 MG/DL (ref 0.55–1.02)
DIAGNOSIS, 93000: NORMAL
DIAGNOSIS, 93000: NORMAL
DIFFERENTIAL METHOD BLD: ABNORMAL
EOSINOPHIL # BLD: 0.4 K/UL (ref 0–0.4)
EOSINOPHIL NFR BLD: 5 % (ref 0–7)
ERYTHROCYTE [DISTWIDTH] IN BLOOD BY AUTOMATED COUNT: 11.9 % (ref 11.5–14.5)
GLOBULIN SER CALC-MCNC: 3.7 G/DL (ref 2–4)
GLUCOSE SERPL-MCNC: 98 MG/DL (ref 65–100)
HCT VFR BLD AUTO: 39.4 % (ref 35–47)
HGB BLD-MCNC: 12.9 G/DL (ref 11.5–16)
IMM GRANULOCYTES # BLD: 0 K/UL (ref 0–0.04)
IMM GRANULOCYTES NFR BLD AUTO: 0 % (ref 0–0.5)
LYMPHOCYTES # BLD: 3.7 K/UL (ref 0.8–3.5)
LYMPHOCYTES NFR BLD: 45 % (ref 12–49)
MCH RBC QN AUTO: 31.5 PG (ref 26–34)
MCHC RBC AUTO-ENTMCNC: 32.7 G/DL (ref 30–36.5)
MCV RBC AUTO: 96.3 FL (ref 80–99)
MONOCYTES # BLD: 0.8 K/UL (ref 0–1)
MONOCYTES NFR BLD: 10 % (ref 5–13)
NEUTS SEG # BLD: 3.3 K/UL (ref 1.8–8)
NEUTS SEG NFR BLD: 40 % (ref 32–75)
NRBC # BLD: 0 K/UL (ref 0–0.01)
NRBC BLD-RTO: 0 PER 100 WBC
P-R INTERVAL, ECG05: 148 MS
P-R INTERVAL, ECG05: 154 MS
PLATELET # BLD AUTO: 232 K/UL (ref 150–400)
PMV BLD AUTO: 11 FL (ref 8.9–12.9)
POTASSIUM SERPL-SCNC: 3.9 MMOL/L (ref 3.5–5.1)
PROT SERPL-MCNC: 7.6 G/DL (ref 6.4–8.2)
Q-T INTERVAL, ECG07: 464 MS
Q-T INTERVAL, ECG07: 544 MS
QRS DURATION, ECG06: 70 MS
QRS DURATION, ECG06: 70 MS
QTC CALCULATION (BEZET), ECG08: 459 MS
QTC CALCULATION (BEZET), ECG08: 495 MS
RBC # BLD AUTO: 4.09 M/UL (ref 3.8–5.2)
SODIUM SERPL-SCNC: 144 MMOL/L (ref 136–145)
TROPONIN I SERPL-MCNC: <0.04 NG/ML
TROPONIN I SERPL-MCNC: <0.04 NG/ML
VENTRICULAR RATE, ECG03: 50 BPM
VENTRICULAR RATE, ECG03: 59 BPM
WBC # BLD AUTO: 8.4 K/UL (ref 3.6–11)

## 2018-05-23 PROCEDURE — 82248 BILIRUBIN DIRECT: CPT | Performed by: FAMILY MEDICINE

## 2018-05-23 PROCEDURE — 70450 CT HEAD/BRAIN W/O DYE: CPT

## 2018-05-23 PROCEDURE — 74011250637 HC RX REV CODE- 250/637: Performed by: FAMILY MEDICINE

## 2018-05-23 PROCEDURE — 99284 EMERGENCY DEPT VISIT MOD MDM: CPT

## 2018-05-23 PROCEDURE — 80053 COMPREHEN METABOLIC PANEL: CPT | Performed by: EMERGENCY MEDICINE

## 2018-05-23 PROCEDURE — 85025 COMPLETE CBC W/AUTO DIFF WBC: CPT | Performed by: EMERGENCY MEDICINE

## 2018-05-23 PROCEDURE — 93005 ELECTROCARDIOGRAM TRACING: CPT

## 2018-05-23 PROCEDURE — 99218 HC RM OBSERVATION: CPT

## 2018-05-23 PROCEDURE — 36415 COLL VENOUS BLD VENIPUNCTURE: CPT | Performed by: EMERGENCY MEDICINE

## 2018-05-23 PROCEDURE — 71046 X-RAY EXAM CHEST 2 VIEWS: CPT

## 2018-05-23 PROCEDURE — 74011250637 HC RX REV CODE- 250/637: Performed by: HOSPITALIST

## 2018-05-23 PROCEDURE — 65660000000 HC RM CCU STEPDOWN

## 2018-05-23 PROCEDURE — 74011250637 HC RX REV CODE- 250/637: Performed by: EMERGENCY MEDICINE

## 2018-05-23 PROCEDURE — 84484 ASSAY OF TROPONIN QUANT: CPT | Performed by: EMERGENCY MEDICINE

## 2018-05-23 RX ORDER — PRASUGREL 10 MG/1
10 TABLET, FILM COATED ORAL DAILY
COMMUNITY
End: 2020-01-07

## 2018-05-23 RX ORDER — SODIUM CHLORIDE 0.9 % (FLUSH) 0.9 %
5-10 SYRINGE (ML) INJECTION AS NEEDED
Status: DISCONTINUED | OUTPATIENT
Start: 2018-05-23 | End: 2018-05-23 | Stop reason: HOSPADM

## 2018-05-23 RX ORDER — NITROGLYCERIN 0.4 MG/1
0.4 TABLET SUBLINGUAL
Status: DISCONTINUED | OUTPATIENT
Start: 2018-05-23 | End: 2018-05-23 | Stop reason: HOSPADM

## 2018-05-23 RX ORDER — ACETAMINOPHEN 325 MG/1
975 TABLET ORAL
Status: COMPLETED | OUTPATIENT
Start: 2018-05-23 | End: 2018-05-23

## 2018-05-23 RX ORDER — SIMVASTATIN 20 MG/1
20 TABLET, FILM COATED ORAL
Status: DISCONTINUED | OUTPATIENT
Start: 2018-05-23 | End: 2018-05-23 | Stop reason: HOSPADM

## 2018-05-23 RX ORDER — METOPROLOL TARTRATE 50 MG/1
50 TABLET ORAL 2 TIMES DAILY
Qty: 270 TAB | Refills: 2 | Status: SHIPPED | OUTPATIENT
Start: 2018-05-23 | End: 2018-07-16 | Stop reason: SDUPTHER

## 2018-05-23 RX ORDER — ASPIRIN 81 MG/1
81 TABLET ORAL DAILY
Status: DISCONTINUED | OUTPATIENT
Start: 2018-05-23 | End: 2018-05-23 | Stop reason: HOSPADM

## 2018-05-23 RX ORDER — METOPROLOL TARTRATE 50 MG/1
50 TABLET ORAL 3 TIMES DAILY
Status: DISCONTINUED | OUTPATIENT
Start: 2018-05-23 | End: 2018-05-23 | Stop reason: HOSPADM

## 2018-05-23 RX ORDER — SODIUM CHLORIDE 0.9 % (FLUSH) 0.9 %
5-10 SYRINGE (ML) INJECTION EVERY 8 HOURS
Status: DISCONTINUED | OUTPATIENT
Start: 2018-05-23 | End: 2018-05-23 | Stop reason: HOSPADM

## 2018-05-23 RX ORDER — PRASUGREL 10 MG/1
10 TABLET, FILM COATED ORAL DAILY
Status: DISCONTINUED | OUTPATIENT
Start: 2018-05-23 | End: 2018-05-23 | Stop reason: HOSPADM

## 2018-05-23 RX ADMIN — NITROGLYCERIN 0.5 INCH: 20 OINTMENT TOPICAL at 04:04

## 2018-05-23 RX ADMIN — NITROGLYCERIN 0.4 MG: 0.4 TABLET SUBLINGUAL at 02:13

## 2018-05-23 RX ADMIN — PRASUGREL HYDROCHLORIDE 10 MG: 10 TABLET, FILM COATED ORAL at 08:54

## 2018-05-23 RX ADMIN — ACETAMINOPHEN 975 MG: 325 TABLET ORAL at 02:13

## 2018-05-23 RX ADMIN — ASPIRIN 81 MG: 81 TABLET, COATED ORAL at 08:36

## 2018-05-23 RX ADMIN — Medication 1 CAPSULE: at 08:54

## 2018-05-23 NOTE — ED PROVIDER NOTES
Patient is a 61 y.o. female presenting with chest pain. Chest Pain (Angina)    Associated symptoms include headaches and numbness. Pertinent negatives include no abdominal pain, no cough, no fever, no nausea, no palpitations, no shortness of breath, no vomiting and no weakness. 61year old female with history of NSTEMI in Feb s/p stent in Feb by Dr. Saida Henriquez, presents with recurrent cp. States h/o cholesterol and strong family history. Was admitted for c diff colitis in feb. And developed SOB in the hospital and found to have nstemi. She doesn't really recall her symptoms. She has had some cp post stent, f/u with Dr. De Los Santos Dose and put on Imdur but didn't tolerate due to headache. Tonight she had 15-20 minutes of chest pressure with radiation down her left arm and some diaphoresis. No sob, nausea or lightheadedness. It resolved on its own. That was around 9pm.  Then around 12midnight she was woken up with same symptoms that has not subsided. It was an 8/10 but has come down to 6/10 without intervention. No leg pain or swelling. No cough/cold URI symptoms. No histoyr of dvt/PE. Is on Effient. She also reports severe headache on Sat with associated numbness \"burning\" in her left leg- no weakness. Symptoms did not last long. Tonight she feels some of that numbness\"burning\" in her left leg again with the chest pain and left arm numbness. No change in speech or vision. No headache currently.      Past Medical History:   Diagnosis Date    C. difficile colitis     CAD (coronary artery disease)     Colitis     Hypercholesteremia     Sepsis ([address]     Vertigo of central origin of both ears     during allergy season       Past Surgical History:   Procedure Laterality Date    CARDIAC SURG PROCEDURE UNLIST  02/27/2018    1 stent    HX TUBAL LIGATION           Family History:   Problem Relation Age of Onset    Heart Disease Mother     Heart Attack Mother     Heart Disease Father     Heart Attack Father    [facility] Diabetes Brother     No Known Problems Brother     Cancer Brother      throat cancer       Social History     Social History    Marital status:      Spouse name: N/A    Number of children: N/A    Years of education: N/A     Occupational History    Not on file. Social History Main Topics    Smoking status: Never Smoker    Smokeless tobacco: Never Used    Alcohol use Yes      Comment: special occasions    Drug use: No    Sexual activity: Not on file     Other Topics Concern    Not on file     Social History Narrative         ALLERGIES: Review of patient's allergies indicates no known allergies. Review of Systems   Constitutional: Negative for fever. HENT: Negative for congestion. Eyes: Negative for visual disturbance. Respiratory: Positive for chest tightness. Negative for cough and shortness of breath. Cardiovascular: Positive for chest pain. Negative for palpitations and leg swelling. Gastrointestinal: Negative for abdominal pain, nausea and vomiting. Endocrine: Negative for polyuria. Genitourinary: Negative for dysuria. Musculoskeletal: Negative for gait problem. Neurological: Positive for numbness and headaches. Negative for speech difficulty and weakness. Psychiatric/Behavioral: Positive for dysphoric mood. Vitals:    05/23/18 0158   BP: 153/80   Pulse: 60   Resp: 18   Temp: 98.5 °F (36.9 °C)   SpO2: 100%            Physical Exam   Constitutional: She is oriented to person, place, and time. She appears well-developed and well-nourished. No distress. HENT:   Head: Normocephalic and atraumatic. Mouth/Throat: Oropharynx is clear and moist. No oropharyngeal exudate. Eyes: Conjunctivae and EOM are normal. Pupils are equal, round, and reactive to light. Right eye exhibits no discharge. Left eye exhibits no discharge. No scleral icterus. Neck: Normal range of motion. Neck supple. No JVD present.    Cardiovascular: Normal rate, regular rhythm, normal heart sounds and intact distal pulses. Exam reveals no gallop and no friction rub. No murmur heard. Pulmonary/Chest: Effort normal and breath sounds normal. No stridor. No respiratory distress. She has no wheezes. She has no rales. She exhibits no tenderness. Abdominal: Soft. Bowel sounds are normal. She exhibits no distension and no mass. There is no tenderness. There is no rebound and no guarding. Musculoskeletal: Normal range of motion. She exhibits no edema or tenderness. Neurological: She is alert and oriented to person, place, and time. She has normal reflexes. No cranial nerve deficit. She exhibits normal muscle tone. Coordination normal.   Skin: Skin is warm and dry. No rash noted. No erythema. Psychiatric: She has a normal mood and affect. Her behavior is normal. Judgment and thought content normal.        [facility]      ED Course       Procedures         ED EKG interpretation:  Rhythm: normal sinus rhythm; and regular . Rate (approx.): 59; Axis: normal; P wave: normal; QRS interval: normal ; ST/T wave: non-specific changes; T wave inversion inferior/laterally similar to previous EKG 3/14/18. This EKG was interpreted by Adonay Rapp MD,ED Provider. Neg troponin. CXR clear. Chest pain resolved with 1 SL nitro. Given risk factors, multiple other lesions on cath in Feb, will admit for further work up/eval. Patient agreeable. ED EKG interpretation:  Rhythm: sinus bradycardia; and regular . Rate (approx.): 50; Axis: normal; P wave: normal; QRS interval: normal ; ST/T wave: non-specific changes;. This EKG was interpreted by Adonay Rapp MD,ED Provider.

## 2018-05-23 NOTE — PROGRESS NOTES
Admission Medication Reconciliation:    Information obtained from: Patient, medication list, patient's     Significant PMH/Disease States:   Past Medical History:   Diagnosis Date    C. 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 Prior to Admission Medications:   Prior to Admission Medications   Prescriptions Last Dose Informant Patient Reported? Taking? L. acidophilus/Strept/La p-toro (OSCAR-Q PO) 5/22/2018  Yes Yes   Sig: Take 1 Tab by mouth daily. aspirin delayed-release 81 mg tablet 5/22/2018 at 0800  Yes Yes   Sig: Take 81 mg by mouth daily. metoprolol tartrate (LOPRESSOR) 50 mg tablet 5/22/2018 at 2200  No Yes   Sig: Take 1 Tab by mouth three (3) times daily. prasugrel (EFFIENT) 10 mg tablet 5/22/2018 at 0800  Yes Yes   Sig: Take 10 mg by mouth daily. simvastatin (ZOCOR) 20 mg tablet 5/22/2018 at 2200  Yes Yes   Sig: Take 20 mg by mouth nightly. Facility-Administered Medications: None         Comments/Recommendations: added probiotic. Pt also notes having diarrhea with Cephalosporins.

## 2018-05-23 NOTE — ED NOTES
0350 Pt reassessed - complains chest pain has returned - midsternal 2/10 \"pinching\" pain. Dr Alexandre Cody informed. New orders received.

## 2018-05-23 NOTE — PROGRESS NOTES
Hospitalist  Pt seen and examined  Admitted earlier by    62 yo female with recent NSTEMI s/p SHASHI to Distal RCA admitted with chest pain.       Angina  - cardiology eval  - trend trop and ekg  - O2, pain management   - c/w home meds  - NPO for now    Agree with a/p of Josh Palumbo MD  Internal Medicine  Mobile/text: [phone]

## 2018-05-23 NOTE — DISCHARGE SUMMARY
Discharge Summary     Patient:  Samantha Stark       MRN: [id number]       YOB: 1955       Age: 61 y.o. Date of admission:  5/23/2018    Date of discharge:  5/23/2018    Primary care provider: Dr. Corbin Kelley    Admitting provider:  Harry Delarosa MD    Discharging provider:  Harry Delarosa MD - [phone]  If unavailable, call [phone] and ask the  to page the triage hospitalist.    Consultations  · IP CONSULT TO HOSPITALIST  · IP CONSULT TO CARDIOLOGY    Procedures  · * No surgery found *    Discharge destination: [facility]. The patient is stable for discharge. Admission diagnosis  · Acute chest pain  · Numbness and tingling in left upper extremity  · CAD (coronary artery disease)  · Chest pain    Current Discharge Medication List      CONTINUE these medications which have CHANGED    Details   metoprolol tartrate (LOPRESSOR) 50 mg tablet Take 1 Tab by mouth two (2) times a day. Qty: 270 Tab, Refills: 2         CONTINUE these medications which have NOT CHANGED    Details   prasugrel (EFFIENT) 10 mg tablet Take 10 mg by mouth daily. L. acidophilus/Strept/La p-toro (OSCAR-Q PO) Take 1 Tab by mouth daily. simvastatin (ZOCOR) 20 mg tablet Take 20 mg by mouth nightly. aspirin delayed-release 81 mg tablet Take 81 mg by mouth daily. Follow-up Information     Follow up With Details Comments Jamia Juarez MD On 6/1/2018 Appointment scheduled for: 2:40 pm [address] [place] [address]  [phone]            Final discharge diagnoses and brief hospital course  Please also refer to the admission H&P for details on the presenting problem. 62 yo female with CAD s/p stent, C. Diff hx, admitted for chest pain.     Chest pain, possibly related to Angina  - c/w Effient, Lopressor, ASA and statin  - Seen by Cardiologist  - no new interventions necessary  - F/u with  on 6/1    Bradycardia, asymptomatic  - Lopressor decreased to BID    Resume other meds    Physical examination at discharge  Visit Vitals    /57    Pulse (!) 50    Temp 97.9 °F (36.6 °C)    Resp 18    Wt 60 kg (132 lb 4.4 oz)    SpO2 98%    BMI 22.71 kg/m2     AO3  PERRLA  EOMI  Lung: CTA  CVS: RRR  ABd: soft NT ND  Ext: no edema    Pertinent imaging studies:      Recent Labs      05/23/18 0207   WBC  8.4   HGB  12.9   HCT  39.4   PLT  232     Recent Labs      05/23/18 0207   NA  144   K  3.9   CL  109*   CO2  30   BUN  13   CREA  0.93   GLU  98   CA  9.0     Recent Labs      05/23/18 0207   SGOT  17   AP  72   TP  7.6   ALB  3.9   GLOB  3.7     No results for input(s): INR, PTP, APTT in the last 72 hours. No lab exists for component: INREXT   No results for input(s): FE, TIBC, PSAT, FERR in the last 72 hours. No results for input(s): PH, PCO2, PO2 in the last 72 hours. No results for input(s): CPK, CKMB in the last 72 hours.     No lab exists for component: TROPONINI  No components found for: [place]    Chronic Diagnoses:    Problem List as of 5/23/2018  Date Reviewed: 5/23/2018          Codes Class Noted - Resolved    CAD (coronary artery disease) ICD-10-CM: I25.10  ICD-9-CM: 414.00  5/23/2018 - Present        * (Principal)Acute chest pain ICD-10-CM: R07.9  ICD-9-CM: 786.50  5/23/2018 - Present        Numbness and tingling in left upper extremity ICD-10-CM: R20.0, R20.2  ICD-9-CM: 782.0  5/23/2018 - Present        Chest pain ICD-10-CM: R07.9  ICD-9-CM: 786.50  5/23/2018 - Present        Colitis ICD-10-CM: K52.9  ICD-9-CM: 558.9  2/24/2018 - Present        Severe sepsis ([facility] [address] ICD-10-CM: A41.9, R65.20  ICD-9-CM: 038.9, 995.92  2/24/2018 - Present        Dehydration ICD-10-CM: E86.0  ICD-9-CM: 276.51  2/24/2018 - Present        Hypokalemia ICD-10-CM: E87.6  ICD-9-CM: 276.8  2/24/2018 - Present        Hypocalcemia ICD-10-CM: E83.51  ICD-9-CM: 275.41  2/24/2018 - Present              Time spent on discharge related activities today greater than 30 minutes.       Signed:  Tea Perales MD                 Hospitalist, Internal Medicine      Cc: None

## 2018-05-23 NOTE — ED NOTES
Cardiology PA at bedside Face to Face Supporting Documentation - Home Health    The encounter with this patient was in whole or in part the primary reason for home health admission.    Date of encounter:   Patient:                    MRN:                       YOB: 2017  Sebastien Horn  [id number]  1949     Home health to see patient for:  Skilled Nursing care for assessment, interventions & education and Wound Care    Skilled need for:  Surgical Aftercare s/p removal of infected AUS    Skilled nursing interventions to include:  Wound Care    Homebound status evidenced by:  Needs the assistance of another person in order to leave the home. Leaving home requires a considerable and taxing effort. There is a normal inability to leave the home.    Community Physician to provide follow up care: Caden Rodarte M.D.     Optional Interventions? Yes, Details: Will need daily dressing changes to abdominal, scrotal, and perineal wounds with iodoform packing.      I certify the face to face encounter for this home health care referral meets the CMS requirements and the encounter/clinical assessment with the patient was, in whole, or in part, for the medical condition(s) listed above, which is the primary reason for home health care. Based on my clinical findings: the service(s) are medically necessary, support the need for home health care, and the homebound criteria are met.  I certify that this patient has had a face to face encounter by myself.  Alexsander Griffith PA-C - BERNARDAI: [phone]

## 2018-05-23 NOTE — Clinical Note
Patient Class[de-identified] Observation [153] Type of Bed: Telemetry [19] Reason for Observation: chest pain Admitting Diagnosis: Chest pain [102813] Admitting Physician: Rob CASTANEDA [[id number]] Attending Physician: Rob CASTANEDA [[id number]]

## 2018-05-23 NOTE — ED NOTES
Pt on monitor x 3, side rails up x 1, call light placed within reach, bed in lowest and locked position, pt eating breakfast tray, spouse at bedside.

## 2018-05-23 NOTE — ED TRIAGE NOTES
TRIAGE: Patient arrives from home, escorted by male , for chest pain that has been on going since her discharge from [facility] in March for colitis and worsened this evening around 2100. She complains of some LEFT sided arm pain that began with the chest pain tonight. Patient states that she had a heart attack and received stent placement during her admission. She had an ECHO by Dr. Lisa Santacruz earlier this week.

## 2018-05-23 NOTE — CONSULTS
Cardiology Consult Note      Patient Name: Liana Pedersen  : 1955 MRN: [id number]  Date: 2018  Time: 10:11 AM    Admit Diagnosis: Acute chest pain  Numbness and tingling in left upper extremity  CAD (coronary artery disease)    Primary Cardiologist: Cynthia Jeffries M.D. Consulting Cardiologist: Tiffanie Ross M.D.    Reason for Consult: Acute chest pain     Requesting MD: Dr. Kelly Standing    HPI:  Liana Pedersen is a 61 y.o. female admitted on 2018  for Acute chest pain  Numbness and tingling in left upper extremity  CAD (coronary artery disease). has a past medical history of C. 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 Mrs. Gian Bland had a recent NSTEMI and is s/p SHASHI to distal RCA in February that presented to the ED last night for chest pain and numbness and tingling of her LUE and LLE. The chest pain began yesterday around 9 pm, with the first episode lasting 15-20 minutes. The second episode awoke her around 12 am. She describes the pain during these episodes as 8/10, midsternal, and \"pinching\" with a numbing sensation that radiates to the upper left arm and lower left leg. She also admits to intermittent chest tightness. She says the numbness and pinching began after her stent was placed in February, and the progression of her numbness from her arm to her leg was what prompted her to come to the ED. She was given Imdur after her stent placement, but was unable to tolerate it due to severe headache. The numbness is intermittent and alternates from her left upper to lower leg as well as her left upper arm. She is unable to describe how this pain compares to when she had the previous NSTEMI in . She says her pain is improved with walking and movement and has no provoking factors.  She is currently in cardiac rehab and does not report any SOB or chest pain with exercise. She tolerates exercises well. She denies current chest pain, SOB, nausea, diaphoresis, palpitations, or left-sided weakness. Subjective: At present, patient denies chest pain, SOB, palpitations, lightheadedness. \"Pinching\" sensation not currently present. Assessment and Plan     1. Chest pain   -Troponin I <0.04 x 2  -EKG x 2: sinus bradycardia, T-wave changes in anterolateral leads. -TTE: LV normal with normal systolic function. EF 55-60% without wall motion abnormalities and with normal wall thickness.   -Per Dr. Shandra Candelaria, patient may be discharged. 2. Bradycardia  -Will decrease Lopressor from BID to TID. 3. Hyperlipidemia  -Continue Zocor 20mg every day. 4. CAD: s/p NSTEMI with SHASHI placement on 02/18/18  -Patient will follow up with Dr. Jonnathan Brown and has an appointment on 06/01/18.  -Continue Effient 10mg every day. -Continue ASA 81mg every day. 5. Left upper and lower extremity numbness   -Management per primary team.     Agree with PA-S A/P. \"Pinching\" in chest, associated left UE numbness and sometimes left LE numbness, most likely un-cardiac related. Chest pressure 2 x last evening, no SOB, most likely un-cardiac related. She participates in cardiac rehab as well as exercise on her own, briskly and for a least an hour, without c/o CP, SOB or \"pinching\" in her chest.  Troponin 0.04 x 2 and normal EKG. Recommend follow up as outpatient for UE and LE numbness with Neurology. She is acceptable from Cardiology standpoint to discharge home. She has a follow up appointment with Dr. Rola Vitale June 1 at 2:40 pm.  Pradeep Dodson [place]    Cardiology Attending:Patient seen and examined. I agree with NP assessment and plans. Chest pain improves with exertion, OK to release.     Timmy Valdes MD 5/23/2018 4:40 PM       Review of Systems:       CARDIOVASCULAR   Chest pain/pressure ---- no   Arrhythmia/palpitations - no       RESPIRATORY   Cough ------------------ no   Shortness of breath - no     GASTROINTESTINAL   Abdominal pain - no   Heartburn -------- no           NEUROLOGICAL   Numbness/tingling - no   Sensation loss ------ no   Weakness -------------no     Previous treatment/evaluation includes Percutaneous Coronary Intervention and cardiac catheterization . Cardiac risk factors: dyslipidemia, post-menopausal, CAD. Past Medical History:   Diagnosis Date    C. 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     during allergy season     Past Surgical History:   Procedure Laterality Date    CARDIAC SURG PROCEDURE UNLIST  02/27/2018    1 stent    HX TUBAL LIGATION       Current Facility-Administered Medications   Medication Dose Route Frequency    nitroglycerin (NITROSTAT) tablet 0.4 mg  0.4 mg SubLINGual Q5MIN PRN    aspirin delayed-release tablet 81 mg  81 mg Oral DAILY    metoprolol tartrate (LOPRESSOR) tablet 50 mg  50 mg Oral TID    simvastatin (ZOCOR) tablet 20 mg  20 mg Oral QHS    sodium chloride (NS) flush 5-10 mL  5-10 mL IntraVENous Q8H    sodium chloride (NS) flush 5-10 mL  5-10 mL IntraVENous PRN    prasugrel (EFFIENT) tablet 10 mg  10 mg Oral DAILY    lactobac ac& pc-s.therm-b.anim (OSCAR Q/RISAQUAD)  1 Cap Oral DAILY     Current Outpatient Prescriptions   Medication Sig    prasugrel (EFFIENT) 10 mg tablet Take 10 mg by mouth daily.  L. acidophilus/Strept/La p-toro (OSCAR-Q PO) Take 1 Tab by mouth daily.  metoprolol tartrate (LOPRESSOR) 50 mg tablet Take 1 Tab by mouth three (3) times daily.  simvastatin (ZOCOR) 20 mg tablet Take 20 mg by mouth nightly.  aspirin delayed-release 81 mg tablet Take 81 mg by mouth daily.        Allergies   Allergen Reactions    Other Medication Other (comments)     Diarrhea from cephalosporins      Family History   Problem Relation Age of Onset    Heart Disease Mother     Heart Attack Mother     Heart Disease Father     Heart Attack Father     Diabetes Brother     No Known Problems Brother     Cancer Brother      throat cancer      Social History     Social History    Marital status:      Spouse name: N/A    Number of children: N/A    Years of education: N/A     Social History Main Topics    Smoking status: Never Smoker    Smokeless tobacco: Never Used    Alcohol use Yes      Comment: special occasions    Drug use: No    Sexual activity: Not Asked     Other Topics Concern    None     Social History Narrative       Objective:    Physical Exam    Vitals:   Vitals:    05/23/18 0757 05/23/18 0830 05/23/18 0900 05/23/18 0930   BP: 127/56 110/54 127/53 126/57   Pulse: (!) 51 (!) 53 (!) 53 (!) 50   Resp: 14 18 16 18   Temp: 97.9 °F (36.6 °C)      SpO2: 96% 97% 95% 98%   Weight:           General:    Alert, cooperative, no distress, appears stated age. Cardiovascular:  Normal S1, S2, bradycardic, no murmurs or gallops   Neck:   Supple, no JVD. Lungs:    Clear to auscultation bilaterally. Diminished breath sounds at lower bases. Extremities:   Extremities normal, atraumatic, no cyanosis or edema. Vascular:   Pulses 2+ bilaterally.    Skin:   Skin color normal. No rashes or lesions           Telemetry: normal sinus rhythm, sinus bradycardia, T wave abnormalities    ECG:   EKG Results     Procedure 720 Value Units Date/Time    EKG 12 LEAD INITIAL [[id number]] Collected:  05/23/18 0358    Order Status:  Completed Updated:  05/23/18 0627     Ventricular Rate 50 BPM      Atrial Rate 50 BPM      P-R Interval 148 ms      QRS Duration 70 ms      Q-T Interval 544 ms      QTC Calculation (Bezet) 495 ms      Calculated P Axis 33 degrees      Calculated R Axis 70 degrees      Calculated T Axis 144 degrees      Diagnosis --     Sinus bradycardia  T wave abnormality, consider anterolateral ischemia  Prolonged QT  When compared with ECG of 23-MAY-2018 01:52,  No other changes noted.  Confirmed by Stacia You M.D., Rosemary Fortune ([id number]) on 5/23/2018 6:27:29 AM      EKG 12 LEAD INITIAL [[id number]] Collected:  05/23/18 0152    Order Status:  Completed Updated:  05/23/18 0627     Ventricular Rate 59 BPM      Atrial Rate 59 BPM      P-R Interval 154 ms      QRS Duration 70 ms      Q-T Interval 464 ms      QTC Calculation (Bezet) 459 ms      Calculated P Axis 45 degrees      Calculated R Axis 67 degrees      Calculated T Axis 31 degrees      Diagnosis --     Sinus bradycardia  T wave abnormality, consider anterolateral ischemia  When compared with ECG of 02-MAR-2018 06:20,  Nonspecific T wave abnormality now evident in Inferior leads  T wave inversion now evident in Anterolateral leads  Confirmed by Stacia You M.D., Rosemary Fortune ([id number]) on 5/23/2018 6:26:57 AM          normal EKG, normal sinus rhythm, unchanged from previous tracings    Data Review:     Radiology:   XR Results (most recent):    Results from [place] encounter on 05/23/18   XR CHEST PA LAT   Narrative History: Chest pain. Frontal and lateral views of the chest show clear lungs. The heart, mediastinum  and pulmonary vasculature are normal.  The bony thorax is unremarkable. Impression IMPRESSION:  NORMAL CHEST. Recent Labs      05/23/18   0802  05/23/18   0207   TROIQ  <0.04  <0.04     Recent Labs      05/23/18   0207   NA  144   K  3.9   CL  109*   CO2  30   BUN  13   CREA  0.93   GLU  98   CA  9.0     Recent Labs      05/23/18   0207   WBC  8.4   HGB  12.9   HCT  39.4   PLT  232     Recent Labs      05/23/18   0207   SGOT  17   AP  72     No results for input(s): CHOL, LDLC in the last 72 hours. No lab exists for component: TGL, HDLC,  HBA1C  No results for input(s): CRP, TSH, TSHEXT in the last 72 hours. No lab exists for component: ESR     ALVINO Freed. Marietta Sheriff M.D.          [facility] [address] [place] [address], [address] [facility]     [phone]    CC:None

## 2018-05-23 NOTE — ED NOTES
Pt ambulatory with spouse out of ED with discharge instructions in hand; pt verbalized understanding of discharge paperwork and time allotted for questions. VSS. Pt alert and oriented x 4. Pt ambulated with steady gait without assistance.

## 2018-05-23 NOTE — ED NOTES
Patient ambulated to the restroom with steady gait to void. Dr. Jorge Shrestha declined urinalysis at this time.

## 2018-05-23 NOTE — H&P
[address] [facility]  HISTORY AND PHYSICAL      Chika Hurt  MR#: [id number]  : 1955  ACCOUNT #: [de-identified]   ADMIT DATE: 2018    CHIEF COMPLAINT:  Chest pain. HISTORY OF PRESENT ILLNESS:  A 31-year-old female with past medical history of coronary artery disease, C Diff colitis, hyperlipidemia, vertigo, ventricular bigeminy, prior sepsis, presented to the Emergency Department from home with chief complaint of chest pain. Patient noted onset of symptoms starting approximately 1 day ago. She complains of pain in her left anterior chest, described at approximately 5/10, characterized as \"pinching,\" intermittent with questionable radiation into her left arm which she describes as a burning sensation which have since resolved (without specific relieving factors). She also complained of having a burning sensation which radiates from her left hip down her entire left lower extremity to the left leg. Does not complain of any back pain, acute trauma or injuries. She denies having any current shortness of breath. Additionally, in regards to her left lower extremity, she describes a possible feeling of numbness as if \"gone to sleep. \"  Of note, the patient had been last hospitalized on 2018 and 2018 with diagnosis of sepsis, C Diff colitis, NSTEMI, shortness of breath, hypoxia and large bilateral pleural effusions with lactic acidosis, hyperbilirubinemia, atelectasis and additional notes of right adnexal cyst.  The patient had last undergone cardiac workup with cardiac catheterization 2018, at which time she had a drug-eluting stent (SHASHI) to distal RCA. A 2D echocardiogram on 2018 showed normal left ventricular systolic ejection fraction of 50% to 55% with grade I diastolic dysfunction. On arrival to the Emergency Department, initial recorded vital signs, blood pressure 153/88, heart rate is 60, respiratory rate 18, O2 saturation 100% on room air.   CT of the head without contrast showed no acute intracranial abnormalities. Chest x-ray, PA and lateral was negative for any acute cardiopulmonary process. A 12-lead EKG shows sinus bradycardia, T-wave change anterior lateral leads at 59 beats per minute. Of note, the patient's last 2D echocardiogram was on 05/14/2018, showed again normal left ventricular systolic ejection fraction of 55% to 60%. The patient is now seen for admission to the hospitalist service for continued evaluation and treatment. She has not complained of any new onset syncope, loss of consciousness, visual disturbance, neck pain, back pain, palpitations, abdominal pain, nausea, vomiting, diarrhea, melena, dysuria, hematuria, calf pain, swelling, edema, fever, chills or rash. PAST MEDICAL HISTORY:    1.  C Diff colitis. 2.  Coronary artery disease. 3.  Hyperlipidemia. 4.  Sepsis. 5.  Vertigo. 6.  Ventricular bigeminy. 7.  NSTEMI. 8.  Right adnexal cyst.  9.  Bilateral pleural effusion. PAST SURGICAL HISTORY:    1.  Status post cardiac catheterization with SHASHI, drug-eluting stent to distal RCA 02/18/2018. 2.  Tubal ligation. MEDICATIONS:  Complete medication list reviewed and noted on chart records. aspirin delayed-release 81 mg tablet  5/22/2018  --  --  Historical Provider        Take 81 mg by mouth daily.      L. acidophilus/Strept/La p-toro (OSCAR-Q PO)  5/22/2018  --  --  Historical Provider      Take 1 Tab by mouth daily.      prasugrel (EFFIENT) 10 mg tablet  5/22/2018  --  --  Palmira Huynh MD      Take 10 mg by mouth daily.      simvastatin (ZOCOR) 20 mg tablet  5/22/2018  --  --  Historical Provider      Take 20 mg by mouth nightly.             ALLERGIES:  NO KNOWN DRUG ALLERGIES. SOCIAL HISTORY:  No reports of smoking, rare alcohol, no reports of illicit drugs. FAMILY HISTORY:  Heart disease mother, father. Heart attack mother and father. Throat cancer in brother. REVIEW OF SYSTEMS:  All systems reviewed. Pertinent positives noted in HPI. All other systems reviewed and were negative. PHYSICAL EXAMINATION:  VITAL SIGNS:  Temperature 98.5 degrees Fahrenheit, blood pressure 115/53, heart rate 54, respiration 16, saturation 97% on room air. Recorded weight 132 pounds (60 kg). GENERAL:  Patient in no acute respiratory distress. PSYCHIATRIC:  Patient is awake, alert, oriented x3. NEUROLOGIC:  GCS of 15. Moves extremities x4. Sensation grossly intact without slurred speech or facial droop. HEENT:  Normocephalic, atraumatic. PERRLA is intact. Throat anicteric. Conjunctivae clear. Nares are patent. Throat is clear. Tongue is midline, nonedematous. NECK:  Supple, without lymphadenopathy, JVD, carotid bruits or thyromegaly. LYMPHATICS:  Negative for cervical or supraclavicular adenopathy. RESPIRATORY:  Lungs clear to auscultation bilaterally. CARDIOVASCULAR:  Heart bradycardic, regular rhythm. No murmurs, rubs or gallops. GASTROINTESTINAL:  Abdomen soft, nontender, nondistended, normoactive bowel sounds. No rebound, guarding, rigidity. No auscultated abdominal bruits or palpable mass. BACK:  No CVA tenderness. No step-off deformity. MUSCULOSKELETAL:  No acute palpable bony deformity. Negative for calf tenderness. VASCULAR:  2+ radial, 1+ dorsalis pedis pulses without cyanosis, clubbing, edema. SKIN:  Warm and dry. LABORATORY DATA:  Sodium 144, potassium 3.9, chloride 109, CO2 of 30, BUN of 13, creatinine 0.93, glucose 98, anion gap of 5, calcium is 9.0 GFR greater than 60. Total bili was 1.2. Total protein is 7.6, albumin 3.9, ALT 25, AST 17, alkaline phosphatase 72. Troponin I of less than 0.04. WBC 8.4, hemoglobin 12.9, hematocrit 39.4, platelets 407, neutrophils of 40%. CT of the head without contrast, no acute intracranial process. Chest x-ray, PA and lateral, no acute process. A 12-lead EKG, sinus bradycardia, T-wave change anterolateral leads at 50 beats per minute.     IMPRESSION AND PLAN:  1. Acute chest pain--with significant history of coronary artery disease and noted risk factors. Admit patient to telemetry. Consult with the patient's cardiologist.  She has already had a recent cardiac catheterization, as well as 2D echocardiogram, as such 2D echocardiogram not repeated. Caution with beta blockers, as she is already bradycardic. Place on hold parameters for the same. Patient notably had been on Effient. She had aspirin therapy. Chest pain is currently resolved, but provide supplemental oxygen as needed. Also, she noted resolution of chest pain symptoms after administration of nitroglycerin. Continue to monitor closely. Repeat serial troponin levels q.6 hours. 2.  Right upper extremity numbness. Continue workup and plan as noted above. A CT of the head was unremarkable. 3.  Left lower extremity radiculopathy and pain with intermittent numbness. She has no current focal neurological deficits. Place on neurovascular checks and fall precautions. 4.  Coronary artery disease. Plan as noted above. 5.  Hyperlipidemia. Continue statin therapy. Check lipid panel. 6.  Bradycardia, place on telemetry monitoring. Place hold parameters for beta blockers. 7. VTE prophylaxis, SCDs to lower extremities. 8.  Hyperbilirubinemia. Repeat comprehensive metabolic panel, add a direct bilirubin test.      MD PABLO Li/  D: 05/23/2018 07:52     T: 05/23/2018 09:05  JOB #: [id number]

## 2018-05-23 NOTE — DISCHARGE INSTRUCTIONS
Please bring this form with you to show your primary care provider at your follow-up appointment. Primary care provider:  Dr. Montague Austell    Discharging provider:  Kinsey Cortez MD    You have been admitted to the hospital with the following diagnoses:    - Chest pain    FOLLOW-UP CARE RECOMMENDATIONS:    APPOINTMENTS:  Follow-up Information     Follow up With Details Comments 607 Clary Juarez MD On 6/1/2018 Appointment scheduled for: 2:40 pm [place] [id number]  [address] [facility]  [phone]              FOLLOW-UP TESTS recommended:  - Decreased Lopressor to Twice daily from 3 times daily    PENDING TEST RESULTS:  At the time of your discharge the following test results are still pending: NONE  Please make sure you review these results with your outpatient follow-up provider(s). SYMPTOMS to watch for: chest pain, shortness of breath, fever, chills, nausea, vomiting, diarrhea, change in mentation, falling, weakness, bleeding. DIET/what to eat:  Cardiac Diet    ACTIVITY:  Activity as tolerated    WOUND CARE: NONE    EQUIPMENT needed:  NONE      What to do if new or unexpected symptoms occur? If you experience any of the above symptoms (or should other concerns or questions arise after discharge) please call your primary care physician. Return to the emergency room if you cannot get hold of your doctor. · It is very important that you keep your follow-up appointment(s). · Please bring discharge papers, medication list (and/or medication bottles) to your follow-up appointments for review by your outpatient provider(s). · Please check the list of medications and be sure it includes every medication (even non-prescription medications) that your provider wants you to take. · It is important that you take the medication exactly as they are prescribed.    · Keep your medication in the bottles provided by the pharmacist and keep a list of the medication names, dosages, and times to be taken in your wallet. · Do not take other medications without consulting your doctor. · If you have any questions about your medications or other instructions, please talk to your nurse or care provider before you leave the hospital.    I understand that if any problems occur once I am at home I am to contact my physician. These instructions were explained to me and I had the opportunity to ask questions.

## 2018-05-25 ENCOUNTER — HOSPITAL ENCOUNTER (OUTPATIENT)
Dept: CARDIAC REHAB | Age: 63
Discharge: HOME | End: 2018-05-25
Payer: COMMERCIAL

## 2018-05-25 ENCOUNTER — APPOINTMENT (OUTPATIENT)
Dept: CARDIAC REHAB | Age: 63
End: 2018-05-25
Payer: COMMERCIAL

## 2018-05-25 VITALS — BODY MASS INDEX: 23.07 KG/M2 | WEIGHT: 134.4 LBS

## 2018-05-25 PROCEDURE — 93798 PHYS/QHP OP CAR RHAB W/ECG: CPT

## 2018-05-30 ENCOUNTER — HOSPITAL ENCOUNTER (OUTPATIENT)
Dept: CARDIAC REHAB | Age: 63
Discharge: HOME | End: 2018-05-30
Payer: COMMERCIAL

## 2018-05-30 VITALS — WEIGHT: 136.2 LBS | BODY MASS INDEX: 23.38 KG/M2

## 2018-05-30 PROCEDURE — 93798 PHYS/QHP OP CAR RHAB W/ECG: CPT

## 2018-05-30 NOTE — CARDIO/PULMONARY
Cardiopulmonary Rehab Nursing Entry:    Benedict Duncan pt in and out of BBB on cardiac monitor, pt without evidence of prior BBB. Strips printed and faxed to Dr. Bunnie Skiff for review. MD office contacted to inform of faxes being sent over. Requested MD inform CR of pertinent advice.

## 2018-06-01 ENCOUNTER — CLINICAL SUPPORT (OUTPATIENT)
Dept: CARDIOLOGY CLINIC | Age: 63
End: 2018-06-01

## 2018-06-01 ENCOUNTER — HOSPITAL ENCOUNTER (OUTPATIENT)
Dept: CARDIAC REHAB | Age: 63
Discharge: HOME | End: 2018-06-01
Payer: COMMERCIAL

## 2018-06-01 VITALS
RESPIRATION RATE: 16 BRPM | DIASTOLIC BLOOD PRESSURE: 60 MMHG | BODY MASS INDEX: 23.22 KG/M2 | WEIGHT: 136 LBS | HEART RATE: 56 BPM | SYSTOLIC BLOOD PRESSURE: 110 MMHG | OXYGEN SATURATION: 99 % | HEIGHT: 64 IN

## 2018-06-01 VITALS — BODY MASS INDEX: 23.38 KG/M2 | WEIGHT: 136.2 LBS

## 2018-06-01 DIAGNOSIS — I25.10 CORONARY ARTERY DISEASE INVOLVING NATIVE CORONARY ARTERY OF NATIVE HEART WITHOUT ANGINA PECTORIS: Primary | ICD-10-CM

## 2018-06-01 PROCEDURE — 93798 PHYS/QHP OP CAR RHAB W/ECG: CPT

## 2018-06-01 NOTE — MR AVS SNAPSHOT
[address] [facility] [address] [facility] 57 
[phone] Patient: Samantha Stark MRN: [id number] Visit Information Date & Time Provider Department Dept. Phone Encounter #  
 6/1/2018  2:40 PM Ally Bashir MD [facility] Thad Jean [phone] [id number] Your Appointments 7/19/2018 11:00 AM  
PHYSICAL PRE OP with Hortensia Herrera MD  
[address] [facility] Appt Note: NP, est PCP 05/03/18 Aoc [address] [id number] [phone] [address] [facility] [address]  
  
    
 7/24/2018  9:30 AM  
New Patient with Jb Gamez MD  
[facility]) Appt Note: to establish  
 [address] [facility] [address] [facility] [address]  
[address] [id number] [facility] Upcoming Health Maintenance Date Due Hepatitis C Screening 1955 DTaP/Tdap/Td series (1 - Tdap) 1/13/1976 PAP AKA CERVICAL CYTOLOGY 1/13/1976 BREAST CANCER SCRN MAMMOGRAM 1/13/2005 FOBT Q 1 YEAR AGE 50-75 1/13/2005 ZOSTER VACCINE AGE 60> 11/13/2014 Influenza Age 5 to Adult 8/1/2018 Allergies as of 6/1/2018  Review Complete On: 6/1/2018 By: Dudley Bernard Severity Noted Reaction Type Reactions Other Medication  05/23/2018    Other (comments) Diarrhea from cephalosporins Current Immunizations  Reviewed on 3/5/2018 No immunizations on file. Not reviewed this visit You Were Diagnosed With   
  
 Codes Comments Coronary artery disease involving native coronary artery of native heart without angina pectoris    -  Primary ICD-10-CM: I25.10 ICD-9-CM: 414.01 Vitals  BP Pulse Resp Height(growth percentile) Weight(growth percentile) SpO2  
 110/60 (BP 1 Location: Left arm, BP Patient Position: Sitting) (!) 56 16 5' 4\" (1.626 m) 136 lb (61.7 kg) 99% BMI Smoking Status 23.34 kg/m2 Never Smoker BMI and BSA Data Body Mass Index Body Surface Area  
 23.34 kg/m 2 1.67 m 2 Preferred Pharmacy Pharmacy Name Phone [id number] [address] [phone] Your Updated Medication List  
  
   
This list is accurate as of 6/1/18  4:23 PM.  Always use your most recent med list.  
  
  
  
  
 aspirin delayed-release 81 mg tablet Take 81 mg by mouth daily. EFFIENT 10 mg tablet Generic drug:  prasugrel Take 10 mg by mouth daily. OSCAR-Q PO Take 1 Tab by mouth daily. metoprolol tartrate 50 mg tablet Commonly known as:  LOPRESSOR Take 1 Tab by mouth two (2) times a day. simvastatin 20 mg tablet Commonly known as:  ZOCOR Take 20 mg by mouth nightly. We Performed the Following HEPATIC FUNCTION PANEL [71938 CPT(R)] LIPID PANEL [17872 CPT(R)] To-Do List   
 06/04/2018 9:30 AM  
  Appointment with [facility] CARDIOPULM EXERCISE at [facility] ([phone])  
  
 06/06/2018 9:30 AM  
  Appointment with [facility] CARDIOPULM EXERCISE at [facility] ([phone])  
  
 06/08/2018 9:30 AM  
  Appointment with [facility] CARDIOPULM EXERCISE at [facility] ([phone])  
  
 06/11/2018 9:30 AM  
  Appointment with [facility] CARDIOPULM EXERCISE at [facility] ([phone])  
  
 06/13/2018 9:30 AM  
  Appointment with [facility] CARDIOPULM EXERCISE at [facility] ([phone])  
  
 06/15/2018 9:30 AM  
  Appointment with [facility] CARDIOPULM EXERCISE at [facility] ([phone])  
  
 06/18/2018 9:30 AM  
  Appointment with [facility] CARDIOPULM EXERCISE at [facility] ([phone])  
  
 06/20/2018 9:30 AM  
  Appointment with [facility] CARDIOPULM EXERCISE at [facility] ([phone])  
  
 06/22/2018 9:30 AM  
 Appointment with [facility] CARDIOPULM EXERCISE at [facility] ([phone])  
  
 06/25/2018 9:30 AM  
  Appointment with [facility] CARDIOPULM EXERCISE at [facility] ([phone])  
  
 06/27/2018 9:30 AM  
  Appointment with [facility] CARDIOPULM EXERCISE at [facility] ([phone])  
  
 06/29/2018 9:30 AM  
  Appointment with [facility] CARDIOPULM EXERCISE at [facility] ([phone]) Patient Instructions Have blood work drawn Stress Echo (Will call result) Follow up with Cynthia Prescott in 6 months Introducing [facility] SERVICES! Dear Ac Fan: Thank you for requesting a FoxyP2 account. Our records indicate that you already have an active FoxyP2 account. You can access your account anytime at [email or web address] Did you know that you can access your hospital and ER discharge instructions at any time in FoxyP2? You can also review all of your test results from your hospital stay or ER visit. Additional Information If you have questions, please visit the Frequently Asked Questions section of the [email or web address] website at [email or web address] Remember, FoxyP2 is NOT to be used for urgent needs. For medical emergencies, dial 911. Now available from your iPhone and Android! Please provide this summary of care documentation to your next provider. Your primary care clinician is listed as NONE. If you have any questions after today's visit, please call [phone].

## 2018-06-01 NOTE — PATIENT INSTRUCTIONS
Have blood work drawn    Stress Echo (Will call result)    Follow up with Emma Henriquez in 6 months

## 2018-06-01 NOTE — PROGRESS NOTES
HISTORY OF PRESENT ILLNESSpatient with h/o HLD, seen by cardiologist about 15 years ago for ventricular bigeminy recently moved to [facility], presented to [facility] with nausea vomiting and diarrhea, she had some abx for sore throat and then developed above symptoms  She was diagnosed with cdiff  While in hospital she developed cp and eventually ruled in for MI  Cardiac catheterization on 2/18:left main ok, lad minor irregs, lcx small, occluded distally with faint right to left collaterals. 30% prox rca, 50% mid pda, 85% distal rca treated with 3.25 by 18mm SHASHI to 10 david. No lv gram.  Echo on 2/18:Systolic function was normal. Ejection fraction was  estimated in the range of 50 % to 55 %. There was dyskinesis, possibly  aneurysmal formation of the apical wall(s). Doppler parameters were  consistent with abnormal left ventricular relaxation (grade 1 diastolic  dysfunction). Tricuspid valve: There was mild regurgitation. Admitted for cp on 5/18 w/u was normal cardiac wise    Echo on 5/18:Size was normal. Systolic function was normal. Ejection  fraction was estimated in the range of 55 % to 60 %. No obvious wall  motion abnormalities identified in the views obtained. Wall thickness was  normal.    HPI  Some chest twinges reported not necessarily with activities  Review of Systems   Cardiovascular: Positive for chest pain. Physical Exam  No results found for: CHOL, CHOLPOCT, CHOLX, CHLST, CHOLV, HDL, HDLPOC, LDL, LDLCPOC, LDLC, DLDLP, VLDLC, VLDL, TGLX, TRIGL, TRIGP, TGLPOCT, CHHD, CHHDX  Physical Exam   Blood pressure 110/60, pulse (!) 56, resp. rate 16, height 5' 4\" (1.626 m), weight 61.7 kg (136 lb), SpO2 99 %. Constitutional: She is oriented to person, place, and time. She appears well-developed and well-nourished. No distress. HENT: Head: Normocephalic. Eyes: No scleral icterus. Neck: Normal range of motion. Neck supple. No JVD present. No tracheal deviation present.    Cardiovascular: Normal rate, regular rhythm, normal heart sounds and intact distal pulses. Exam reveals no gallop and no friction rub. No murmur heard. Pulmonary/Chest: Effort normal and breath sounds normal. No stridor. No respiratory distress, wheezes or  rales. Abdominal: She exhibits no distension. Musculoskeletal: She exhibits no edema. Neurological: She is alert and oriented to person, place, and time. Coordination normal.   Skin: Skin is warm. No rash noted. Not diaphoretic. No erythema. Psychiatric:  Normal mood and affect. Behavior is normal.   Current Outpatient Prescriptions on File Prior to Visit   Medication Sig Dispense Refill    prasugrel (EFFIENT) 10 mg tablet Take 10 mg by mouth daily.  L. acidophilus/Strept/La p-toro (OSCAR-Q PO) Take 1 Tab by mouth daily.  metoprolol tartrate (LOPRESSOR) 50 mg tablet Take 1 Tab by mouth two (2) times a day. 270 Tab 2    simvastatin (ZOCOR) 20 mg tablet Take 20 mg by mouth nightly.  aspirin delayed-release 81 mg tablet Take 81 mg by mouth daily. No current facility-administered medications on file prior to visit. ASSESSMENT and PLAN  CAD: Status post MI in February 2018 and PCI and stent of PDA. cp reported in some ways atypical not necessarily occurring with activities and lasting only few seconds  Discussed options  Proceed with stress echo  continue asa and effient, bruising reported if SE normal consider decreasing asa to m/w/f   Continue metoprolol hr decreased may consider decreasing further to toprol xl 50 mg daily which she will need to continue given mi of 2/18       Hypertension well-controlled     Hyperlipidemia: On simvastatin. nno lipids for some time!  Proceed with lipids and lft     C. difficile: resolved     See her back in 6 months if stress echo is normal  Continue exercise

## 2018-06-04 ENCOUNTER — HOSPITAL ENCOUNTER (OUTPATIENT)
Dept: CARDIAC REHAB | Age: 63
Discharge: HOME | End: 2018-06-04
Payer: COMMERCIAL

## 2018-06-04 VITALS — WEIGHT: 136.6 LBS | BODY MASS INDEX: 23.45 KG/M2

## 2018-06-04 PROCEDURE — 93798 PHYS/QHP OP CAR RHAB W/ECG: CPT

## 2018-06-06 ENCOUNTER — HOSPITAL ENCOUNTER (OUTPATIENT)
Dept: CARDIAC REHAB | Age: 63
Discharge: HOME | End: 2018-06-06
Payer: COMMERCIAL

## 2018-06-06 VITALS — WEIGHT: 136 LBS | BODY MASS INDEX: 23.34 KG/M2

## 2018-06-06 PROCEDURE — 93798 PHYS/QHP OP CAR RHAB W/ECG: CPT

## 2018-06-07 LAB
ALBUMIN SERPL-MCNC: 4.2 G/DL (ref 3.6–4.8)
ALP SERPL-CCNC: 69 IU/L (ref 39–117)
ALT SERPL-CCNC: 18 IU/L (ref 0–32)
AST SERPL-CCNC: 20 IU/L (ref 0–40)
BILIRUB DIRECT SERPL-MCNC: 0.23 MG/DL (ref 0–0.4)
BILIRUB SERPL-MCNC: 1.4 MG/DL (ref 0–1.2)
CHOLEST SERPL-MCNC: 215 MG/DL (ref 100–199)
HDLC SERPL-MCNC: 51 MG/DL
INTERPRETATION, 910389: NORMAL
LDLC SERPL CALC-MCNC: 139 MG/DL (ref 0–99)
PROT SERPL-MCNC: 7.1 G/DL (ref 6–8.5)
TRIGL SERPL-MCNC: 125 MG/DL (ref 0–149)
VLDLC SERPL CALC-MCNC: 25 MG/DL (ref 5–40)

## 2018-06-08 ENCOUNTER — HOSPITAL ENCOUNTER (OUTPATIENT)
Dept: CARDIAC REHAB | Age: 63
Discharge: HOME | End: 2018-06-08
Payer: COMMERCIAL

## 2018-06-08 VITALS — BODY MASS INDEX: 23.48 KG/M2 | WEIGHT: 136.8 LBS

## 2018-06-08 PROCEDURE — 93798 PHYS/QHP OP CAR RHAB W/ECG: CPT

## 2018-06-11 ENCOUNTER — HOSPITAL ENCOUNTER (OUTPATIENT)
Dept: CARDIAC REHAB | Age: 63
Discharge: HOME | End: 2018-06-11
Payer: COMMERCIAL

## 2018-06-11 VITALS — WEIGHT: 138.6 LBS | BODY MASS INDEX: 23.79 KG/M2

## 2018-06-11 PROCEDURE — 93798 PHYS/QHP OP CAR RHAB W/ECG: CPT

## 2018-06-13 ENCOUNTER — HOSPITAL ENCOUNTER (OUTPATIENT)
Dept: CARDIAC REHAB | Age: 63
Discharge: HOME | End: 2018-06-13
Payer: COMMERCIAL

## 2018-06-13 VITALS — BODY MASS INDEX: 23.58 KG/M2 | WEIGHT: 137.4 LBS

## 2018-06-13 PROCEDURE — 93798 PHYS/QHP OP CAR RHAB W/ECG: CPT

## 2018-06-15 ENCOUNTER — APPOINTMENT (OUTPATIENT)
Dept: CARDIAC REHAB | Age: 63
End: 2018-06-15
Payer: COMMERCIAL

## 2018-06-18 ENCOUNTER — APPOINTMENT (OUTPATIENT)
Dept: CARDIAC REHAB | Age: 63
End: 2018-06-18
Payer: COMMERCIAL

## 2018-06-18 ENCOUNTER — TELEPHONE (OUTPATIENT)
Dept: CARDIOLOGY CLINIC | Age: 63
End: 2018-06-18

## 2018-06-18 NOTE — TELEPHONE ENCOUNTER
Verified patient with two patient identifiers. Spoke with patient and was advised per Shannan Alvarado that she needs to have a 24 hour holter also after stress Echo. [facility] sent rhythm strips that she might be on ? New BBB,patient asymptomatic. Discussed with Shannan Alvarado and he said to apply holter.

## 2018-06-20 ENCOUNTER — APPOINTMENT (OUTPATIENT)
Dept: CARDIAC REHAB | Age: 63
End: 2018-06-20
Payer: COMMERCIAL

## 2018-06-22 ENCOUNTER — APPOINTMENT (OUTPATIENT)
Dept: CARDIAC REHAB | Age: 63
End: 2018-06-22
Payer: COMMERCIAL

## 2018-06-25 ENCOUNTER — HOSPITAL ENCOUNTER (OUTPATIENT)
Dept: CARDIAC REHAB | Age: 63
Discharge: HOME | End: 2018-06-25
Payer: COMMERCIAL

## 2018-06-25 VITALS — BODY MASS INDEX: 23.69 KG/M2 | WEIGHT: 138 LBS

## 2018-06-25 PROCEDURE — 93798 PHYS/QHP OP CAR RHAB W/ECG: CPT

## 2018-06-27 ENCOUNTER — HOSPITAL ENCOUNTER (OUTPATIENT)
Dept: CARDIAC REHAB | Age: 63
Discharge: HOME | End: 2018-06-27
Payer: COMMERCIAL

## 2018-06-27 VITALS — BODY MASS INDEX: 23.48 KG/M2 | WEIGHT: 136.8 LBS

## 2018-06-27 PROCEDURE — 93798 PHYS/QHP OP CAR RHAB W/ECG: CPT

## 2018-06-27 NOTE — CARDIO/PULMONARY
Liana Render  61 y.o. With diagnosis of a NSTEMI and stent, Ms. Gian Bland attended phase II cardiac rehab for 3 sessions here at [facility] from 4/5/18 - 4/11/18. She transferred over to [facility]. Records were faxed over there.     Lazarus Baller, RN  6/27/2018

## 2018-06-29 ENCOUNTER — HOSPITAL ENCOUNTER (OUTPATIENT)
Dept: CARDIAC REHAB | Age: 63
Discharge: HOME | End: 2018-06-29
Payer: COMMERCIAL

## 2018-06-29 VITALS — WEIGHT: 135.6 LBS | BODY MASS INDEX: 23.28 KG/M2

## 2018-06-29 PROCEDURE — 93798 PHYS/QHP OP CAR RHAB W/ECG: CPT

## 2018-07-02 ENCOUNTER — HOSPITAL ENCOUNTER (OUTPATIENT)
Dept: CARDIAC REHAB | Age: 63
Discharge: HOME | End: 2018-07-02
Payer: COMMERCIAL

## 2018-07-02 VITALS — WEIGHT: 136.6 LBS | BODY MASS INDEX: 23.45 KG/M2

## 2018-07-02 PROCEDURE — 93798 PHYS/QHP OP CAR RHAB W/ECG: CPT

## 2018-07-03 ENCOUNTER — OFFICE VISIT (OUTPATIENT)
Dept: CARDIOLOGY CLINIC | Age: 63
End: 2018-07-03

## 2018-07-03 ENCOUNTER — CLINICAL SUPPORT (OUTPATIENT)
Dept: CARDIOLOGY CLINIC | Age: 63
End: 2018-07-03

## 2018-07-03 DIAGNOSIS — I25.10 CORONARY ARTERY DISEASE INVOLVING NATIVE CORONARY ARTERY OF NATIVE HEART WITHOUT ANGINA PECTORIS: ICD-10-CM

## 2018-07-03 DIAGNOSIS — I49.9 CARDIAC ARRHYTHMIA, UNSPECIFIED CARDIAC ARRHYTHMIA TYPE: Primary | ICD-10-CM

## 2018-07-03 DIAGNOSIS — R07.9 CHEST PAIN, UNSPECIFIED TYPE: ICD-10-CM

## 2018-07-06 ENCOUNTER — HOSPITAL ENCOUNTER (OUTPATIENT)
Dept: CARDIAC REHAB | Age: 63
Discharge: HOME | End: 2018-07-06
Payer: COMMERCIAL

## 2018-07-06 VITALS — BODY MASS INDEX: 23.55 KG/M2 | WEIGHT: 137.2 LBS

## 2018-07-06 PROCEDURE — 93798 PHYS/QHP OP CAR RHAB W/ECG: CPT

## 2018-07-09 ENCOUNTER — HOSPITAL ENCOUNTER (OUTPATIENT)
Dept: CARDIAC REHAB | Age: 63
Discharge: HOME | End: 2018-07-09
Payer: COMMERCIAL

## 2018-07-09 VITALS — WEIGHT: 137.2 LBS | BODY MASS INDEX: 23.55 KG/M2

## 2018-07-09 PROCEDURE — 93798 PHYS/QHP OP CAR RHAB W/ECG: CPT

## 2018-07-11 ENCOUNTER — TELEPHONE (OUTPATIENT)
Dept: CARDIOLOGY CLINIC | Age: 63
End: 2018-07-11

## 2018-07-11 ENCOUNTER — HOSPITAL ENCOUNTER (OUTPATIENT)
Dept: CARDIAC REHAB | Age: 63
Discharge: HOME | End: 2018-07-11
Payer: COMMERCIAL

## 2018-07-11 VITALS — WEIGHT: 137.2 LBS | BODY MASS INDEX: 23.55 KG/M2

## 2018-07-11 DIAGNOSIS — E78.49 OTHER HYPERLIPIDEMIA: Primary | ICD-10-CM

## 2018-07-11 PROCEDURE — 93798 PHYS/QHP OP CAR RHAB W/ECG: CPT

## 2018-07-11 RX ORDER — ROSUVASTATIN CALCIUM 20 MG/1
20 TABLET, COATED ORAL
Qty: 90 TAB | Refills: 1 | Status: SHIPPED | OUTPATIENT
Start: 2018-07-11 | End: 2018-10-04 | Stop reason: DRUGHIGH

## 2018-07-11 NOTE — TELEPHONE ENCOUNTER
Dr Jani Enriquez reviewed holter done on 6/27/18. Holter shows sinus tachy and needs f/u in office. Verified patient with two types of identifiers. Notified of results and made f/u appt with Dr Jani Enriquez. Patient verbalizes understanding. And will call with any questions or concerns.

## 2018-07-11 NOTE — TELEPHONE ENCOUNTER
Dr Glynn Dorsey review lipid panel done on 6/7/18, per Dr Glynn Dorsey recommend to stop zocor and start crestor 20mg every day. Recheck lipids in 2 months. Verified patient with two types of identifiers. Advised patient of this and will send crestor into mail order per patient request and mail lab slip to check lipids 2 months after starting crestor. Patient verbalizes understanding. And will call with any questions or concerns.

## 2018-07-13 ENCOUNTER — HOSPITAL ENCOUNTER (OUTPATIENT)
Dept: CARDIAC REHAB | Age: 63
Discharge: HOME | End: 2018-07-13
Payer: COMMERCIAL

## 2018-07-13 ENCOUNTER — TELEPHONE (OUTPATIENT)
Dept: CARDIOLOGY CLINIC | Age: 63
End: 2018-07-13

## 2018-07-13 VITALS — BODY MASS INDEX: 23.04 KG/M2 | WEIGHT: 134.2 LBS

## 2018-07-13 PROCEDURE — 93798 PHYS/QHP OP CAR RHAB W/ECG: CPT

## 2018-07-13 NOTE — TELEPHONE ENCOUNTER
Verified patient with two types of identifiers. Per Dr Paris Mata need sooner appt than 8/10/18. Patient states that they are seeing a new PCP in the area on 7/24/18 and will call back to set up a time with us same day as they do not live in the area.

## 2018-07-16 ENCOUNTER — HOSPITAL ENCOUNTER (OUTPATIENT)
Dept: CARDIAC REHAB | Age: 63
Discharge: HOME | End: 2018-07-16
Payer: COMMERCIAL

## 2018-07-16 VITALS — BODY MASS INDEX: 23.52 KG/M2 | WEIGHT: 137 LBS

## 2018-07-16 PROCEDURE — 93798 PHYS/QHP OP CAR RHAB W/ECG: CPT

## 2018-07-16 RX ORDER — METOPROLOL TARTRATE 50 MG/1
50 TABLET ORAL 2 TIMES DAILY
Qty: 60 TAB | Refills: 0 | Status: SHIPPED | OUTPATIENT
Start: 2018-07-16 | End: 2020-06-12

## 2018-07-16 NOTE — TELEPHONE ENCOUNTER
Patient has 1 1/2 days of medication left. Pharmacy verified     Would like enough to get through to scheduled appointment on 7/24/18.      Thank you, Josi Acevedo

## 2018-07-18 ENCOUNTER — HOSPITAL ENCOUNTER (OUTPATIENT)
Dept: CARDIAC REHAB | Age: 63
Discharge: HOME | End: 2018-07-18
Payer: COMMERCIAL

## 2018-07-18 VITALS — WEIGHT: 136.4 LBS | HEIGHT: 64 IN | BODY MASS INDEX: 23.29 KG/M2

## 2018-07-18 PROCEDURE — 93798 PHYS/QHP OP CAR RHAB W/ECG: CPT

## 2018-07-18 NOTE — CARDIO/PULMONARY
Darlen Osler  Completed phase II cardiac rehab and attended 36 sessions. Darlen Osler is interested in maintaining optimal health and will work with Dr. Alvaro Torrez MD. Darlen Osler has  improved her endurance and stamina through regular exercise, lost 1.5 inches from her waist.      She has also improved her nutrition, [employer] and depression scores and these were reviewed with patient. Darlen Osler plans to continue exercising at her home neighborhood gym and already goes 3 days per week now and plans to increase her days and go for 60 min. Alfred Lynn RN  7/18/2018     Her pre 6MW was 338 meters and post 6MW is 563 meters! This is a great improvement!     Progress report given with previous vitals and t shirt for completing the program.

## 2018-07-20 ENCOUNTER — APPOINTMENT (OUTPATIENT)
Dept: CARDIAC REHAB | Age: 63
End: 2018-07-20
Payer: COMMERCIAL

## 2018-07-23 ENCOUNTER — APPOINTMENT (OUTPATIENT)
Dept: CARDIAC REHAB | Age: 63
End: 2018-07-23
Payer: COMMERCIAL

## 2018-07-24 ENCOUNTER — OFFICE VISIT (OUTPATIENT)
Dept: CARDIOLOGY CLINIC | Age: 63
End: 2018-07-24

## 2018-07-24 ENCOUNTER — OFFICE VISIT (OUTPATIENT)
Dept: INTERNAL MEDICINE CLINIC | Age: 63
End: 2018-07-24

## 2018-07-24 VITALS
HEIGHT: 64 IN | WEIGHT: 137 LBS | HEART RATE: 56 BPM | OXYGEN SATURATION: 97 % | RESPIRATION RATE: 16 BRPM | BODY MASS INDEX: 23.39 KG/M2 | SYSTOLIC BLOOD PRESSURE: 122 MMHG | DIASTOLIC BLOOD PRESSURE: 76 MMHG

## 2018-07-24 VITALS
BODY MASS INDEX: 23.29 KG/M2 | SYSTOLIC BLOOD PRESSURE: 102 MMHG | WEIGHT: 136.4 LBS | OXYGEN SATURATION: 98 % | HEART RATE: 51 BPM | RESPIRATION RATE: 17 BRPM | TEMPERATURE: 98 F | HEIGHT: 64 IN | DIASTOLIC BLOOD PRESSURE: 80 MMHG

## 2018-07-24 DIAGNOSIS — Z76.89 ENCOUNTER TO ESTABLISH CARE: ICD-10-CM

## 2018-07-24 DIAGNOSIS — E78.49 OTHER HYPERLIPIDEMIA: ICD-10-CM

## 2018-07-24 DIAGNOSIS — I25.10 CORONARY ARTERY DISEASE INVOLVING NATIVE CORONARY ARTERY OF NATIVE HEART WITHOUT ANGINA PECTORIS: ICD-10-CM

## 2018-07-24 DIAGNOSIS — Z11.59 ENCOUNTER FOR HEPATITIS C SCREENING TEST FOR LOW RISK PATIENT: ICD-10-CM

## 2018-07-24 DIAGNOSIS — Z13.1 DIABETES MELLITUS SCREENING: ICD-10-CM

## 2018-07-24 DIAGNOSIS — I25.10 CORONARY ARTERY DISEASE INVOLVING NATIVE CORONARY ARTERY OF NATIVE HEART WITHOUT ANGINA PECTORIS: Primary | ICD-10-CM

## 2018-07-24 DIAGNOSIS — Z12.11 SCREENING FOR COLON CANCER: Primary | ICD-10-CM

## 2018-07-24 DIAGNOSIS — Z01.818 PREOP TESTING: ICD-10-CM

## 2018-07-24 DIAGNOSIS — Z12.11 COLON CANCER SCREENING: ICD-10-CM

## 2018-07-24 DIAGNOSIS — Z12.4 CERVICAL CANCER SCREENING: ICD-10-CM

## 2018-07-24 DIAGNOSIS — R07.9 CHEST PAIN, UNSPECIFIED TYPE: ICD-10-CM

## 2018-07-24 NOTE — MR AVS SNAPSHOT
[address] [facility] [address] [facility] 
[phone] Patient: Joy Birmingham MRN: [id number] TSM:[birth year] Visit Information Date & Time Provider Department Dept. Phone Encounter #  
 7/24/2018  8:20 AM Anni Beach MD [facility] Dee Reyes [phone] [id number] Your Appointments 7/24/2018  8:20 AM  
ESTABLISHED PATIENT with Anni Beach MD  
[facility] Appt Note: discuss holter results [address] [facility] 7 [address]  
[address] [address]  
  
    
 7/24/2018  9:30 AM  
New Patient with Laly Bruner MD  
[facility] Internal [facility]) Appt Note: to establish  
 [address] [facility] [address]  
[address] [id number] [address] [address] [facility]  
  
    
 8/10/2018  9:20 AM  
ESTABLISHED PATIENT with Anni Beach MD  
[facility] Appt Note: f/u from holter monitor [facility] Dr Vaz Hint [address] [facility]  
[phone]  
  
    
 12/5/2018  8:40 AM  
ESTABLISHED PATIENT with Anni Beach MD  
[facility]) Appt Note: 6 Month Follow up  
 [address] [facility] [address] [facility]  
[phone] Upcoming Health Maintenance Date Due Hepatitis C Screening 1955 DTaP/Tdap/Td series (1 - Tdap) 1/13/1976 PAP AKA CERVICAL CYTOLOGY 1/13/1976 BREAST CANCER SCRN MAMMOGRAM 1/13/2005 FOBT Q 1 YEAR AGE 50-75 1/13/2005 ZOSTER VACCINE AGE 60> 11/13/2014 Influenza Age 5 to Adult 8/1/2018 Allergies as of 7/24/2018  Review Complete On: 6/1/2018 By: Anni Beach MD  
  
 Severity Noted Reaction Type Reactions Other Medication  05/23/2018    Other (comments) Diarrhea from cephalosporins Current Immunizations  Reviewed on 3/5/2018 No immunizations on file. Not reviewed this visit Vitals Smoking Status Never Smoker Your Updated Medication List  
  
   
This list is accurate as of 7/16/18  8:39 AM.  Always use your most recent med list.  
  
  
  
  
 aspirin delayed-release 81 mg tablet Take 81 mg by mouth daily. EFFIENT 10 mg tablet Generic drug:  prasugrel Take 10 mg by mouth daily. OSCAR-Q PO Take 1 Tab by mouth daily. metoprolol tartrate 50 mg tablet Commonly known as:  LOPRESSOR Take 1 Tab by mouth two (2) times a day. rosuvastatin 20 mg tablet Commonly known as:  CRESTOR Take 1 Tab by mouth nightly. To-Do List   
 07/16/2018 9:30 AM  
  Appointment with STEF[facility] CARDIOPULM EXERCISE at [facility] ([phone])  
  
 07/18/2018 9:30 AM  
  Appointment with STEFANI CARDIOPULM DISCHARGE at [facility] ([phone]) [facility] SERVICES! Dear Travon Uriostegui: Thank you for requesting a [employer] account. Our records indicate that you already have an active [employer] account. You can access your account anytime at [email or web address] Did you know that you can access your hospital and ER discharge instructions at any time in [employer] Software? You can also review all of your test results from your hospital stay or ER visit. Additional Information If you have questions, please visit the Frequently Asked Questions section of the [employer] website at [email or web address] Remember, Magnetic Software is NOT to be used for urgent needs. For medical emergencies, dial 911. Now available from your iPhone and Android! Please provide this summary of care documentation to your next provider. Your primary care clinician is listed as NONE. If you have any questions after today's visit, please call [phone].

## 2018-07-24 NOTE — PROGRESS NOTES
HISTORY OF PRESENT ILLNESS  Bladimir Neville is a 61 y.o. female. patient with h/o HLD, seen by cardiologist about 15 years ago for ventricular bigeminy recently moved to [facility][1], presented to [facility] with nausea vomiting and diarrhea, she had some abx for sore throat and then developed above symptoms  She was diagnosed with cdiff  While in hospital she developed cp and eventually ruled in for MI  Cardiac catheterization on 2/18:left main ok, lad minor irregs, lcx small, occluded distally with faint right to left collaterals. 30% prox rca, 50% mid pda, 85% distal rca treated with 3.25 by 18mm michelle to 10 david. No lv gram.  Echo on 2/18:Systolic function was normal. Ejection fraction was  estimated in the range of 50 % to 55 %. There was dyskinesis, possibly  aneurysmal formation of the apical wall(s). Doppler parameters were  consistent with abnormal left ventricular relaxation (grade 1 diastolic  dysfunction). Tricuspid valve: There was , regurgitation. holter on 7/18:NSR  rare episode of bundle branch block, 52 pacs , 1 episode of non sustained AT at111, 33 pvcs and 1 couplet  Stress echo on 7/18:Impressions: Abnormal study after maximal exercise without reproduction of  symptoms. Findings suggest single vessel coronary artery disease in the  territory of the right coronary artery. Past Medical History:   Diagnosis Date    C. difficile colitis     CAD (coronary artery disease)     Colitis     Hypercholesteremia     Sepsis ([address]     Vertigo of central origin of both ears     during allergy season     Past Surgical History:   Procedure Laterality Date    CARDIAC SURG PROCEDURE UNLIST  02/27/2018    1 stent    HX TUBAL LIGATION       Social History   Substance Use Topics    Smoking status: Never Smoker    Smokeless tobacco: Never Used    Alcohol use Yes      Comment: special occasions       HPI  Patient with persistent chest twinges lasting 15 seconds.   This are not seemingly associated with activities. She also has occasional fluttering. She does have also more episodically episodes of chest pressure and tightness lasting approximately 30 seconds. Not necessarily associated with activities. Review of Systems   Respiratory: Negative. Cardiovascular: Positive for chest pain. Physical Exam  Visit Vitals    /76 (BP 1 Location: Left arm)    Pulse (!) 56    Resp 16    Ht 5' 4\" (1.626 m)    Wt 62.1 kg (137 lb)    SpO2 97%    BMI 23.52 kg/m2       ASSESSMENT and PLAN  CAD: Status post MI in February 2018 and PCI and stent of PDA. Difficult to pinpoint etiology of her symptoms. I have discussed with her and her  the results of the stress echocardiogram.  This seems to suggest the presence of ischemia at the level of the inferior wall. I am not sure if this is any relation only to the known occlusion of the circumflex artery over there is additional issue with the right coronary artery at this time. Unfortunately she seems to be relatively symptomatic at this time even if atypical.  Discussed with patient and her  potential options. In my opinion we should consider proceeding again with cardiac catheterization. Alternatively options discussed. Ranexa and Cardizem on hold for now. The procedure was discussed at length with the patient, including risks/benefits, potential findings and treatment options. Risks including but not limited to CVA, DEATH,MI,ARF, CRF requiring chronic dialysis,emergencgy CABG or pacemaker, bleeding need for transfusions, vascular injuries requiring intervention. Alternative options were offered. .She agrees to proceed.       Hypertension well-controlled     Hyperlipidemia: On simvastatin. Closely followed by her primary care physician she tells me.     C. difficile: resolved    Fluttering: Discussed also results of Holter monitor.   The burden of PVCs is not significant at this time and she tells me she did have a history of bigeminy in since 2007. Notwithstanding this we will of course continue with metoprolol given the relatively recent myocardial infarction.   No additional interventions at this time.     See her back at the time of procedure

## 2018-07-24 NOTE — MR AVS SNAPSHOT
[address] [facility] [address] [place] [address] 
[phone] Patient: Kina Adkins MRN: [id number] [facility]5/[facility][birth year][facility] Visit Information Date & Time Provider Department Dept. Phone Encounter #  
 7/24/2018  9:30 AM Ligia Owens MD [facility] Internal Medicine [phone] [id number] Follow-up Instructions Return in about 6 months (around 1/24/2019). Your Appointments 8/10/2018  9:20 AM  
ESTABLISHED PATIENT with Orlando Huitron MD  
[facility] ([facility]) Appt Note: f/u from holter monitor per Dr Carle Bloch [address] [place] [address]  
[address] [facility]  
  
    
 12/5/2018  8:40 AM  
ESTABLISHED PATIENT with Orlando Huitron MD  
[facility] ([facility]) Appt Note: 6 Month Follow up  
 [address] [facility] [address] [place] [address]  
[phone] Upcoming Health Maintenance Date Due Hepatitis C Screening 1955 DTaP/Tdap/Td series (1 - Tdap) 1/13/1976 PAP AKA CERVICAL CYTOLOGY 1/13/1976 BREAST CANCER SCRN MAMMOGRAM 1/13/2005 FOBT Q 1 YEAR AGE 50-75 1/13/2005 ZOSTER VACCINE AGE 60> 11/13/2014 Influenza Age 5 to Adult 8/1/2018 Allergies as of 7/24/2018  Review Complete On: 7/24/2018 By: Ligia Owens MD  
  
 Severity Noted Reaction Type Reactions Other Medication  05/23/2018    Other (comments) Diarrhea from cephalosporins Current Immunizations  Reviewed on 3/5/2018 No immunizations on file. Not reviewed this visit You Were Diagnosed With   
  
 Codes Comments Screening for colon cancer    -  Primary ICD-10-CM: Z12.11 ICD-9-CM: V76.51 Encounter to establish care     ICD-10-CM: Z76.89 
ICD-9-CM: V65.8  Coronary artery disease involving native coronary artery of native heart without angina pectoris     ICD-10-CM: I25.10 ICD-9-CM: 414.01 Cervical cancer screening     ICD-10-CM: Z12.4 ICD-9-CM: V76.2 Colon cancer screening     ICD-10-CM: Z12.11 ICD-9-CM: V76.51 Encounter for hepatitis C screening test for low risk patient     ICD-10-CM: Z11.59 
ICD-9-CM: V73.89 Diabetes mellitus screening     ICD-10-CM: Z13.1 ICD-9-CM: V77.1 Vitals BP Pulse Temp Resp Height(growth percentile) Weight(growth percentile) 102/80 (BP 1 Location: Right arm, BP Patient Position: Sitting) (!) 51 98 °F (36.7 °C) (Oral) 17 5' 4.17\" (1.63 m) 136 lb 6.4 oz (61.9 kg) SpO2 BMI OB Status Smoking Status 98% 23.29 kg/m2 Menopause Never Smoker BMI and BSA Data Body Mass Index Body Surface Area  
 23.29 kg/m 2 1.67 m 2 Preferred Pharmacy Pharmacy Name Phone [id number] [address] [facility] [phone] Your Updated Medication List  
  
   
This list is accurate as of 7/24/18 11:06 AM.  Always use your most recent med list.  
  
  
  
  
 aspirin delayed-release 81 mg tablet Take 81 mg by mouth daily. EFFIENT 10 mg tablet Generic drug:  prasugrel Take 10 mg by mouth daily. OSCAR-Q PO Take 1 Tab by mouth daily. metoprolol tartrate 50 mg tablet Commonly known as:  LOPRESSOR Take 1 Tab by mouth two (2) times a day. rosuvastatin 20 mg tablet Commonly known as:  CRESTOR Take 1 Tab by mouth nightly. We Performed the Following HEMOGLOBIN A1C WITH EAG [60066 CPT(R)] HEPATITIS C AB [13675 CPT(R)] METABOLIC PANEL, COMPREHENSIVE [77795 CPT(R)] REFERRAL TO GASTROENTEROLOGY [NIA55 Custom] REFERRAL TO GYNECOLOGY [REF30 Custom] TSH 3RD GENERATION [98700 CPT(R)] Follow-up Instructions Return in about 6 months (around 1/24/2019). Referral Information Referral ID Referred By Referred To [address] [facility], MD [address] [facility], [address] Phone: [phone] Fax: [phone] Visits Status Start Date End Date 1 Authorized 7/24/18 7/24/19 If your referral has a status of pending review or denied, additional information will be sent to support the outcome of this decision. Referral ID Referred By Referred To  
 [id number] Jorge Lpresley [facility] [address] [facility] 030 66 62 83 [facility], [address] Visits Status Start Date End Date 1 Authorized 7/24/18 7/24/19 If your referral has a status of pending review or denied, additional information will be sent to support the outcome of this decision. Introducing [facility] & HEALTH SERVICES! Dear Heber Juarez: Thank you for requesting a [employer] account. Our records indicate that you already have an active [employer] account. You can access your account anytime at [email or web address] Did you know that you can access your hospital and ER discharge instructions at any time in [employer]? You can also review all of your test results from your hospital stay or ER visit. Additional Information If you have questions, please visit the Frequently Asked Questions section of the [employer] website at [email or web address]/. Remember, [employer] is NOT to be used for urgent needs. For medical emergencies, dial 911. Now available from your iPhone and Android! Please provide this summary of care documentation to your next provider. Your primary care clinician is listed as Krissy Maldonado. If you have any questions after today's visit, please call [phone].

## 2018-07-24 NOTE — PROGRESS NOTES
New Patient Evaluation    Eugene Acuna is a 61 y.o. female. They are here to establish care with the group and me as a primary care provider. She was in the hospital with C.diff. She subsequently had acute dehydration, inflammatory colitis. She had an MI--STEMI. Stented via cath. She has improved since that time. She still experiences some moderate chest pain at times. She had a Holter, stress echo done and is following up with cardiology today regarding this. She needs a colonoscopy. Polyps last time (2-3 yrs ago). Due for this. She had some ovarian cysts notes on CT scan in hospital.  Needs Gyn input. No shingles vaccine, Tdap was done 2012    Need Hep C    Patient Active Problem List    Diagnosis Date Noted    CAD (coronary artery disease) 05/23/2018    Acute chest pain 05/23/2018    Numbness and tingling in left upper extremity 05/23/2018    Chest pain 05/23/2018    Colitis 02/24/2018    Severe sepsis ([address] 02/24/2018    Dehydration 02/24/2018    Hypokalemia 02/24/2018    Hypocalcemia 02/24/2018     Current Outpatient Prescriptions   Medication Sig Dispense Refill    metoprolol tartrate (LOPRESSOR) 50 mg tablet Take 1 Tab by mouth two (2) times a day. 60 Tab 0    rosuvastatin (CRESTOR) 20 mg tablet Take 1 Tab by mouth nightly. 90 Tab 1    prasugrel (EFFIENT) 10 mg tablet Take 10 mg by mouth daily.  L. acidophilus/Strept/La p-toro (OSCAR-Q PO) Take 1 Tab by mouth daily.  aspirin delayed-release 81 mg tablet Take 81 mg by mouth daily.        Allergies   Allergen Reactions    Other Medication Other (comments)     Diarrhea from cephalosporins     Past Medical History:   Diagnosis Date    C. difficile colitis     CAD (coronary artery disease)     Colitis     Hypercholesteremia     Sepsis ([address]     Vertigo of central origin of both ears     during allergy season     Past Surgical History:   Procedure Laterality Date    CARDIAC SURG PROCEDURE UNLIST  02/27/2018 1 stent    HX TUBAL LIGATION      HX WRIST FRACTURE TX Left 2016    ganglian cyst removal      Family History   Problem Relation Age of Onset    Heart Disease Mother     Heart Attack Mother     Heart Disease Father     Heart Attack Father     Diabetes Brother     No Known Problems Brother     Cancer Brother      throat cancer     Social History   Substance Use Topics    Smoking status: Never Smoker    Smokeless tobacco: Never Used    Alcohol use Yes      Comment: special occasions        Health Maintenance   Topic Date Due    Hepatitis C Screening  1955    DTaP/Tdap/Td series (1 - Tdap) 01/13/1976    PAP AKA CERVICAL CYTOLOGY  01/13/1976    BREAST CANCER SCRN MAMMOGRAM  01/13/2005    FOBT Q 1 YEAR AGE 50-75  01/13/2005    ZOSTER VACCINE AGE 60>  11/13/2014    Influenza Age 9 to Adult  08/01/2018       Review of Systems   Constitutional: Negative. Respiratory: Negative. Cardiovascular: Positive for chest pain (mild discomfort). Gastrointestinal: Negative. Visit Vitals    /80 (BP 1 Location: Right arm, BP Patient Position: Sitting)    Pulse (!) 51    Temp 98 °F (36.7 °C) (Oral)    Resp 17    Ht 5' 4.17\" (1.63 m)    Wt 136 lb 6.4 oz (61.9 kg)    SpO2 98%    BMI 23.29 kg/m2       Physical Exam   Constitutional: No distress. Cardiovascular: Normal rate and regular rhythm. No murmur heard. Pulmonary/Chest: Effort normal and breath sounds normal.           ASSESSMENT/PLAN    Diagnoses and all orders for this visit:    1. Screening for colon cancer    2. Encounter to establish care    3. Coronary artery disease involving native coronary artery of native heart without angina pectoris  -     TSH 3RD GENERATION    4. Cervical cancer screening  -     REFERRAL TO GYNECOLOGY    5. Colon cancer screening  -     REFERRAL TO GASTROENTEROLOGY    6. Encounter for hepatitis C screening test for low risk patient  -     HEPATITIS C AB    7.  Diabetes mellitus screening  - METABOLIC PANEL, COMPREHENSIVE  -     HEMOGLOBIN A1C WITH EAG    Other orders  -     METABOLIC PANEL, COMPREHENSIVE  -     HEMOGLOBIN A1C WITH EAG  -     TSH 3RD GENERATION  -     HEPATITIS C AB      Follow-up Disposition:  Return in about 6 months (around 1/24/2019). -Discussed with the patient to continue the current plan of care. We will obtain baseline labwork and determine if any adjustments need to be done. We will also await the records of the previous PCP to ascertain further details of the patient's history. The patient agrees with and understands the plan of care. All questions have been answered.

## 2018-07-25 ENCOUNTER — APPOINTMENT (OUTPATIENT)
Dept: CARDIAC REHAB | Age: 63
End: 2018-07-25
Payer: COMMERCIAL

## 2018-07-27 ENCOUNTER — APPOINTMENT (OUTPATIENT)
Dept: CARDIAC REHAB | Age: 63
End: 2018-07-27
Payer: COMMERCIAL

## 2018-07-27 LAB
ALBUMIN SERPL-MCNC: 4.3 G/DL (ref 3.6–4.8)
ALBUMIN/GLOB SERPL: 1.5 {RATIO} (ref 1.2–2.2)
ALP SERPL-CCNC: 69 IU/L (ref 39–117)
ALT SERPL-CCNC: 17 IU/L (ref 0–32)
AST SERPL-CCNC: 21 IU/L (ref 0–40)
BASOPHILS # BLD AUTO: 0 X10E3/UL (ref 0–0.2)
BASOPHILS NFR BLD AUTO: 1 %
BILIRUB SERPL-MCNC: 1.3 MG/DL (ref 0–1.2)
BUN SERPL-MCNC: 11 MG/DL (ref 8–27)
BUN/CREAT SERPL: 13 (ref 12–28)
CALCIUM SERPL-MCNC: 9.3 MG/DL (ref 8.7–10.3)
CHLORIDE SERPL-SCNC: 106 MMOL/L (ref 96–106)
CO2 SERPL-SCNC: 25 MMOL/L (ref 20–29)
CREAT SERPL-MCNC: 0.86 MG/DL (ref 0.57–1)
EOSINOPHIL # BLD AUTO: 0.2 X10E3/UL (ref 0–0.4)
EOSINOPHIL NFR BLD AUTO: 3 %
ERYTHROCYTE [DISTWIDTH] IN BLOOD BY AUTOMATED COUNT: 12.6 % (ref 12.3–15.4)
EST. AVERAGE GLUCOSE BLD GHB EST-MCNC: 91 MG/DL
GLOBULIN SER CALC-MCNC: 2.9 G/DL (ref 1.5–4.5)
GLUCOSE SERPL-MCNC: 98 MG/DL (ref 65–99)
HBA1C MFR BLD: 4.8 % (ref 4.8–5.6)
HCT VFR BLD AUTO: 37.5 % (ref 34–46.6)
HCV AB S/CO SERPL IA: <0.1 S/CO RATIO (ref 0–0.9)
HGB BLD-MCNC: 12.4 G/DL (ref 11.1–15.9)
IMM GRANULOCYTES # BLD: 0 X10E3/UL (ref 0–0.1)
IMM GRANULOCYTES NFR BLD: 0 %
LYMPHOCYTES # BLD AUTO: 2.1 X10E3/UL (ref 0.7–3.1)
LYMPHOCYTES NFR BLD AUTO: 32 %
MAGNESIUM SERPL-MCNC: 2 MG/DL (ref 1.6–2.3)
MCH RBC QN AUTO: 31.2 PG (ref 26.6–33)
MCHC RBC AUTO-ENTMCNC: 33.1 G/DL (ref 31.5–35.7)
MCV RBC AUTO: 94 FL (ref 79–97)
MONOCYTES # BLD AUTO: 0.6 X10E3/UL (ref 0.1–0.9)
MONOCYTES NFR BLD AUTO: 9 %
NEUTROPHILS # BLD AUTO: 3.8 X10E3/UL (ref 1.4–7)
NEUTROPHILS NFR BLD AUTO: 55 %
PLATELET # BLD AUTO: 262 X10E3/UL (ref 150–379)
POTASSIUM SERPL-SCNC: 4.6 MMOL/L (ref 3.5–5.2)
PROT SERPL-MCNC: 7.2 G/DL (ref 6–8.5)
RBC # BLD AUTO: 3.98 X10E6/UL (ref 3.77–5.28)
SODIUM SERPL-SCNC: 146 MMOL/L (ref 134–144)
TSH SERPL DL<=0.005 MIU/L-ACNC: 1.41 UIU/ML (ref 0.45–4.5)
WBC # BLD AUTO: 6.8 X10E3/UL (ref 3.4–10.8)

## 2018-07-27 RX ORDER — SODIUM CHLORIDE 9 MG/ML
75 INJECTION, SOLUTION INTRAVENOUS CONTINUOUS
Status: CANCELLED | OUTPATIENT
Start: 2018-08-09

## 2018-07-27 RX ORDER — DIPHENHYDRAMINE HYDROCHLORIDE 50 MG/ML
25 INJECTION, SOLUTION INTRAMUSCULAR; INTRAVENOUS ONCE
Status: CANCELLED | OUTPATIENT
Start: 2018-08-09 | End: 2018-08-09

## 2018-07-30 ENCOUNTER — APPOINTMENT (OUTPATIENT)
Dept: CARDIAC REHAB | Age: 63
End: 2018-07-30
Payer: COMMERCIAL

## 2018-08-01 ENCOUNTER — APPOINTMENT (OUTPATIENT)
Dept: CARDIAC REHAB | Age: 63
End: 2018-08-01

## 2018-08-06 ENCOUNTER — HOSPITAL ENCOUNTER (INPATIENT)
Age: 63
LOS: 1 days | Discharge: HOME | DRG: 287 | End: 2018-08-07
Attending: EMERGENCY MEDICINE | Admitting: SPECIALIST
Payer: COMMERCIAL

## 2018-08-06 ENCOUNTER — TELEPHONE (OUTPATIENT)
Dept: CARDIOLOGY CLINIC | Age: 63
End: 2018-08-06

## 2018-08-06 ENCOUNTER — APPOINTMENT (OUTPATIENT)
Dept: GENERAL RADIOLOGY | Age: 63
DRG: 287 | End: 2018-08-06
Attending: NURSE PRACTITIONER
Payer: COMMERCIAL

## 2018-08-06 LAB
ALBUMIN SERPL-MCNC: 3.6 G/DL (ref 3.5–5)
ALBUMIN/GLOB SERPL: 1 {RATIO} (ref 1.1–2.2)
ALP SERPL-CCNC: 65 U/L (ref 45–117)
ALT SERPL-CCNC: 32 U/L (ref 12–78)
ANION GAP SERPL CALC-SCNC: 11 MMOL/L (ref 5–15)
AST SERPL-CCNC: 25 U/L (ref 15–37)
ATRIAL RATE: 51 BPM
BASOPHILS # BLD: 0.1 K/UL (ref 0–0.1)
BASOPHILS NFR BLD: 1 % (ref 0–1)
BILIRUB SERPL-MCNC: 1.5 MG/DL (ref 0.2–1)
BUN SERPL-MCNC: 9 MG/DL (ref 6–20)
BUN/CREAT SERPL: 10 (ref 12–20)
CALCIUM SERPL-MCNC: 8.8 MG/DL (ref 8.5–10.1)
CALCULATED P AXIS, ECG09: 70 DEGREES
CALCULATED R AXIS, ECG10: 69 DEGREES
CALCULATED T AXIS, ECG11: 69 DEGREES
CHLORIDE SERPL-SCNC: 109 MMOL/L (ref 97–108)
CO2 SERPL-SCNC: 24 MMOL/L (ref 21–32)
CREAT SERPL-MCNC: 0.86 MG/DL (ref 0.55–1.02)
DIAGNOSIS, 93000: NORMAL
DIFFERENTIAL METHOD BLD: ABNORMAL
EOSINOPHIL # BLD: 0.2 K/UL (ref 0–0.4)
EOSINOPHIL NFR BLD: 2 % (ref 0–7)
ERYTHROCYTE [DISTWIDTH] IN BLOOD BY AUTOMATED COUNT: 11.4 % (ref 11.5–14.5)
GLOBULIN SER CALC-MCNC: 3.6 G/DL (ref 2–4)
GLUCOSE SERPL-MCNC: 80 MG/DL (ref 65–100)
HCT VFR BLD AUTO: 36 % (ref 35–47)
HGB BLD-MCNC: 11.8 G/DL (ref 11.5–16)
IMM GRANULOCYTES # BLD: 0 K/UL (ref 0–0.04)
IMM GRANULOCYTES NFR BLD AUTO: 0 % (ref 0–0.5)
LYMPHOCYTES # BLD: 2.4 K/UL (ref 0.8–3.5)
LYMPHOCYTES NFR BLD: 31 % (ref 12–49)
MCH RBC QN AUTO: 32.1 PG (ref 26–34)
MCHC RBC AUTO-ENTMCNC: 32.8 G/DL (ref 30–36.5)
MCV RBC AUTO: 97.8 FL (ref 80–99)
MONOCYTES # BLD: 0.6 K/UL (ref 0–1)
MONOCYTES NFR BLD: 8 % (ref 5–13)
NEUTS SEG # BLD: 4.6 K/UL (ref 1.8–8)
NEUTS SEG NFR BLD: 58 % (ref 32–75)
NRBC # BLD: 0 K/UL (ref 0–0.01)
NRBC BLD-RTO: 0 PER 100 WBC
P-R INTERVAL, ECG05: 152 MS
PLATELET # BLD AUTO: 216 K/UL (ref 150–400)
PMV BLD AUTO: 10.9 FL (ref 8.9–12.9)
POTASSIUM SERPL-SCNC: 3.7 MMOL/L (ref 3.5–5.1)
PROT SERPL-MCNC: 7.2 G/DL (ref 6.4–8.2)
Q-T INTERVAL, ECG07: 476 MS
QRS DURATION, ECG06: 70 MS
QTC CALCULATION (BEZET), ECG08: 438 MS
RBC # BLD AUTO: 3.68 M/UL (ref 3.8–5.2)
SODIUM SERPL-SCNC: 144 MMOL/L (ref 136–145)
TROPONIN I SERPL-MCNC: <0.05 NG/ML
VENTRICULAR RATE, ECG03: 51 BPM
WBC # BLD AUTO: 7.9 K/UL (ref 3.6–11)

## 2018-08-06 PROCEDURE — 80053 COMPREHEN METABOLIC PANEL: CPT | Performed by: NURSE PRACTITIONER

## 2018-08-06 PROCEDURE — 84484 ASSAY OF TROPONIN QUANT: CPT | Performed by: NURSE PRACTITIONER

## 2018-08-06 PROCEDURE — 93005 ELECTROCARDIOGRAM TRACING: CPT

## 2018-08-06 PROCEDURE — 74011250636 HC RX REV CODE- 250/636: Performed by: NURSE PRACTITIONER

## 2018-08-06 PROCEDURE — 85025 COMPLETE CBC W/AUTO DIFF WBC: CPT | Performed by: NURSE PRACTITIONER

## 2018-08-06 PROCEDURE — 74011250636 HC RX REV CODE- 250/636: Performed by: INTERNAL MEDICINE

## 2018-08-06 PROCEDURE — 36415 COLL VENOUS BLD VENIPUNCTURE: CPT | Performed by: NURSE PRACTITIONER

## 2018-08-06 PROCEDURE — 74011250637 HC RX REV CODE- 250/637: Performed by: NURSE PRACTITIONER

## 2018-08-06 PROCEDURE — 65660000000 HC RM CCU STEPDOWN

## 2018-08-06 PROCEDURE — 71046 X-RAY EXAM CHEST 2 VIEWS: CPT

## 2018-08-06 PROCEDURE — 99282 EMERGENCY DEPT VISIT SF MDM: CPT

## 2018-08-06 RX ORDER — PRASUGREL 10 MG/1
10 TABLET, FILM COATED ORAL DAILY
Status: DISCONTINUED | OUTPATIENT
Start: 2018-08-07 | End: 2018-08-07 | Stop reason: HOSPADM

## 2018-08-06 RX ORDER — ROSUVASTATIN CALCIUM 10 MG/1
20 TABLET, COATED ORAL
Status: DISCONTINUED | OUTPATIENT
Start: 2018-08-06 | End: 2018-08-07 | Stop reason: HOSPADM

## 2018-08-06 RX ORDER — ACETAMINOPHEN 325 MG/1
650 TABLET ORAL
Status: DISCONTINUED | OUTPATIENT
Start: 2018-08-06 | End: 2018-08-07 | Stop reason: HOSPADM

## 2018-08-06 RX ORDER — SODIUM CHLORIDE 9 MG/ML
75 INJECTION, SOLUTION INTRAVENOUS CONTINUOUS
Status: DISCONTINUED | OUTPATIENT
Start: 2018-08-09 | End: 2018-08-07 | Stop reason: HOSPADM

## 2018-08-06 RX ORDER — METOPROLOL TARTRATE 50 MG/1
50 TABLET ORAL 2 TIMES DAILY
Status: DISCONTINUED | OUTPATIENT
Start: 2018-08-06 | End: 2018-08-07 | Stop reason: HOSPADM

## 2018-08-06 RX ORDER — ENOXAPARIN SODIUM 100 MG/ML
1 INJECTION SUBCUTANEOUS ONCE
Status: COMPLETED | OUTPATIENT
Start: 2018-08-06 | End: 2018-08-06

## 2018-08-06 RX ORDER — DIPHENHYDRAMINE HYDROCHLORIDE 50 MG/ML
25 INJECTION, SOLUTION INTRAMUSCULAR; INTRAVENOUS ONCE
Status: DISCONTINUED | OUTPATIENT
Start: 2018-08-09 | End: 2018-08-07 | Stop reason: HOSPADM

## 2018-08-06 RX ORDER — ASPIRIN 81 MG/1
81 TABLET ORAL DAILY
Status: DISCONTINUED | OUTPATIENT
Start: 2018-08-07 | End: 2018-08-07 | Stop reason: HOSPADM

## 2018-08-06 RX ORDER — SODIUM CHLORIDE 9 MG/ML
75 INJECTION, SOLUTION INTRAVENOUS CONTINUOUS
Status: DISCONTINUED | OUTPATIENT
Start: 2018-08-06 | End: 2018-08-07 | Stop reason: HOSPADM

## 2018-08-06 RX ADMIN — ENOXAPARIN SODIUM 60 MG: 100 INJECTION SUBCUTANEOUS at 21:37

## 2018-08-06 RX ADMIN — METOPROLOL TARTRATE 50 MG: 50 TABLET ORAL at 21:46

## 2018-08-06 RX ADMIN — ROSUVASTATIN CALCIUM 20 MG: 10 TABLET, FILM COATED ORAL at 21:36

## 2018-08-06 RX ADMIN — NITROGLYCERIN 1 INCH: 20 OINTMENT TOPICAL at 18:51

## 2018-08-06 RX ADMIN — NITROGLYCERIN 1 INCH: 20 OINTMENT TOPICAL at 22:38

## 2018-08-06 RX ADMIN — SODIUM CHLORIDE 75 ML/HR: 900 INJECTION, SOLUTION INTRAVENOUS at 22:29

## 2018-08-06 NOTE — PROGRESS NOTES
Primary Nurse Margarita Menjivar, SYDNEE and Charma Prader, RN performed a dual skin assessment on this patient No impairment noted  Pt has bruise to left arm noted  Bedside shift change report given to Nita Cedillo (oncoming nurse) by FAVIAN Waite (offgoing nurse). Report included the following information SBAR, Kardex, Procedure Summary, MAR and Recent Results.

## 2018-08-06 NOTE — PROGRESS NOTES
TRANSFER - IN REPORT:    Verbal report received from Capri(name) on Bladimir Neville  being received from ED(unit) for routine progression of care      Report consisted of patients Situation, Background, Assessment and   Recommendations(SBAR). Information from the following report(s) SBAR, Kardex, Procedure Summary, MAR and Recent Results was reviewed with the receiving nurse. Opportunity for questions and clarification was provided. Assessment completed upon patients arrival to unit and care assumed.

## 2018-08-06 NOTE — ED NOTES
3:20 PM  I have evaluated the patient as the Provider in Triage. I have reviewed Her vital signs and the triage nurse assessment. I have talked with the patient and any available family and advised that I am the provider in triage and have ordered the appropriate study to initiate their work up based on the clinical presentation during my assessment. I have advised that the patient will be accommodated in the Main ED as soon as possible. I have also requested to contact the triage nurse or myself immediately if the patient experiences any changes in their condition during this brief waiting period. Patient started with chest pressure that started around 1000 this morning while at rest, radiates to the left arm. Got worse when she went up and down the stairs. Denies diaphoresis and syncope. Is a patient of Dr. Yue Chavira, has a cardiac cath scheduled for Thursday. Cardiology aware patient is here.    81 mg asa today  Rohini Escobar NP

## 2018-08-06 NOTE — IP AVS SNAPSHOT
[address] [facility] 13 
[phone] Patient: Rossana Willoughby MRN: [id number] XDR:[id number]/67/[id number] About your hospitalization You were admitted on:  August 6, 2018 You last received care in the:  [facility] 4 [facility] You were discharged on:  August 7, 2018 Why you were hospitalized Your primary diagnosis was:  Not on File Your diagnoses also included:  Chest Pain Follow-up Information Follow up With Details Comments Contact Info Zach Castorena MD   [address] [place] [address] [address] [facility] Internal Medicine [facility] 57 
[phone] Yolande Rangel MD On 12/5/2018 8:40 AM [address] [facility] 57 
[phone] Discharge Orders None A check crissy indicates which time of day the medication should be taken. My Medications CONTINUE taking these medications Instructions Each Dose to Equal  
 Morning Noon Evening Bedtime  
 aspirin delayed-release 81 mg tablet Your last dose was: Your next dose is: Take 81 mg by mouth daily. 81 mg  
    
   
   
   
  
 EFFIENT 10 mg tablet Generic drug:  prasugrel Your last dose was: Your next dose is: Take 10 mg by mouth daily. 10 mg  
    
   
   
   
  
 OSCAR-Q PO Your last dose was: Your next dose is: Take 1 Tab by mouth daily. 1 Tab  
    
   
   
   
  
 metoprolol tartrate 50 mg tablet Commonly known as:  LOPRESSOR Your last dose was: Your next dose is: Take 1 Tab by mouth two (2) times a day. 50 mg  
    
   
   
   
  
 rosuvastatin 20 mg tablet Commonly known as:  CRESTOR Your last dose was: Your next dose is: Take 1 Tab by mouth nightly. 20 mg Discharge Instructions · It is important that you take your medications exactly as they are prescribed. · Keep your medications in the bottles provided by the pharmacist and keep a list of the medication names, dosages, and number of times taken daily in your wallet. · Do not take other medications without consulting with your doctor first.  
. 
 
 
Cardiac Catheterization  Discharge Instructions ? Do not drive, operate any machinery, or sign any legal documents for 24 hours after your procedure. You must have someone else drive you home. ? You may take a shower 24 hours after your cardiac catheterization. Be sure to get the dressing wet and then remove it; gently wash the area with warm soapy water. Pat dry and leave open to air. To help prevent infections, be sure to keep the cath site clean and dry. No lotions, creams, powders, ointments, etc. On the cath site for approximately 1 week. ? Do not take a tub bath, get in a hot tub or swimming pool for approximately 5 days or until the cath site is completely healed. ? No strenuous activity or heavy lifting over 10 lbs. for 7 days. ? Drink plenty of fluids for 24-48 hours after your cath to flush the contrast dye from your kidneys. No alcoholic beverages for 24 hours. You may resume your previous diet (low fat, low cholesterol) after your cath. ? After your cath, some bruising or discomfort is common during the healing process. Tylenol, 1-2 tablets every 6 hours as needed, is recommended if you experience any discomfort. If you experience any signs or symptoms of infection such as fever, chills, or poorly healing incision, persistent tenderness or swelling in the groin, redness and/or warmth to the touch, numbness, significant tingling or pain at the groin site or affected extremity, rash, drainage from the cath site, or if the leg feels tight or swollen, call your doctor right away. ? If bleeding at the cath site occurs, take a clean gauze pad and apply direct pressure to the groin just above the puncture site. Call 911 immediately, and continue to apply direct pressure until an ambulance gets to your location. ? You may return to work  2  days after your cardiac cath if there is no bleeding from the cath site. Yury Frey. MARTINA Castaneda 
 
  
  
  
[employer] Announcement We are excited to announce that we are making your provider's discharge notes available to you in [employer]. You will see these notes when they are completed and signed by the physician that discharged you from your recent hospital stay. If you have any questions or concerns about any information you see in [employer], please call the Health Information Department where you were seen or reach out to your Primary Care Provider for more information about your plan of care. Introducing [facility] & HEALTH SERVICES! Paulo Hernandez Listen: Thank you for requesting a [employer] account. Our records indicate that you already have an active [employer] account. You can access your account anytime at [email or web address] Did you know that you can access your hospital and ER discharge instructions at any time in [employer]BC? You can also review all of your test results from your hospital stay or ER visit. Additional Information If you have questions, please visit the Frequently Asked Questions section of the [employer] website at [email or web address] Remember, [employer] is NOT to be used for urgent needs. For medical emergencies, dial 911. Now available from your iPhone and Android! Introducing Bert Le As a Sameera Steele patient, I wanted to make you aware of our electronic visit tool called Bert Le. [facility]era [facility] 24/7 allows you to connect within minutes with a medical provider 24 hours a day, seven days a week via a mobile device or tablet or logging into a secure website from your computer. You can access [employer] from anywhere in the [place]. A virtual visit might be right for you when you have a simple condition and feel like you just dont want to get out of bed, or cant get away from work for an appointment, when your regular [facility] provider is not available (evenings, weekends or holidays), or when youre out of town and need minor care. Electronic visits cost only $49 and if the [facility] 24/7 provider determines a prescription is needed to treat your condition, one can be electronically transmitted to a nearby pharmacy*. Please take a moment to enroll today if you have not already done so. The enrollment process is free and takes just a few minutes. To enroll, please download the [facility] 24/7 tyler to your tablet or phone, or visit [email or web address] to enroll on your computer. And, as an [address] [place] [address] patient with a [employer] account, the results of your visits will be scanned into your electronic medical record and your primary care provider will be able to view the scanned results. We urge you to continue to see your regular [facility] provider for your ongoing medical care. And while your primary care provider may not be the one available when you seek a [employer] virtual visit, the peace of mind you get from getting a real diagnosis real time can be priceless. For more information on [employer], view our Frequently Asked Questions (FAQs) at [email or web address]. Sincerely, 
 
Latia Grant MD 
Chief Medical Officer [employer] *:  certain medications cannot be prescribed via [employer] Providers Seen During Your Hospitalization Provider Specialty Primary office phone Alfredo Choe MD Emergency Medicine [phone] Alonso Ramos MD Cardiology [phone] Your Primary Care Physician (PCP) Primary Care Physician Office Phone Office Fax Debbie Mccann [phone] You are allergic to the following Allergen Reactions Other Medication Other (comments) Diarrhea from cephalosporins Recent Documentation Height Weight Breastfeeding? BMI OB Status Smoking Status 1.626 m 62.1 kg No 23.5 kg/m2 Menopause Never Smoker Emergency Contacts Name Discharge Info Relation Home Work Mobile Philippe Jensen DISCHARGE CAREGIVER [3] Spouse [3] [phone] Debora Toledo [3] [phone] Patient Belongings The following personal items are in your possession at time of discharge: 
  Dental Appliances: None  Visual Aid: None      Home Medications: None   Jewelry: Bracelet, Ring (neck lace,bracelet)  Clothing: Pants, Footwear, Shirt    Other Valuables: None, Cell Phone  Personal Items Sent to Safe: none Please provide this summary of care documentation to your next provider. Signatures-by signing, you are acknowledging that this After Visit Summary has been reviewed with you and you have received a copy. Patient Signature:  ____________________________________________________________ Date:  ____________________________________________________________  
  
Kristel Mcconnell Provider Signature:  ____________________________________________________________ Date:  ____________________________________________________________

## 2018-08-06 NOTE — H&P
Date of  Admission: 8/6/2018  3:31 PM     Joy Birmingham is a 61 y.o. female admitted for There are no admission diagnoses documented for this encounter. Subjective: patient presents with recurring chest tightness and chest pain at rest but this time also worsening with activities (prior to this activities made chest pain better)  Patient was already planning to undergo cardiac catheterization for abnormal stress echo and relatively atypical cp in a female patient nonetheless  Cardiac catheterization on 2/18:left main ok, lad minor irregs, lcx small, occluded distally with faint right to left collaterals. 30% prox rca, 50% mid pda, 85% distal rca treated with 3.25 by 18mm michelle to 10 david. No lv gram.  Echo on 2/18:Systolic function was normal. Ejection fraction was  estimated in the range of 50 % to 55 %. There was dyskinesis, possibly  aneurysmal formation of the apical wall(s). Doppler parameters were  consistent with abnormal left ventricular relaxation (grade 1 diastolic  dysfunction). Tricuspid valve: There was , regurgitation. holter on 7/18:NSR  rare episode of bundle branch block, 52 pacs , 1 episode of non sustained AT at111, 33 pvcs and 1 couplet  Stress echo on 7/18:Impressions: Abnormal study after maximal exercise without reproduction of  symptoms. Findings suggest single vessel coronary artery disease in the  territory of the right coronary artery. reports chest pain, chest pressure/discomfort.       Assessment/Plan: pain is better ECG with NSR and nstt not significantly changed from previous  Admit r/o mi  Resume home meds   Add lovenox and ntp  Npo  Serial troponin  Proceed with cardiac catheterization in am  CXR pending and labs pending as well at this time  Patient again aware of risks and benefits of cath as discussed in office and agreeable to proceed with Dr. Joycelyn Borjas in AM    Patient Active Problem List    Diagnosis Date Noted    CAD (coronary artery disease) 05/23/2018    Acute chest pain 05/23/2018    Numbness and tingling in left upper extremity 05/23/2018    Chest pain 05/23/2018    Colitis 02/24/2018    Severe sepsis ([address] 02/24/2018    Dehydration 02/24/2018    Hypokalemia 02/24/2018    Hypocalcemia 02/24/2018      Arsh Brown MD  Past Medical History:   Diagnosis Date    C. difficile colitis     CAD (coronary artery disease)     Colitis     Hypercholesteremia     Sepsis ([address]     Vertigo of central origin of both ears     during allergy season      Past Surgical History:   Procedure Laterality Date    CARDIAC SURG PROCEDURE UNLIST  02/27/2018    1 stent    HX TUBAL LIGATION      HX WRIST FRACTURE TX Left 2016    ganglian cyst removal      Allergies   Allergen Reactions    Other Medication Other (comments)     Diarrhea from cephalosporins      Family History   Problem Relation Age of Onset    Heart Disease Mother     Heart Attack Mother     Heart Disease Father     Heart Attack Father     Diabetes Brother     No Known Problems Brother     Cancer Brother      throat cancer      No current facility-administered medications for this encounter. Current Outpatient Prescriptions   Medication Sig    metoprolol tartrate (LOPRESSOR) 50 mg tablet Take 1 Tab by mouth two (2) times a day.  rosuvastatin (CRESTOR) 20 mg tablet Take 1 Tab by mouth nightly.  prasugrel (EFFIENT) 10 mg tablet Take 10 mg by mouth daily.  L. acidophilus/Strept/La p-toro (OSCAR-Q PO) Take 1 Tab by mouth daily.  aspirin delayed-release 81 mg tablet Take 81 mg by mouth daily. Review of Symptoms:  Pertinent items are noted in HPI.     Physical Exam    Visit Vitals    /75 (BP 1 Location: Left arm, BP Patient Position: At rest)    Pulse (!) 56    Temp 98 °F (36.7 °C)    Resp 16    Ht 5' 4\" (1.626 m)    Wt 62.7 kg (138 lb 3.7 oz)    SpO2 99%    BMI 23.73 kg/m2     Neck: no carotid bruit and no JVD  Heart: regular rate and rhythm  Lungs: clear to auscultation bilaterally  Abdomen: soft, non-tender. Bowel sounds normal. No masses,  no organomegaly  Extremities: no edema    Cardiographics    Telemetry: normal sinus rhythm  ECG: normal sinus rhythm, nonspecific ST and T waves changes  Echocardiogram: Not done    No current facility-administered medications on file prior to encounter. Current Outpatient Prescriptions on File Prior to Encounter   Medication Sig Dispense Refill    metoprolol tartrate (LOPRESSOR) 50 mg tablet Take 1 Tab by mouth two (2) times a day. 60 Tab 0    rosuvastatin (CRESTOR) 20 mg tablet Take 1 Tab by mouth nightly. 90 Tab 1    prasugrel (EFFIENT) 10 mg tablet Take 10 mg by mouth daily.  L. acidophilus/Strept/La p-toro (OSCAR-Q PO) Take 1 Tab by mouth daily.  aspirin delayed-release 81 mg tablet Take 81 mg by mouth daily.            Labs:   Recent Results (from the past 24 hour(s))   EKG, 12 LEAD, INITIAL    Collection Time: 08/06/18  3:26 PM   Result Value Ref Range    Ventricular Rate 51 BPM    Atrial Rate 51 BPM    P-R Interval 152 ms    QRS Duration 70 ms    Q-T Interval 476 ms    QTC Calculation (Bezet) 438 ms    Calculated P Axis 70 degrees    Calculated R Axis 69 degrees    Calculated T Axis 69 degrees    Diagnosis       Sinus bradycardia  Nonspecific T wave abnormality  When compared with ECG of 23-MAY-2018 03:58,  QT has shortened

## 2018-08-06 NOTE — IP AVS SNAPSHOT
[address] 
[phone] Patient: Glynn Norris MRN: [id number] A check crissy indicates which time of day the medication should be taken. My Medications CONTINUE taking these medications Instructions Each Dose to Equal  
 Morning Noon Evening Bedtime  
 aspirin delayed-release 81 mg tablet Your last dose was: Your next dose is: Take 81 mg by mouth daily. 81 mg  
    
   
   
   
  
 EFFIENT 10 mg tablet Generic drug:  prasugrel Your last dose was: Your next dose is: Take 10 mg by mouth daily. 10 mg  
    
   
   
   
  
 OSCAR-Q PO Your last dose was: Your next dose is: Take 1 Tab by mouth daily. 1 Tab  
    
   
   
   
  
 metoprolol tartrate 50 mg tablet Commonly known as:  LOPRESSOR Your last dose was: Your next dose is: Take 1 Tab by mouth two (2) times a day. 50 mg  
    
   
   
   
  
 rosuvastatin 20 mg tablet Commonly known as:  CRESTOR Your last dose was: Your next dose is: Take 1 Tab by mouth nightly.   
 20 mg

## 2018-08-06 NOTE — ROUTINE PROCESS
TRANSFER - OUT REPORT:    Verbal report given to [address] [place] [address],[facility] M[facility] RN(name) on Rossana Willoughby  being transferred to [facility](unit) for routine progression of care       Report consisted of patients Situation, Background, Assessment and   Recommendations(SBAR). Information from the following report(s) SBAR, Kardex, ED Summary, [facility] and Recent Results was reviewed with the receiving nurse. Lines:   Peripheral IV 08/06/18 Right Hand (Active)   Site Assessment Clean, dry, & intact 8/6/2018  4:27 PM   Phlebitis Assessment 0 8/6/2018  4:27 PM   Infiltration Assessment 0 8/6/2018  4:27 PM   Dressing Status Clean, dry, & intact; Occlusive 8/6/2018  4:27 PM   Dressing Type 4 X 4;Tape;Transparent 8/6/2018  4:27 PM   Hub Color/Line Status Blue;Flushed;Patent 8/6/2018  4:27 PM   Action Taken Blood drawn 8/6/2018  4:27 PM        Opportunity for questions and clarification was provided.       Patient transported with:   Monitor  Registered Nurse

## 2018-08-06 NOTE — ED TRIAGE NOTES
Patient reports midline chest pressure and shortness of breath. Reports pain radiates to LEFT arm and then will return to midline of chest. Reports pain worsens with deep inspiration. Patient is scheduled for a cardiac cath Thursday. Cardiologist is Ale VICK.Gretta N/V/D.

## 2018-08-06 NOTE — TELEPHONE ENCOUNTER
Received call from patient's .  (name noted on permission to release information). Identifiers x 2. States patient with complaints of chest pressure/shortness of breath/heaviness of heart x 4 hours. Noted that patient is scheduled for cardiac cath on 8-9-18. Instructed patient to go to ER for cardiac evaluation. He and patient to discuss. Not sure if they will go to ER. Informed that blockage in blood flow can present differently. Better to be safe.

## 2018-08-07 VITALS
DIASTOLIC BLOOD PRESSURE: 60 MMHG | HEART RATE: 54 BPM | RESPIRATION RATE: 20 BRPM | BODY MASS INDEX: 23.37 KG/M2 | OXYGEN SATURATION: 99 % | WEIGHT: 136.91 LBS | TEMPERATURE: 98 F | SYSTOLIC BLOOD PRESSURE: 122 MMHG | HEIGHT: 64 IN

## 2018-08-07 LAB
ANION GAP SERPL CALC-SCNC: 11 MMOL/L (ref 5–15)
BUN SERPL-MCNC: 12 MG/DL (ref 6–20)
BUN/CREAT SERPL: 15 (ref 12–20)
CALCIUM SERPL-MCNC: 8.2 MG/DL (ref 8.5–10.1)
CHLORIDE SERPL-SCNC: 111 MMOL/L (ref 97–108)
CO2 SERPL-SCNC: 23 MMOL/L (ref 21–32)
CREAT SERPL-MCNC: 0.82 MG/DL (ref 0.55–1.02)
D DIMER PPP FEU-MCNC: 0.32 MG/L FEU (ref 0–0.65)
GLUCOSE SERPL-MCNC: 98 MG/DL (ref 65–100)
POTASSIUM SERPL-SCNC: 3.8 MMOL/L (ref 3.5–5.1)
SODIUM SERPL-SCNC: 145 MMOL/L (ref 136–145)
TROPONIN I SERPL-MCNC: <0.05 NG/ML

## 2018-08-07 PROCEDURE — 77030013744

## 2018-08-07 PROCEDURE — 74011250636 HC RX REV CODE- 250/636: Performed by: INTERNAL MEDICINE

## 2018-08-07 PROCEDURE — B2151ZZ FLUOROSCOPY OF LEFT HEART USING LOW OSMOLAR CONTRAST: ICD-10-PCS | Performed by: SPECIALIST

## 2018-08-07 PROCEDURE — 84484 ASSAY OF TROPONIN QUANT: CPT | Performed by: NURSE PRACTITIONER

## 2018-08-07 PROCEDURE — 74011250637 HC RX REV CODE- 250/637: Performed by: NURSE PRACTITIONER

## 2018-08-07 PROCEDURE — 85379 FIBRIN DEGRADATION QUANT: CPT | Performed by: SPECIALIST

## 2018-08-07 PROCEDURE — 74011636320 HC RX REV CODE- 636/320: Performed by: SPECIALIST

## 2018-08-07 PROCEDURE — 4A023N7 MEASUREMENT OF CARDIAC SAMPLING AND PRESSURE, LEFT HEART, PERCUTANEOUS APPROACH: ICD-10-PCS | Performed by: SPECIALIST

## 2018-08-07 PROCEDURE — 74011250636 HC RX REV CODE- 250/636: Performed by: SPECIALIST

## 2018-08-07 PROCEDURE — 36415 COLL VENOUS BLD VENIPUNCTURE: CPT | Performed by: NURSE PRACTITIONER

## 2018-08-07 PROCEDURE — 93458 L HRT ARTERY/VENTRICLE ANGIO: CPT

## 2018-08-07 PROCEDURE — 74011250637 HC RX REV CODE- 250/637: Performed by: INTERNAL MEDICINE

## 2018-08-07 PROCEDURE — C1894 INTRO/SHEATH, NON-LASER: HCPCS

## 2018-08-07 PROCEDURE — B2111ZZ FLUOROSCOPY OF MULTIPLE CORONARY ARTERIES USING LOW OSMOLAR CONTRAST: ICD-10-PCS | Performed by: SPECIALIST

## 2018-08-07 PROCEDURE — 80048 BASIC METABOLIC PNL TOTAL CA: CPT | Performed by: NURSE PRACTITIONER

## 2018-08-07 PROCEDURE — 77030004533 HC CATH ANGI DX IMP BSC -B

## 2018-08-07 RX ORDER — FENTANYL CITRATE 50 UG/ML
25-200 INJECTION, SOLUTION INTRAMUSCULAR; INTRAVENOUS
Status: DISCONTINUED | OUTPATIENT
Start: 2018-08-07 | End: 2018-08-07

## 2018-08-07 RX ORDER — ATROPINE SULFATE 0.1 MG/ML
0.4 INJECTION INTRAVENOUS AS NEEDED
Status: DISCONTINUED | OUTPATIENT
Start: 2018-08-07 | End: 2018-08-07

## 2018-08-07 RX ORDER — HEPARIN SODIUM 200 [USP'U]/100ML
2000 INJECTION, SOLUTION INTRAVENOUS ONCE
Status: COMPLETED | OUTPATIENT
Start: 2018-08-07 | End: 2018-08-07

## 2018-08-07 RX ORDER — HEPARIN SODIUM 1000 [USP'U]/ML
5000 INJECTION, SOLUTION INTRAVENOUS; SUBCUTANEOUS AS NEEDED
Status: DISCONTINUED | OUTPATIENT
Start: 2018-08-07 | End: 2018-08-07

## 2018-08-07 RX ORDER — MIDAZOLAM HYDROCHLORIDE 1 MG/ML
1-10 INJECTION, SOLUTION INTRAMUSCULAR; INTRAVENOUS
Status: DISCONTINUED | OUTPATIENT
Start: 2018-08-07 | End: 2018-08-07

## 2018-08-07 RX ORDER — LIDOCAINE HYDROCHLORIDE 10 MG/ML
5-30 INJECTION INFILTRATION; PERINEURAL AS NEEDED
Status: DISCONTINUED | OUTPATIENT
Start: 2018-08-07 | End: 2018-08-07

## 2018-08-07 RX ORDER — SODIUM CHLORIDE 0.9 % (FLUSH) 0.9 %
10 SYRINGE (ML) INJECTION AS NEEDED
Status: DISCONTINUED | OUTPATIENT
Start: 2018-08-07 | End: 2018-08-07 | Stop reason: HOSPADM

## 2018-08-07 RX ADMIN — LIDOCAINE HYDROCHLORIDE 10 ML: 10 INJECTION, SOLUTION INFILTRATION; PERINEURAL at 10:34

## 2018-08-07 RX ADMIN — IOPAMIDOL 62 ML: 755 INJECTION, SOLUTION INTRAVENOUS at 10:47

## 2018-08-07 RX ADMIN — ASPIRIN 81 MG: 81 TABLET, DELAYED RELEASE ORAL at 08:22

## 2018-08-07 RX ADMIN — SODIUM CHLORIDE 75 ML/HR: 900 INJECTION, SOLUTION INTRAVENOUS at 12:19

## 2018-08-07 RX ADMIN — HEPARIN SODIUM 2000 UNITS: 200 INJECTION, SOLUTION INTRAVENOUS at 10:24

## 2018-08-07 RX ADMIN — ACETAMINOPHEN 650 MG: 325 TABLET ORAL at 04:12

## 2018-08-07 RX ADMIN — Medication 1 CAPSULE: at 13:10

## 2018-08-07 RX ADMIN — FENTANYL CITRATE 50 MCG: 50 INJECTION, SOLUTION INTRAMUSCULAR; INTRAVENOUS at 10:39

## 2018-08-07 RX ADMIN — PRASUGREL HYDROCHLORIDE 10 MG: 10 TABLET, FILM COATED ORAL at 08:22

## 2018-08-07 RX ADMIN — NITROGLYCERIN 1 INCH: 20 OINTMENT TOPICAL at 13:10

## 2018-08-07 RX ADMIN — MIDAZOLAM HYDROCHLORIDE 2 MG: 1 INJECTION, SOLUTION INTRAMUSCULAR; INTRAVENOUS at 10:28

## 2018-08-07 RX ADMIN — FENTANYL CITRATE 25 MCG: 50 INJECTION, SOLUTION INTRAMUSCULAR; INTRAVENOUS at 10:28

## 2018-08-07 RX ADMIN — MIDAZOLAM HYDROCHLORIDE 2 MG: 1 INJECTION, SOLUTION INTRAMUSCULAR; INTRAVENOUS at 10:39

## 2018-08-07 NOTE — PROGRESS NOTES
TRANSFER - IN REPORT:    Verbal report received from [address] [place] [address] RN(name) on Loc Syed  being received from Room 403(unit) for routine progression of care      Report consisted of patients Situation, Background, Assessment and   Recommendations(SBAR). Information from the following report(s) SBAR, Procedure Summary, MAR, Recent Results and Cardiac Rhythm NSR was reviewed with the receiving nurse. Opportunity for questions and clarification was provided. Assessment completed upon patients arrival to unit and care assumed.

## 2018-08-07 NOTE — PROGRESS NOTES
TRANSFER - OUT REPORT:    Verbal report given to Kaila RANDHAWA on Dasie Riddles being transferred to Room 403 for routine progression of care       Report consisted of patients Situation, Background, Assessment and   Recommendations(SBAR). Information from the following report(s) SBAR, Procedure Summary, MAR, Recent Results and Cardiac Rhythm Sinus Donell Sinks was reviewed with the receiving nurse. Opportunity for questions and clarification was provided.

## 2018-08-07 NOTE — PROCEDURES
Cardiac Catheterization Procedure Note   Patient: Augustin Pina  MRN: [id number]  SSN: [id number]   YOB: 1955 Age: 61 y.o. Sex: female    Date of Procedure: 8/7/2018   Pre-procedure Diagnosis: Atypical Angina and Coronary Artery Disease  Post-procedure Diagnosis: Non-cardiac Chest Pain  Procedure: Left Heart Cath  :  Dr. Felton Jason MD    Assistant(s):  None  Anesthesia: Moderate Sedation   Estimated Blood Loss: Less than 10 mL   Specimens Removed: None  Findings: left main ok, lad mild irregs, occluded distal lcx with faint right to left collaterals. 30% prox rca, widely patent distal rca stent, 50% mid pda. All unchanged from 3/18 final result.  lvef 66%  Complications: None   Implants:  None  Signed by:  Felton Jason MD  8/7/2018  11:00 AM

## 2018-08-07 NOTE — PROGRESS NOTES
TRANSFER - IN REPORT:    Verbal report received from Yogesh(name) on Juancho Gamez  being received from cath lab(unit) for ordered procedure      Report consisted of patients Situation, Background, Assessment and   Recommendations(SBAR). Information from the following report(s) SBAR, Kardex, Procedure Summary, MAR and Recent Results was reviewed with the receiving nurse. Opportunity for questions and clarification was provided. Assessment completed upon patients arrival to unit and care assumed.

## 2018-08-07 NOTE — DISCHARGE SUMMARY
Cardiology Discharge Summary     Patient ID:  Juancho Gamez  [id number]  69 y.o.  1955    Admit Date: 8/6/2018    Discharge Date: 8/7/2018    Admitting Physician: Papi Kim MD     Discharge Physician: Papi Kim MD    Admission Diagnoses:   Chest pain    Discharge Diagnoses: Active Problems:    Chest pain (5/23/2018)        Discharge Condition: good    Cardiology Procedures this Admission:   Left heart cath: Findings: left main ok, lad mild irregs, occluded distal lcx with faint right to left collaterals. 30% prox rca, widely patent distal rca stent, 50% mid pda. All unchanged from 3/18 final result. lvef 60%    Consults: none    Hospital Course: Juancho Gamez is a 61 y.o. femalepatient presents with recurring chest tightness and chest pain at rest but this time also worsening with activities (prior to this activities made chest pain better). Had recent abnormal stress echo and cardiac cath was already planned. Pt was admitted and had serial negative troponins. Underwent LHC by Dr. Fan Cade with above findings (no change from cath on 3/2018). No PCI needed. Pt was ambulatory at discharge. I have seen and examined the patient and agree with N.P. assessment. Doing well post cath  Discussed results  No changes in medications for now except may decrease asa to every other day for excessive bruising  Consider GI evaluation first before adding any new anti anginal medication    Visit Vitals    /60    Pulse (!) 54    Temp 98 °F (36.7 °C)    Resp 20    Ht 5' 4\" (1.626 m)    Wt 62.1 kg (136 lb 14.5 oz)    SpO2 99%    Breastfeeding No    BMI 23.5 kg/m2       Physical Exam  Abdomen: soft, non-tender. Bowel sounds normal. No masses,  no organomegaly  Extremities: no LE edema, + PP bilaterally. Rt groin site dressing c/d/i without hematoma or bleeding.   Heart: regular rate and rhythm, S1, S2 normal, no murmurs, clicks, rubs or gallops  Lungs: clear to auscultation bilaterally  Neck: supple, symmetrical,   Neurologic: Grossly normal  Pulses: 2+ and symmetrical    Labs:   Recent Labs      08/06/18   1623   WBC  7.9   HGB  11.8   HCT  36.0   PLT  216     Recent Labs      08/07/18   0408  08/06/18   1623   NA  145  144   K  3.8  3.7   CL  111*  109*   CO2  23  24   GLU  98  80   BUN  12  9   CREA  0.82  0.86   CA  8.2*  8.8   ALB   --   3.6   SGOT   --   25   ALT   --   32       Recent Labs      08/07/18   0408  08/06/18   1623   TROIQ  <0.05  <0.05       EKG: Sinus bradycardia   Nonspecific T wave abnormality   When compared with ECG of 23-MAY-2018 03:58,   QT has shortened   Confirmed by Steve Nieves M.D., Roetta Crigler ([id number]) on 8/6/2018 9:36:55 PM   CXR:   Other Diagnostic Tests:   Device check:     Disposition: Home    Patient Instructions:   Current Discharge Medication List      CONTINUE these medications which have NOT CHANGED    Details   metoprolol tartrate (LOPRESSOR) 50 mg tablet Take 1 Tab by mouth two (2) times a day. Qty: 60 Tab, Refills: 0      rosuvastatin (CRESTOR) 20 mg tablet Take 1 Tab by mouth nightly. Qty: 90 Tab, Refills: 1      prasugrel (EFFIENT) 10 mg tablet Take 10 mg by mouth daily. L. acidophilus/Strept/La p-toro (OSCAR-Q PO) Take 1 Tab by mouth daily. aspirin delayed-release 81 mg tablet Take 81 mg by mouth daily. Reference discharge instructions provided by nursing for diet and activity.     Follow-up with   Future Appointments  Date Time Provider Georgina Levin   8/14/2018 12:00 PM Loren Mcneil MD [id number] [facility]   10/24/2018 10:15 AM Loren Mcneil MD [facility] DAVE KENN SCHED   12/5/2018 8:40 AM MD NAOMI Lee SCHED       Signed:  Sharron Carcamo MD

## 2018-08-07 NOTE — PROGRESS NOTES
SHEATH PULL NOTE:    Patient informed of procedure with questions answered with review. Sheath site prepped with Chloraprep swab. 6 fr sheath in Right groin pulled by Sierra Sink. Hand hold and clotting pad, with manual compression to site. No bleeding, no hematoma, no pain at site. Hemostasis obtained with hand hold/manual compression at site. Patient tolerated well. No change in status. Handhold for 15 minutes. No change at site. Tegaderm dressing applied to site. No bleeding, no hematoma, no pain/discomfort at site. Groin instructions provided with review. Continue to monitor procedure site and patient status. *Advised patient to keep head flat and extremity flat to decrease risk of bleeding. *Recommended that patient not drink for ONE HOUR post sheath pull completion. *Recommended that patient not eat for TWO HOURS post sheath pull completion. *Instructed patient on rationale for delay of PO products to decrease risk for aspiration and if additional treatment to procedure site is required. Patient verbalized understanding of instructions with review.

## 2018-08-07 NOTE — PROGRESS NOTES
1020 -   Cardiac Cath Lab Procedure Area Arrival Note:    Tyra Bella arrived to Cardiac Cath Lab, Procedure Area. Patient identifiers verified with NAME and DATE OF BIRTH. Procedure verified with patient. Consent forms verified. Allergies verified. Patient informed of procedure and plan of care. Questions answered with review. Patient voiced understanding of procedure and plan of care. Patient on cardiac monitor, non-invasive blood pressure, SPO2 monitor. Placed on O2 @ 2 lpm via NC.  IV of NS on pump at 75 ml/hr. Patient status doing well without problems. Patient is A&Ox 4. Patient reports 3/10 chest tightness. Patient medicated during procedure with orders obtained and verified by Dr. Belen Franklin. Refer to patients Cardiac Cath Lab PROCEDURE REPORT for vital signs, assessment, status, and response during procedure, printed at end of case. Printed report on chart or scanned into chart. 1053 - TRANSFER - OUT REPORT:    Verbal report given to Yanely Garzon RN(name) on Tyra Bella  being transferred to (unit) for routine post - op       Report consisted of patients Situation, Background, Assessment and   Recommendations(SBAR). Information from the following report(s) Procedure Summary and MAR was reviewed with the receiving nurse. Lines:   Peripheral IV 08/06/18 Right Hand (Active)   Site Assessment Clean, dry, & intact 8/6/2018 11:31 PM   Phlebitis Assessment 0 8/6/2018 11:31 PM   Infiltration Assessment 0 8/6/2018 11:31 PM   Dressing Status Clean, dry, & intact 8/6/2018 11:31 PM   Dressing Type Tape;Transparent 8/6/2018 11:31 PM   Hub Color/Line Status Blue; Infusing 8/6/2018 11:31 PM   Action Taken Open ports on tubing capped 8/6/2018 11:31 PM   Alcohol Cap Used Yes 8/6/2018 11:31 PM        Opportunity for questions and clarification was provided.       Patient transported with:   Registered Nurse

## 2018-08-07 NOTE — PROGRESS NOTES
Spoke to infection control and informed that pt's last admission was in 2/2018 and was treated for c-diff. Pt currently does not have any diarrhea nor has had any recent hospital admissions. Informed that pt does not need to be on enteric precautions.

## 2018-08-07 NOTE — PROGRESS NOTES
Hospital follow-up PCP transitional care appointment has been scheduled with Dr. Nathalia Orosco for Tuesday, 8/14/18 at 12:00 p.m. Pending patient discharge.   Natalie Cabezas, Care Management Specialist.

## 2018-08-07 NOTE — PROGRESS NOTES
Bedside shift change report given to SYDNEE Kang (oncoming nurse) by Wanda Theodore (offgoing nurse). Report included the following information SBAR, Kardex, Intake/Output and Recent Results. Confirmed pt's NPO at [place] status, no complaints at this time.

## 2018-08-07 NOTE — DISCHARGE INSTRUCTIONS
· It is important that you take your medications exactly as they are prescribed. · Keep your medications in the bottles provided by the pharmacist and keep a list of the medication names, dosages, and number of times taken daily in your wallet. · Do not take other medications without consulting with your doctor first.   .      Cardiac Catheterization  Discharge Instructions     Do not drive, operate any machinery, or sign any legal documents for 24 hours after your procedure. You must have someone else drive you home.  You may take a shower 24 hours after your cardiac catheterization. Be sure to get the dressing wet and then remove it; gently wash the area with warm soapy water. Pat dry and leave open to air. To help prevent infections, be sure to keep the cath site clean and dry. No lotions, creams, powders, ointments, etc. On the cath site for approximately 1 week.  Do not take a tub bath, get in a hot tub or swimming pool for approximately 5 days or until the cath site is completely healed.  No strenuous activity or heavy lifting over 10 lbs. for 7 days.  Drink plenty of fluids for 24-48 hours after your cath to flush the contrast dye from your kidneys. No alcoholic beverages for 24 hours. You may resume your previous diet (low fat, low cholesterol) after your cath.  After your cath, some bruising or discomfort is common during the healing process. Tylenol, 1-2 tablets every 6 hours as needed, is recommended if you experience any discomfort. If you experience any signs or symptoms of infection such as fever, chills, or poorly healing incision, persistent tenderness or swelling in the groin, redness and/or warmth to the touch, numbness, significant tingling or pain at the groin site or affected extremity, rash, drainage from the cath site, or if the leg feels tight or swollen, call your doctor right away.      If bleeding at the cath site occurs, take a clean gauze pad and apply direct pressure to the groin just above the puncture site. Call 911 immediately, and continue to apply direct pressure until an ambulance gets to your location.  You may return to work  2  days after your cardiac cath if there is no bleeding from the cath site. Anca Barajas.  Tonya, NP

## 2018-09-21 ENCOUNTER — TELEPHONE (OUTPATIENT)
Dept: INTERNAL MEDICINE CLINIC | Age: 63
End: 2018-09-21

## 2018-09-21 DIAGNOSIS — Z11.1 SCREENING-PULMONARY TB: Primary | ICD-10-CM

## 2018-09-21 NOTE — TELEPHONE ENCOUNTER
----- Message from Mikayla Craig sent at 9/21/2018  3:29 PM EDT -----  Regarding: Referral Request  Contact: [phone]  Alena Colunga,    I hope you are well. 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  I understand a blood test is an alternative to the skin test.  The blood test (an IGRA, according to my  :)) would be significantly more convenient in time and travel as I could combine it with the upcoming cholesterol test Dr Yue Chavira ordered when he changed my statin to Crestor. Would you be able to order such a test for me?     Thank you for your help and I look forward to seeing in late October for my flu shot and exam.    Mikyala Craig

## 2018-09-24 ENCOUNTER — DOCUMENTATION ONLY (OUTPATIENT)
Dept: INTERNAL MEDICINE CLINIC | Age: 63
End: 2018-09-24

## 2018-09-28 LAB
ALBUMIN SERPL-MCNC: 4.5 G/DL (ref 3.6–4.8)
ALP SERPL-CCNC: 63 IU/L (ref 39–117)
ALT SERPL-CCNC: 24 IU/L (ref 0–32)
AST SERPL-CCNC: 28 IU/L (ref 0–40)
BILIRUB DIRECT SERPL-MCNC: 0.31 MG/DL (ref 0–0.4)
BILIRUB SERPL-MCNC: 2 MG/DL (ref 0–1.2)
CHOLEST SERPL-MCNC: 162 MG/DL (ref 100–199)
HDLC SERPL-MCNC: 51 MG/DL
INTERPRETATION, 910389: NORMAL
LDLC SERPL CALC-MCNC: 93 MG/DL (ref 0–99)
PROT SERPL-MCNC: 7 G/DL (ref 6–8.5)
TRIGL SERPL-MCNC: 89 MG/DL (ref 0–149)
VLDLC SERPL CALC-MCNC: 18 MG/DL (ref 5–40)

## 2018-09-29 LAB
GAMMA INTERFERON BACKGROUND BLD IA-ACNC: 0.02 IU/ML
M TB IFN-G BLD-IMP: NEGATIVE
M TB IFN-G CD4+ T-CELLS BLD-ACNC: 0.03 IU/ML
M TB IFN-G CD4+ T-CELLS BLD-ACNC: 0.03 IU/ML
MITOGEN IGNF BLD-ACNC: 0.91 IU/ML
SERVICE CMNT-IMP: NORMAL

## 2018-10-01 NOTE — ED NOTES
Patient presents to be seen by Dr. Alba Cohen in ED.  No interaction with patient in the ED by ED MD

## 2018-10-04 ENCOUNTER — TELEPHONE (OUTPATIENT)
Dept: CARDIOLOGY CLINIC | Age: 63
End: 2018-10-04

## 2018-10-04 DIAGNOSIS — I25.10 CORONARY ARTERY DISEASE INVOLVING NATIVE CORONARY ARTERY OF NATIVE HEART WITHOUT ANGINA PECTORIS: Primary | ICD-10-CM

## 2018-10-04 RX ORDER — ROSUVASTATIN CALCIUM 40 MG/1
40 TABLET, COATED ORAL
COMMUNITY
End: 2018-10-04 | Stop reason: SDUPTHER

## 2018-10-04 RX ORDER — ROSUVASTATIN CALCIUM 40 MG/1
40 TABLET, COATED ORAL
Qty: 90 TAB | Refills: 2 | Status: SHIPPED | OUTPATIENT
Start: 2018-10-04 | End: 2019-06-29 | Stop reason: SDUPTHER

## 2018-10-04 NOTE — TELEPHONE ENCOUNTER
Verified patient with two patient identifiers. Spoke with patient,lab results discussed with patient. Per  increase Crestor 40 mg daily and check FLP in 2 months. Will mail lab slip and will sent new Rx to mail order.

## 2018-10-09 ENCOUNTER — CLINICAL SUPPORT (OUTPATIENT)
Dept: INTERNAL MEDICINE CLINIC | Age: 63
End: 2018-10-09

## 2018-10-09 DIAGNOSIS — Z23 ENCOUNTER FOR IMMUNIZATION: Primary | ICD-10-CM

## 2018-10-09 NOTE — PROGRESS NOTES
Pharmacy Note - Immunizations    Jered Randhawa is a 61 y.o.  female  who present for a flu shot. She denies any symptoms, reactions or allergies that would exclude them from being immunized today. Risks and adverse reactions were discussed and the VIS was given to them. All questions were addressed. Verbal order received from Dr. Jahaira Metz    Patient was observed for 10 min post injection. There were no reactions observed.     Tea Garcia, PHARMD BCACP

## 2018-10-09 NOTE — MR AVS SNAPSHOT
[address] [facility] [address] [facility] 57 
[phone] Patient: Jim Pinto MRN: [id number] ITY:[id number] Visit Information Date & Time Provider Department Dept. Phone Encounter #  
 10/9/2018  3:15 PM Shirley White, [address] [facility]. Internal Medicine [phone] [id number] Your Appointments 10/9/2018  3:15 PM  
Nurse Visit with Shirley White, PHARMNARGIS  
[facility] Internal Medicine ([address]) Appt Note: flu shot  
 [address] [facility] [address]  
[id number] [facility]  
  
    
 10/18/2018  2:20 PM  
New Patient with Carolyne Stoddard MD  
[facility] [address] [facility] ([address] [facility]) Appt Note: NG/aetna  
 [address], [place] [address] [facility] [address]  
[phone]  
  
   
 [address], [address] [facility] 5 [address]  
  
    
 11/7/2018  2:45 PM  
ROUTINE CARE with Roma Caldwell MD  
[facility] Internal Medicine [address] [place] [address]) Appt Note: 3 month f/u 25c/p; reschedule from 10/24/18  
 [address] [facility] [address]  
[phone] [address]  
  
    
 12/5/2018  8:40 AM  
ESTABLISHED PATIENT with Luiz Graza MD  
[facility] ([address] [facility]) Appt Note: 6 Month Follow up  
 [address] [facility] [address]  
[address] [facility] [address] Upcoming Health Maintenance Date Due DTaP/Tdap/Td series (1 - Tdap) 1/13/1976 PAP AKA CERVICAL CYTOLOGY 1/13/1976 Shingrix Vaccine Age 50> (1 of 2) 1/13/2005 BREAST CANCER SCRN MAMMOGRAM 1/13/2005 FOBT Q 1 YEAR AGE 50-75 1/13/2005 Influenza Age 5 to Adult 8/1/2018 Allergies as of 10/9/2018  Review Complete On: 10/9/2018 By: Shirley White, PHARMD  
  
 Severity Noted Reaction Type Reactions Other Medication  05/23/2018    Other (comments) Diarrhea from cephalosporins Current Immunizations  Reviewed on 10/9/2018 Name Date Influenza Vaccine (Quad) PF 10/9/2018 Reviewed by Angelo Lazaro PHARMD on 10/9/2018 at  3:10 PM  
You Were Diagnosed With   
  
 Codes Comments Encounter for immunization    -  Primary ICD-10-CM: X90 ICD-9-CM: V03.89 Vitals OB Status Smoking Status Menopause Never Smoker Preferred Pharmacy Pharmacy Name Phone Carlos Welch, [facility] [phone] Your Updated Medication List  
  
   
This list is accurate as of 10/9/18  3:14 PM.  Always use your most recent med list.  
  
  
  
  
 aspirin delayed-release 81 mg tablet Take 81 mg by mouth daily. EFFIENT 10 mg tablet Generic drug:  prasugrel Take 10 mg by mouth daily. OSCAR-Q PO Take 1 Tab by mouth daily. metoprolol tartrate 50 mg tablet Commonly known as:  LOPRESSOR Take 1 Tab by mouth two (2) times a day. rosuvastatin 40 mg tablet Commonly known as:  CRESTOR Take 1 Tab by mouth nightly. We Performed the Following INFLUENZA VIRUS VAC QUAD,SPLIT,PRESV FREE SYRINGE IM [id number] CPT(R)] VA IMMUNIZ ADMIN,1 SINGLE/COMB VAC/TOXOID [id number] CPT(R)] Patient Instructions Vaccine Information Statement Influenza (Flu) Vaccine (Inactivated or Recombinant): What you need to know Many Vaccine Information Statements are available in [place] and other languages. See www.immunize.org/vis Hojas de Información Sobre Vacunas están disponibles en Español y en muchos otros idiomas. Visite www.immunize.org/vis 1. Why get vaccinated? Influenza (flu) is a contagious disease that spreads around the [place] every year, usually between October and May.   
 
Flu is caused by influenza viruses, and is spread mainly by coughing, sneezing, and close contact. Anyone can get flu. Flu strikes suddenly and can last several days. Symptoms vary by age, but can include: 
 fever/chills  sore throat  muscle aches  fatigue  cough  headache  runny or stuffy nose Flu can also lead to pneumonia and blood infections, and cause diarrhea and seizures in children. If you have a medical condition, such as heart or lung disease, flu can make it worse. Flu is more dangerous for some people. Infants and young children, people 72years of age and older, pregnant women, and people with certain health conditions or a weakened immune system are at greatest risk. Each year thousands of people in the [facility] die from flu, and many more are hospitalized. Flu vaccine can: 
 keep you from getting flu, 
 make flu less severe if you do get it, and 
 keep you from spreading flu to your family and other people. 2. Inactivated and recombinant flu vaccines A dose of flu vaccine is recommended every flu season. Children 6 months through 6years of age may need two doses during the same flu season. Everyone else needs only one dose each flu season. Some inactivated flu vaccines contain a very small amount of a mercury-based preservative called thimerosal. Studies have not shown thimerosal in vaccines to be harmful, but flu vaccines that do not contain thimerosal are available. There is no live flu virus in flu shots. They cannot cause the flu. There are many flu viruses, and they are always changing. Each year a new flu vaccine is made to protect against three or four viruses that are likely to cause disease in the upcoming flu season. But even when the vaccine doesnt exactly match these viruses, it may still provide some protection Flu vaccine cannot prevent: 
 flu that is caused by a virus not covered by the vaccine, or 
 illnesses that look like flu but are not. It takes about 2 weeks for protection to develop after vaccination, and protection lasts through the flu season. 3. Some people should not get this vaccine Tell the person who is giving you the vaccine:  If you have any severe, life-threatening allergies. If you ever had a life-threatening allergic reaction after a dose of flu vaccine, or have a severe allergy to any part of this vaccine, you may be advised not to get vaccinated. Most, but not all, types of flu vaccine contain a small amount of egg protein.  If you ever had Guillain-Barré Syndrome (also called GBS). Some people with a history of GBS should not get this vaccine. This should be discussed with your doctor.  If you are not feeling well. It is usually okay to get flu vaccine when you have a mild illness, but you might be asked to come back when you feel better. 4. Risks of a vaccine reaction With any medicine, including vaccines, there is a chance of reactions. These are usually mild and go away on their own, but serious reactions are also possible. Most people who get a flu shot do not have any problems with it. Minor problems following a flu shot include:  
 soreness, redness, or swelling where the shot was given  hoarseness  sore, red or itchy eyes  cough  fever  aches  headache  itching  fatigue If these problems occur, they usually begin soon after the shot and last 1 or 2 days. More serious problems following a flu shot can include the following:  There may be a small increased risk of Guillain-Barré Syndrome (GBS) after inactivated flu vaccine. This risk has been estimated at 1 or 2 additional cases per million people vaccinated. This is much lower than the risk of severe complications from flu, which can be prevented by flu vaccine.    
 
 Young children who get the flu shot along with pneumococcal vaccine (PCV13) and/or DTaP vaccine at the same time might be slightly more likely to have a seizure caused by fever. Ask your doctor for more information. Tell your doctor if a child who is getting flu vaccine has ever had a seizure. Problems that could happen after any injected vaccine:  People sometimes faint after a medical procedure, including vaccination. Sitting or lying down for about 15 minutes can help prevent fainting, and injuries caused by a fall. Tell your doctor if you feel dizzy, or have vision changes or ringing in the ears.  Some people get severe pain in the shoulder and have difficulty moving the arm where a shot was given. This happens very rarely.  Any medication can cause a severe allergic reaction. Such reactions from a vaccine are very rare, estimated at about 1 in a million doses, and would happen within a few minutes to a few hours after the vaccination. As with any medicine, there is a very remote chance of a vaccine causing a serious injury or death. The safety of vaccines is always being monitored. For more information, visit: www.cdc.gov/vaccinesafety/ 
 
 
The [facility] Vaccine Injury Compensation Program (VICP) is a federal program that was created to compensate people who may have been injured by certain vaccines. Persons who believe they may have been injured by a vaccine can learn about the program and about filing a claim by calling 8-360.929.6998 or visiting the 1900 "[employer] website at www.[facility].gov/vaccinecompensation. There is a time limit to file a claim for compensation. 7. How can I learn more?  Ask your healthcare provider. He or she can give you the vaccine package insert or suggest other sources of information.  Call your local or state health department.  Contact the Centers for Disease Control and Prevention (CDC): 
- Call 5-990.324.2797 (1-800-CDC-INFO) or 
- Visit CDCs website at www.cdc.gov/flu Vaccine Information Statement Inactivated Influenza Vaccine 8/7/2015 
[address] [facility] [id number] [facility] and "[employer] Centers for Disease Control and Prevention Office Use Only Introducing [facility]! Dear Paris Pizano: Thank you for requesting a [employer] account. Our records indicate that you already have an active [employer] account. You can access your account anytime at https://[employer] Did you know that you can access your hospital and ER discharge instructions at any time in [employer]? You can also review all of your test results from your hospital stay or ER visit. Additional Information If you have questions, please visit the Frequently Asked Questions section of the [employer] website at https://[employer]/. Remember, [employer] is NOT to be used for urgent needs. For medical emergencies, dial 911. Now available from your iPhone and Android! Please provide this summary of care documentation to your next provider. Your primary care clinician is listed as Cherylene Flores. If you have any questions after today's visit, please call [phone].

## 2018-10-09 NOTE — PATIENT INSTRUCTIONS
Vaccine Information Statement    Influenza (Flu) Vaccine (Inactivated or Recombinant): What you need to know    Many Vaccine Information Statements are available in [place]o and other languages. See www.immunize.org/vis  Hojas de Información Sobre Vacunas están disponibles en Español y en muchos otros idiomas. Visite www.immunize.org/vis    1. Why get vaccinated? Influenza (flu) is a contagious disease that spreads around the [place] every year, usually between October and May. Flu is caused by influenza viruses, and is spread mainly by coughing, sneezing, and close contact. Anyone can get flu. Flu strikes suddenly and can last several days. Symptoms vary by age, but can include:   fever/chills   sore throat   muscle aches   fatigue   cough   headache    runny or stuffy nose    Flu can also lead to pneumonia and blood infections, and cause diarrhea and seizures in children. If you have a medical condition, such as heart or lung disease, flu can make it worse. Flu is more dangerous for some people. Infants and young children, people 72years of age and older, pregnant women, and people with certain health conditions or a weakened immune system are at greatest risk. Each year thousands of people in the [facility] die from flu, and many more are hospitalized. Flu vaccine can:   keep you from getting flu,   make flu less severe if you do get it, and   keep you from spreading flu to your family and other people. 2. Inactivated and recombinant flu vaccines    A dose of flu vaccine is recommended every flu season. Children 6 months through 6years of age may need two doses during the same flu season. Everyone else needs only one dose each flu season.        Some inactivated flu vaccines contain a very small amount of a mercury-based preservative called thimerosal. Studies have not shown thimerosal in vaccines to be harmful, but flu vaccines that do not contain thimerosal are available. There is no live flu virus in flu shots. They cannot cause the flu. There are many flu viruses, and they are always changing. Each year a new flu vaccine is made to protect against three or four viruses that are likely to cause disease in the upcoming flu season. But even when the vaccine doesnt exactly match these viruses, it may still provide some protection    Flu vaccine cannot prevent:   flu that is caused by a virus not covered by the vaccine, or   illnesses that look like flu but are not. It takes about 2 weeks for protection to develop after vaccination, and protection lasts through the flu season. 3. Some people should not get this vaccine    Tell the person who is giving you the vaccine:     If you have any severe, life-threatening allergies. If you ever had a life-threatening allergic reaction after a dose of flu vaccine, or have a severe allergy to any part of this vaccine, you may be advised not to get vaccinated. Most, but not all, types of flu vaccine contain a small amount of egg protein.  If you ever had Guillain-Barré Syndrome (also called GBS). Some people with a history of GBS should not get this vaccine. This should be discussed with your doctor.  If you are not feeling well. It is usually okay to get flu vaccine when you have a mild illness, but you might be asked to come back when you feel better. 4. Risks of a vaccine reaction    With any medicine, including vaccines, there is a chance of reactions. These are usually mild and go away on their own, but serious reactions are also possible. Most people who get a flu shot do not have any problems with it.      Minor problems following a flu shot include:    soreness, redness, or swelling where the shot was given     hoarseness   sore, red or itchy eyes   cough   fever   aches   headache   itching   fatigue  If these problems occur, they usually begin soon after the shot and last 1 or 2 days. More serious problems following a flu shot can include the following:     There may be a small increased risk of Guillain-Barré Syndrome (GBS) after inactivated flu vaccine. This risk has been estimated at 1 or 2 additional cases per million people vaccinated. This is much lower than the risk of severe complications from flu, which can be prevented by flu vaccine.  Young children who get the flu shot along with pneumococcal vaccine (PCV13) and/or DTaP vaccine at the same time might be slightly more likely to have a seizure caused by fever. Ask your doctor for more information. Tell your doctor if a child who is getting flu vaccine has ever had a seizure. Problems that could happen after any injected vaccine:      People sometimes faint after a medical procedure, including vaccination. Sitting or lying down for about 15 minutes can help prevent fainting, and injuries caused by a fall. Tell your doctor if you feel dizzy, or have vision changes or ringing in the ears.  Some people get severe pain in the shoulder and have difficulty moving the arm where a shot was given. This happens very rarely.  Any medication can cause a severe allergic reaction. Such reactions from a vaccine are very rare, estimated at about 1 in a million doses, and would happen within a few minutes to a few hours after the vaccination. As with any medicine, there is a very remote chance of a vaccine causing a serious injury or death. The safety of vaccines is always being monitored. For more information, visit: www.cdc.gov/vaccinesafety/    5. What if there is a serious reaction? What should I look for?  Look for anything that concerns you, such as signs of a severe allergic reaction, very high fever, or unusual behavior.     Signs of a severe allergic reaction can include hives, swelling of the face and throat, difficulty breathing, a fast heartbeat, dizziness, and weakness - usually within a few minutes to a few hours after the vaccination. What should I do?  If you think it is a severe allergic reaction or other emergency that cant wait, call 9-1-1 and get the person to the nearest hospital. Otherwise, call your doctor.  Reactions should be reported to the Vaccine Adverse Event Reporting System (VAERS). Your doctor should file this report, or you can do it yourself through  the VAERS web site at www.vaers. [facility].gov, or by calling 2-684.817.5899. VAERS does not give medical advice. 6. The National Vaccine Injury Compensation Program    The [facility] Vaccine Injury Compensation Program (VICP) is a federal program that was created to compensate people who may have been injured by certain vaccines. Persons who believe they may have been injured by a vaccine can learn about the program and about filing a claim by calling 7-350.339.2938 or visiting the [employer] website at www.[facility].gov/vaccinecompensation. There is a time limit to file a claim for compensation. 7. How can I learn more?  Ask your healthcare provider. He or she can give you the vaccine package insert or suggest other sources of information.  Call your local or state health department.  Contact the Centers for Disease Control and Prevention (CDC):  - Call 8-440.426.7936 (1-800-CDC-INFO) or  - Visit CDCs website at www.cdc.gov/flu    Vaccine Information Statement   Inactivated Influenza Vaccine   8/7/2015  42 BE[address] [id number]    Department of Health and Human Services  Centers for Disease Control and Prevention    Office Use Only

## 2018-10-18 ENCOUNTER — OFFICE VISIT (OUTPATIENT)
Dept: OBGYN CLINIC | Age: 63
End: 2018-10-18

## 2018-10-18 VITALS
DIASTOLIC BLOOD PRESSURE: 64 MMHG | HEIGHT: 64 IN | WEIGHT: 141.8 LBS | SYSTOLIC BLOOD PRESSURE: 120 MMHG | BODY MASS INDEX: 24.21 KG/M2

## 2018-10-18 DIAGNOSIS — Z01.419 WELL WOMAN EXAM WITH ROUTINE GYNECOLOGICAL EXAM: Primary | ICD-10-CM

## 2018-10-18 NOTE — PROGRESS NOTES
Annual exam ages 40-58    Danny Costello is a No obstetric history on file. ,  61 y.o. female [address] No LMP recorded. Patient is not currently having periods (Reason: Menopause). .    She presents for her annual checkup. She is having occasional vaginal dryness and pain with intercourse. Patient also reports having CT scan 8 months ago that showed incidental finding of R adnexal cyst 3.9cm; had fu study and cyst down to 2cm and benign in appearance         Menstrual status:    Now menopausal      Sexual history:    She  reports that she currently engages in sexual activity and has had partners who are Male. Medical conditions:    Since her last annual GYN exam about two years ago, she has not the following changes in her health history: none. Pap and Mammogram History:    Her most recent Pap smear was normal, obtained 2 year(s) ago. The patient has not had a recent mammogram. Last was 4/2017, negative for malignancy. Breast Cancer History/Substance Abuse: negative    Osteoporosis History:    Family history does not include a first or second degree relative with osteopenia or osteoporosis. Past Medical History:   Diagnosis Date    C. difficile colitis     CAD (coronary artery disease)     Colitis     Hypercholesteremia     Sepsis ([address]     Vertigo of central origin of both ears     during allergy season     Past Surgical History:   Procedure Laterality Date    CARDIAC SURG PROCEDURE UNLIST  02/27/2018    1 stent    HX TUBAL LIGATION      HX WRIST FRACTURE TX Left 2016    ganglian cyst removal        Current Outpatient Medications   Medication Sig Dispense Refill    rosuvastatin (CRESTOR) 40 mg tablet Take 1 Tab by mouth nightly. 90 Tab 2    metoprolol tartrate (LOPRESSOR) 50 mg tablet Take 1 Tab by mouth two (2) times a day. 60 Tab 0    prasugrel (EFFIENT) 10 mg tablet Take 10 mg by mouth daily.       L. acidophilus/Strept/La p-toro (OSCAR-Q PO) Take 1 Tab by mouth daily.      aspirin delayed-release 81 mg tablet Take 81 mg by mouth daily. Allergies: Other medication     Tobacco History:  reports that  has never smoked. she has never used smokeless tobacco.  Alcohol Abuse:  reports that she drinks alcohol. Drug Abuse:  reports that she does not use drugs.     Family Medical/Cancer History:   Family History   Problem Relation Age of Onset    Heart Disease Mother     Heart Attack Mother     Heart Disease Father     Heart Attack Father     Diabetes Brother     No Known Problems Brother     Cancer Brother         throat cancer        Review of Systems - History obtained from the patient  Constitutional: negative for weight loss, fever, night sweats  HEENT: negative for hearing loss, earache, congestion, snoring, sorethroat  CV: negative for chest pain, palpitations, edema  Resp: negative for cough, shortness of breath, wheezing  GI: negative for change in bowel habits, abdominal pain, black or bloody stools  : negative for frequency, dysuria, hematuria, vaginal discharge  MSK: negative for back pain, joint pain, muscle pain  Breast: negative for breast lumps, nipple discharge, galactorrhea  Skin :negative for itching, rash, hives  Neuro: negative for dizziness, headache, confusion, weakness  Psych: negative for anxiety, depression, change in mood  Heme/lymph: negative for bleeding, bruising, pallor    Physical Exam    Visit Vitals  Ht 5' 4\" (1.626 m)   Wt 141 lb 12.8 oz (64.3 kg)   BMI 24.34 kg/m²       Constitutional  · Appearance: well-nourished, well developed, alert, in no acute distress    HENT  · Head and Face: appears normal    Chest  · Respiratory Effort: breathing unlabored      Breasts  · Inspection of Breasts: breasts symmetrical, no skin changes, no discharge present, nipple appearance normal, no skin retraction present  · Palpation of Breasts and Axillae: no masses present on palpation, no breast tenderness  · Axillary Lymph Nodes: no lymphadenopathy present    Gastrointestinal  · Abdominal Examination: abdomen non-tender to palpation, normal bowel sounds, no masses present  · Liver and spleen: no hepatomegaly present, spleen not palpable  · Hernias: no hernias identified    Skin  · General Inspection: no rash, no lesions identified    Neurologic/Psychiatric  · Mental Status:  · Orientation: grossly oriented to person, place and time  · Mood and Affect: mood normal, affect appropriate    Genitourinary  · External Genitalia: normal appearance for age, no discharge present, no tenderness present, no inflammatory lesions present, no masses present, no atrophy present  · Vagina: normal vaginal vault without central or paravaginal defects, no discharge present, no inflammatory lesions present, no masses present  · Bladder: non-tender to palpation  · Urethra: appears normal  · Cervix: normal   · Uterus: normal size, shape and consistency  · Adnexa: no adnexal tenderness present, no adnexal masses present  · Perineum: perineum within normal limits, no evidence of trauma, no rashes or skin lesions present  · Anus: anus within normal limits, no hemorrhoids present  · Inguinal Lymph Nodes: no lymphadenopathy present    Assessment:  Routine gynecologic examination  Dyspareunia  Personal hx cardiac disease  Her current medical status is satisfactory with no evidence of significant gynecologic issues. Plan:  Interested in trial of intrarosa. Script and instructions shared.   Silicone lubricant also recommended  Counseled re: diet, exercise, healthy lifestyle  Return for yearly wellness visits  Rec annual mammogram

## 2018-10-19 LAB
CYTOLOGIST CVX/VAG CYTO: NORMAL
CYTOLOGY CVX/VAG DOC THIN PREP: NORMAL
DX ICD CODE: NORMAL
LABCORP, 190119: NORMAL
Lab: NORMAL
OTHER STN SPEC: NORMAL
PATH REPORT.FINAL DX SPEC: NORMAL
STAT OF ADQ CVX/VAG CYTO-IMP: NORMAL

## 2018-10-22 ENCOUNTER — TELEPHONE (OUTPATIENT)
Dept: CARDIOLOGY CLINIC | Age: 63
End: 2018-10-22

## 2018-10-22 NOTE — TELEPHONE ENCOUNTER
Identifiers x 2. Per Dr. Miya Bonilla, patient to hold effient for 1 week prior to gastroenterology procedure. Pateint verbalized understanding. Order faxed to [facility]. Confirmation received.

## 2018-11-07 ENCOUNTER — OFFICE VISIT (OUTPATIENT)
Dept: INTERNAL MEDICINE CLINIC | Age: 63
End: 2018-11-07

## 2018-11-07 VITALS
SYSTOLIC BLOOD PRESSURE: 126 MMHG | HEART RATE: 49 BPM | DIASTOLIC BLOOD PRESSURE: 75 MMHG | RESPIRATION RATE: 17 BRPM | HEIGHT: 64 IN | BODY MASS INDEX: 24.24 KG/M2 | TEMPERATURE: 98 F | OXYGEN SATURATION: 96 % | WEIGHT: 142 LBS

## 2018-11-07 DIAGNOSIS — I25.10 CORONARY ARTERY DISEASE INVOLVING NATIVE CORONARY ARTERY OF NATIVE HEART WITHOUT ANGINA PECTORIS: Primary | ICD-10-CM

## 2018-11-07 DIAGNOSIS — Z23 ENCOUNTER FOR IMMUNIZATION: ICD-10-CM

## 2018-11-07 NOTE — PROGRESS NOTES
Follow Up Visit    Luis Enrique Ward is a 61 y.o. female. she presents for Coronary Artery Disease    The patient comes for follow up. She reports feeling well, no complaints today. She denies chest pain since her episode of MI which occurred several months ago. She has a plan to follow up with cardiology in the coming weeks. Otherwise, she is due for a shingles vaccine, she has not gotten this yet and will do so. Patient Active Problem List   Diagnosis Code    Colitis K52.9    Severe sepsis ([facility] [address] A41.9, R65.20    Dehydration E86.0    Hypokalemia E87.6    Hypocalcemia E83.51    CAD (coronary artery disease) I25.10    Acute chest pain R07.9    Numbness and tingling in left upper extremity R20.0, R20.2    Chest pain R07.9         Prior to Admission medications    Medication Sig Start Date End Date Taking? Authorizing Provider   rosuvastatin (CRESTOR) 40 mg tablet Take 1 Tab by mouth nightly. 10/4/18  Yes Rena Xavier MD   metoprolol tartrate (LOPRESSOR) 50 mg tablet Take 1 Tab by mouth two (2) times a day. 7/16/18  Yes Rena Xavier MD   prasugrel (EFFIENT) 10 mg tablet Take 10 mg by mouth daily. Yes Other, MD Palmira   L. acidophilus/Strept/La p-toro (OSCAR-Q PO) Take 1 Tab by mouth daily. Yes Provider, Historical   aspirin delayed-release 81 mg tablet Take 81 mg by mouth daily. Yes Provider, Historical         Health Maintenance   Topic Date Due    DTaP/Tdap/Td series (1 - Tdap) 01/13/1976    Shingrix Vaccine Age 50> (1 of 2) 01/13/2005    BREAST CANCER SCRN MAMMOGRAM  01/13/2005    FOBT Q 1 YEAR AGE 50-75  01/13/2005    PAP AKA CERVICAL CYTOLOGY  10/18/2021    Hepatitis C Screening  Completed    Influenza Age 5 to Adult  Completed       Review of Systems   Constitutional: Negative. Respiratory: Negative. Cardiovascular: Negative.             Visit Vitals  /75 (BP 1 Location: Right arm, BP Patient Position: Sitting)   Pulse (!) 49   Temp 98 °F (36.7 °C) (Oral)   Resp 17   Ht 5' 4\" (1.626 m)   Wt 142 lb (64.4 kg)   SpO2 96%   BMI 24.37 kg/m²       Physical Exam   Constitutional: She appears well-developed and well-nourished. Cardiovascular: Normal rate and regular rhythm. Pulmonary/Chest: Effort normal and breath sounds normal.         ASSESSMENT/PLAN    Diagnoses and all orders for this visit:    1. Coronary artery disease involving native coronary artery of native heart without angina pectoris    2. Encounter for immunization  -     varicella-zoster recombinant, PF, (SHINGRIX, PF,) 50 mcg/0.5 mL susr injection; 0.5 mL by IntraMUSCular route once for 1 dose. Follow-up Disposition:  Return in about 6 months (around 5/7/2019).

## 2018-12-06 LAB
ALBUMIN SERPL-MCNC: 4.2 G/DL (ref 3.6–4.8)
ALP SERPL-CCNC: 60 IU/L (ref 39–117)
ALT SERPL-CCNC: 27 IU/L (ref 0–32)
AST SERPL-CCNC: 30 IU/L (ref 0–40)
BILIRUB DIRECT SERPL-MCNC: 0.34 MG/DL (ref 0–0.4)
BILIRUB SERPL-MCNC: 1.8 MG/DL (ref 0–1.2)
CHOLEST SERPL-MCNC: 148 MG/DL (ref 100–199)
HDLC SERPL-MCNC: 46 MG/DL
INTERPRETATION, 910389: NORMAL
LDLC SERPL CALC-MCNC: 85 MG/DL (ref 0–99)
PROT SERPL-MCNC: 6.7 G/DL (ref 6–8.5)
TRIGL SERPL-MCNC: 86 MG/DL (ref 0–149)
VLDLC SERPL CALC-MCNC: 17 MG/DL (ref 5–40)

## 2018-12-14 ENCOUNTER — OFFICE VISIT (OUTPATIENT)
Dept: CARDIOLOGY CLINIC | Age: 63
End: 2018-12-14

## 2018-12-14 VITALS
DIASTOLIC BLOOD PRESSURE: 50 MMHG | SYSTOLIC BLOOD PRESSURE: 110 MMHG | RESPIRATION RATE: 16 BRPM | OXYGEN SATURATION: 98 % | WEIGHT: 142 LBS | BODY MASS INDEX: 24.24 KG/M2 | HEIGHT: 64 IN | HEART RATE: 56 BPM

## 2018-12-14 DIAGNOSIS — E78.49 OTHER HYPERLIPIDEMIA: ICD-10-CM

## 2018-12-14 DIAGNOSIS — I49.9 CARDIAC ARRHYTHMIA, UNSPECIFIED CARDIAC ARRHYTHMIA TYPE: ICD-10-CM

## 2018-12-14 DIAGNOSIS — I25.10 CORONARY ARTERY DISEASE INVOLVING NATIVE CORONARY ARTERY OF NATIVE HEART WITHOUT ANGINA PECTORIS: Primary | ICD-10-CM

## 2018-12-14 NOTE — PATIENT INSTRUCTIONS
Have blood work drawn Aysha Cedillo end of February or beginning of March    Follow up with Choco Armstrong in 6 months

## 2018-12-14 NOTE — PROGRESS NOTES
HISTORY OF PRESENT ILLNESS  Lobito Noyola is a 61 y.o. female. .patient with h/o HLD, seen by cardiologist about 15 years ago for ventricular bigeminy recently moved to [facility], presented to [facility] with nausea vomiting and diarrhea, she had some abx for sore throat and then developed above symptoms  She was diagnosed with cdiff  While in hospital she developed cp and eventually ruled in for MI  Cardiac catheterization on 2/18:left main ok, lad minor irregs, lcx small, occluded distally with faint right to left collaterals. 30% prox rca, 50% mid pda, 85% distal rca treated with 3.25 by 18mm michelle to 10 david. No lv gram.  Echo on 2/18:Systolic function was normal. Ejection fraction was  estimated in the range of 50 % to 55 %. There was dyskinesis, possibly  aneurysmal formation of the apical wall(s). Doppler parameters were  consistent with abnormal left ventricular relaxation (grade 1 diastolic  dysfunction). Tricuspid valve: There was , regurgitation. holter on 7/18:NSR  rare episode of bundle branch block, 52 pacs , 1 episode of non sustained AT at111, 33 pvcs and 1 couplet  Stress echo on 7/18:Impressions: Abnormal study after maximal exercise without reproduction of  symptoms. Findings suggest single vessel coronary artery disease in the  territory of the right coronary artery. CATHETERIZATION on 8/18 with Dr. Gill:left main ok, lad mild irregs, occluded distal lcx with faint right to left collaterals. 30% prox rca, widely patent distal rca stent, 50% mid pda. All unchanged from 3/18 final result.  lvef 60%           Past Medical History:   Diagnosis Date    C. difficile colitis      CAD (coronary artery disease)      Colitis      Hypercholesteremia      Sepsis (HCC)      Vertigo of central origin of both ears       during allergy season            Past Surgical History:   Procedure Laterality Date    CARDIAC SURG PROCEDURE UNLIST   02/27/2018     1 stent    HX TUBAL LIGATION         Social History    Substance Use Topics     Smoking status: Never Smoker     Smokeless tobacco: Never Used     Alcohol use Yes         Comment: special occasions        HPI  Doing very well very active with no cp or sob  Review of Systems   Respiratory: Negative. Cardiovascular: Negative. Physical Exam  Physical Exam   Blood pressure 110/50, pulse (!) 56, resp. rate 16, height 5' 4\" (1.626 m), weight 64.4 kg (142 lb), SpO2 98 %. Constitutional: She is oriented to person, place, and time. She appears well-developed and well-nourished. No distress. HENT: Head: Normocephalic. Eyes: No scleral icterus. Neck: Normal range of motion. Neck supple. No JVD present. No tracheal deviation present. Cardiovascular: Normal rate, regular rhythm, normal heart sounds and intact distal pulses. Exam reveals no gallop and no friction rub. No murmur heard. Pulmonary/Chest: Effort normal and breath sounds normal. No stridor. No respiratory distress, wheezes or  rales. Abdominal: She exhibits no distension. Musculoskeletal: She exhibits no edema. Neurological: She is alert and oriented to person, place, and time. Coordination normal.   Skin: Skin is warm. No rash noted. Not diaphoretic. No erythema. Psychiatric:  Normal mood and affect. Behavior is normal.   Current Outpatient Medications on File Prior to Visit   Medication Sig Dispense Refill    rosuvastatin (CRESTOR) 40 mg tablet Take 1 Tab by mouth nightly. 90 Tab 2    metoprolol tartrate (LOPRESSOR) 50 mg tablet Take 1 Tab by mouth two (2) times a day. 60 Tab 0    prasugrel (EFFIENT) 10 mg tablet Take 10 mg by mouth daily.  L. acidophilus/Strept/La p-toro (OSCAR-Q PO) Take 1 Tab by mouth daily.  aspirin delayed-release 81 mg tablet Take 81 mg by mouth daily. No current facility-administered medications on file prior to visit.       Lab Results   Component Value Date/Time    Cholesterol, total 148 12/05/2018 09:17 AM    HDL Cholesterol 46 12/05/2018 09:17 AM    LDL, calculated 85 12/05/2018 09:17 AM    VLDL, calculated 17 12/05/2018 09:17 AM    Triglyceride 86 12/05/2018 09:17 AM     Lab Results   Component Value Date/Time    ALT (SGPT) 27 12/05/2018 09:17 AM    AST (SGOT) 30 12/05/2018 09:17 AM    Alk. phosphatase 60 12/05/2018 09:17 AM    Bilirubin, direct 0.34 12/05/2018 09:17 AM    Bilirubin, total 1.8 (H) 12/05/2018 09:17 AM         ASSESSMENT and PLAN  CAD: Status post MI in February 2018 and PCI and stent of PDA. no recurrent cp   Cardiac cath on 8/18 with no changes from previous  Continue asa effient toprol and crestor        Hypertension well-controlled     Hyperlipidemia: on crestor max dose and tolerating   Discussed virgie for loweing ldl <70 zetia and repatha discussed  She wants to try to recheck in 2 -3 months first  I feel that this is reasonnable     C. difficile: resolved     Fluttering: Discussed also results of Holter monitor. The burden of PVCs is not significant at this time and she tells me she did have a history of bigeminy in since 2007.     doin better also with palpitaions     See her back  in 6 months otherwise

## 2018-12-14 NOTE — PROGRESS NOTES
Visit Vitals  /50 (BP 1 Location: Left arm, BP Patient Position: Sitting)   Pulse (!) 56   Resp 16   Ht 5' 4\" (1.626 m)   Wt 142 lb (64.4 kg)   SpO2 98%   BMI 24.37 kg/m²

## 2018-12-20 ENCOUNTER — HOSPITAL ENCOUNTER (OUTPATIENT)
Dept: MAMMOGRAPHY | Age: 63
Discharge: HOME | End: 2018-12-20
Attending: INTERNAL MEDICINE
Payer: COMMERCIAL

## 2018-12-20 DIAGNOSIS — Z12.31 SCREENING MAMMOGRAM, ENCOUNTER FOR: ICD-10-CM

## 2018-12-20 PROCEDURE — 77067 SCR MAMMO BI INCL CAD: CPT

## 2019-02-14 ENCOUNTER — PATIENT MESSAGE (OUTPATIENT)
Dept: INTERNAL MEDICINE CLINIC | Age: 64
End: 2019-02-14

## 2019-03-07 ENCOUNTER — OFFICE VISIT (OUTPATIENT)
Dept: INTERNAL MEDICINE CLINIC | Age: 64
End: 2019-03-07

## 2019-03-07 VITALS
BODY MASS INDEX: 23.9 KG/M2 | TEMPERATURE: 98.2 F | HEART RATE: 54 BPM | RESPIRATION RATE: 19 BRPM | SYSTOLIC BLOOD PRESSURE: 120 MMHG | WEIGHT: 140 LBS | HEIGHT: 64 IN | OXYGEN SATURATION: 98 % | DIASTOLIC BLOOD PRESSURE: 80 MMHG

## 2019-03-07 DIAGNOSIS — K21.9 CHRONIC GERD: Primary | ICD-10-CM

## 2019-03-07 NOTE — PROGRESS NOTES
Pt is here c/o intermittent sob @ rest and more frequently when she's lying on her left side and or \"sitting around\". Pt also reports having pain to lower abd area radiating towards her chest when she is experiencing sob. Pt has a theory of the increase in her Crestor that might possibly be  causing these side effects.

## 2019-03-08 LAB
ALBUMIN SERPL-MCNC: 4.6 G/DL (ref 3.6–4.8)
ALP SERPL-CCNC: 74 IU/L (ref 39–117)
ALT SERPL-CCNC: 21 IU/L (ref 0–32)
AST SERPL-CCNC: 21 IU/L (ref 0–40)
BILIRUB DIRECT SERPL-MCNC: 0.27 MG/DL (ref 0–0.4)
BILIRUB SERPL-MCNC: 1.5 MG/DL (ref 0–1.2)
CHOLEST SERPL-MCNC: 157 MG/DL (ref 100–199)
HDLC SERPL-MCNC: 50 MG/DL
INTERPRETATION, 910389: NORMAL
LDLC SERPL CALC-MCNC: 86 MG/DL (ref 0–99)
PROT SERPL-MCNC: 7.6 G/DL (ref 6–8.5)
TRIGL SERPL-MCNC: 106 MG/DL (ref 0–149)
VLDLC SERPL CALC-MCNC: 21 MG/DL (ref 5–40)

## 2019-03-19 RX ORDER — PRASUGREL 10 MG/1
TABLET, FILM COATED ORAL
Qty: 90 TAB | Refills: 2 | Status: SHIPPED | OUTPATIENT
Start: 2019-03-19 | End: 2020-01-07

## 2019-03-22 NOTE — PROGRESS NOTES
Acute Care Note    Haily Median is 59 y.o. female. she presents for evaluation of Breathing Problem      The patient presents with complaints of shortness of breath and some mild cough. She notes that this is been ongoing for several months. She has been evaluated by gastroenterology who felt that she was having reflux. As such, she was advised to take Zantac. She did not begin this medication. Her symptoms have persisted. She notes discomfort when laying on her left side. She also has some mild cough. She has a history of coronary artery disease status post stenting. She has been seen and cleared by cardiology in regards to ongoing or worsening cardiac issues. Prior to Admission medications    Medication Sig Start Date End Date Taking? Authorizing Provider   rosuvastatin (CRESTOR) 40 mg tablet Take 1 Tab by mouth nightly. 10/4/18  Yes Edwin Lawson MD   metoprolol tartrate (LOPRESSOR) 50 mg tablet Take 1 Tab by mouth two (2) times a day. 7/16/18  Yes Edwin Lawson MD   prasugrel (EFFIENT) 10 mg tablet Take 10 mg by mouth daily. Yes Other, MD Palmira   L. acidophilus/Strept/La p-toro (OSCAR-Q PO) Take 1 Tab by mouth daily. Yes Provider, Historical   aspirin delayed-release 81 mg tablet Take 81 mg by mouth daily. Yes Provider, Historical   prasugrel (EFFIENT) 10 mg tablet TAKE 1 TABLET DAILY 3/19/19   Edwin Lawson MD         Patient Active Problem List   Diagnosis Code    Colitis K52.9    Severe sepsis ([facility] [address] A41.9, R65.20    Dehydration E86.0    Hypokalemia E87.6    Hypocalcemia E83.51    CAD (coronary artery disease) I25.10    Acute chest pain R07.9    Numbness and tingling in left upper extremity R20.0, R20.2    Chest pain R07.9         Review of Systems   Constitutional: Negative. Respiratory: Positive for cough and shortness of breath. Cardiovascular: Negative. Gastrointestinal: Negative.           Visit Vitals  /80 (BP 1 Location: Right arm, BP Patient Position: Sitting)   Pulse (!) 54   Temp 98.2 °F (36.8 °C) (Oral)   Resp 19   Ht 5' 4\" (1.626 m)   Wt 140 lb (63.5 kg)   SpO2 98%   BMI 24.03 kg/m²       Physical Exam   Constitutional: She appears well-developed and well-nourished. Cardiovascular: Normal rate and regular rhythm. Pulmonary/Chest: Effort normal and breath sounds normal.           ASSESSMENT/PLAN  Diagnoses and all orders for this visit:    1. Chronic GERD -I believe the patient's symptoms are likely secondary to GERD. She has not taken medication as suggested. I have asked her to begin the Zantac and to let me know how she does with it. The patient and her  endorsed understanding. Advised the patient to call back or return to office if symptoms worsen/change/persist.   Discussed expected course/resolution/complications of diagnosis in detail with patient. Medication risks/benefits/costs/interactions/alternatives discussed with patient. The patient was given an after visit summary which includes diagnoses, current medications, & vitals. They expressed understanding with the diagnosis and plan.

## 2019-03-25 ENCOUNTER — TELEPHONE (OUTPATIENT)
Dept: CARDIOLOGY CLINIC | Age: 64
End: 2019-03-25

## 2019-03-25 DIAGNOSIS — E78.49 OTHER HYPERLIPIDEMIA: Primary | ICD-10-CM

## 2019-03-25 RX ORDER — RANITIDINE 150 MG/1
150 TABLET, FILM COATED ORAL DAILY
COMMUNITY
End: 2019-03-28 | Stop reason: SDUPTHER

## 2019-03-25 RX ORDER — EZETIMIBE 10 MG/1
10 TABLET ORAL DAILY
Qty: 90 TAB | Refills: 1 | Status: SHIPPED | OUTPATIENT
Start: 2019-03-25 | End: 2019-09-10 | Stop reason: SDUPTHER

## 2019-03-25 NOTE — TELEPHONE ENCOUNTER
Requested Prescriptions     Signed Prescriptions Disp Refills    ezetimibe (ZETIA) 10 mg tablet 90 Tab 1     Sig: Take 1 Tab by mouth daily. Authorizing Provider: Gabriela Salgado     Ordering User: Hilda Isidro     Written order per Dr. Sam Wilson. Follow up scheduled on 6-14-19.

## 2019-03-25 NOTE — TELEPHONE ENCOUNTER
Per Dr. Hanks Degree: To start zetia 10 mg daily and recheck fasting lipid panel in 3 months. Identifiers x 2. Informed patient of the above. Verbalized understanding. Lab requisition mailed to patient. Patient wanted Dr. Latonya Varma to be aware that she is now taking zantac 150 mg daily. Medication profile updated. Written order per Dr. Hanks Degree.

## 2019-03-28 RX ORDER — RANITIDINE 150 MG/1
150 TABLET, FILM COATED ORAL DAILY
Qty: 90 TAB | Refills: 1 | Status: SHIPPED | OUTPATIENT
Start: 2019-03-28 | End: 2019-09-30 | Stop reason: SDUPTHER

## 2019-05-10 RX ORDER — METOPROLOL TARTRATE 50 MG/1
TABLET ORAL
Qty: 270 TAB | Refills: 2 | Status: SHIPPED | OUTPATIENT
Start: 2019-05-10 | End: 2020-01-07

## 2019-06-25 LAB
CHOLEST SERPL-MCNC: 145 MG/DL (ref 100–199)
HDLC SERPL-MCNC: 45 MG/DL
INTERPRETATION, 910389: NORMAL
LDLC SERPL CALC-MCNC: 79 MG/DL (ref 0–99)
TRIGL SERPL-MCNC: 107 MG/DL (ref 0–149)
VLDLC SERPL CALC-MCNC: 21 MG/DL (ref 5–40)

## 2019-06-28 ENCOUNTER — OFFICE VISIT (OUTPATIENT)
Dept: CARDIOLOGY CLINIC | Age: 64
End: 2019-06-28

## 2019-06-28 VITALS
WEIGHT: 141 LBS | DIASTOLIC BLOOD PRESSURE: 60 MMHG | BODY MASS INDEX: 24.07 KG/M2 | HEIGHT: 64 IN | HEART RATE: 55 BPM | RESPIRATION RATE: 16 BRPM | SYSTOLIC BLOOD PRESSURE: 120 MMHG | OXYGEN SATURATION: 98 %

## 2019-06-28 DIAGNOSIS — I25.10 CORONARY ARTERY DISEASE INVOLVING NATIVE CORONARY ARTERY OF NATIVE HEART WITHOUT ANGINA PECTORIS: Primary | ICD-10-CM

## 2019-06-28 RX ORDER — ASPIRIN 81 MG/1
81 TABLET ORAL DAILY
COMMUNITY

## 2019-06-28 NOTE — PROGRESS NOTES
HISTORY OF PRESENT ILLNESS  Gilma Garrett is a 59 y.o. female. patient with h/o HLD, seen by cardiologist about 15 years ago for ventricular bigeminy recently moved to [facility], presented to [facility] with nausea vomiting and diarrhea, she had some abx for sore throat and then developed above symptoms  She was diagnosed with cdiff  While in hospital she developed cp and eventually ruled in for MI  Cardiac catheterization on 2/18:left main ok, lad minor irregs, lcx small, occluded distally with faint right to left collaterals. 30% prox rca, 50% mid pda, 85% distal rca treated with 3.25 by 18mm michelle to 10 david. No lv gram.  Echo on 2/18:Systolic function was normal. Ejection fraction was  estimated in the range of 50 % to 55 %. There was dyskinesis, possibly  aneurysmal formation of the apical wall(s). Doppler parameters were  consistent with abnormal left ventricular relaxation (grade 1 diastolic  dysfunction). Tricuspid valve: There was , regurgitation. holter on 7/18:NSR  rare episode of bundle branch block, 52 pacs , 1 episode of non sustained AT at111, 33 pvcs and 1 couplet  Stress echo on 7/18:Impressions: Abnormal study after maximal exercise without reproduction of  symptoms. Findings suggest single vessel coronary artery disease in the  territory of the right coronary artery. CATHETERIZATION on 8/18 with Dr. Gill:left main ok, lad mild irregs, occluded distal lcx with faint right to left collaterals. 30% prox rca, widely patent distal rca stent, 50% mid pda. All unchanged from 3/18 final result.  lvef 60%                Past Medical History:   Diagnosis Date    C. difficile colitis      CAD (coronary artery disease)      Colitis      Hypercholesteremia      Sepsis (HCC)      Vertigo of central origin of both ears       during allergy season                Past Surgical History:   Procedure Laterality Date    CARDIAC SURG PROCEDURE UNLIST   02/27/2018     1 stent    HX TUBAL LIGATION                      Social History     Substance Use Topics      Smoking status: Never Smoker      Smokeless tobacco: Never Used      Alcohol use Yes         Comment: special occasions         HPI  Doing very well    no cp or sob she remains relatively active   started zantac and chest twinges have resolved  Review of Systems   Constitutional: Negative. Respiratory: Negative. Cardiovascular: Negative. Neurological: Negative. Physical Exam  Physical Exam   Blood pressure 120/60, pulse (!) 55, resp. rate 16, height 5' 4\" (1.626 m), weight 141 lb (64 kg), SpO2 98 %. Constitutional: She is oriented to person, place, and time. She appears well-developed and well-nourished. No distress. HENT: Head: Normocephalic. Eyes: No scleral icterus. Neck: Normal range of motion. Neck supple. No JVD present. No tracheal deviation present. Cardiovascular: Normal rate, regular rhythm, normal heart sounds and intact distal pulses. Exam reveals no gallop and no friction rub. No murmur heard. Pulmonary/Chest: Effort normal and breath sounds normal. No stridor. No respiratory distress, wheezes or  rales. Abdominal: She exhibits no distension. Musculoskeletal: She exhibits no edema. Neurological: She is alert and oriented to person, place, and time. Coordination normal.   Skin: Skin is warm. No rash noted. Not diaphoretic. No erythema. Psychiatric:  Normal mood and affect. Behavior is normal.   Current Outpatient Medications on File Prior to Visit   Medication Sig Dispense Refill    BIOTIN PO Take 1 Cap by mouth daily.  raNITIdine (ZANTAC) 150 mg tablet Take 1 Tab by mouth daily. 90 Tab 1    ezetimibe (ZETIA) 10 mg tablet Take 1 Tab by mouth daily. 90 Tab 1    prasugrel (EFFIENT) 10 mg tablet TAKE 1 TABLET DAILY 90 Tab 2    rosuvastatin (CRESTOR) 40 mg tablet Take 1 Tab by mouth nightly. 90 Tab 2    metoprolol tartrate (LOPRESSOR) 50 mg tablet Take 1 Tab by mouth two (2) times a day.  60 Tab 0    L. acidophilus/Strept/La p-toro (OSCAR-Q PO) Take 1 Tab by mouth daily.  aspirin delayed-release 81 mg tablet Take 81 mg by mouth every other day.  metoprolol tartrate (LOPRESSOR) 50 mg tablet TAKE 1 TABLET THREE TIMES A  Tab 2    prasugrel (EFFIENT) 10 mg tablet Take 10 mg by mouth daily. No current facility-administered medications on file prior to visit. Lab Results   Component Value Date/Time    Cholesterol, total 145 06/24/2019 09:03 AM    HDL Cholesterol 45 06/24/2019 09:03 AM    LDL, calculated 79 06/24/2019 09:03 AM    VLDL, calculated 21 06/24/2019 09:03 AM    Triglyceride 107 06/24/2019 09:03 AM     Lab Results   Component Value Date/Time    ALT (SGPT) 21 03/07/2019 08:52 AM    AST (SGOT) 21 03/07/2019 08:52 AM    Alk. phosphatase 74 03/07/2019 08:52 AM    Bilirubin, direct 0.27 03/07/2019 08:52 AM    Bilirubin, total 1.5 (H) 03/07/2019 08:52 AM       ASSESSMENT and PLAN  CAD: Status post MI in February 2018 and PCI and stent of PDA. no recurrent cp   Cardiac cath on 8/18 with no changes from previous  Continue asa  toprol and crestor  Excessive bruising reported  Continue effient for a total of 18 months then stop and continue asa 81 mg daily  Her ECG is unchanged with NSR and nstt        Hypertension well-controlled     Hyperlipidemia: on crestor max dose and tolerating also on zetia   discussed lipids.  Better but not quite at goal   no additional medications for now   we have discussed at length exercise ( at least 150 minutes a week ) and nutrition  repatha on hold for now     C. difficile: resolved     Fluttering: Discussed also results of Holter monitor.  The burden of PVCs is not significant at this time and she tells me she did have a history of bigeminy in since 2007.    doing better also with palpitations  Continue with metoprolol       See her back  in 6 months otherwise

## 2019-06-28 NOTE — PATIENT INSTRUCTIONS
Have blood work drawn in 6 months (1 week prior appointment)    Stop Effient end of August    Start Aspirin 81 mg daily

## 2019-09-10 RX ORDER — EZETIMIBE 10 MG/1
TABLET ORAL
Qty: 90 TAB | Refills: 1 | Status: SHIPPED | OUTPATIENT
Start: 2019-09-10 | End: 2020-01-07

## 2019-09-10 NOTE — TELEPHONE ENCOUNTER
Requested Prescriptions     Signed Prescriptions Disp Refills    ezetimibe (ZETIA) 10 mg tablet 90 Tab 1     Sig: TAKE 1 TABLET DAILY     Authorizing Provider: Omer Moreno     Ordering User: Soledad Liz     Verbal order per Dr. Ruddy Rojas.       Future Appointments   Date Time Provider Georgina Camejoisti   11/12/2019  3:00 PM Alanna Ortiz MD [id number] [facility]   12/30/2019  8:40 AM Raina Gilbert  [address]

## 2019-09-30 DIAGNOSIS — K21.9 GASTROESOPHAGEAL REFLUX DISEASE WITHOUT ESOPHAGITIS: Primary | ICD-10-CM

## 2019-10-01 RX ORDER — RANITIDINE 150 MG/1
TABLET, FILM COATED ORAL
Qty: 90 TAB | Refills: 4 | Status: SHIPPED | OUTPATIENT
Start: 2019-10-01 | End: 2019-11-12

## 2019-10-01 RX ORDER — RANITIDINE 150 MG/1
150 TABLET, FILM COATED ORAL DAILY
Qty: 90 TAB | Refills: 1 | Status: SHIPPED | OUTPATIENT
Start: 2019-10-01 | End: 2019-10-01 | Stop reason: SDUPTHER

## 2019-10-23 DIAGNOSIS — Z00.00 ROUTINE GENERAL MEDICAL EXAMINATION AT A HEALTH CARE FACILITY: Primary | ICD-10-CM

## 2019-11-06 LAB
ALBUMIN SERPL-MCNC: 4.7 G/DL (ref 3.6–4.8)
ALBUMIN/GLOB SERPL: 1.7 {RATIO} (ref 1.2–2.2)
ALP SERPL-CCNC: 66 IU/L (ref 39–117)
ALT SERPL-CCNC: 66 IU/L (ref 0–32)
APPEARANCE UR: ABNORMAL
AST SERPL-CCNC: 62 IU/L (ref 0–40)
BACTERIA #/AREA URNS HPF: NORMAL /[HPF]
BASOPHILS # BLD AUTO: 0.1 X10E3/UL (ref 0–0.2)
BASOPHILS NFR BLD AUTO: 1 %
BILIRUB SERPL-MCNC: 2.8 MG/DL (ref 0–1.2)
BILIRUB UR QL STRIP: NEGATIVE
BUN SERPL-MCNC: 12 MG/DL (ref 8–27)
BUN/CREAT SERPL: 10 (ref 12–28)
CALCIUM SERPL-MCNC: 9.3 MG/DL (ref 8.7–10.3)
CASTS URNS QL MICRO: NORMAL /LPF
CHLORIDE SERPL-SCNC: 105 MMOL/L (ref 96–106)
CHOLEST SERPL-MCNC: 151 MG/DL (ref 100–199)
CO2 SERPL-SCNC: 25 MMOL/L (ref 20–29)
COLOR UR: YELLOW
CREAT SERPL-MCNC: 1.21 MG/DL (ref 0.57–1)
EOSINOPHIL # BLD AUTO: 0.2 X10E3/UL (ref 0–0.4)
EOSINOPHIL NFR BLD AUTO: 4 %
EPI CELLS #/AREA URNS HPF: NORMAL /HPF (ref 0–10)
ERYTHROCYTE [DISTWIDTH] IN BLOOD BY AUTOMATED COUNT: 11.6 % (ref 12.3–15.4)
EST. AVERAGE GLUCOSE BLD GHB EST-MCNC: 103 MG/DL
GLOBULIN SER CALC-MCNC: 2.7 G/DL (ref 1.5–4.5)
GLUCOSE SERPL-MCNC: 90 MG/DL (ref 65–99)
GLUCOSE UR QL: NEGATIVE
HBA1C MFR BLD: 5.2 % (ref 4.8–5.6)
HCT VFR BLD AUTO: 41.3 % (ref 34–46.6)
HDLC SERPL-MCNC: 45 MG/DL
HGB BLD-MCNC: 13.2 G/DL (ref 11.1–15.9)
HGB UR QL STRIP: ABNORMAL
IMM GRANULOCYTES # BLD AUTO: 0 X10E3/UL (ref 0–0.1)
IMM GRANULOCYTES NFR BLD AUTO: 0 %
KETONES UR QL STRIP: NEGATIVE
LDLC SERPL CALC-MCNC: 84 MG/DL (ref 0–99)
LEUKOCYTE ESTERASE UR QL STRIP: NEGATIVE
LYMPHOCYTES # BLD AUTO: 1.9 X10E3/UL (ref 0.7–3.1)
LYMPHOCYTES NFR BLD AUTO: 36 %
MCH RBC QN AUTO: 30.8 PG (ref 26.6–33)
MCHC RBC AUTO-ENTMCNC: 32 G/DL (ref 31.5–35.7)
MCV RBC AUTO: 96 FL (ref 79–97)
MICRO URNS: ABNORMAL
MONOCYTES # BLD AUTO: 0.5 X10E3/UL (ref 0.1–0.9)
MONOCYTES NFR BLD AUTO: 10 %
MUCOUS THREADS URNS QL MICRO: PRESENT
NEUTROPHILS # BLD AUTO: 2.5 X10E3/UL (ref 1.4–7)
NEUTROPHILS NFR BLD AUTO: 49 %
NITRITE UR QL STRIP: NEGATIVE
PH UR STRIP: 5 [PH] (ref 5–7.5)
PLATELET # BLD AUTO: 214 X10E3/UL (ref 150–450)
POTASSIUM SERPL-SCNC: 4.6 MMOL/L (ref 3.5–5.2)
PROT SERPL-MCNC: 7.4 G/DL (ref 6–8.5)
PROT UR QL STRIP: ABNORMAL
RBC # BLD AUTO: 4.29 X10E6/UL (ref 3.77–5.28)
RBC #/AREA URNS HPF: NORMAL /HPF (ref 0–2)
SODIUM SERPL-SCNC: 145 MMOL/L (ref 134–144)
SP GR UR: 1.02 (ref 1–1.03)
TRIGL SERPL-MCNC: 110 MG/DL (ref 0–149)
TSH SERPL DL<=0.005 MIU/L-ACNC: 1.11 UIU/ML (ref 0.45–4.5)
UROBILINOGEN UR STRIP-MCNC: 0.2 MG/DL (ref 0.2–1)
VLDLC SERPL CALC-MCNC: 22 MG/DL (ref 5–40)
WBC # BLD AUTO: 5.1 X10E3/UL (ref 3.4–10.8)
WBC #/AREA URNS HPF: NORMAL /HPF (ref 0–5)

## 2019-11-11 ENCOUNTER — TELEPHONE (OUTPATIENT)
Dept: CARDIOLOGY CLINIC | Age: 64
End: 2019-11-11

## 2019-11-12 ENCOUNTER — OFFICE VISIT (OUTPATIENT)
Dept: INTERNAL MEDICINE CLINIC | Age: 64
End: 2019-11-12

## 2019-11-12 VITALS
HEIGHT: 64 IN | TEMPERATURE: 98.5 F | RESPIRATION RATE: 18 BRPM | SYSTOLIC BLOOD PRESSURE: 120 MMHG | HEART RATE: 47 BPM | DIASTOLIC BLOOD PRESSURE: 70 MMHG | OXYGEN SATURATION: 98 % | BODY MASS INDEX: 23.9 KG/M2 | WEIGHT: 140 LBS

## 2019-11-12 DIAGNOSIS — R80.0 ISOLATED PROTEINURIA WITHOUT SPECIFIC MORPHOLOGIC LESION: ICD-10-CM

## 2019-11-12 DIAGNOSIS — Z00.00 WELL ADULT EXAM: Primary | ICD-10-CM

## 2019-11-12 DIAGNOSIS — Z13.820 OSTEOPOROSIS SCREENING: ICD-10-CM

## 2019-11-12 DIAGNOSIS — K21.9 GASTROESOPHAGEAL REFLUX DISEASE, ESOPHAGITIS PRESENCE NOT SPECIFIED: ICD-10-CM

## 2019-11-12 DIAGNOSIS — E80.6 INDIRECT HYPERBILIRUBINEMIA: ICD-10-CM

## 2019-11-12 DIAGNOSIS — Z23 ENCOUNTER FOR IMMUNIZATION: ICD-10-CM

## 2019-11-12 RX ORDER — FAMOTIDINE 20 MG/1
20 TABLET, FILM COATED ORAL 2 TIMES DAILY
Qty: 90 TAB | Refills: 1 | Status: SHIPPED | OUTPATIENT
Start: 2019-11-12

## 2019-11-12 NOTE — PROGRESS NOTES
Comprehensive Physical Examination    Benjamín Jensen is a 59 y.o. female. she presents for a comprehensive physical examination. Cardiovascular Review  The patient has coronary artery disease. She reports taking medications as instructed, no medication side effects noted. Diet and Lifestyle: generally follows a low fat low cholesterol diet, generally follows a low sodium diet. Lab review: labs are reviewed, up to date and normal, labs reviewed and discussed with patient. On review of labs, the patient has an elevated bilirubin. She has no abdominal discomfort. There is an associated transaminitis. We discussed possibilities as to cause. Patient Active Problem List    Diagnosis Date Noted    CAD (coronary artery disease) 05/23/2018    Acute chest pain 05/23/2018    Numbness and tingling in left upper extremity 05/23/2018    Chest pain 05/23/2018    Colitis 02/24/2018    Severe sepsis ([address] 02/24/2018    Dehydration 02/24/2018    Hypokalemia 02/24/2018    Hypocalcemia 02/24/2018     Current Outpatient Medications   Medication Sig Dispense Refill    ezetimibe (ZETIA) 10 mg tablet TAKE 1 TABLET DAILY 90 Tab 1    rosuvastatin (CRESTOR) 40 mg tablet TAKE 1 TABLET NIGHTLY 90 Tab 2    BIOTIN PO Take 1 Cap by mouth daily.  aspirin delayed-release 81 mg tablet Take 81 mg by mouth daily.  metoprolol tartrate (LOPRESSOR) 50 mg tablet TAKE 1 TABLET THREE TIMES A  Tab 2    metoprolol tartrate (LOPRESSOR) 50 mg tablet Take 1 Tab by mouth two (2) times a day. 60 Tab 0    prasugrel (EFFIENT) 10 mg tablet Take 10 mg by mouth daily.  L. acidophilus/Strept/La p-toro (OSCAR-Q PO) Take 1 Tab by mouth daily.       raNITIdine (ZANTAC) 150 mg tablet TAKE 1 TABLET DAILY 90 Tab 4    prasugrel (EFFIENT) 10 mg tablet TAKE 1 TABLET DAILY 90 Tab 2     Allergies   Allergen Reactions    Other Medication Other (comments)     Diarrhea from cephalosporins     Past Medical History:   Diagnosis Date    C. difficile colitis     CAD (coronary artery disease)     Colitis     Hypercholesteremia     Sepsis ([address]     Vertigo of central origin of both ears     during allergy season     Past Surgical History:   Procedure Laterality Date    CARDIAC SURG PROCEDURE UNLIST  02/27/2018    1 stent    HX TUBAL LIGATION      HX WRIST FRACTURE TX Left 2016    ganglian cyst removal      Family History   Problem Relation Age of Onset    Heart Disease Mother     Heart Attack Mother     Heart Disease Father     Heart Attack Father     Diabetes Brother     No Known Problems Brother     Cancer Brother         throat cancer     Social History     Tobacco Use    Smoking status: Never Smoker    Smokeless tobacco: Never Used   Substance Use Topics    Alcohol use: Yes     Frequency: Monthly or less     Drinks per session: 1 or 2     Binge frequency: Never     Comment: special occasions        Health Maintenance   Topic Date Due    Shingrix Vaccine Age 50> (1 of 2) 01/13/2005    Influenza Age 5 to Adult  08/01/2019    Bone Densitometry (Dexa) Screening  01/13/2020    BREAST CANCER SCRN MAMMOGRAM  12/20/2020    PAP AKA CERVICAL CYTOLOGY  10/18/2021    DTaP/Tdap/Td series (2 - Td) 11/07/2022    COLONOSCOPY  12/28/2028    Hepatitis C Screening  Completed    Pneumococcal 0-64 years  Aged Out         Review of Systems   Constitutional: Negative. Respiratory: Negative. Cardiovascular: Negative. Gastrointestinal: Negative. Visit Vitals  /70 (BP 1 Location: Left arm, BP Patient Position: Sitting)   Pulse (!) 47   Temp 98.5 °F (36.9 °C) (Oral)   Resp 18   Ht 5' 4\" (1.626 m)   Wt 140 lb (63.5 kg)   SpO2 98%   BMI 24.03 kg/m²       Physical Exam   Constitutional: She is oriented to person, place, and time and well-developed, well-nourished, and in no distress. No distress. HENT:   Mouth/Throat: Oropharynx is clear and moist.   Neck: Neck supple.    Cardiovascular: Normal rate and regular rhythm. No murmur heard. Pulmonary/Chest: Effort normal and breath sounds normal. She has no wheezes. Abdominal: Soft. Bowel sounds are normal.   Musculoskeletal: Normal range of motion. Neurological: She is alert and oriented to person, place, and time. ASSESSMENT/PLAN  Diagnoses and all orders for this visit:    1. Well adult exam  -     IN IMMUNIZ ADMIN,1 SINGLE/COMB VAC/TOXOID  -     INFLUENZA VIRUS VAC QUAD,SPLIT,PRESV FREE SYRINGE IM    2. Osteoporosis screening  -     DEXA BONE DENSITY STUDY AXIAL; Future    3. Indirect hyperbilirubinemia - Discussed with the patient, there is a possibility that her medications could be at fault (statin plus Zetia) for the transaminase elevation. However, given the hyperbilirubinemia as well, will ask for a hepatology opinion.   -     REFERRAL TO LIVER HEPATOLOGY    4. Gastroesophageal reflux disease, esophagitis presence not specified  -     famotidine (PEPCID) 20 mg tablet; Take 1 Tab by mouth two (2) times a day. 5. Isolated proteinuria without specific morphologic lesion  -     URINALYSIS W/ RFLX MICROSCOPIC    Follow-up and Dispositions    · Return in about 6 months (around 5/12/2020).

## 2019-12-30 ENCOUNTER — DOCUMENTATION ONLY (OUTPATIENT)
Dept: INTERNAL MEDICINE CLINIC | Age: 64
End: 2019-12-30

## 2020-01-07 ENCOUNTER — OFFICE VISIT (OUTPATIENT)
Dept: CARDIOLOGY CLINIC | Age: 65
End: 2020-01-07

## 2020-01-07 VITALS
BODY MASS INDEX: 24.92 KG/M2 | SYSTOLIC BLOOD PRESSURE: 120 MMHG | RESPIRATION RATE: 18 BRPM | WEIGHT: 146 LBS | OXYGEN SATURATION: 98 % | DIASTOLIC BLOOD PRESSURE: 60 MMHG | HEART RATE: 52 BPM | HEIGHT: 64 IN

## 2020-01-07 DIAGNOSIS — E78.49 OTHER HYPERLIPIDEMIA: ICD-10-CM

## 2020-01-07 DIAGNOSIS — I25.10 CORONARY ARTERY DISEASE INVOLVING NATIVE CORONARY ARTERY OF NATIVE HEART WITHOUT ANGINA PECTORIS: Primary | ICD-10-CM

## 2020-01-07 NOTE — PATIENT INSTRUCTIONS
Stop taking zetia. Have fasting labs obtained in 8-10 weeks. Follow up with Dr. Sy Nurse in 6 months.

## 2020-01-07 NOTE — PROGRESS NOTES
HISTORY OF PRESENT ILLNESS  Alfred Badillo is a 59 y.o. female. patient with h/o HLD, seen by cardiologist about 15 years ago for ventricular bigeminy  moved to [facility] in 2018  On 2/18 she presented to [facility] with nausea vomiting and diarrhea, she had some abx for sore throat and then developed above symptoms  She was diagnosed with cdiff  While in hospital she developed cp and eventually ruled in for MI  Cardiac catheterization on 2/18:left main ok, lad minor irregs, lcx small, occluded distally with faint right to left collaterals. 30% prox rca, 50% mid pda, 85% distal rca treated with 3.25 by 18mm michelle to 10 david. No lv gram.  Echo on 2/18:Systolic function was normal. Ejection fraction was  estimated in the range of 50 % to 55 %. There was dyskinesis, possibly  aneurysmal formation of the apical wall(s). Doppler parameters were  consistent with abnormal left ventricular relaxation (grade 1 diastolic  dysfunction). Tricuspid valve: There was , regurgitation. holter on 7/18:NSR  rare episode of bundle branch block, 52 pacs , 1 episode of non sustained AT at111, 33 pvcs and 1 couplet  Stress echo on 7/18:Impressions: Abnormal study after maximal exercise without reproduction of  symptoms. Findings suggest single vessel coronary artery disease in the  territory of the right coronary artery. CATHETERIZATION on 8/18 with Dr. Gill:left main ok, lad mild irregs, occluded distal lcx with faint right to left collaterals. 30% prox rca, widely patent distal rca stent, 50% mid pda. All unchanged from 3/18 final result.  lvef 60%                Past Medical History:   Diagnosis Date    C. difficile colitis      CAD (coronary artery disease)      Colitis      Hypercholesteremia      Sepsis (HCC)      Vertigo of central origin of both ears       during allergy season                Past Surgical History:   Procedure Laterality Date    CARDIAC SURG PROCEDURE UNLIST   02/27/2018     1 stent    HX TUBAL LIGATION                        Social History     Substance Use Topics      Smoking status: Never Smoker      Smokeless tobacco: Never Used      Alcohol use Yes         Comment: special occasions          HPI  Doing very well   no cp or sob reported  Review of Systems   Respiratory: Negative. Cardiovascular: Negative. Physical Exam   Physical Exam   Blood pressure 120/60, pulse (!) 52, resp. rate 18, height 5' 4\" (1.626 m), weight 146 lb (66.2 kg), SpO2 98 %. Constitutional: She is oriented to person, place, and time. She appears well-developed and well-nourished. No distress. HENT: Head: Normocephalic. Eyes: No scleral icterus. Neck: Normal range of motion. Neck supple. No JVD present. No tracheal deviation present. Cardiovascular: Normal rate, regular rhythm, normal heart sounds and intact distal pulses. Exam reveals no gallop and no friction rub. No murmur heard. Pulmonary/Chest: Effort normal and breath sounds normal. No stridor. No respiratory distress, wheezes or  rales. Abdominal: She exhibits no distension. Musculoskeletal: She exhibits no edema. Neurological: She is alert and oriented to person, place, and time. Coordination normal.   Skin: Skin is warm. No rash noted. Not diaphoretic. No erythema. Psychiatric:  Normal mood and affect. Behavior is normal.   Current Outpatient Medications on File Prior to Visit   Medication Sig Dispense Refill    famotidine (PEPCID) 20 mg tablet Take 1 Tab by mouth two (2) times a day. 90 Tab 1    ezetimibe (ZETIA) 10 mg tablet TAKE 1 TABLET DAILY 90 Tab 1    rosuvastatin (CRESTOR) 40 mg tablet TAKE 1 TABLET NIGHTLY 90 Tab 2    BIOTIN PO Take 1 Cap by mouth daily.  aspirin delayed-release 81 mg tablet Take 81 mg by mouth daily.  metoprolol tartrate (LOPRESSOR) 50 mg tablet Take 1 Tab by mouth two (2) times a day. 60 Tab 0    L. acidophilus/Strept/La p-toro (OSCAR-Q PO) Take 1 Tab by mouth daily.       metoprolol tartrate (LOPRESSOR) 50 mg tablet TAKE 1 TABLET THREE TIMES A  Tab 2    prasugrel (EFFIENT) 10 mg tablet TAKE 1 TABLET DAILY 90 Tab 2    prasugrel (EFFIENT) 10 mg tablet Take 10 mg by mouth daily. No current facility-administered medications on file prior to visit. Lab Results   Component Value Date/Time    Cholesterol, total 151 11/05/2019 09:13 AM    HDL Cholesterol 45 11/05/2019 09:13 AM    LDL, calculated 84 11/05/2019 09:13 AM    VLDL, calculated 22 11/05/2019 09:13 AM    Triglyceride 110 11/05/2019 09:13 AM     Lab Results   Component Value Date/Time    ALT (SGPT) 66 (H) 11/05/2019 09:13 AM    AST (SGOT) 62 (H) 11/05/2019 09:13 AM    Alk. phosphatase 66 11/05/2019 09:13 AM    Bilirubin, direct 0.33 11/05/2019 09:07 AM    Bilirubin, total 2.8 (H) 11/05/2019 09:13 AM     Current Outpatient Medications on File Prior to Visit   Medication Sig Dispense Refill    famotidine (PEPCID) 20 mg tablet Take 1 Tab by mouth two (2) times a day. 90 Tab 1    ezetimibe (ZETIA) 10 mg tablet TAKE 1 TABLET DAILY 90 Tab 1    rosuvastatin (CRESTOR) 40 mg tablet TAKE 1 TABLET NIGHTLY 90 Tab 2    BIOTIN PO Take 1 Cap by mouth daily.  aspirin delayed-release 81 mg tablet Take 81 mg by mouth daily.  metoprolol tartrate (LOPRESSOR) 50 mg tablet Take 1 Tab by mouth two (2) times a day. 60 Tab 0    L. acidophilus/Strept/La p-toro (OSCAR-Q PO) Take 1 Tab by mouth daily.  metoprolol tartrate (LOPRESSOR) 50 mg tablet TAKE 1 TABLET THREE TIMES A  Tab 2    prasugrel (EFFIENT) 10 mg tablet TAKE 1 TABLET DAILY 90 Tab 2    prasugrel (EFFIENT) 10 mg tablet Take 10 mg by mouth daily. No current facility-administered medications on file prior to visit.         ASSESSMENT and PLAN  CAD: Status post MI in February 2018 and PCI and stent of PDA. no recurrent cp   Cardiac cath on 8/18 with no changes from previous  Continue asa  to prol and crestor  Now off effient ( no longer with bruising)  Her ECG is unchanged with NSR and nstt        Hypertension well-controlled     Hyperlipidemia: on crestor max dose    discussed lipids. Better but not quite at goal     we have discussed at length exercise ( at least 150 minutes a week ) and nutrition  repatha on hold for now as we have discussed together      ldl really not improved with zetia this will be stopped and lipids re checked in 8-10 weeks  Elevation in tbil and lfts noted  Possible effect of crestor?   Patient to see hepatologist first     C. difficile: resolved     Fluttering: Discussed also results of Holter monitor.  The burden of PVCs is not significant at this time and she tells me she did have a history of bigeminy in since 2007.    doing better also with palpitations  Continue with metoprolol 50 mg bid she seems to tolerate without significant issues        See her back  in 6 months otherwise

## 2020-01-07 NOTE — PROGRESS NOTES
Visit Vitals  /60 (BP 1 Location: Left arm, BP Patient Position: Sitting)   Pulse (!) 52   Resp 18   Ht 5' 4\" (1.626 m)   Wt 146 lb (66.2 kg)   SpO2 98%   BMI 25.06 kg/m²

## 2020-01-09 ENCOUNTER — HOSPITAL ENCOUNTER (OUTPATIENT)
Dept: MAMMOGRAPHY | Age: 65
Discharge: HOME | End: 2020-01-09
Attending: INTERNAL MEDICINE
Payer: MEDICARE

## 2020-01-09 ENCOUNTER — HOSPITAL ENCOUNTER (OUTPATIENT)
Dept: BONE DENSITY | Age: 65
Discharge: HOME | End: 2020-01-09
Attending: INTERNAL MEDICINE
Payer: MEDICARE

## 2020-01-09 DIAGNOSIS — Z13.820 OSTEOPOROSIS SCREENING: ICD-10-CM

## 2020-01-09 DIAGNOSIS — Z12.31 VISIT FOR SCREENING MAMMOGRAM: ICD-10-CM

## 2020-01-09 PROCEDURE — 77067 SCR MAMMO BI INCL CAD: CPT

## 2020-01-09 PROCEDURE — 77080 DXA BONE DENSITY AXIAL: CPT

## 2020-01-14 ENCOUNTER — TELEPHONE (OUTPATIENT)
Dept: HEMATOLOGY | Age: 65
End: 2020-01-14

## 2020-01-14 NOTE — TELEPHONE ENCOUNTER
Left voicemail to pt to scheduled New Pt appointment.  Diagnosis: Indirect Hyperbiliubinemia  Referred by: [facility] [phone] RECORD RECEIVED

## 2020-01-21 ENCOUNTER — TELEPHONE (OUTPATIENT)
Dept: HEMATOLOGY | Age: 65
End: 2020-01-21

## 2020-01-21 NOTE — TELEPHONE ENCOUNTER
Called pt and spoke to  Isa Garcia, he said that the pt was schedule before and they just cancelled it yesterday because the pt cardiologist stated to the pt that she needs to finish her Statin medication first which it will take for 2 to 3 months before she needs to be seen by a Liver specialist.

## 2020-03-20 RX ORDER — ROSUVASTATIN CALCIUM 40 MG/1
TABLET, COATED ORAL
Qty: 90 TAB | Refills: 1 | Status: SHIPPED | OUTPATIENT
Start: 2020-03-20 | End: 2020-09-21

## 2020-03-20 NOTE — TELEPHONE ENCOUNTER
Requested Prescriptions     Signed Prescriptions Disp Refills    rosuvastatin (CRESTOR) 40 mg tablet 90 Tab 1     Sig: TAKE 1 TABLET NIGHTLY     Authorizing Provider: Valeria Verduzco     Ordering User: Verna Lopez     Verbal order per Dr. Zuñiga Charter.     Future Appointments   Date Time Provider Georgina Levin   7/7/2020  3:00 PM Nik Johansen  [address]

## 2020-06-12 RX ORDER — METOPROLOL TARTRATE 50 MG/1
TABLET ORAL
Qty: 270 TAB | Refills: 3 | Status: SHIPPED | OUTPATIENT
Start: 2020-06-12 | End: 2020-10-23 | Stop reason: DRUGHIGH

## 2020-06-20 LAB
ALBUMIN SERPL-MCNC: 4.2 G/DL (ref 3.8–4.8)
ALP SERPL-CCNC: 63 IU/L (ref 39–117)
ALT SERPL-CCNC: 27 IU/L (ref 0–32)
AST SERPL-CCNC: 31 IU/L (ref 0–40)
BILIRUB DIRECT SERPL-MCNC: 0.34 MG/DL (ref 0–0.4)
BILIRUB SERPL-MCNC: 2.2 MG/DL (ref 0–1.2)
CHOLEST SERPL-MCNC: 151 MG/DL (ref 100–199)
HDLC SERPL-MCNC: 49 MG/DL
INTERPRETATION, 910389: NORMAL
LDLC SERPL CALC-MCNC: 82 MG/DL (ref 0–99)
PROT SERPL-MCNC: 6.9 G/DL (ref 6–8.5)
TRIGL SERPL-MCNC: 101 MG/DL (ref 0–149)
VLDLC SERPL CALC-MCNC: 20 MG/DL (ref 5–40)

## 2020-09-21 RX ORDER — ROSUVASTATIN CALCIUM 40 MG/1
TABLET, COATED ORAL
Qty: 90 TAB | Refills: 0 | Status: SHIPPED | OUTPATIENT
Start: 2020-09-21 | End: 2020-12-15

## 2020-10-07 ENCOUNTER — OFFICE VISIT (OUTPATIENT)
Dept: INTERNAL MEDICINE CLINIC | Age: 65
End: 2020-10-07
Payer: MEDICARE

## 2020-10-07 VITALS — TEMPERATURE: 97.6 F

## 2020-10-07 DIAGNOSIS — Z23 NEEDS FLU SHOT: Primary | ICD-10-CM

## 2020-10-07 PROCEDURE — G0008 ADMIN INFLUENZA VIRUS VAC: HCPCS | Performed by: INTERNAL MEDICINE

## 2020-10-07 PROCEDURE — 90694 VACC AIIV4 NO PRSRV 0.5ML IM: CPT | Performed by: INTERNAL MEDICINE

## 2020-10-07 NOTE — PROGRESS NOTES
Pharmacy Note - Immunizations    Ian Elliott is a 72 y.o.  female  who present for a flu shot. She denies any symptoms, reactions or allergies that would exclude them from being immunized today. Risks and adverse reactions were discussed and the VIS was given to them. All questions were addressed. Verbal order received from Dr. Nicoletta Hodgkin    Patient was observed for 10 min post injection. There were no reactions observed.     JANNETTE PrescottD BCACP    CLINICAL PHARMACY CONSULT: MED RECONCILIATION/REVIEW ADDENDUM    For Pharmacy Admin Tracking Only    PHSO: PHSO Patient?: No  Total # of Interventions Recommended: Count: 1  Time Spent (min): 15    Keren Mccauley, JANNETTED, BCACP

## 2020-10-07 NOTE — PATIENT INSTRUCTIONS
Vaccine Information Statement    Influenza (Flu) Vaccine (Inactivated or Recombinant): What You Need to Know    Many Vaccine Information Statements are available in [place] and other languages. See www.immunize.org/vis  Hojas de información sobre vacunas están disponibles en español y en muchos otros idiomas. Visite www.immunize.org/vis    1. Why get vaccinated? Influenza vaccine can prevent influenza (flu). Flu is a contagious disease that spreads around the [facility] every year, usually between October and May. Anyone can get the flu, but it is more dangerous for some people. Infants and young children, people 72years of age and older, pregnant women, and people with certain health conditions or a weakened immune system are at greatest risk of flu complications. Pneumonia, bronchitis, sinus infections and ear infections are examples of flu-related complications. If you have a medical condition, such as heart disease, cancer or diabetes, flu can make it worse. Flu can cause fever and chills, sore throat, muscle aches, fatigue, cough, headache, and runny or stuffy nose. Some people may have vomiting and diarrhea, though this is more common in children than adults. Each year thousands of people in the [facility] die from flu, and many more are hospitalized. Flu vaccine prevents millions of illnesses and flu-related visits to the doctor each year. 2. Influenza vaccines     CDC recommends everyone 10months of age and older get vaccinated every flu season. Children 6 months through 6years of age may need 2 doses during a single flu season. Everyone else needs only 1 dose each flu season. It takes about 2 weeks for protection to develop after vaccination. There are many flu viruses, and they are always changing. Each year a new flu vaccine is made to protect against three or four viruses that are likely to cause disease in the upcoming flu season.  Even when the vaccine doesnt exactly match these viruses, it may still provide some protection. Influenza vaccine does not cause flu. Influenza vaccine may be given at the same time as other vaccines. 3. Talk with your health care provider    Tell your vaccine provider if the person getting the vaccine:   Has had an allergic reaction after a previous dose of influenza vaccine, or has any severe, life-threatening allergies.  Has ever had Guillain-Barré Syndrome (also called GBS). In some cases, your health care provider may decide to postpone influenza vaccination to a future visit. People with minor illnesses, such as a cold, may be vaccinated. People who are moderately or severely ill should usually wait until they recover before getting influenza vaccine. Your health care provider can give you more information. 4. Risks of a reaction     Soreness, redness, and swelling where shot is given, fever, muscle aches, and headache can happen after influenza vaccine.  There may be a very small increased risk of Guillain-Barré Syndrome (GBS) after inactivated influenza vaccine (the flu shot). Devora Session children who get the flu shot along with pneumococcal vaccine (PCV13), and/or DTaP vaccine at the same time might be slightly more likely to have a seizure caused by fever. Tell your health care provider if a child who is getting flu vaccine has ever had a seizure. People sometimes faint after medical procedures, including vaccination. Tell your provider if you feel dizzy or have vision changes or ringing in the ears. As with any medicine, there is a very remote chance of a vaccine causing a severe allergic reaction, other serious injury, or death. 5. What if there is a serious problem? An allergic reaction could occur after the vaccinated person leaves the clinic.  If you see signs of a severe allergic reaction (hives, swelling of the face and throat, difficulty breathing, a fast heartbeat, dizziness, or weakness), call 9-1-1 and get the person to the nearest hospital.    For other signs that concern you, call your health care provider. Adverse reactions should be reported to the Vaccine Adverse Event Reporting System (VAERS). Your health care provider will usually file this report, or you can do it yourself. Visit the VAERS website at www.vaers. [facility].gov or call 7-571.689.8842. VAERS is only for reporting reactions, and VAERS staff do not give medical advice. 6. The National Vaccine Injury Compensation Program    The [facility] Vaccine Injury Compensation Program (VICP) is a federal program that was created to compensate people who may have been injured by certain vaccines. Visit the VICP website at www.[facility].gov/vaccinecompensation or call 6-907.193.5913 to learn about the program and about filing a claim. There is a time limit to file a claim for compensation. 7. How can I learn more?  Ask your health care provider.  Call your local or state health department.  Contact the Centers for Disease Control and Prevention (CDC):  - Call 3-563.171.3055 (1-800-CDC-INFO) or  - Visit CDCs influenza website at www.cdc.gov/flu    Vaccine Information Statement (Interim)  Inactivated Influenza Vaccine   8/15/2019  42 BEBETO Murillo 321NI-[id number]   Department of Health and Human Services  Centers for Disease Control and Prevention    Office Use Only

## 2020-10-23 ENCOUNTER — OFFICE VISIT (OUTPATIENT)
Dept: CARDIOLOGY CLINIC | Age: 65
End: 2020-10-23
Payer: MEDICARE

## 2020-10-23 VITALS
BODY MASS INDEX: 25.44 KG/M2 | SYSTOLIC BLOOD PRESSURE: 110 MMHG | HEIGHT: 64 IN | DIASTOLIC BLOOD PRESSURE: 60 MMHG | OXYGEN SATURATION: 98 % | HEART RATE: 73 BPM | RESPIRATION RATE: 18 BRPM | WEIGHT: 149 LBS

## 2020-10-23 DIAGNOSIS — I25.10 CORONARY ARTERY DISEASE INVOLVING NATIVE CORONARY ARTERY OF NATIVE HEART WITHOUT ANGINA PECTORIS: Primary | ICD-10-CM

## 2020-10-23 PROCEDURE — 1090F PRES/ABSN URINE INCON ASSESS: CPT | Performed by: SPECIALIST

## 2020-10-23 PROCEDURE — G8419 CALC BMI OUT NRM PARAM NOF/U: HCPCS | Performed by: SPECIALIST

## 2020-10-23 PROCEDURE — G8432 DEP SCR NOT DOC, RNG: HCPCS | Performed by: SPECIALIST

## 2020-10-23 PROCEDURE — G8427 DOCREV CUR MEDS BY ELIG CLIN: HCPCS | Performed by: SPECIALIST

## 2020-10-23 PROCEDURE — G8536 NO DOC ELDER MAL SCRN: HCPCS | Performed by: SPECIALIST

## 2020-10-23 PROCEDURE — G9899 SCRN MAM PERF RSLTS DOC: HCPCS | Performed by: SPECIALIST

## 2020-10-23 PROCEDURE — 1101F PT FALLS ASSESS-DOCD LE1/YR: CPT | Performed by: SPECIALIST

## 2020-10-23 PROCEDURE — G8399 PT W/DXA RESULTS DOCUMENT: HCPCS | Performed by: SPECIALIST

## 2020-10-23 PROCEDURE — 3017F COLORECTAL CA SCREEN DOC REV: CPT | Performed by: SPECIALIST

## 2020-10-23 PROCEDURE — 99214 OFFICE O/P EST MOD 30 MIN: CPT | Performed by: SPECIALIST

## 2020-10-23 PROCEDURE — 93000 ELECTROCARDIOGRAM COMPLETE: CPT | Performed by: SPECIALIST

## 2020-10-23 RX ORDER — METOPROLOL TARTRATE 50 MG/1
50 TABLET ORAL 2 TIMES DAILY
Qty: 180 TAB | Refills: 1
Start: 2020-10-23 | End: 2021-04-23

## 2020-10-23 NOTE — PROGRESS NOTES
[facility]                                                            OFFICE NOTE        Juancho Sutherland M.D.,BANDAR CIRA LEHMAN   1955  [id number]    Date/Time:  10/23/58151:00 AM        ICD-10-CM ICD-9-CM    1. Coronary artery disease involving native coronary artery of native heart without angina pectoris  I25.10 414.01 AMB POC EKG ROUTINE W/ 12 LEADS, INTER & REP         SUBJECTIVE:  Doing very well no cp or sob or palpitations not very active she tells me       Assessment/Plan  CAD: Status post MI in February 2018 and PCI and stent of PDA. no recurrent cp   Cardiac cath on 8/18 with no changes from previous  Continue asa  toprol and crestor  Now off effient ( no longer with bruising)  Her ECG is unchanged with NSR and nstt        Hypertension well-controlled     Hyperlipidemia: on crestor max dose    discussed lipids. Better , lft now normal discussed options I would continue with crestor only for now ( increased lfts in the past)      we have discussed at length exercise ( at least 150 minutes a week ) and nutrition  repatha on hold for now as we have discussed together  Not much improvement with zetia this was stopped     C. difficile: resolved     Fluttering: Discussed also results of Holter monitor.  The burden of PVCs is not significant at this time and she tells me she did have a history of bigeminy in since 2007.    doing better also with palpitations  Continue with metoprolol 50 mg bid she seems to tolerate without significant issues        See her back  in 6 months otherwise                         HPI    72 y.o. female. patient with h/o HLD, seen by cardiologist about 15 years ago for ventricular bigeminy  moved to [facility] in 2018  On 2/18 she presented to [facility] with nausea vomiting and diarrhea, she had some abx for sore throat and then developed above symptoms  She was diagnosed with cdiff  While in hospital she developed cp and eventually ruled in for MI  Cardiac catheterization on 2/18:left main ok, lad minor irregs, lcx small, occluded distally with faint right to left collaterals. 30% prox rca, 50% mid pda, 85% distal rca treated with 3.25 by 18mm michelle to 10 david. No lv gram.  Echo on 2/18:Systolic function was normal. Ejection fraction was  estimated in the range of 50 % to 55 %. There was dyskinesis, possibly  aneurysmal formation of the apical wall(s). Doppler parameters were  consistent with abnormal left ventricular relaxation (grade 1 diastolic  dysfunction). Tricuspid valve: There was , regurgitation. holter on 7/18:NSR  rare episode of bundle branch block, 52 pacs , 1 episode of non sustained AT at111, 33 pvcs and 1 couplet  Stress echo on 7/18:Impressions: Abnormal study after maximal exercise without reproduction of  symptoms. Findings suggest single vessel coronary artery disease in the  territory of the right coronary artery.     CATHETERIZATION on 8/18 with Dr. Gill:left main ok, lad mild irregs, occluded distal lcx with faint right to left collaterals. 30% prox rca, widely patent distal rca stent, 50% mid pda. All unchanged from 3/18 final result. lvef 60%                 CARDIAC STUDIES                                       EKG Results     Procedure 720 Value Units Date/Time    AMB POC EKG ROUTINE W/ 12 LEADS, INTER & REP [[id number]] Resulted:  10/23/20 4084    Order Status:  Completed Updated:  10/23/20 0846              IMAGING      MRI Results (most recent):  No results found for this or any previous visit. CT Results (most recent):  Results from Hospital Encounter encounter on 05/23/18   CT HEAD WO CONT    Narrative EXAM:  CT HEAD WO CONT    INDICATION:   left sided numbness    COMPARISON: None. CONTRAST:  None. TECHNIQUE: Unenhanced CT of the head was performed using 5 mm images. Brain and  bone windows were generated.   CT dose reduction was achieved through use of a  standardized protocol tailored for this examination and automatic exposure  control for dose modulation. FINDINGS:  The ventricles and sulci are normal in size, shape and configuration and  midline. There is no significant white matter disease. There is no intracranial  hemorrhage, extra-axial collection, mass, mass effect or midline shift. The  basilar cisterns are open. No acute infarct is identified. The bone windows  demonstrate no abnormalities. The visualized portions of the paranasal sinuses  and mastoid air cells are clear. Impression IMPRESSION: Unremarkable CT of the head. XR Results (most recent):  Results from Hospital Encounter encounter on 08/06/18   XR CHEST PA LAT    Narrative EXAM:  XR CHEST PA LAT    INDICATION:  Chest pain and shortness of breath. COMPARISON: 5/23/2018    TECHNIQUE: PA and lateral chest views    FINDINGS: The cardiomediastinal contours are stable. The pulmonary vasculature  is within normal limits. The lungs and pleural spaces are clear. There is no pneumothorax. The bones and  upper abdomen are stable. Impression IMPRESSION:    No acute process.  Stable exam.             Past Medical History:   Diagnosis Date    C. difficile colitis     CAD (coronary artery disease)     Colitis     Hypercholesteremia     Sepsis ([address]     Vertigo of central origin of both ears     during allergy season     Past Surgical History:   Procedure Laterality Date    CARDIAC SURG PROCEDURE UNLIST  02/27/2018    1 stent    HX TUBAL LIGATION      HX WRIST FRACTURE TX Left 2016    ganglian cyst removal      Social History     Tobacco Use    Smoking status: Never Smoker    Smokeless tobacco: Never Used   Substance Use Topics    Alcohol use: Yes     Frequency: Monthly or less     Drinks per session: 1 or 2     Binge frequency: Never     Comment: special occasions    Drug use: No     Family History   Problem Relation Age of Onset    Heart Disease Mother     Heart Attack Mother     Heart Disease Father     Heart Attack Father     Diabetes Brother     No Known Problems Brother     Cancer Brother         throat cancer     Allergies   Allergen Reactions    Other Medication Other (comments)     Diarrhea from cephalosporins         Visit Vitals  /60 (BP 1 Location: Left arm, BP Patient Position: Sitting)   Pulse 73   Resp 18   Ht 5' 4\" (1.626 m)   Wt 149 lb (67.6 kg)   SpO2 98%   BMI 25.58 kg/m²         Last 3 Recorded Weights in this Encounter    10/23/20 0830   Weight: 149 lb (67.6 kg)            Review of Systems:   Pertinent items are noted in the History of Present Illness. Visit Vitals  /60 (BP 1 Location: Left arm, BP Patient Position: Sitting)   Pulse 73   Resp 18   Ht 5' 4\" (1.626 m)   Wt 149 lb (67.6 kg)   SpO2 98%   BMI 25.58 kg/m²     General Appearance:  Well developed, well nourished,alert and oriented x 3, and individual in no acute distress. Ears/Nose/Mouth/Throat:   Hearing grossly normal.         Neck: Supple. Chest:   Lungs clear to auscultation bilaterally. Cardiovascular:  Regular rate and rhythm, S1, S2 normal, no murmur. Abdomen:   Soft, non-tender, bowel sounds are active. Extremities: No edema bilaterally. Skin: Warm and dry. Current Outpatient Medications on File Prior to Visit   Medication Sig Dispense Refill    rosuvastatin (CRESTOR) 40 mg tablet TAKE 1 TABLET NIGHTLY 90 Tab 0    metoprolol tartrate (LOPRESSOR) 50 mg tablet TAKE 1 TABLET THREE TIMES A DAY (Patient taking differently: Take 50 mg by mouth two (2) times a day.) 270 Tab 3    famotidine (PEPCID) 20 mg tablet Take 1 Tab by mouth two (2) times a day. (Patient taking differently: Take 20 mg by mouth daily.) 90 Tab 1    aspirin delayed-release 81 mg tablet Take 81 mg by mouth daily.  L. acidophilus/Strept/La p-toro (OSCAR-Q PO) Take 1 Tab by mouth daily.        No current facility-administered medications on file prior to visit. Yasmani Lee had no medications administered during this visit. Current Outpatient Medications   Medication Sig    rosuvastatin (CRESTOR) 40 mg tablet TAKE 1 TABLET NIGHTLY    metoprolol tartrate (LOPRESSOR) 50 mg tablet TAKE 1 TABLET THREE TIMES A DAY (Patient taking differently: Take 50 mg by mouth two (2) times a day.)    famotidine (PEPCID) 20 mg tablet Take 1 Tab by mouth two (2) times a day. (Patient taking differently: Take 20 mg by mouth daily.)    aspirin delayed-release 81 mg tablet Take 81 mg by mouth daily.  L. acidophilus/Strept/La p-toro (OSCAR-Q PO) Take 1 Tab by mouth daily. No current facility-administered medications for this visit. Lab Results   Component Value Date/Time    Cholesterol, total 151 06/19/2020 09:46 AM    HDL Cholesterol 49 06/19/2020 09:46 AM    LDL, calculated 82 06/19/2020 09:46 AM    VLDL, calculated 20 06/19/2020 09:46 AM    Triglyceride 101 06/19/2020 09:46 AM       Lab Results   Component Value Date/Time    Sodium 145 (H) 11/05/2019 09:13 AM    Potassium 4.6 11/05/2019 09:13 AM    Chloride 105 11/05/2019 09:13 AM    CO2 25 11/05/2019 09:13 AM    Anion gap 11 08/07/2018 04:08 AM    Glucose 90 11/05/2019 09:13 AM    BUN 12 11/05/2019 09:13 AM    Creatinine 1.21 (H) 11/05/2019 09:13 AM    BUN/Creatinine ratio 10 (L) 11/05/2019 09:13 AM    GFR est AA 55 (L) 11/05/2019 09:13 AM    GFR est non-AA 47 (L) 11/05/2019 09:13 AM    Calcium 9.3 11/05/2019 09:13 AM       Lab Results   Component Value Date/Time    ALT (SGPT) 27 06/19/2020 09:46 AM    Alk.  phosphatase 63 06/19/2020 09:46 AM    Bilirubin, direct 0.34 06/19/2020 09:46 AM    Bilirubin, total 2.2 (H) 06/19/2020 09:46 AM       Lab Results   Component Value Date/Time    WBC 5.1 11/05/2019 09:13 AM    HGB 13.2 11/05/2019 09:13 AM    HCT 41.3 11/05/2019 09:13 AM    PLATELET 682 52/73/3838 09:13 AM    MCV 96 11/05/2019 09:13 AM       Lab Results   Component Value Date/Time    TSH 1.110 11/05/2019 09:13 AM         Lab Results   Component Value Date/Time    Cholesterol, total 151 06/19/2020 09:46 AM    Cholesterol, total 151 11/05/2019 09:13 AM    Cholesterol, total 153 11/05/2019 09:07 AM    Cholesterol, total 145 06/24/2019 09:03 AM    Cholesterol, total 157 03/07/2019 08:52 AM    HDL Cholesterol 49 06/19/2020 09:46 AM    HDL Cholesterol 45 11/05/2019 09:13 AM    HDL Cholesterol 45 11/05/2019 09:07 AM    HDL Cholesterol 45 06/24/2019 09:03 AM    HDL Cholesterol 50 03/07/2019 08:52 AM    LDL, calculated 82 06/19/2020 09:46 AM    LDL, calculated 84 11/05/2019 09:13 AM    LDL, calculated 86 11/05/2019 09:07 AM    LDL, calculated 79 06/24/2019 09:03 AM    LDL, calculated 86 03/07/2019 08:52 AM    Triglyceride 101 06/19/2020 09:46 AM    Triglyceride 110 11/05/2019 09:13 AM    Triglyceride 110 11/05/2019 09:07 AM    Triglyceride 107 06/24/2019 09:03 AM    Triglyceride 106 03/07/2019 08:52 AM                Please note that this dictation was completed with Yedda, the [employer] voice recognition software. Quite often unanticipated grammatical, syntax, homophones, and other interpretative errors are inadvertently transcribed by the computer software. Please disregard these errors. Please excuse any errors that have escaped final proofreading.

## 2020-11-24 ENCOUNTER — OFFICE VISIT (OUTPATIENT)
Dept: INTERNAL MEDICINE CLINIC | Age: 65
End: 2020-11-24
Payer: MEDICARE

## 2020-11-24 VITALS
WEIGHT: 148.8 LBS | TEMPERATURE: 97.7 F | OXYGEN SATURATION: 97 % | HEART RATE: 50 BPM | RESPIRATION RATE: 16 BRPM | DIASTOLIC BLOOD PRESSURE: 70 MMHG | HEIGHT: 64 IN | BODY MASS INDEX: 25.4 KG/M2 | SYSTOLIC BLOOD PRESSURE: 130 MMHG

## 2020-11-24 DIAGNOSIS — K21.9 GASTROESOPHAGEAL REFLUX DISEASE WITHOUT ESOPHAGITIS: ICD-10-CM

## 2020-11-24 DIAGNOSIS — Z23 ENCOUNTER FOR IMMUNIZATION: ICD-10-CM

## 2020-11-24 DIAGNOSIS — Z13.39 SCREENING FOR ALCOHOLISM: ICD-10-CM

## 2020-11-24 DIAGNOSIS — Z00.00 MEDICARE ANNUAL WELLNESS VISIT, SUBSEQUENT: Primary | ICD-10-CM

## 2020-11-24 PROCEDURE — G8536 NO DOC ELDER MAL SCRN: HCPCS | Performed by: INTERNAL MEDICINE

## 2020-11-24 PROCEDURE — 1101F PT FALLS ASSESS-DOCD LE1/YR: CPT | Performed by: INTERNAL MEDICINE

## 2020-11-24 PROCEDURE — G8427 DOCREV CUR MEDS BY ELIG CLIN: HCPCS | Performed by: INTERNAL MEDICINE

## 2020-11-24 PROCEDURE — G0439 PPPS, SUBSEQ VISIT: HCPCS | Performed by: INTERNAL MEDICINE

## 2020-11-24 PROCEDURE — G8510 SCR DEP NEG, NO PLAN REQD: HCPCS | Performed by: INTERNAL MEDICINE

## 2020-11-24 PROCEDURE — G8399 PT W/DXA RESULTS DOCUMENT: HCPCS | Performed by: INTERNAL MEDICINE

## 2020-11-24 PROCEDURE — G9899 SCRN MAM PERF RSLTS DOC: HCPCS | Performed by: INTERNAL MEDICINE

## 2020-11-24 PROCEDURE — 3017F COLORECTAL CA SCREEN DOC REV: CPT | Performed by: INTERNAL MEDICINE

## 2020-11-24 PROCEDURE — G8419 CALC BMI OUT NRM PARAM NOF/U: HCPCS | Performed by: INTERNAL MEDICINE

## 2020-11-24 PROCEDURE — 90732 PPSV23 VACC 2 YRS+ SUBQ/IM: CPT

## 2020-11-24 PROCEDURE — G0009 ADMIN PNEUMOCOCCAL VACCINE: HCPCS

## 2020-11-24 NOTE — PROGRESS NOTES
Chief Complaint   [facility]     Reviewed record in preparation for visit and have obtained necessary documentation. Identified pt with two pt identifiers(name and ).       Health Maintenance Due   Topic    Shingrix Vaccine Age 49> (1 of 2)    Medicare Yearly Exam     GLAUCOMA SCREENING Q2Y     Pneumococcal 65+ years (1 of 1 - PPSV23)         Chief Complaint   Patient presents with   [facility] Annual Wellness Visit        Wt Readings from Last 3 Encounters:   20 148 lb 12.8 oz (67.5 kg)   10/23/20 149 lb (67.6 kg)   20 146 lb (66.2 kg)     Temp Readings from Last 3 Encounters:   20 97.7 °F (36.5 °C) (Temporal)   10/07/20 97.6 °F (36.4 °C)   19 98.5 °F (36.9 °C) (Oral)     BP Readings from Last 3 Encounters:   20 130/70   10/23/20 110/60   20 120/60     Pulse Readings from Last 3 Encounters:   20 (!) 50   10/23/20 73   20 (!) 52           Learning Assessment:  :     Learning Assessment 2018   PRIMARY LEARNER Patient   HIGHEST LEVEL OF EDUCATION - PRIMARY LEARNER  4 YEARS OF COLLEGE   BARRIERS PRIMARY LEARNER NONE   PRIMARY LANGUAGE ENGLISH   LEARNER PREFERENCE PRIMARY READING   ANSWERED BY pt   RELATIONSHIP SELF       Depression Screening:  :     3 most recent PHQ Screens 2020   Little interest or pleasure in doing things Not at all   Feeling down, depressed, irritable, or hopeless Not at all   Total Score PHQ 2 0   Trouble falling or staying asleep, or sleeping too much -   Feeling tired or having little energy -   Poor appetite, weight loss, or overeating -   Feeling bad about yourself - or that you are a failure or have let yourself or your family down -   Trouble concentrating on things such as school, work, reading, or watching TV -   Moving or speaking so slowly that other people could have noticed; or the opposite being so fidgety that others notice -   Thoughts of being better off dead, or hurting yourself in some way -   PHQ 9 Score -   How difficult have these problems made it for you to do your work, take care of your home and get along with others -       Fall Risk Assessment:  :     Fall Risk Assessment, last 12 mths 11/24/2020   Able to walk? Yes   Fall in past 12 months? No       Abuse Screening:  :     Abuse Screening Questionnaire 7/24/2018   Do you ever feel afraid of your partner? N   Are you in a relationship with someone who physically or mentally threatens you? N   Is it safe for you to go home? Y       Coordination of Care Questionnaire:  :     1) Have you been to an emergency room, urgent care clinic since your last visit? no   Hospitalized since your last visit? no             2) Have you seen or consulted any other health care providers outside of [address] [place] [address] since your last visit? no  (Include any pap smears or colon screenings in this section.)    3) Do you have an Advance Directive on file? no    4) Are you interested in receiving information on Advance Directives? NO      Patient is accompanied by self I have received verbal consent from Martha Garcia to discuss any/all medical information while they are present in the room. Reviewed record  In preparation for visit and have obtained necessary documentation.

## 2020-11-24 NOTE — PROGRESS NOTES
This is the Subsequent Medicare Annual Wellness Exam, performed 12 months or more after the Initial AWV or the last Subsequent AWV    I have reviewed the patient's medical history in detail and updated the computerized patient record. She reports feeling well today, no concerns. She has seen cardiology recently and determined to be stable by Dr. Pastor Pfeiffer. She has had some GERD and we discussed the need to continue with acid supression. She was on Zantac previously but had stopped this due to the recent recall. Advised that Pepcid would be a viable alternative. Depression Risk Factor Screening:     3 most recent PHQ Screens 11/24/2020   Little interest or pleasure in doing things Not at all   Feeling down, depressed, irritable, or hopeless Not at all   Total Score PHQ 2 0   Trouble falling or staying asleep, or sleeping too much -   Feeling tired or having little energy -   Poor appetite, weight loss, or overeating -   Feeling bad about yourself - or that you are a failure or have let yourself or your family down -   Trouble concentrating on things such as school, work, reading, or watching TV -   Moving or speaking so slowly that other people could have noticed; or the opposite being so fidgety that others notice -   Thoughts of being better off dead, or hurting yourself in some way -   PHQ 9 Score -   How difficult have these problems made it for you to do your work, take care of your home and get along with others -       Alcohol Risk Screen   Do you average more than 1 drink per night or more than 7 drinks a week:  No    On any one occasion in the past three months have you have had more than 3 drinks containing alcohol:  No        Functional Ability and Level of Safety:   Hearing: Hearing is good. Activities of Daily Living: The home contains: no safety equipment.   Patient does total self care     Ambulation: with no difficulty     Fall Risk:  Fall Risk Assessment, last 12 mths 11/24/2020   Able to walk? Yes   Fall in past 12 months? No     Abuse Screen:  Patient is not abused       Cognitive Screening   Has your family/caregiver stated any concerns about your memory: no         Assessment/Plan   Education and counseling provided:  Are appropriate based on today's review and evaluation    Diagnoses and all orders for this visit:    1. Medicare annual wellness visit, subsequent  -     CBC WITH AUTOMATED DIFF; Future  -     METABOLIC PANEL, COMPREHENSIVE; Future  -     HEMOGLOBIN A1C WITH EAG; Future    2. Screening for alcoholism    3.  Encounter for immunization  -     PNEUMOCOCCAL POLYSACCHARIDE VACCINE, 23-VALENT, ADULT OR IMMUNOSUPPRESSED PT DOSE,    4. Gastroesophageal reflux disease without esophagitis        Health Maintenance Due     Health Maintenance Due   Topic Date Due    Shingrix Vaccine Age 49> (1 of 2) 01/13/2005    Medicare Yearly Exam  01/07/2020    GLAUCOMA SCREENING Q2Y  01/13/2020    Pneumococcal 65+ years (1 of 1 - PPSV23) 01/13/2020       Patient Care Team   Patient Care Team:  Marie Floyd MD as PCP - General (Internal Medicine)  Marie Floyd MD as PCP - [facility]  Vicky Downing MD (Cardiology)    History     Patient Active Problem List   Diagnosis Code    Colitis K52.9    Severe sepsis ([address] A41.9, R65.20    Dehydration E86.0    Hypokalemia E87.6    Hypocalcemia E83.51    CAD (coronary artery disease) I25.10    Acute chest pain R07.9    Numbness and tingling in left upper extremity R20.0, R20.2    Chest pain R07.9     Past Medical History:   Diagnosis Date    C. difficile colitis     CAD (coronary artery disease)     Colitis     Hypercholesteremia     [address] [address]     Vertigo of central origin of both ears     during allergy season      Past Surgical History:   Procedure Laterality Date    CARDIAC SURG PROCEDURE UNLIST  02/27/2018    1 stent    HX TUBAL LIGATION      HX WRIST FRACTURE TX Left 2016    ganglian cyst removal      Current Outpatient Medications   Medication Sig Dispense Refill    metoprolol tartrate (LOPRESSOR) 50 mg tablet Take 1 Tab by mouth two (2) times a day. 180 Tab 1    rosuvastatin (CRESTOR) 40 mg tablet TAKE 1 TABLET NIGHTLY 90 Tab 0    famotidine (PEPCID) 20 mg tablet Take 1 Tab by mouth two (2) times a day. (Patient taking differently: Take 20 mg by mouth daily.) 90 Tab 1    aspirin delayed-release 81 mg tablet Take 81 mg by mouth daily.  L. acidophilus/Strept/La p-toro (OSCAR-Q PO) Take 1 Tab by mouth daily.        Allergies   Allergen Reactions    Other Medication Other (comments)     Diarrhea from cephalosporins       Family History   Problem Relation Age of Onset    Heart Disease Mother     Heart Attack Mother     Heart Disease Father     Heart Attack Father     Diabetes Brother     No Known Problems Brother     Cancer Brother         throat cancer     Social History     Tobacco Use    Smoking status: Never Smoker    Smokeless tobacco: Never Used   Substance Use Topics    Alcohol use: Yes     Frequency: Monthly or less     Drinks per session: 1 or 2     Binge frequency: Never     Comment: special occasions

## 2020-11-24 NOTE — PATIENT INSTRUCTIONS
Medicare Wellness Visit, Female The best way to live healthy is to have a lifestyle where you eat a well-balanced diet, exercise regularly, limit alcohol use, and quit all forms of tobacco/nicotine, if applicable. Regular preventive services are another way to keep healthy. Preventive services (vaccines, screening tests, monitoring & exams) can help personalize your care plan, which helps you manage your own care. Screening tests can find health problems at the earliest stages, when they are easiest to treat. [facility] follows the current, evidence-based guidelines published by the [facility] ([facility]) when recommending preventive services for our patients. Because we follow these guidelines, sometimes recommendations change over time as research supports it. (For example, mammograms used to be recommended annually. Even though Medicare will still pay for an annual mammogram, the newer guidelines recommend a mammogram every two years for women of average risk). Of course, you and your doctor may decide to screen more often for some diseases, based on your risk and your co-morbidities (chronic disease you are already diagnosed with). Preventive services for you include: - Medicare offers their members a free annual wellness visit, which is time for you and your primary care provider to discuss and plan for your preventive service needs. Take advantage of this benefit every year! 
-All adults over the age of 72 should receive the recommended pneumonia vaccines. Current USPSTF guidelines recommend a series of two vaccines for the best pneumonia protection.  
-All adults should have a flu vaccine yearly and a tetanus vaccine every 10 years.  
-All adults age 48 and older should receive the shingles vaccines (series of two vaccines). -All adults age 38-68 who are overweight should have a diabetes screening test once every three years. -All adults born between [birth year] and 1965 should be screened once for Hepatitis C. 
-Other screening tests and preventive services for persons with diabetes include: an eye exam to screen for diabetic retinopathy, a kidney function test, a foot exam, and stricter control over your cholesterol.  
-Cardiovascular screening for adults with routine risk involves an electrocardiogram (ECG) at intervals determined by your doctor.  
-Colorectal cancer screenings should be done for adults age 54-65 with no increased risk factors for colorectal cancer. There are a number of acceptable methods of screening for this type of cancer. Each test has its own benefits and drawbacks. Discuss with your doctor what is most appropriate for you during your annual wellness visit. The different tests include: colonoscopy (considered the best screening method), a fecal occult blood test, a fecal DNA test, and sigmoidoscopy. 
 
-A bone mass density test is recommended when a woman turns 65 to screen for osteoporosis. This test is only recommended one time, as a screening. Some providers will use this same test as a disease monitoring tool if you already have osteoporosis. -Breast cancer screenings are recommended every other year for women of normal risk, age 54-69. 
-Cervical cancer screenings for women over age 72 are only recommended with certain risk factors. Here is a list of your current Health Maintenance items (your personalized list of preventive services) with a due date: 
Health Maintenance Due Topic Date Due  Shingles Vaccine (1 of 2) 01/13/2005 [address] [place] [address] Annual Well Visit  01/07/2020  Glaucoma Screening   01/13/2020  Pneumococcal Vaccine (1 of 1 - PPSV23) 01/13/2020 Vaccine Information Statement Pneumococcal Polysaccharide Vaccine (PPSV23): What You Need to Know Many Vaccine Information Statements are available in Serbian and other languages. See www.immunize.org/vis Hojas de información sobre vacunas están disponibles en español y en muchos otros idiomas. Visite www.immunize.org/vis 1. Why get vaccinated? Pneumococcal polysaccharide vaccine (PPSV23) can prevent pneumococcal disease. Pneumococcal disease refers to any illness caused by pneumococcal bacteria. These bacteria can cause many types of illnesses, including pneumonia, which is an infection of the lungs. Pneumococcal bacteria are one of the most common causes of pneumonia. Besides pneumonia, pneumococcal bacteria can also cause: 
 Ear infections  Sinus infections  Meningitis (infection of the tissue covering the brain and spinal cord)  Bacteremia (bloodstream infection) Anyone can get pneumococcal disease, but children under 3years of age, people with certain medical conditions, adults 72 years or older, and cigarette smokers are at the highest risk. Most pneumococcal infections are mild. However, some can result in long-term problems, such as brain damage or hearing loss. Meningitis, bacteremia, and pneumonia caused by pneumococcal disease can be fatal.  
 
2. PPSV23  
 
PPSV23 protects against 23 types of bacteria that cause pneumococcal disease. PPSV23 is recommended for:  All adults 72 years or older,  Anyone 2 years or older with certain medical conditions that can lead to an increased risk for pneumococcal disease. Most people need only one dose of PPSV23. A second dose of PPSV23, and another type of pneumococcal vaccine called PCV13, are recommended for certain high-risk groups. Your health care provider can give you more information. People 65 years or older should get a dose of PPSV23 even if they have already gotten one or more doses of the vaccine before they turned 72. 
 
3. Talk with your health care provider Tell your vaccine provider if the person getting the vaccine: 
 Has had an allergic reaction after a previous dose of PPSV23, or has any severe, life-threatening allergies. In some cases, your health care provider may decide to postpone PPSV23 vaccination to a future visit. People with minor illnesses, such as a cold, may be vaccinated. People who are moderately or severely ill should usually wait until they recover before getting PPSV23. Your health care provider can give you more information. 4. Risks of a vaccine reaction  Redness or pain where the shot is given, feeling tired, fever, or muscle aches can happen after PPSV23. People sometimes faint after medical procedures, including vaccination. Tell your provider if you feel dizzy or have vision changes or ringing in the ears. As with any medicine, there is a very remote chance of a vaccine causing a severe allergic reaction, other serious injury, or death. 5. What if there is a serious problem? An allergic reaction could occur after the vaccinated person leaves the clinic. If you see signs of a severe allergic reaction (hives, swelling of the face and throat, difficulty breathing, a fast heartbeat, dizziness, or weakness), call 9-1-1 and get the person to the nearest hospital. 
 
For other signs that concern you, call your health care provider. Adverse reactions should be reported to the Vaccine Adverse Event Reporting System (VAERS). Your health care provider will usually file this report, or you can do it yourself. Visit the VAERS website at www.vaers. hhs.gov or call 4-939.921.7873. VAERS is only for reporting reactions, and VAERS staff do not give medical advice. 6. How can I learn more?  Ask your health care provider.  Call your local or state health department.  Contact the Centers for Disease Control and Prevention (CDC): 
- Call 9-606.620.2316 (1-800-CDC-INFO) or 
- Visit CDCs website at www.cdc.gov/vaccines Vaccine Information Statement PPSV23  
10/30/2019 Department of Health and [employer] Centers for Disease Control and Prevention Office Use Only

## 2020-12-10 LAB
ALBUMIN SERPL-MCNC: 4.2 G/DL (ref 3.8–4.8)
ALBUMIN/GLOB SERPL: 1.7 {RATIO} (ref 1.2–2.2)
ALP SERPL-CCNC: 90 IU/L (ref 39–117)
ALT SERPL-CCNC: 39 IU/L (ref 0–32)
AST SERPL-CCNC: 30 IU/L (ref 0–40)
BASOPHILS # BLD AUTO: 0.1 X10E3/UL (ref 0–0.2)
BASOPHILS NFR BLD AUTO: 1 %
BILIRUB SERPL-MCNC: 1.8 MG/DL (ref 0–1.2)
BUN SERPL-MCNC: 12 MG/DL (ref 8–27)
BUN/CREAT SERPL: 9 (ref 12–28)
CALCIUM SERPL-MCNC: 9 MG/DL (ref 8.7–10.3)
CHLORIDE SERPL-SCNC: 108 MMOL/L (ref 96–106)
CO2 SERPL-SCNC: 23 MMOL/L (ref 20–29)
CREAT SERPL-MCNC: 1.3 MG/DL (ref 0.57–1)
EOSINOPHIL # BLD AUTO: 0 X10E3/UL (ref 0–0.4)
EOSINOPHIL NFR BLD AUTO: 0 %
ERYTHROCYTE [DISTWIDTH] IN BLOOD BY AUTOMATED COUNT: 11.4 % (ref 11.7–15.4)
EST. AVERAGE GLUCOSE BLD GHB EST-MCNC: 88 MG/DL
GLOBULIN SER CALC-MCNC: 2.5 G/DL (ref 1.5–4.5)
GLUCOSE SERPL-MCNC: 96 MG/DL (ref 65–99)
HBA1C MFR BLD: 4.7 % (ref 4.8–5.6)
HCT VFR BLD AUTO: 35.2 % (ref 34–46.6)
HGB BLD-MCNC: 11.4 G/DL (ref 11.1–15.9)
IMM GRANULOCYTES # BLD AUTO: 0 X10E3/UL (ref 0–0.1)
IMM GRANULOCYTES NFR BLD AUTO: 0 %
LYMPHOCYTES # BLD AUTO: 2.2 X10E3/UL (ref 0.7–3.1)
LYMPHOCYTES NFR BLD AUTO: 31 %
MCH RBC QN AUTO: 31.2 PG (ref 26.6–33)
MCHC RBC AUTO-ENTMCNC: 32.4 G/DL (ref 31.5–35.7)
MCV RBC AUTO: 96 FL (ref 79–97)
MONOCYTES # BLD AUTO: 0.7 X10E3/UL (ref 0.1–0.9)
MONOCYTES NFR BLD AUTO: 10 %
NEUTROPHILS # BLD AUTO: 4.2 X10E3/UL (ref 1.4–7)
NEUTROPHILS NFR BLD AUTO: 58 %
PLATELET # BLD AUTO: 200 X10E3/UL (ref 150–450)
POTASSIUM SERPL-SCNC: 4.1 MMOL/L (ref 3.5–5.2)
PROT SERPL-MCNC: 6.7 G/DL (ref 6–8.5)
RBC # BLD AUTO: 3.65 X10E6/UL (ref 3.77–5.28)
SODIUM SERPL-SCNC: 141 MMOL/L (ref 134–144)
WBC # BLD AUTO: 7.2 X10E3/UL (ref 3.4–10.8)

## 2020-12-15 RX ORDER — ROSUVASTATIN CALCIUM 40 MG/1
TABLET, COATED ORAL
Qty: 90 TAB | Refills: 3 | Status: SHIPPED | OUTPATIENT
Start: 2020-12-15 | End: 2021-12-13

## 2020-12-15 NOTE — TELEPHONE ENCOUNTER
Cardiologist: Dr. Daniel Enriquez    Last appt: 1/7/2020  Future Appointments   Date Time Provider Georgina Soi   1/8/2021  2:57 PM MD JOSE G Judge BS AMB   4/23/2021  8:40 AM MD NAOMI Gray BS AMB       Requested Prescriptions     Signed Prescriptions Disp Refills    rosuvastatin (CRESTOR) 40 mg tablet 90 Tab 3     Sig: TAKE 1 TABLET NIGHTLY     Authorizing Provider: Fifi Menchaca     Ordering User: BEN LEES         Refills VO per Dr. Daniel Enriquez.

## 2021-01-30 ENCOUNTER — IMMUNIZATION (OUTPATIENT)
Dept: INTERNAL MEDICINE CLINIC | Age: 66
End: 2021-01-30
Payer: MEDICARE

## 2021-01-30 DIAGNOSIS — Z23 ENCOUNTER FOR IMMUNIZATION: Primary | ICD-10-CM

## 2021-01-30 PROCEDURE — 91301 COVID-19, MRNA, LNP-S, PF, 100MCG/0.5ML DOSE(MODERNA): CPT | Performed by: FAMILY MEDICINE

## 2021-01-30 PROCEDURE — 0011A PR IMM ADMN SARSCOV2 100 MCG/0.5 ML 1ST DOSE: CPT | Performed by: FAMILY MEDICINE

## 2021-02-02 ENCOUNTER — OFFICE VISIT (OUTPATIENT)
Dept: OBGYN CLINIC | Age: 66
End: 2021-02-02
Payer: MEDICARE

## 2021-02-02 VITALS — DIASTOLIC BLOOD PRESSURE: 58 MMHG | SYSTOLIC BLOOD PRESSURE: 104 MMHG | WEIGHT: 149 LBS | BODY MASS INDEX: 25.58 KG/M2

## 2021-02-02 DIAGNOSIS — Z01.419 WELL WOMAN EXAM: Primary | ICD-10-CM

## 2021-02-02 DIAGNOSIS — Z11.51 SPECIAL SCREENING EXAMINATION FOR HUMAN PAPILLOMAVIRUS (HPV): ICD-10-CM

## 2021-02-02 PROCEDURE — G8419 CALC BMI OUT NRM PARAM NOF/U: HCPCS | Performed by: OBSTETRICS & GYNECOLOGY

## 2021-02-02 PROCEDURE — 1101F PT FALLS ASSESS-DOCD LE1/YR: CPT | Performed by: OBSTETRICS & GYNECOLOGY

## 2021-02-02 PROCEDURE — G8432 DEP SCR NOT DOC, RNG: HCPCS | Performed by: OBSTETRICS & GYNECOLOGY

## 2021-02-02 PROCEDURE — G0101 CA SCREEN;PELVIC/BREAST EXAM: HCPCS | Performed by: OBSTETRICS & GYNECOLOGY

## 2021-02-02 PROCEDURE — 1090F PRES/ABSN URINE INCON ASSESS: CPT | Performed by: OBSTETRICS & GYNECOLOGY

## 2021-02-02 PROCEDURE — 3017F COLORECTAL CA SCREEN DOC REV: CPT | Performed by: OBSTETRICS & GYNECOLOGY

## 2021-02-02 PROCEDURE — G9899 SCRN MAM PERF RSLTS DOC: HCPCS | Performed by: OBSTETRICS & GYNECOLOGY

## 2021-02-02 NOTE — PATIENT INSTRUCTIONS
Pelvic Exam: Care Instructions Your Care Instructions When your doctor examines all of your pelvic organs, it's called a pelvic exam. Two good reasons to have this kind of exam are to check for sexually transmitted infections (STIs) and to get a Pap test. A Pap test is also called a Pap smear. It checks for early changes that can lead to cancer of the cervix. Sometimes a pelvic exam is part of a regular checkup. Your doctor may ask you to avoid vaginal sex, tampons, vaginal medicines, vaginal sprays or powders, and douching for 1 to 2 days before the test. 
Other times, women have this kind of exam at any time of the month. This is because they have pelvic pain, bleeding, or discharge. Or they may have another pelvic problem. Before your exam, it's important to share some information with your doctor. For example, if you are a survivor of rape or sexual abuse, you can talk about any concerns you may have. Your doctor will also want to know if you are pregnant or use birth control. And he or she will want to hear about any problems, surgeries, or procedures you have had in your pelvic area. You will also need to tell your doctor when your last period was. Follow-up care is a key part of your treatment and safety. Be sure to make and go to all appointments, and call your doctor if you are having problems. It's also a good idea to know your test results and keep a list of the medicines you take. How is a pelvic exam done? · During a pelvic exam, you will: ? Take off your clothes below the waist. You will get a paper or cloth cover to put over the lower half of your body. If this is regular checkup, you may undress completely and put on a gown. ? Lie on your back on an exam table. Your feet will be raised above you. Stirrups will support your feet. · The doctor will: ? Ask you to relax your knees. Your knees need to lean out, toward the walls. ? Check the opening of your vagina for sores or swelling. ? Gently put a tool called a speculum into your vagina. It opens the vagina a little bit. You will feel some pressure. But if you are relaxed, it will not hurt. It lets your doctor see inside the vagina. ? Use a small brush, spatula, or swab to get a sample of cells, if you are having a Pap test or culture. The doctor then removes the speculum. ? Put on gloves and put one or two fingers of one hand into your vagina. The other hand goes on your lower belly. This lets your doctor feel your pelvic organs. You will probably feel some pressure. Try to stay relaxed. ? Put one gloved finger into your rectum and one into your vagina, if needed. This can also help check your pelvic organs. This exam takes about 10 minutes. At the end, you will get a washcloth or tissue to clean your vaginal area. You can then get dressed. Why is a pelvic exam done? A pelvic exam may be done: · As part of a woman's regular physical checkup. The exam may include a Pap test. 
· To check for vaginal infection. · To check for sexually transmitted infections, such as chlamydia or herpes. · To help find the cause of abnormal uterine bleeding. · To look for problems like uterine fibroids, ovarian cysts, or uterine prolapse. · To find the cause of pelvic or belly pain. · Before inserting an intrauterine device (IUD) for birth control. · To collect evidence if you've been sexually assaulted. What are the risks of a pelvic exam? 
There is a small chance that the doctor will find something on a pelvic exam that would not have caused a problem. This is called overdiagnosis. It could lead to tests or treatment you don't need. When should you call for help? Watch closely for changes in your health, and be sure to contact your doctor if you have any problems. Where can you learn more? Go to http://www.[employer] [facility].com/ Enter P254 in the search box to learn more about \"Pelvic Exam: Care Instructions. \" Current as of: November 8, 2019               Content Version: 12.6 © 3135-2644 [employer]. Care instructions adapted under license by [employer] (which disclaims liability or warranty for this information). If you have questions about a medical condition or this instruction, always ask your healthcare professional. [place] [address] any warranty or liability for your use of this information.

## 2021-02-02 NOTE — PROGRESS NOTES
Annual exam ages postmenopausal    Raquel Thompson is a No obstetric history on file. ,  77 y.o. female [address] No LMP recorded. (Menstrual status: Menopause). .    She presents for her annual checkup. She is having no significant problems. First covid shot given       Menstrual status:    Now menopausal  She is not using ERT    Sexual history:    She  reports being sexually active and has had partner(s) who are Male. Medical conditions:    Since her last annual GYN exam about two years ago, she has not the following changes in her health history: none. Pap and Mammogram History:    Her most recent Pap smear was normal, obtained 2 year(s) ago. The patient has not had a recent mammogram.    Breast Cancer History/Substance Abuse: negative    Osteoporosis History:    Family history does not include a first or second degree relative with osteopenia or osteoporosis. Past Medical History:   Diagnosis Date    C. difficile colitis     CAD (coronary artery disease)     Colitis     Hypercholesteremia     Sepsis ([facility] [address]     Vertigo of central origin of both ears     during allergy season     Past Surgical History:   Procedure Laterality Date    HX TUBAL LIGATION      HX WRIST FRACTURE TX Left 2016    ganglian cyst removal     [facility]  02/27/2018    1 stent       Current Outpatient Medications   Medication Sig Dispense Refill    rosuvastatin (CRESTOR) 40 mg tablet TAKE 1 TABLET NIGHTLY 90 Tab 3    metoprolol tartrate (LOPRESSOR) 50 mg tablet Take 1 Tab by mouth two (2) times a day. 180 Tab 1    famotidine (PEPCID) 20 mg tablet Take 1 Tab by mouth two (2) times a day. (Patient taking differently: Take 20 mg by mouth daily.) 90 Tab 1    aspirin delayed-release 81 mg tablet Take 81 mg by mouth daily.  L. acidophilus/Strept/La p-toro (OSCAR-Q PO) Take 1 Tab by mouth daily. Allergies: Other medication     Tobacco History:  reports that she has never smoked. She has never used smokeless tobacco.  Alcohol Abuse:  reports current alcohol use. Drug Abuse:  reports no history of drug use.     Family Medical/Cancer History:   Family History   Problem Relation Age of Onset    Heart Disease Mother     Heart Attack Mother     Heart Disease Father     Heart Attack Father     Diabetes Brother     No Known Problems Brother     Cancer Brother         throat cancer        Review of Systems - History obtained from the patient  Constitutional: negative for weight loss, fever, night sweats  HEENT: negative for hearing loss, earache, congestion, snoring, sorethroat  CV: negative for chest pain, palpitations, edema  Resp: negative for cough, shortness of breath, wheezing  GI: negative for change in bowel habits, abdominal pain, black or bloody stools  : negative for frequency, dysuria, hematuria, vaginal discharge  MSK: negative for back pain, joint pain, muscle pain  Breast: negative for breast lumps, nipple discharge, galactorrhea  Skin :negative for itching, rash, hives  Neuro: negative for dizziness, headache, confusion, weakness  Psych: negative for anxiety, depression, change in mood  Heme/lymph: negative for bleeding, bruising, pallor    Physical Exam    Visit Vitals  Wt 149 lb (67.6 kg)   BMI 25.58 kg/m²       Constitutional  · Appearance: well-nourished, well developed, alert, in no acute distress    HENT  · Head and Face: appears normal    Chest  · Respiratory Effort: breathing unlabored      Breasts  · Inspection of Breasts: breasts symmetrical, no skin changes, no discharge present, nipple appearance normal, no skin retraction present  · Palpation of Breasts and Axillae: no masses present on palpation, no breast tenderness  · Axillary Lymph Nodes: no lymphadenopathy present    Gastrointestinal  · Abdominal Examination: abdomen non-tender to palpation, normal bowel sounds, no masses present  · Liver and spleen: no hepatomegaly present, spleen not palpable  · Hernias: no hernias identified    Skin  · General Inspection: no rash, no lesions identified    Neurologic/Psychiatric  · Mental Status:  · Orientation: grossly oriented to person, place and time  · Mood and Affect: mood normal, affect appropriate    Genitourinary  · External Genitalia: normal appearance for age, no discharge present, no tenderness present, no inflammatory lesions present, no masses present, no atrophy present  · Vagina: normal vaginal vault without central or paravaginal defects, no discharge present, no inflammatory lesions present, no masses present  · Bladder: non-tender to palpation  · Urethra: appears normal  · Cervix: normal   · Uterus: normal size, shape and consistency  · Adnexa: no adnexal tenderness present, no adnexal masses present  · Perineum: perineum within normal limits, no evidence of trauma, no rashes or skin lesions present  · Anus: anus within normal limits, no hemorrhoids present  · Inguinal Lymph Nodes: no lymphadenopathy present    Assessment:  Routine gynecologic examination  Her current medical status is satisfactory with no evidence of significant gynecologic issues. Plan:  Pap done today  Patient offered and completed [facility] genetic screening questionnaire and no changes in personal and family pertinent medical history. Patient declines presence of chaperone during today's visit.    Counseled re: diet, exercise, healthy lifestyle  Return for yearly wellness visits  Rec annual mammogram

## 2021-02-04 LAB
CYTOLOGIST CVX/VAG CYTO: NORMAL
CYTOLOGY CVX/VAG DOC CYTO: NORMAL
CYTOLOGY CVX/VAG DOC THIN PREP: NORMAL
DX ICD CODE: NORMAL
LABCORP, 190119: NORMAL
Lab: NORMAL
OTHER STN SPEC: NORMAL
STAT OF ADQ CVX/VAG CYTO-IMP: NORMAL

## 2021-02-27 ENCOUNTER — IMMUNIZATION (OUTPATIENT)
Dept: INTERNAL MEDICINE CLINIC | Age: 66
End: 2021-02-27
Payer: MEDICARE

## 2021-02-27 DIAGNOSIS — Z23 ENCOUNTER FOR IMMUNIZATION: Primary | ICD-10-CM

## 2021-02-27 PROCEDURE — 91301 COVID-19, MRNA, LNP-S, PF, 100MCG/0.5ML DOSE(MODERNA): CPT | Performed by: FAMILY MEDICINE

## 2021-02-27 PROCEDURE — 0012A COVID-19, MRNA, LNP-S, PF, 100MCG/0.5ML DOSE(MODERNA): CPT | Performed by: FAMILY MEDICINE

## 2021-03-10 ENCOUNTER — TRANSCRIBE ORDER (OUTPATIENT)
Dept: SCHEDULING | Age: 66
End: 2021-03-10

## 2021-03-10 DIAGNOSIS — Z12.31 SCREENING MAMMOGRAM FOR HIGH-RISK PATIENT: Primary | ICD-10-CM

## 2021-04-06 ENCOUNTER — HOSPITAL ENCOUNTER (OUTPATIENT)
Dept: MAMMOGRAPHY | Age: 66
Discharge: HOME | End: 2021-04-06
Attending: INTERNAL MEDICINE
Payer: MEDICARE

## 2021-04-06 DIAGNOSIS — Z12.31 SCREENING MAMMOGRAM FOR HIGH-RISK PATIENT: ICD-10-CM

## 2021-04-06 PROCEDURE — 77067 SCR MAMMO BI INCL CAD: CPT

## 2021-04-12 ENCOUNTER — PATIENT MESSAGE (OUTPATIENT)
Dept: CARDIOLOGY CLINIC | Age: 66
End: 2021-04-12

## 2021-04-12 DIAGNOSIS — I25.10 CORONARY ARTERY DISEASE INVOLVING NATIVE CORONARY ARTERY OF NATIVE HEART WITHOUT ANGINA PECTORIS: Primary | ICD-10-CM

## 2021-04-13 NOTE — TELEPHONE ENCOUNTER
Michelle Gregory MD  You 15 hours ago (4:03 PM)     Yes proceed with lipids and lft    Message text       J Carlos Morris MD 15 hours ago (4:02 PM)     Do you want labs before a year?     Routing comment

## 2021-04-22 LAB
ALBUMIN SERPL-MCNC: 3.9 G/DL (ref 3.8–4.8)
ALP SERPL-CCNC: 90 IU/L (ref 39–117)
ALT SERPL-CCNC: 115 IU/L (ref 0–32)
AST SERPL-CCNC: 103 IU/L (ref 0–40)
BILIRUB DIRECT SERPL-MCNC: 0.37 MG/DL (ref 0–0.4)
BILIRUB SERPL-MCNC: 1.9 MG/DL (ref 0–1.2)
CHOLEST SERPL-MCNC: 130 MG/DL (ref 100–199)
HDLC SERPL-MCNC: 37 MG/DL
IMP & REVIEW OF LAB RESULTS: NORMAL
LDLC SERPL CALC-MCNC: 74 MG/DL (ref 0–99)
PROT SERPL-MCNC: 6.4 G/DL (ref 6–8.5)
TRIGL SERPL-MCNC: 101 MG/DL (ref 0–149)
VLDLC SERPL CALC-MCNC: 19 MG/DL (ref 5–40)

## 2021-04-23 ENCOUNTER — OFFICE VISIT (OUTPATIENT)
Dept: CARDIOLOGY CLINIC | Age: 66
End: 2021-04-23
Payer: MEDICARE

## 2021-04-23 VITALS
OXYGEN SATURATION: 99 % | HEART RATE: 47 BPM | WEIGHT: 145 LBS | BODY MASS INDEX: 24.75 KG/M2 | SYSTOLIC BLOOD PRESSURE: 122 MMHG | HEIGHT: 64 IN | DIASTOLIC BLOOD PRESSURE: 76 MMHG | RESPIRATION RATE: 18 BRPM

## 2021-04-23 DIAGNOSIS — I25.10 CORONARY ARTERY DISEASE INVOLVING NATIVE CORONARY ARTERY OF NATIVE HEART WITHOUT ANGINA PECTORIS: Primary | ICD-10-CM

## 2021-04-23 PROCEDURE — G8420 CALC BMI NORM PARAMETERS: HCPCS | Performed by: SPECIALIST

## 2021-04-23 PROCEDURE — G9899 SCRN MAM PERF RSLTS DOC: HCPCS | Performed by: SPECIALIST

## 2021-04-23 PROCEDURE — G8427 DOCREV CUR MEDS BY ELIG CLIN: HCPCS | Performed by: SPECIALIST

## 2021-04-23 PROCEDURE — 1101F PT FALLS ASSESS-DOCD LE1/YR: CPT | Performed by: SPECIALIST

## 2021-04-23 PROCEDURE — 99214 OFFICE O/P EST MOD 30 MIN: CPT | Performed by: SPECIALIST

## 2021-04-23 PROCEDURE — G8399 PT W/DXA RESULTS DOCUMENT: HCPCS | Performed by: SPECIALIST

## 2021-04-23 PROCEDURE — G8536 NO DOC ELDER MAL SCRN: HCPCS | Performed by: SPECIALIST

## 2021-04-23 PROCEDURE — 1090F PRES/ABSN URINE INCON ASSESS: CPT | Performed by: SPECIALIST

## 2021-04-23 PROCEDURE — G8510 SCR DEP NEG, NO PLAN REQD: HCPCS | Performed by: SPECIALIST

## 2021-04-23 PROCEDURE — 3017F COLORECTAL CA SCREEN DOC REV: CPT | Performed by: SPECIALIST

## 2021-04-23 PROCEDURE — 93000 ELECTROCARDIOGRAM COMPLETE: CPT | Performed by: SPECIALIST

## 2021-04-23 RX ORDER — METOPROLOL TARTRATE 25 MG/1
25 TABLET, FILM COATED ORAL 2 TIMES DAILY
Qty: 180 TAB | Refills: 1 | Status: SHIPPED | OUTPATIENT
Start: 2021-04-23 | End: 2021-11-03 | Stop reason: SDUPTHER

## 2021-04-23 NOTE — PROGRESS NOTES
.  Visit Vitals  /76 (BP 1 Location: Left upper arm, BP Patient Position: Sitting, BP Cuff Size: Adult)   Pulse (!) 47   Resp 18   Ht 5' 4\" (1.626 m)   Wt 145 lb (65.8 kg)   SpO2 99%   BMI 24.89 kg/m²

## 2021-04-23 NOTE — PROGRESS NOTES
[facility]                                                            OFFICE NOTE        Belinda Whitman M.D.,BANDAR Ludwig Aase   1955  [id number]    Date/Time:  4/23/20219:21 AM        ICD-10-CM ICD-9-CM    1. Coronary artery disease involving native coronary artery of native heart without angina pectoris  I25.10 414.01 AMB POC EKG ROUTINE W/ 12 LEADS, INTER & REP         SUBJECTIVE:  She is feeling ok no cp or sob or palpitations  Some fatigue reported       Assessment/Plan  CAD: Status post MI in February 2018 and PCI and stent of PDA. no recurrent cp   Cardiac cath on 8/18 with no changes from previous  Continue asa  toprol and crestor  Now off effient ( no longer with bruising)  Her ECG is unchanged with NSR/SB and nstt    Stress test at the next OV in 6 months        Hypertension well-controlled     Hyperlipidemia: on crestor max dose    discussed lipids these are better but LFT have tripled    Discussed options proceed with lfts again in 2 weeks    we have discussed at length exercise ( at least 150 minutes a week ) and nutrition  repatha on hold for now as we have discussed together  Not much improvement with zetia this was stopped(also increased lft at that time)       Fluttering: previously Discussed also results of Holter monitor.  The burden of PVCs is not significant at this time and she tells me she did have a history of bigeminy in since 2007.    doing better also with palpitations  Continue with metoprolol but on account of bradycardia and fatigue decrease metoprolol to 25 mg bid        See her back  in 3 weeks virtually otherwise        HPI      66 y.o. female. patient with h/o HLD, seen by cardiologist about 15 years ago for ventricular bigeminy  moved to [facility] in 2018  On 2/18 she presented to [facility] with nausea vomiting and diarrhea, she had some abx for sore throat and then developed above symptoms  She was diagnosed with cdiff  While in hospital she developed cp and eventually ruled in for MI  Cardiac catheterization on 2/18:left main ok, lad minor irregs, lcx small, occluded distally with faint right to left collaterals. 30% prox rca, 50% mid pda, 85% distal rca treated with 3.25 by 18mm michelle to 10 david. No lv gram.  Echo on 2/18:Systolic function was normal. Ejection fraction was  estimated in the range of 50 % to 55 %. There was dyskinesis, possibly  aneurysmal formation of the apical wall(s). Doppler parameters were  consistent with abnormal left ventricular relaxation (grade 1 diastolic  dysfunction). Tricuspid valve: There was , regurgitation. holter on 7/18:NSR  rare episode of bundle branch block, 52 pacs , 1 episode of non sustained AT at111, 33 pvcs and 1 couplet  Stress echo on 7/18:Impressions: Abnormal study after maximal exercise without reproduction of  symptoms. Findings suggest single vessel coronary artery disease in the  territory of the right coronary artery.     CATHETERIZATION on 8/18 with Dr. Gill:left main ok, lad mild irregs, occluded distal lcx with faint right to left collaterals. 30% prox rca, widely patent distal rca stent, 50% mid pda. All unchanged from 3/18 final result. lvef 60%            CARDIAC STUDIES                                       EKG Results     Procedure 720 Value Units Date/Time    AMB POC EKG ROUTINE W/ 12 LEADS, INTER & REP [[id number]] Resulted: 04/23/21 0848    Order Status: Completed Updated: 04/23/21 0851              IMAGING      MRI Results (most recent):  No results found for this or any previous visit. CT Results (most recent):  Results from Hospital Encounter encounter on 05/23/18   CT HEAD WO CONT    Narrative EXAM:  CT HEAD WO CONT    INDICATION:   left sided numbness    COMPARISON: None. CONTRAST:  None. TECHNIQUE: Unenhanced CT of the head was performed using 5 mm images.  Brain and  bone windows were generated. CT dose reduction was achieved through use of a  standardized protocol tailored for this examination and automatic exposure  control for dose modulation. FINDINGS:  The ventricles and sulci are normal in size, shape and configuration and  midline. There is no significant white matter disease. There is no intracranial  hemorrhage, extra-axial collection, mass, mass effect or midline shift. The  basilar cisterns are open. No acute infarct is identified. The bone windows  demonstrate no abnormalities. The visualized portions of the paranasal sinuses  and mastoid air cells are clear. Impression IMPRESSION: Unremarkable CT of the head. XR Results (most recent):  Results from Hospital Encounter encounter on 08/06/18   XR CHEST PA LAT    Narrative EXAM:  XR CHEST PA LAT    INDICATION:  Chest pain and shortness of breath. COMPARISON: 5/23/2018    TECHNIQUE: PA and lateral chest views    FINDINGS: The cardiomediastinal contours are stable. The pulmonary vasculature  is within normal limits. The lungs and pleural spaces are clear. There is no pneumothorax. The bones and  upper abdomen are stable. Impression IMPRESSION:    No acute process.  Stable exam.             Past Medical History:   Diagnosis Date    C. difficile colitis     CAD (coronary artery disease)     Colitis     Hypercholesteremia     Sepsis ([address]     Vertigo of central origin of both ears     during allergy season     Past Surgical History:   Procedure Laterality Date    HX TUBAL LIGATION      HX WRIST FRACTURE TX Left 2016    ganglian cyst removal     [place] CARDIAC SURG PROCEDURE UNLIST  02/27/2018    1 stent     Social History     Tobacco Use    Smoking status: Never Smoker    Smokeless tobacco: Never Used   Substance Use Topics    Alcohol use: Not Currently     Frequency: Monthly or less     Drinks per session: 1 or 2     Binge frequency: Never     Comment: special occasions    Drug use: No     Family History   Problem Relation Age of Onset    Heart Disease Mother     Heart Attack Mother     Heart Disease Father     Heart Attack Father     Diabetes Brother     No Known Problems Brother     Cancer Brother         throat cancer     Allergies   Allergen Reactions    Other Medication Other (comments)     Diarrhea from cephalosporins         Visit Vitals  /76 (BP 1 Location: Left upper arm, BP Patient Position: Sitting, BP Cuff Size: Adult)   Pulse (!) 47   Resp 18   Ht 5' 4\" (1.626 m)   Wt 145 lb (65.8 kg)   SpO2 99%   BMI 24.89 kg/m²         Last 3 Recorded Weights in this Encounter    04/23/21 0843   Weight: 145 lb (65.8 kg)            Review of Systems:   Pertinent items are noted in the History of Present Illness. Visit Vitals  /76 (BP 1 Location: Left upper arm, BP Patient Position: Sitting, BP Cuff Size: Adult)   Pulse (!) 47   Resp 18   Ht 5' 4\" (1.626 m)   Wt 145 lb (65.8 kg)   SpO2 99%   BMI 24.89 kg/m²     General Appearance:  Well developed, well nourished,alert and oriented x 3, and individual in no acute distress. Ears/Nose/Mouth/Throat:   Hearing grossly normal.         Neck: Supple. Chest:   Lungs clear to auscultation bilaterally. Cardiovascular:  Regular rate and rhythm, S1, S2 normal, no murmur. Abdomen:   Soft, non-tender, bowel sounds are active. Extremities: No edema bilaterally. Skin: Warm and dry. Current Outpatient Medications on File Prior to Visit   Medication Sig Dispense Refill    rosuvastatin (CRESTOR) 40 mg tablet TAKE 1 TABLET NIGHTLY 90 Tab 3    metoprolol tartrate (LOPRESSOR) 50 mg tablet Take 1 Tab by mouth two (2) times a day. 180 Tab 1    famotidine (PEPCID) 20 mg tablet Take 1 Tab by mouth two (2) times a day. (Patient taking differently: Take 20 mg by mouth daily.) 90 Tab 1    aspirin delayed-release 81 mg tablet Take 81 mg by mouth daily.  L. acidophilus/Strept/La p-toro (OSCAR-Q PO) Take 1 Tab by mouth daily. No current facility-administered medications on file prior to visit. Elle Bruce had no medications administered during this visit. Current Outpatient Medications   Medication Sig    rosuvastatin (CRESTOR) 40 mg tablet TAKE 1 TABLET NIGHTLY    metoprolol tartrate (LOPRESSOR) 50 mg tablet Take 1 Tab by mouth two (2) times a day.  famotidine (PEPCID) 20 mg tablet Take 1 Tab by mouth two (2) times a day. (Patient taking differently: Take 20 mg by mouth daily.)    aspirin delayed-release 81 mg tablet Take 81 mg by mouth daily.  L. acidophilus/Strept/La p-toro (OSCAR-Q PO) Take 1 Tab by mouth daily. No current facility-administered medications for this visit. Lab Results   Component Value Date/Time    Cholesterol, total 130 04/21/2021 09:44 AM    HDL Cholesterol 37 (L) 04/21/2021 09:44 AM    LDL, calculated 74 04/21/2021 09:44 AM    LDL, calculated 82 06/19/2020 09:46 AM    VLDL, calculated 19 04/21/2021 09:44 AM    VLDL, calculated 20 06/19/2020 09:46 AM    Triglyceride 101 04/21/2021 09:44 AM       Lab Results   Component Value Date/Time    Sodium 141 12/09/2020 09:44 AM    Potassium 4.1 12/09/2020 09:44 AM    Chloride 108 (H) 12/09/2020 09:44 AM    CO2 23 12/09/2020 09:44 AM    Anion gap 11 08/07/2018 04:08 AM    Glucose 96 12/09/2020 09:44 AM    BUN 12 12/09/2020 09:44 AM    Creatinine 1.30 (H) 12/09/2020 09:44 AM    BUN/Creatinine ratio 9 (L) 12/09/2020 09:44 AM    GFR est AA 50 (L) 12/09/2020 09:44 AM    GFR est non-AA 43 (L) 12/09/2020 09:44 AM    Calcium 9.0 12/09/2020 09:44 AM       Lab Results   Component Value Date/Time    ALT (SGPT) 115 (H) 04/21/2021 09:44 AM    Alk.  phosphatase 90 04/21/2021 09:44 AM    Bilirubin, direct 0.37 04/21/2021 09:44 AM    Bilirubin, total 1.9 (H) 04/21/2021 09:44 AM       Lab Results   Component Value Date/Time    WBC 7.2 12/09/2020 09:44 AM    HGB 11.4 12/09/2020 09:44 AM    HCT 35.2 12/09/2020 09:44 AM    PLATELET 896 53/79/9049 09:44 AM MCV 96 12/09/2020 09:44 AM       Lab Results   Component Value Date/Time    TSH 1.110 11/05/2019 09:13 AM         Lab Results   Component Value Date/Time    Cholesterol, total 130 04/21/2021 09:44 AM    Cholesterol, total 151 06/19/2020 09:46 AM    Cholesterol, total 151 11/05/2019 09:13 AM    Cholesterol, total 153 11/05/2019 09:07 AM    Cholesterol, total 145 06/24/2019 09:03 AM    HDL Cholesterol 37 (L) 04/21/2021 09:44 AM    HDL Cholesterol 49 06/19/2020 09:46 AM    HDL Cholesterol 45 11/05/2019 09:13 AM    HDL Cholesterol 45 11/05/2019 09:07 AM    HDL Cholesterol 45 06/24/2019 09:03 AM    LDL, calculated 74 04/21/2021 09:44 AM    LDL, calculated 82 06/19/2020 09:46 AM    LDL, calculated 84 11/05/2019 09:13 AM    LDL, calculated 86 11/05/2019 09:07 AM    LDL, calculated 79 06/24/2019 09:03 AM    LDL, calculated 86 03/07/2019 08:52 AM    Triglyceride 101 04/21/2021 09:44 AM    Triglyceride 101 06/19/2020 09:46 AM    Triglyceride 110 11/05/2019 09:13 AM    Triglyceride 110 11/05/2019 09:07 AM    Triglyceride 107 06/24/2019 09:03 AM                Please note that this dictation was completed with [employer], the [employer] voice recognition software. Quite often unanticipated grammatical, syntax, homophones, and other interpretative errors are inadvertently transcribed by the computer software. Please disregard these errors. Please excuse any errors that have escaped final proofreading.

## 2021-04-23 NOTE — PATIENT INSTRUCTIONS
Please decrease your Toprol XL (metoprolol) to 25mg twice a day. Have Liver function blood testing in 2 weeks. Follow up with Dr. Yashira Toure 1 week after blood testing with a virtual visit.

## 2021-05-15 LAB
ALBUMIN SERPL-MCNC: 4 G/DL (ref 3.8–4.8)
ALP SERPL-CCNC: 80 IU/L (ref 39–117)
ALT SERPL-CCNC: 32 IU/L (ref 0–32)
AST SERPL-CCNC: 31 IU/L (ref 0–40)
BILIRUB DIRECT SERPL-MCNC: 0.3 MG/DL (ref 0–0.4)
BILIRUB SERPL-MCNC: 1.9 MG/DL (ref 0–1.2)
PROT SERPL-MCNC: 6.8 G/DL (ref 6–8.5)

## 2021-05-18 ENCOUNTER — VIRTUAL VISIT (OUTPATIENT)
Dept: CARDIOLOGY CLINIC | Age: 66
End: 2021-05-18
Payer: MEDICARE

## 2021-05-18 DIAGNOSIS — E78.49 OTHER HYPERLIPIDEMIA: ICD-10-CM

## 2021-05-18 DIAGNOSIS — I25.10 CORONARY ARTERY DISEASE INVOLVING NATIVE CORONARY ARTERY OF NATIVE HEART WITHOUT ANGINA PECTORIS: Primary | ICD-10-CM

## 2021-05-18 PROCEDURE — G8427 DOCREV CUR MEDS BY ELIG CLIN: HCPCS | Performed by: SPECIALIST

## 2021-05-18 PROCEDURE — G8399 PT W/DXA RESULTS DOCUMENT: HCPCS | Performed by: SPECIALIST

## 2021-05-18 PROCEDURE — G9899 SCRN MAM PERF RSLTS DOC: HCPCS | Performed by: SPECIALIST

## 2021-05-18 PROCEDURE — G8536 NO DOC ELDER MAL SCRN: HCPCS | Performed by: SPECIALIST

## 2021-05-18 PROCEDURE — 99214 OFFICE O/P EST MOD 30 MIN: CPT | Performed by: SPECIALIST

## 2021-05-18 PROCEDURE — 3017F COLORECTAL CA SCREEN DOC REV: CPT | Performed by: SPECIALIST

## 2021-05-18 PROCEDURE — 1090F PRES/ABSN URINE INCON ASSESS: CPT | Performed by: SPECIALIST

## 2021-05-18 PROCEDURE — 1101F PT FALLS ASSESS-DOCD LE1/YR: CPT | Performed by: SPECIALIST

## 2021-05-18 PROCEDURE — G8432 DEP SCR NOT DOC, RNG: HCPCS | Performed by: SPECIALIST

## 2021-05-18 PROCEDURE — G8420 CALC BMI NORM PARAMETERS: HCPCS | Performed by: SPECIALIST

## 2021-05-18 NOTE — PATIENT INSTRUCTIONS
You will be scheduled for a Nuclear Stress Test after your appointment today. For Nuclear Stress Test: 
Wear comfortable clothing (shorts or pants with a shirt or blouse- no underwire bras) and walking or athletic shoes. DO NOT eat or drink anything, except water, for at least 2 hours prior to your appointment. AVOID tobacco products for at least 6 hours prior to your test. 
 
DO NOT eat or drink anything containing caffeine, including but not limited to the following: chocolate, regular and decaffeinated coffee, soft drinks, or tea for at least 12-24 hours prior to your test. 
 
Do not take your Metoprolol 24hours prior to your test.  
 
You will need to inform our office if you are pregnant, nursing, or think you may be pregnant. Your test will be performed on a 1 OR 2 day protocol. This is determined by your height, weight, and other risk factors. For a 2 day test, please allow for 2 hours in the office each day. For a 1 day test, please allow for 4 hours in the office that day. The radioactive isotope used for your testing is different from any of the dyes that are commonly used in x-ray procedures, and is ordered specially for your test. Please call to cancel or reschedule your appointment at least 24 hours prior to your scheduled appointment to avoid being billed for the expensive isotope.

## 2021-05-18 NOTE — PROGRESS NOTES
[facility] Telemedicine Encounter                                                         Pursuant to the emergency declaration under the [facility], [facility] waiver authority and the [employer] Act, this Virtual  Visit was conducted, with patient's consent, to reduce the patient's risk of exposure to COVID-19 and provide continuity of care for an established patient. Services were provided through a video synchronous discussion virtually to substitute for in-person clinic visit. Subjective    Doing well fatigue she feels related to boredom  No real changes by decreasing metoprolol   no cp or sob        HPI  66 y. o. female. patient with h/o HLD, seen by cardiologist about 15 years ago for ventricular bigeminy  moved to [facility] in 2018  On 2/18 she presented to [facility] with nausea vomiting and diarrhea, she had some abx for sore throat and then developed above symptoms  She was diagnosed with cdiff  While in hospital she developed cp and eventually ruled in for MI  Cardiac catheterization on 2/18:left main ok, lad minor irregs, lcx small, occluded distally with faint right to left collaterals. 30% prox rca, 50% mid pda, 85% distal rca treated with 3.25 by 18mm michelle to 10 david. No lv gram.  Echo on 2/18:Systolic function was normal. Ejection fraction was  estimated in the range of 50 % to 55 %. There was dyskinesis, possibly  aneurysmal formation of the apical wall(s). Doppler parameters were  consistent with abnormal left ventricular relaxation (grade 1 diastolic  dysfunction). Tricuspid valve: There was , regurgitation. holter on 7/18:NSR  rare episode of bundle branch block, 52 pacs , 1 episode of non sustained AT at111, 33 pvcs and 1 couplet  Stress echo on 7/18:Impressions: Abnormal study after maximal exercise without reproduction of  symptoms.  Findings suggest single vessel coronary artery disease in the  territory of the right coronary artery.     CATHETERIZATION on 8/18 with Dr. Gill:left main ok, lad mild irregs, occluded distal lcx with faint right to left collaterals. 30% prox rca, widely patent distal rca stent, 50% mid pda. All unchanged from 3/18 final result. lvef 60%      Past Medical History:   Diagnosis Date    C. difficile colitis     CAD (coronary artery disease)     Colitis     Hypercholesteremia     [address] [address]     Vertigo of central origin of both ears     during allergy season         Past Surgical History:   Procedure Laterality Date    HX TUBAL LIGATION      HX WRIST FRACTURE TX Left 2016    ganglian cyst removal     [place] CARDIAC SURG PROCEDURE UNLIST  02/27/2018    1 stent         Social History     Tobacco Use    Smoking status: Never Smoker    Smokeless tobacco: Never Used   Substance Use Topics    Alcohol use: Not Currently     Frequency: Monthly or less     Drinks per session: 1 or 2     Binge frequency: Never     Comment: special occasions    Drug use: No             There were no vitals taken for this visit. Wt Readings from Last 3 Encounters:   04/23/21 145 lb (65.8 kg)   02/02/21 149 lb (67.6 kg)   11/24/20 148 lb 12.8 oz (67.5 kg)           Review of Symptoms  11 systems reviewed, negative other than as stated in the HPI    Physical Exam:    Due to this being a TeleHealth evaluation, many elements of the physical examination are unable to be assessed. General: Well developed, in no acute distress, cooperative and alert  HEENT: Pupils equal/round. No marked JVD visible on video. Respiratory: No audible wheezing, no signs of respiratory distress, lips non cyanotic  Extremities:  No edema  Neuro: A&Ox3, speech clear, no facial droop, answering questions appropriately  Skin: Skin color is normal. No rashes or lesions.  Non diaphoretic on visible skin during exam          Current Outpatient Medications on File Prior to Visit   Medication Sig Dispense Refill    metoprolol tartrate (LOPRESSOR) 25 mg tablet Take 1 Tab by mouth two (2) times a day. 180 Tab 1    rosuvastatin (CRESTOR) 40 mg tablet TAKE 1 TABLET NIGHTLY 90 Tab 3    famotidine (PEPCID) 20 mg tablet Take 1 Tab by mouth two (2) times a day. (Patient taking differently: Take 40 mg by mouth daily.) 90 Tab 1    aspirin delayed-release 81 mg tablet Take 81 mg by mouth daily.  L. acidophilus/Strept/La p-toro (OSCAR-Q PO) Take 1 Tab by mouth daily. No current facility-administered medications on file prior to visit. Lab Results   Component Value Date/Time    Cholesterol, total 130 04/21/2021 09:44 AM    HDL Cholesterol 37 (L) 04/21/2021 09:44 AM    LDL, calculated 74 04/21/2021 09:44 AM    LDL, calculated 82 06/19/2020 09:46 AM    VLDL, calculated 19 04/21/2021 09:44 AM    VLDL, calculated 20 06/19/2020 09:46 AM    Triglyceride 101 04/21/2021 09:44 AM         Lab Results   Component Value Date/Time    Sodium 141 12/09/2020 09:44 AM    Potassium 4.1 12/09/2020 09:44 AM    Chloride 108 (H) 12/09/2020 09:44 AM    CO2 23 12/09/2020 09:44 AM    Anion gap 11 08/07/2018 04:08 AM    Glucose 96 12/09/2020 09:44 AM    BUN 12 12/09/2020 09:44 AM    Creatinine 1.30 (H) 12/09/2020 09:44 AM    BUN/Creatinine ratio 9 (L) 12/09/2020 09:44 AM    GFR est AA 50 (L) 12/09/2020 09:44 AM    GFR est non-AA 43 (L) 12/09/2020 09:44 AM    Calcium 9.0 12/09/2020 09:44 AM         Lab Results   Component Value Date/Time    ALT (SGPT) 32 05/14/2021 10:22 AM    Alk.  phosphatase 80 05/14/2021 10:22 AM    Bilirubin, direct 0.30 05/14/2021 10:22 AM    Bilirubin, total 1.9 (H) 05/14/2021 10:22 AM         Lab Results   Component Value Date/Time    WBC 7.2 12/09/2020 09:44 AM    HGB 11.4 12/09/2020 09:44 AM    HCT 35.2 12/09/2020 09:44 AM    PLATELET 492 75/23/7089 09:44 AM    MCV 96 12/09/2020 09:44 AM             Assessment  /Plan  :    1.  CAD: Status post myocardial infarction in February 2018 with subsequent PCI and stenting of the PDA. Repeated cardiac catheterization of August 2018 with no recurrent flow-limiting stenosis. She is doing well and seemingly completely asymptomatic at this time. Her last EKG showed a normal sinus rhythm nonspecific ST-T wave abnormalities. Continue aspirin Toprol and Crestor. Repeat exercise treadmill Myoview at the next office visit in 6 months. 2.  Hyperlipidemia: She is doing very well with good lipid profile in April 2021. Her repeated liver function test have now completely normalized. There is a question if Covid vaccination was the cause of the temporary increase in her liver function test but once again at this point they are normal she will continue with statin. 3.  Palpitations: Not recurrent previous Holter with symptomatic PVCs. She does have a history of ventricular bigeminy since 2007. On account of fatigue and bradycardia her metoprolol was decreased to 25mg twice a day. This did not bring any particular improvement but at this point since she has had no recurrent palpitation I will continue with a lower dose of metoprolol. We have discussed exercise. Otherwise I will see her back in 6 months with a exercise nuclear stress test 1 week prior                  We discussed the expected course, resolution and complications of the diagnosis(es) in detail. Medication risks, benefits, costs, interactions, and alternatives were discussed as indicated. I advised her   to contact the office if her condition worsens, changes or fails to improve as anticipated. she expressed understanding with the diagnosis(es) and plan        Sanford Aly MD      Greater than 20 minutes was spent in direct video patient care, planning and chart review. This visit was conducted using [email or web address] telemedicine services.

## 2021-06-28 ENCOUNTER — OFFICE VISIT (OUTPATIENT)
Dept: INTERNAL MEDICINE CLINIC | Age: 66
End: 2021-06-28
Payer: MEDICARE

## 2021-06-28 VITALS
HEART RATE: 52 BPM | TEMPERATURE: 97.9 F | RESPIRATION RATE: 14 BRPM | HEIGHT: 64 IN | OXYGEN SATURATION: 98 % | BODY MASS INDEX: 24.65 KG/M2 | DIASTOLIC BLOOD PRESSURE: 60 MMHG | WEIGHT: 144.4 LBS | SYSTOLIC BLOOD PRESSURE: 110 MMHG

## 2021-06-28 DIAGNOSIS — R19.5 LOOSE STOOLS: Primary | ICD-10-CM

## 2021-06-28 DIAGNOSIS — R17 ELEVATED BILIRUBIN: ICD-10-CM

## 2021-06-28 DIAGNOSIS — M25.512 NONTRAUMATIC PAIN OF LEFT SHOULDER: ICD-10-CM

## 2021-06-28 DIAGNOSIS — R10.10 PAIN OF UPPER ABDOMEN: ICD-10-CM

## 2021-06-28 PROCEDURE — G9899 SCRN MAM PERF RSLTS DOC: HCPCS | Performed by: INTERNAL MEDICINE

## 2021-06-28 PROCEDURE — 1101F PT FALLS ASSESS-DOCD LE1/YR: CPT | Performed by: INTERNAL MEDICINE

## 2021-06-28 PROCEDURE — 1090F PRES/ABSN URINE INCON ASSESS: CPT | Performed by: INTERNAL MEDICINE

## 2021-06-28 PROCEDURE — 3017F COLORECTAL CA SCREEN DOC REV: CPT | Performed by: INTERNAL MEDICINE

## 2021-06-28 PROCEDURE — G8427 DOCREV CUR MEDS BY ELIG CLIN: HCPCS | Performed by: INTERNAL MEDICINE

## 2021-06-28 PROCEDURE — 99214 OFFICE O/P EST MOD 30 MIN: CPT | Performed by: INTERNAL MEDICINE

## 2021-06-28 PROCEDURE — G8399 PT W/DXA RESULTS DOCUMENT: HCPCS | Performed by: INTERNAL MEDICINE

## 2021-06-28 PROCEDURE — G8536 NO DOC ELDER MAL SCRN: HCPCS | Performed by: INTERNAL MEDICINE

## 2021-06-28 PROCEDURE — G8510 SCR DEP NEG, NO PLAN REQD: HCPCS | Performed by: INTERNAL MEDICINE

## 2021-06-28 PROCEDURE — G8420 CALC BMI NORM PARAMETERS: HCPCS | Performed by: INTERNAL MEDICINE

## 2021-06-28 NOTE — PROGRESS NOTES
Acute Care Note    Sae Lockhart is 77 y.o. female. she presents for evaluation of GI Problem      The patient reports having some loose stools recently which have been somewhat frequent in the past two weeks. She denies true abdominal pain but notes a general abdominal unease. She notes that the stools are not more frequent than once or twice per day. She also has some left shoulder discomfort. This has been present for several days and she denies any previous trauma to the shoulder. Of note, she does have a history of CAD and is followed by cardiology. She has not had any chest pain or shortness of breath. Prior to Admission medications    Medication Sig Start Date End Date Taking? Authorizing Provider   metoprolol tartrate (LOPRESSOR) 25 mg tablet Take 1 Tab by mouth two (2) times a day. 4/23/21  Yes Andria Batista MD   rosuvastatin (CRESTOR) 40 mg tablet TAKE 1 TABLET NIGHTLY 12/15/20  Yes Andria Batista MD   famotidine (PEPCID) 20 mg tablet Take 1 Tab by mouth two (2) times a day. Patient taking differently: Take 40 mg by mouth daily. 11/12/19  Yes Radha Chand MD   aspirin delayed-release 81 mg tablet Take 81 mg by mouth daily. Yes Provider, Historical   L. acidophilus/Strept/La p-toro (OSCAR-Q PO) Take 1 Tab by mouth daily. Yes Provider, Historical         Patient Active Problem List   Diagnosis Code    Colitis K52.9    Severe sepsis ([facility] [address] A41.9, R65.20    Dehydration E86.0    Hypokalemia E87.6    Hypocalcemia E83.51    CAD (coronary artery disease) I25.10    Acute chest pain R07.9    Numbness and tingling in left upper extremity R20.0, R20.2    Chest pain R07.9         Review of Systems   Constitutional: Negative. Cardiovascular: Negative.     Gastrointestinal:        Loose stools   Musculoskeletal:        Shoulder pain as per HPI         Visit Vitals  /60 (BP 1 Location: Left arm, BP Patient Position: Sitting, BP Cuff Size: Adult)   Pulse (!) 52   Temp 97.9 °F (36.6 °C) (Temporal)   Resp 14   Ht 5' 4\" (1.626 m)   Wt 144 lb 6.4 oz (65.5 kg)   SpO2 98%   BMI 24.79 kg/m²       Physical Exam  Constitutional:       Appearance: Normal appearance. She is well-developed. Cardiovascular:      Rate and Rhythm: Normal rate and regular rhythm. Pulmonary:      Effort: Pulmonary effort is normal.      Breath sounds: Normal breath sounds. Abdominal:      Palpations: Abdomen is soft. There is no mass. Tenderness: There is abdominal tenderness (moderately tender to palpation at the upper abdomen). Hernia: No hernia is present. Neurological:      Mental Status: She is alert. ASSESSMENT/PLAN  Diagnoses and all orders for this visit:    1. Loose stools - Unclear etiology, advised hydration    2. Elevated bilirubin- Recheck this and also evaluate GGT.  -     METABOLIC PANEL, COMPREHENSIVE; Future  -     GGT; Future    3. Pain of upper abdomen - Given loose stools and liver abnormalities, we will refer to GI.    -     REFERRAL TO GASTROENTEROLOGY    4. Nontraumatic pain of left shoulder - Unclear etiology for this. I do not feel strongly that this represents any cardiac pathology. However, given her previous history of CAD and a planned upcoming stress test, I question the need for sooner cardiac re-evaluation to rule this out. Will inform her cardiologist.           Adonis Curling the patient to call back or return to office if symptoms worsen/change/persist.   Discussed expected course/resolution/complications of diagnosis in detail with patient. Medication risks/benefits/costs/interactions/alternatives discussed with patient. The patient was given an after visit summary which includes diagnoses, current medications, & vitals. They expressed understanding with the diagnosis and plan.

## 2021-06-28 NOTE — PROGRESS NOTES
Chief Complaint   Patient presents with    GI Problem     Reviewed record in preparation for visit and have obtained necessary documentation. Identified pt with two pt identifiers(name and ).       Health Maintenance Due   Topic    Shingrix Vaccine Age 49> (1 of 2)         Chief Complaint   Patient presents with    GI Problem        Wt Readings from Last 3 Encounters:   21 144 lb 6.4 oz (65.5 kg)   21 145 lb (65.8 kg)   21 149 lb (67.6 kg)     Temp Readings from Last 3 Encounters:   21 97.9 °F (36.6 °C) (Temporal)   20 97.7 °F (36.5 °C) (Temporal)   10/07/20 97.6 °F (36.4 °C)     BP Readings from Last 3 Encounters:   21 110/60   21 122/76   21 (!) 104/58     Pulse Readings from Last 3 Encounters:   21 (!) 52   21 (!) 47   20 (!) 50           Learning Assessment:  :     Learning Assessment 2018   PRIMARY LEARNER Patient   HIGHEST LEVEL OF EDUCATION - PRIMARY LEARNER  4 YEARS OF COLLEGE   BARRIERS PRIMARY LEARNER NONE   PRIMARY LANGUAGE ENGLISH   LEARNER PREFERENCE PRIMARY READING   ANSWERED BY pt   RELATIONSHIP SELF       Depression Screening:  :     3 most recent PHQ Screens 2021   Little interest or pleasure in doing things Not at all   Feeling down, depressed, irritable, or hopeless Not at all   Total Score PHQ 2 0   Trouble falling or staying asleep, or sleeping too much -   Feeling tired or having little energy -   Poor appetite, weight loss, or overeating -   Feeling bad about yourself - or that you are a failure or have let yourself or your family down -   Trouble concentrating on things such as school, work, reading, or watching TV -   Moving or speaking so slowly that other people could have noticed; or the opposite being so fidgety that others notice -   Thoughts of being better off dead, or hurting yourself in some way -   PHQ 9 Score -   How difficult have these problems made it for you to do your work, take care of your home and get along with others -       Fall Risk Assessment:  :     Fall Risk Assessment, last 12 mths 6/28/2021   Able to walk? Yes   Fall in past 12 months? 0   Do you feel unsteady? 0   Are you worried about falling 0       Abuse Screening:  :     Abuse Screening Questionnaire 7/24/2018   Do you ever feel afraid of your partner? N   Are you in a relationship with someone who physically or mentally threatens you? N   Is it safe for you to go home? Y       Coordination of Care Questionnaire:  :     1) Have you been to an emergency room, urgent care clinic since your last visit? no   Hospitalized since your last visit? no             2) Have you seen or consulted any other health care providers outside of [address] [place] [address] since your last visit? no  (Include any pap smears or colon screenings in this section.)    3) Do you have an Advance Directive on file? no    4) Are you interested in receiving information on Advance Directives? NO      Patient is accompanied by self I have received verbal consent from Shannan Castillo to discuss any/all medical information while they are present in the room. Reviewed record  In preparation for visit and have obtained necessary documentation.

## 2021-06-28 NOTE — Clinical Note
Good afternoon,     I saw Mrs. Misael tello last week with symptoms of shoulder discomfort. This was minor but I was wondering if she needed a sooner stress test.  This did not appear very convincing for ischemia but I did want to run it by you. Thanks!   Caty Dodd

## 2021-07-13 LAB
ALBUMIN SERPL-MCNC: 4.3 G/DL (ref 3.8–4.8)
ALBUMIN/GLOB SERPL: 1.9 {RATIO} (ref 1.2–2.2)
ALP SERPL-CCNC: 79 IU/L (ref 48–121)
ALT SERPL-CCNC: 61 IU/L (ref 0–32)
AST SERPL-CCNC: 61 IU/L (ref 0–40)
BILIRUB SERPL-MCNC: 1.9 MG/DL (ref 0–1.2)
BUN SERPL-MCNC: 15 MG/DL (ref 8–27)
BUN/CREAT SERPL: 11 (ref 12–28)
CALCIUM SERPL-MCNC: 9.1 MG/DL (ref 8.7–10.3)
CHLORIDE SERPL-SCNC: 110 MMOL/L (ref 96–106)
CO2 SERPL-SCNC: 22 MMOL/L (ref 20–29)
CREAT SERPL-MCNC: 1.39 MG/DL (ref 0.57–1)
GGT SERPL-CCNC: 15 IU/L (ref 0–60)
GLOBULIN SER CALC-MCNC: 2.3 G/DL (ref 1.5–4.5)
GLUCOSE SERPL-MCNC: 95 MG/DL (ref 65–99)
POTASSIUM SERPL-SCNC: 4.4 MMOL/L (ref 3.5–5.2)
PROT SERPL-MCNC: 6.6 G/DL (ref 6–8.5)
SODIUM SERPL-SCNC: 143 MMOL/L (ref 134–144)

## 2021-07-23 ENCOUNTER — VIRTUAL VISIT (OUTPATIENT)
Dept: INTERNAL MEDICINE CLINIC | Age: 66
End: 2021-07-23
Payer: MEDICARE

## 2021-07-23 DIAGNOSIS — R79.89 ELEVATED LFTS: Primary | ICD-10-CM

## 2021-07-23 PROCEDURE — 3017F COLORECTAL CA SCREEN DOC REV: CPT | Performed by: INTERNAL MEDICINE

## 2021-07-23 PROCEDURE — 1090F PRES/ABSN URINE INCON ASSESS: CPT | Performed by: INTERNAL MEDICINE

## 2021-07-23 PROCEDURE — 99213 OFFICE O/P EST LOW 20 MIN: CPT | Performed by: INTERNAL MEDICINE

## 2021-07-23 PROCEDURE — G9899 SCRN MAM PERF RSLTS DOC: HCPCS | Performed by: INTERNAL MEDICINE

## 2021-07-23 PROCEDURE — G8432 DEP SCR NOT DOC, RNG: HCPCS | Performed by: INTERNAL MEDICINE

## 2021-07-23 PROCEDURE — G8427 DOCREV CUR MEDS BY ELIG CLIN: HCPCS | Performed by: INTERNAL MEDICINE

## 2021-07-23 PROCEDURE — G8399 PT W/DXA RESULTS DOCUMENT: HCPCS | Performed by: INTERNAL MEDICINE

## 2021-07-23 PROCEDURE — G8420 CALC BMI NORM PARAMETERS: HCPCS | Performed by: INTERNAL MEDICINE

## 2021-07-23 PROCEDURE — G8536 NO DOC ELDER MAL SCRN: HCPCS | Performed by: INTERNAL MEDICINE

## 2021-07-23 PROCEDURE — 1101F PT FALLS ASSESS-DOCD LE1/YR: CPT | Performed by: INTERNAL MEDICINE

## 2021-07-23 NOTE — PROGRESS NOTES
Scott Del Rio is a 77 y.o. female who was seen by synchronous (real-time) audio-video technology on 7/23/2021. She confirmed that, for purposes of billing, this is a virtual visit with her provider for which we will submit a claim for reimbursement to her insurance company. She is aware that she will be responsible for any copays, coinsurance amounts or other amounts not covered by her insurance company. Do you accept - YES    This visit was completed was completed virtually using Lisa Weiner re: LFT's and repeat stress test      Subjective:   Scott Del Rio was seen for Abnormal Lab Results      We discussed the patient's recent lab testing. She has reported feeling somewhat better since our last discussion. She has noted elevated lfts on her recent lab testing. She has no current symptoms. Discussed that this while not severe should be re-evaluated. We also discussed her upcoming stress test.  Given recent history of chest sensations, advised a sooner stress test.        Prior to Admission medications    Medication Sig Start Date End Date Taking? Authorizing Provider   metoprolol tartrate (LOPRESSOR) 25 mg tablet Take 1 Tab by mouth two (2) times a day. 4/23/21  Yes Tyrone Johnson MD   rosuvastatin (CRESTOR) 40 mg tablet TAKE 1 TABLET NIGHTLY 12/15/20  Yes Tyrone Johnson MD   famotidine (PEPCID) 20 mg tablet Take 1 Tab by mouth two (2) times a day. Patient taking differently: Take 40 mg by mouth daily. 11/12/19  Yes Dayo Nava MD   aspirin delayed-release 81 mg tablet Take 81 mg by mouth daily. Yes Provider, Historical   L. acidophilus/Strept/La p-toro (OSCAR-Q PO) Take 1 Tab by mouth daily.    Yes Provider, Historical       Allergies   Allergen Reactions    Other Medication Other (comments)     Diarrhea from cephalosporins       Patient Active Problem List    Diagnosis Date Noted    CAD (coronary artery disease) 05/23/2018    Acute chest pain 05/23/2018    Numbness and tingling in left upper extremity 05/23/2018    Colitis 02/24/2018    Severe sepsis ([facility] [address] 02/24/2018    Dehydration 02/24/2018    Hypokalemia 02/24/2018    Hypocalcemia 02/24/2018     Current Outpatient Medications   Medication Sig Dispense Refill    metoprolol tartrate (LOPRESSOR) 25 mg tablet Take 1 Tab by mouth two (2) times a day. 180 Tab 1    rosuvastatin (CRESTOR) 40 mg tablet TAKE 1 TABLET NIGHTLY 90 Tab 3    famotidine (PEPCID) 20 mg tablet Take 1 Tab by mouth two (2) times a day. (Patient taking differently: Take 40 mg by mouth daily.) 90 Tab 1    aspirin delayed-release 81 mg tablet Take 81 mg by mouth daily.  L. acidophilus/Strept/La p-toro (OSCAR-Q PO) Take 1 Tab by mouth daily.        Allergies   Allergen Reactions    Other Medication Other (comments)     Diarrhea from cephalosporins     Past Medical History:   Diagnosis Date    C. difficile colitis     CAD (coronary artery disease)     Colitis     Hypercholesteremia     Sepsis ([facility] [address]     Vertigo of central origin of both ears     during allergy season     Past Surgical History:   Procedure Laterality Date    HX TUBAL LIGATION      HX WRIST FRACTURE TX Left 2016    ganglian cyst removal     [place] CARDIAC SURG PROCEDURE UNLIST  02/27/2018    1 stent     Family History   Problem Relation Age of Onset    Heart Disease Mother     Heart Attack Mother     Heart Disease Father     Heart Attack Father     Diabetes Brother     No Known Problems Brother     Cancer Brother         throat cancer          ROS - per HPI      Objective:     General: alert, cooperative, no distress   Mental  status: pe mental status_general use: normal mood, behavior, speech, dress, motor activity, and thought processes, able to follow commands   Eyes: EOM intact, normal sclera   Mouth: mucous membranes moist   Neck: no visualized mass   Resp: PULM - obs findings: normal effort and no respiratory distress   Neuro: neuro - obs: no gross deficits Musculoskeletal: normal ROM of neck and normal gait w/o ataxia   Skin: skin exam: no discoloration or lesions of concern on visible areas   Psychiatric: normal affect, no hallucinations           Assessment & Plan:   1. Elevated LFTs - Advised monitoring these further. If further changes, will need to look into further etiologies of the elevation. Discussed with the patient and her                  Due to this being a TeleHealth evaluation, many elements of the physical examination are unable to be assessed. Pursuant to the emergency declaration under the [address] [place] [address] waiver authority and the [employer] Act, this Virtual  Visit was conducted, with patient's consent, to reduce the patient's risk of exposure to COVID-19 and provide continuity of care for an established patient. Services were provided through a video synchronous discussion virtually to substitute for in-person clinic visit. We discussed the expected course, resolution and complications of the diagnosis(es) in detail. Medication risks, benefits, costs, interactions, and alternatives were discussed as indicated. I advised her to contact the office if her condition worsens, changes or fails to improve as anticipated. She expressed understanding with the diagnosis(es) and plan.      Vicky Pocne MD

## 2021-08-03 PROBLEM — R07.9 CHEST PAIN: Status: RESOLVED | Noted: 2018-05-23 | Resolved: 2021-08-03

## 2021-08-17 ENCOUNTER — TELEPHONE (OUTPATIENT)
Dept: INTERNAL MEDICINE CLINIC | Age: 66
End: 2021-08-17

## 2021-08-17 NOTE — TELEPHONE ENCOUNTER
Alexis Short (Self) [phone] (H)     pt's  calling and wants to be sure that dr Kelley Estrada reads his wife's my chart mess.

## 2021-10-29 ENCOUNTER — PATIENT MESSAGE (OUTPATIENT)
Dept: CARDIOLOGY CLINIC | Age: 66
End: 2021-10-29

## 2021-10-29 DIAGNOSIS — E78.49 OTHER HYPERLIPIDEMIA: ICD-10-CM

## 2021-10-29 DIAGNOSIS — I25.10 CORONARY ARTERY DISEASE INVOLVING NATIVE CORONARY ARTERY OF NATIVE HEART WITHOUT ANGINA PECTORIS: Primary | ICD-10-CM

## 2021-11-04 RX ORDER — METOPROLOL TARTRATE 25 MG/1
25 TABLET, FILM COATED ORAL 2 TIMES DAILY
Qty: 180 TABLET | Refills: 1 | Status: SHIPPED | OUTPATIENT
Start: 2021-11-04 | End: 2022-04-29

## 2021-11-04 NOTE — TELEPHONE ENCOUNTER
Cardiologist: Dr. Marjorie Mccoy    Last appt: 5/18/2021  Future Appointments   Date Time Provider Georgina Levin   11/11/2021  9:00 AM YULI BALDWIN BS AMB   11/18/2021 11:40 AM MD NAOMI Garner BS AMB   11/29/2021 10:45 AM MD MARIELOS Rudolph BS AMB       Requested Prescriptions     Signed Prescriptions Disp Refills    metoprolol tartrate (LOPRESSOR) 25 mg tablet 180 Tablet 1     Sig: Take 1 Tablet by mouth two (2) times a day. Authorizing Provider: Serge Bhatt     Ordering User: BEN LEES         Refills VO per Dr. Marjorie Mccoy.

## 2021-11-11 ENCOUNTER — ANCILLARY PROCEDURE (OUTPATIENT)
Dept: CARDIOLOGY CLINIC | Age: 66
End: 2021-11-11
Payer: MEDICARE

## 2021-11-11 VITALS
DIASTOLIC BLOOD PRESSURE: 60 MMHG | WEIGHT: 144 LBS | HEIGHT: 64 IN | BODY MASS INDEX: 24.59 KG/M2 | SYSTOLIC BLOOD PRESSURE: 106 MMHG

## 2021-11-11 DIAGNOSIS — E78.49 OTHER HYPERLIPIDEMIA: ICD-10-CM

## 2021-11-11 DIAGNOSIS — I25.10 CORONARY ARTERY DISEASE INVOLVING NATIVE CORONARY ARTERY OF NATIVE HEART WITHOUT ANGINA PECTORIS: ICD-10-CM

## 2021-11-11 DIAGNOSIS — R20.2 NUMBNESS AND TINGLING IN LEFT UPPER EXTREMITY: ICD-10-CM

## 2021-11-11 DIAGNOSIS — R07.9 ACUTE CHEST PAIN: ICD-10-CM

## 2021-11-11 DIAGNOSIS — R20.0 NUMBNESS AND TINGLING IN LEFT UPPER EXTREMITY: ICD-10-CM

## 2021-11-11 PROCEDURE — 93015 CV STRESS TEST SUPVJ I&R: CPT | Performed by: SPECIALIST

## 2021-11-11 PROCEDURE — 78452 HT MUSCLE IMAGE SPECT MULT: CPT | Performed by: SPECIALIST

## 2021-11-11 PROCEDURE — A9500 TC99M SESTAMIBI: HCPCS

## 2021-11-11 RX ORDER — TETRAKIS(2-METHOXYISOBUTYLISOCYANIDE)COPPER(I) TETRAFLUOROBORATE 1 MG/ML
10 INJECTION, POWDER, LYOPHILIZED, FOR SOLUTION INTRAVENOUS ONCE
Status: COMPLETED | OUTPATIENT
Start: 2021-11-11 | End: 2021-11-11

## 2021-11-11 RX ORDER — TETRAKIS(2-METHOXYISOBUTYLISOCYANIDE)COPPER(I) TETRAFLUOROBORATE 1 MG/ML
30 INJECTION, POWDER, LYOPHILIZED, FOR SOLUTION INTRAVENOUS ONCE
Status: COMPLETED | OUTPATIENT
Start: 2021-11-11 | End: 2021-11-11

## 2021-11-11 RX ADMIN — TETRAKIS(2-METHOXYISOBUTYLISOCYANIDE)COPPER(I) TETRAFLUOROBORATE 26 MILLICURIE: 1 INJECTION, POWDER, LYOPHILIZED, FOR SOLUTION INTRAVENOUS at 11:00

## 2021-11-11 RX ADMIN — TETRAKIS(2-METHOXYISOBUTYLISOCYANIDE)COPPER(I) TETRAFLUOROBORATE 7.9 MILLICURIE: 1 INJECTION, POWDER, LYOPHILIZED, FOR SOLUTION INTRAVENOUS at 09:20

## 2021-11-12 LAB
STRESS ANGINA INDEX: 0
STRESS BASELINE DIAS BP: 60 MMHG
STRESS BASELINE HR: 88 BPM
STRESS BASELINE SYS BP: 106 MMHG
STRESS ESTIMATED WORKLOAD: 7 METS
STRESS EXERCISE DUR MIN: NORMAL
STRESS O2 SAT PEAK: 99 %
STRESS O2 SAT REST: 99 %
STRESS PEAK DIAS BP: 70 MMHG
STRESS PEAK SYS BP: 142 MMHG
STRESS PERCENT HR ACHIEVED: 98 %
STRESS POST PEAK HR: 151 BPM
STRESS RATE PRESSURE PRODUCT: NORMAL BPM*MMHG
STRESS TARGET HR: 154 BPM

## 2021-11-17 LAB
ALBUMIN SERPL-MCNC: 3.9 G/DL (ref 3.8–4.8)
ALP SERPL-CCNC: 75 IU/L (ref 44–121)
ALT SERPL-CCNC: 42 IU/L (ref 0–32)
AST SERPL-CCNC: 46 IU/L (ref 0–40)
BILIRUB DIRECT SERPL-MCNC: 0.35 MG/DL (ref 0–0.4)
BILIRUB SERPL-MCNC: 1.9 MG/DL (ref 0–1.2)
CHOLEST SERPL-MCNC: 117 MG/DL (ref 100–199)
EST. AVERAGE GLUCOSE BLD GHB EST-MCNC: 100 MG/DL
HBA1C MFR BLD: 5.1 % (ref 4.8–5.6)
HDLC SERPL-MCNC: 35 MG/DL
IMP & REVIEW OF LAB RESULTS: NORMAL
LDLC SERPL CALC-MCNC: 62 MG/DL (ref 0–99)
PROT SERPL-MCNC: 6.6 G/DL (ref 6–8.5)
TRIGL SERPL-MCNC: 108 MG/DL (ref 0–149)
VLDLC SERPL CALC-MCNC: 20 MG/DL (ref 5–40)

## 2021-11-18 ENCOUNTER — OFFICE VISIT (OUTPATIENT)
Dept: CARDIOLOGY CLINIC | Age: 66
End: 2021-11-18
Payer: MEDICARE

## 2021-11-18 VITALS
HEIGHT: 64 IN | WEIGHT: 139 LBS | RESPIRATION RATE: 16 BRPM | SYSTOLIC BLOOD PRESSURE: 110 MMHG | BODY MASS INDEX: 23.73 KG/M2 | HEART RATE: 62 BPM | OXYGEN SATURATION: 98 % | DIASTOLIC BLOOD PRESSURE: 60 MMHG

## 2021-11-18 DIAGNOSIS — I25.10 CORONARY ARTERY DISEASE INVOLVING NATIVE CORONARY ARTERY OF NATIVE HEART WITHOUT ANGINA PECTORIS: Primary | ICD-10-CM

## 2021-11-18 PROCEDURE — G8510 SCR DEP NEG, NO PLAN REQD: HCPCS | Performed by: SPECIALIST

## 2021-11-18 PROCEDURE — 1090F PRES/ABSN URINE INCON ASSESS: CPT | Performed by: SPECIALIST

## 2021-11-18 PROCEDURE — G8427 DOCREV CUR MEDS BY ELIG CLIN: HCPCS | Performed by: SPECIALIST

## 2021-11-18 PROCEDURE — G8536 NO DOC ELDER MAL SCRN: HCPCS | Performed by: SPECIALIST

## 2021-11-18 PROCEDURE — 99214 OFFICE O/P EST MOD 30 MIN: CPT | Performed by: SPECIALIST

## 2021-11-18 PROCEDURE — G8399 PT W/DXA RESULTS DOCUMENT: HCPCS | Performed by: SPECIALIST

## 2021-11-18 PROCEDURE — G8420 CALC BMI NORM PARAMETERS: HCPCS | Performed by: SPECIALIST

## 2021-11-18 PROCEDURE — 3017F COLORECTAL CA SCREEN DOC REV: CPT | Performed by: SPECIALIST

## 2021-11-18 PROCEDURE — 1101F PT FALLS ASSESS-DOCD LE1/YR: CPT | Performed by: SPECIALIST

## 2021-11-18 PROCEDURE — G9899 SCRN MAM PERF RSLTS DOC: HCPCS | Performed by: SPECIALIST

## 2021-11-18 NOTE — PROGRESS NOTES
[address] [facility] VASCULAR [place]                                                            OFFICE NOTE        Uzair Patel M.D.,BANDAR Monia Lanes   1955  [id number]    Date/Time:  11/18/202111:45 AM            SUBJECTIVE:  She is doing very well cardiac wise. No chest pain or shortness of breath or palpitations reported. She does have some heartburn which she seems to attribute to Crestor release this started after she was started on Crestor. Assessment/Plan  1. CAD: Status post myocardial infarction in February 2018 with subsequent PCI and stenting of the PDA.     Repeated cardiac catheterization of August 2018 with no recurrent flow-limiting stenosis.     She is doing well and seemingly completely asymptomatic at this time. We have discussed nuclear stress test results. There is no evidence for ischemia. There is a fixed defect in the inferolateral wall suggestive of previous subendocardial myocardial infarction at the level. Nonetheless the ejection fraction is complete preserved and estimated 80%. Continue aspirin Toprol and Crestor.          2. Hyperlipidemia: Excellent lipid panel as of November 2021. Nonetheless heartburn may be in part related to Crestor use. We have decided to stop Crestor temporarily for 3 weeks. She will let me know if heartburn is resolved after stopping Crestor if it does resolve we will start her on Lipitor 40 mg daily on the other hand if the heartburn does not resolve off Crestor she will undergo GI evaluation.     3.  Palpitations: Not recurrent previous Holter with symptomatic PVCs. She does have a history of ventricular bigeminy since 2007.     Continue beta-blocker. Otherwise I will see her back in 9 months or sooner if any question problems arise prior to that.           HPI     66 y. o. female. patient with h/o HLD, seen by cardiologist about 15 years ago for ventricular bigeminy  moved to [facility] in 2018  On 2/18 she presented to [facility] with nausea vomiting and diarrhea, she had some abx for sore throat and then developed above symptoms  She was diagnosed with cdiff  While in hospital she developed cp and eventually ruled in for MI  Cardiac catheterization on 2/18:left main ok, lad minor irregs, lcx small, occluded distally with faint right to left collaterals. 30% prox rca, 50% mid pda, 85% distal rca treated with 3.25 by 18mm michelle to 10 david. No lv gram.  Echo on 2/18:Systolic function was normal. Ejection fraction was  estimated in the range of 50 % to 55 %. There was dyskinesis, possibly  aneurysmal formation of the apical wall(s). Doppler parameters were  consistent with abnormal left ventricular relaxation (grade 1 diastolic  dysfunction). Tricuspid valve: There was , regurgitation. holter on 7/18:NSR  rare episode of bundle branch block, 52 pacs , 1 episode of non sustained AT at111, 33 pvcs and 1 couplet  Stress echo on 7/18:Impressions: Abnormal study after maximal exercise without reproduction of  symptoms. Findings suggest single vessel coronary artery disease in the  territory of the right coronary artery.     CATHETERIZATION on 8/18 with Dr. Gill:left main ok, lad mild irregs, occluded distal lcx with faint right to left collaterals. 30% prox rca, widely patent distal rca stent, 50% mid pda. All unchanged from 3/18 final result. lvef 60%            CARDIAC STUDIES                  11/11/21    NUCLEAR CARDIAC STRESS TEST 11/12/2021 11/15/2021    Interpretation Summary  · SPECT: Left ventricular function post-stress was normal. Calculated ejection fraction is 80% (normal EF value is equal to or greater than 50%). Left ventricular wall motion was normal at stress, no regional wall motion abnormality noted. There is no evidence of transient ischemic dilation (TID). The TID ratio is 0.95.   · Baseline ECG: Normal sinus rhythm, non-specific ST-T wave abnormalities. · SPECT: Myocardial perfusion imaging defect 1: There is a defect that is small in size with a mild reduction in uptake present in the apical segment(s) of the apex wall(s) that is non-reversible. There is normal wall motion in the defect area. Viability in the area is good. The defect appears to probably be artifact caused by subdiaphragmatic activity. Myocardial perfusion imaging defect 2: There is a defect that is small in size present in the basal segment(s) of the inferolateral wall(s) that is non-reversible. The defect appears to probably be infarction. · No gross ischemia noted  · Small inferolateral proximal defectlikely related to previous subendocardial myocardial infarcion  · Normal EF at 80%    Signed by: Kenneth Daley MD on 11/12/2021 12:52 PM                    EKG Results     None              IMAGING      MRI Results (most recent):  No results found for this or any previous visit. CT Results (most recent):  Results from Hospital Encounter encounter on 05/23/18    CT HEAD WO CONT    Narrative  EXAM:  CT HEAD WO CONT    INDICATION:   left sided numbness    COMPARISON: None. CONTRAST:  None. TECHNIQUE: Unenhanced CT of the head was performed using 5 mm images. Brain and  bone windows were generated. CT dose reduction was achieved through use of a  standardized protocol tailored for this examination and automatic exposure  control for dose modulation. FINDINGS:  The ventricles and sulci are normal in size, shape and configuration and  midline. There is no significant white matter disease. There is no intracranial  hemorrhage, extra-axial collection, mass, mass effect or midline shift. The  basilar cisterns are open. No acute infarct is identified. The bone windows  demonstrate no abnormalities. The visualized portions of the paranasal sinuses  and mastoid air cells are clear. Impression  IMPRESSION: Unremarkable CT of the head.       XR Results (most recent):  Results from Hospital Encounter encounter on 08/06/18    XR CHEST PA LAT    Narrative  EXAM:  XR CHEST PA LAT    INDICATION:  Chest pain and shortness of breath. COMPARISON: 5/23/2018    TECHNIQUE: PA and lateral chest views    FINDINGS: The cardiomediastinal contours are stable. The pulmonary vasculature  is within normal limits. The lungs and pleural spaces are clear. There is no pneumothorax. The bones and  upper abdomen are stable. Impression  IMPRESSION:    No acute process. Stable exam.          Past Medical History:   Diagnosis Date    C. difficile colitis     CAD (coronary artery disease)     Colitis     Hypercholesteremia     Sepsis ([address]     Vertigo of central origin of both ears     during allergy season     Past Surgical History:   Procedure Laterality Date    HX TUBAL LIGATION      HX WRIST FRACTURE TX Left 2016    ganglian cyst removal     [place] CARDIAC SURG PROCEDURE UNLIST  02/27/2018    1 stent     Social History     Tobacco Use    Smoking status: Never Smoker    Smokeless tobacco: Never Used   Substance Use Topics    Alcohol use: Not Currently     Comment: special occasions    Drug use: No     Family History   Problem Relation Age of Onset    Heart Disease Mother     Heart Attack Mother     Heart Disease Father     Heart Attack Father     Diabetes Brother     No Known Problems Brother     Cancer Brother         throat cancer     Allergies   Allergen Reactions    Other Medication Other (comments)     Diarrhea from cephalosporins         There were no vitals taken for this visit. There were no vitals filed for this visit. Review of Systems:   Pertinent items are noted in the History of Present Illness. Visit Vitals  /60   Pulse 62   Resp 16   Ht 5' 4\" (1.626 m)   Wt 139 lb (63 kg)   SpO2 98%   BMI 23.86 kg/m²     General Appearance:  Well developed, well nourished,alert and oriented x 3, and individual in no acute distress. Ears/Nose/Mouth/Throat:   Hearing grossly normal.         Neck: Supple. Chest:   Lungs clear to auscultation bilaterally. Cardiovascular:  Regular rate and rhythm, S1, S2 normal, no murmur. Abdomen:   Soft, non-tender, bowel sounds are active. Extremities: No edema bilaterally. Skin: Warm and dry. Current Outpatient Medications on File Prior to Visit   Medication Sig Dispense Refill    metoprolol tartrate (LOPRESSOR) 25 mg tablet Take 1 Tablet by mouth two (2) times a day. 180 Tablet 1    rosuvastatin (CRESTOR) 40 mg tablet TAKE 1 TABLET NIGHTLY 90 Tab 3    famotidine (PEPCID) 20 mg tablet Take 1 Tab by mouth two (2) times a day. (Patient taking differently: Take 40 mg by mouth daily.) 90 Tab 1    aspirin delayed-release 81 mg tablet Take 81 mg by mouth daily.  L. acidophilus/Strept/La p-toro (OSCAR-Q PO) Take 1 Tab by mouth daily. No current facility-administered medications on file prior to visit. Jacob Chase had no medications administered during this visit. Current Outpatient Medications   Medication Sig    metoprolol tartrate (LOPRESSOR) 25 mg tablet Take 1 Tablet by mouth two (2) times a day.  rosuvastatin (CRESTOR) 40 mg tablet TAKE 1 TABLET NIGHTLY    famotidine (PEPCID) 20 mg tablet Take 1 Tab by mouth two (2) times a day. (Patient taking differently: Take 40 mg by mouth daily.)    aspirin delayed-release 81 mg tablet Take 81 mg by mouth daily.  L. acidophilus/Strept/La p-toro (OSCAR-Q PO) Take 1 Tab by mouth daily. No current facility-administered medications for this visit.          Lab Results   Component Value Date/Time    Cholesterol, total 117 11/16/2021 09:26 AM    HDL Cholesterol 35 (L) 11/16/2021 09:26 AM    LDL, calculated 62 11/16/2021 09:26 AM    LDL, calculated 82 06/19/2020 09:46 AM    VLDL, calculated 20 11/16/2021 09:26 AM    VLDL, calculated 20 06/19/2020 09:46 AM    Triglyceride 108 11/16/2021 09:26 AM       Lab Results Component Value Date/Time    Sodium 143 07/12/2021 10:22 AM    Potassium 4.4 07/12/2021 10:22 AM    Chloride 110 (H) 07/12/2021 10:22 AM    CO2 22 07/12/2021 10:22 AM    Anion gap 11 08/07/2018 04:08 AM    Glucose 95 07/12/2021 10:22 AM    BUN 15 07/12/2021 10:22 AM    Creatinine 1.39 (H) 07/12/2021 10:22 AM    BUN/Creatinine ratio 11 (L) 07/12/2021 10:22 AM    GFR est AA 46 (L) 07/12/2021 10:22 AM    GFR est non-AA 40 (L) 07/12/2021 10:22 AM    Calcium 9.1 07/12/2021 10:22 AM       Lab Results   Component Value Date/Time    ALT (SGPT) 42 (H) 11/16/2021 09:26 AM    GGT 15 07/12/2021 10:22 AM    Alk.  phosphatase 75 11/16/2021 09:26 AM    Bilirubin, direct 0.35 11/16/2021 09:26 AM    Bilirubin, total 1.9 (H) 11/16/2021 09:26 AM       Lab Results   Component Value Date/Time    WBC 7.2 12/09/2020 09:44 AM    HGB 11.4 12/09/2020 09:44 AM    HCT 35.2 12/09/2020 09:44 AM    PLATELET 492 68/00/7219 09:44 AM    MCV 96 12/09/2020 09:44 AM       Lab Results   Component Value Date/Time    TSH 1.110 11/05/2019 09:13 AM         Lab Results   Component Value Date/Time    Cholesterol, total 117 11/16/2021 09:26 AM    Cholesterol, total 130 04/21/2021 09:44 AM    Cholesterol, total 151 06/19/2020 09:46 AM    Cholesterol, total 151 11/05/2019 09:13 AM    Cholesterol, total 153 11/05/2019 09:07 AM    HDL Cholesterol 35 (L) 11/16/2021 09:26 AM    HDL Cholesterol 37 (L) 04/21/2021 09:44 AM    HDL Cholesterol 49 06/19/2020 09:46 AM    HDL Cholesterol 45 11/05/2019 09:13 AM    HDL Cholesterol 45 11/05/2019 09:07 AM    LDL, calculated 62 11/16/2021 09:26 AM    LDL, calculated 74 04/21/2021 09:44 AM    LDL, calculated 82 06/19/2020 09:46 AM    LDL, calculated 84 11/05/2019 09:13 AM    LDL, calculated 86 11/05/2019 09:07 AM    LDL, calculated 79 06/24/2019 09:03 AM    LDL, calculated 86 03/07/2019 08:52 AM    Triglyceride 108 11/16/2021 09:26 AM    Triglyceride 101 04/21/2021 09:44 AM    Triglyceride 101 06/19/2020 09:46 AM    Triglyceride 110 11/05/2019 09:13 AM    Triglyceride 110 11/05/2019 09:07 AM                Please note that this dictation was completed with [employer], the computer voice recognition software. Quite often unanticipated grammatical, syntax, homophones, and other interpretative errors are inadvertently transcribed by the computer software. Please disregard these errors. Please excuse any errors that have escaped final proofreading.

## 2021-11-18 NOTE — PROGRESS NOTES
Visit Vitals  /60   Pulse 62   Resp 16   Ht 5' 4\" (1.626 m)   Wt 139 lb (63 kg)   SpO2 98%   BMI 23.86 kg/m²

## 2021-11-18 NOTE — PATIENT INSTRUCTIONS
Please STOP Crestor for 3 weeks and call us to let us know how you are doing after 3 weeks. Follow up with Dr. Vane Ramachandran in 9 months. normal

## 2021-11-29 ENCOUNTER — OFFICE VISIT (OUTPATIENT)
Dept: INTERNAL MEDICINE CLINIC | Age: 66
End: 2021-11-29
Payer: MEDICARE

## 2021-11-29 VITALS
TEMPERATURE: 97.5 F | HEART RATE: 50 BPM | DIASTOLIC BLOOD PRESSURE: 68 MMHG | SYSTOLIC BLOOD PRESSURE: 130 MMHG | OXYGEN SATURATION: 99 % | BODY MASS INDEX: 24.55 KG/M2 | WEIGHT: 143 LBS

## 2021-11-29 DIAGNOSIS — Z00.00 MEDICARE ANNUAL WELLNESS VISIT, SUBSEQUENT: Primary | ICD-10-CM

## 2021-11-29 DIAGNOSIS — K21.9 GASTROESOPHAGEAL REFLUX DISEASE WITHOUT ESOPHAGITIS: ICD-10-CM

## 2021-11-29 DIAGNOSIS — I25.10 CORONARY ARTERY DISEASE INVOLVING NATIVE CORONARY ARTERY OF NATIVE HEART WITHOUT ANGINA PECTORIS: ICD-10-CM

## 2021-11-29 DIAGNOSIS — R79.89 ELEVATED LFTS: ICD-10-CM

## 2021-11-29 PROCEDURE — G8420 CALC BMI NORM PARAMETERS: HCPCS | Performed by: INTERNAL MEDICINE

## 2021-11-29 PROCEDURE — G8536 NO DOC ELDER MAL SCRN: HCPCS | Performed by: INTERNAL MEDICINE

## 2021-11-29 PROCEDURE — G8399 PT W/DXA RESULTS DOCUMENT: HCPCS | Performed by: INTERNAL MEDICINE

## 2021-11-29 PROCEDURE — G8427 DOCREV CUR MEDS BY ELIG CLIN: HCPCS | Performed by: INTERNAL MEDICINE

## 2021-11-29 PROCEDURE — 1101F PT FALLS ASSESS-DOCD LE1/YR: CPT | Performed by: INTERNAL MEDICINE

## 2021-11-29 PROCEDURE — G9899 SCRN MAM PERF RSLTS DOC: HCPCS | Performed by: INTERNAL MEDICINE

## 2021-11-29 PROCEDURE — G8510 SCR DEP NEG, NO PLAN REQD: HCPCS | Performed by: INTERNAL MEDICINE

## 2021-11-29 PROCEDURE — G0439 PPPS, SUBSEQ VISIT: HCPCS | Performed by: INTERNAL MEDICINE

## 2021-11-29 PROCEDURE — 3017F COLORECTAL CA SCREEN DOC REV: CPT | Performed by: INTERNAL MEDICINE

## 2021-11-29 NOTE — PROGRESS NOTES
This is the Subsequent Medicare Annual Wellness Exam, performed 12 months or more after the Initial AWV or the last Subsequent AWV    I have reviewed the patient's medical history in detail and updated the computerized patient record. She is holding her statin for 3 weeks per cardiology to determine if this provides relief of GERD symptoms. She is feeling better at this time. Will continue and discuss further plan with Dr. Travon Zhang. Assessment/Plan   Education and counseling provided:  Are appropriate based on today's review and evaluation    1. Medicare annual wellness visit, subsequent  2. Elevated LFTs  3. Gastroesophageal reflux disease without esophagitis  4.  Coronary artery disease involving native coronary artery of native heart without angina pectoris       Depression Risk Factor Screening     3 most recent PHQ Screens 11/29/2021   Little interest or pleasure in doing things Not at all   Feeling down, depressed, irritable, or hopeless Not at all   Total Score PHQ 2 0   Trouble falling or staying asleep, or sleeping too much -   Feeling tired or having little energy -   Poor appetite, weight loss, or overeating -   Feeling bad about yourself - or that you are a failure or have let yourself or your family down -   Trouble concentrating on things such as school, work, reading, or watching TV -   Moving or speaking so slowly that other people could have noticed; or the opposite being so fidgety that others notice -   Thoughts of being better off dead, or hurting yourself in some way -   PHQ 9 Score -   How difficult have these problems made it for you to do your work, take care of your home and get along with others -       Alcohol Risk Screen    Do you average more than 1 drink per night or more than 7 drinks a week:  No    On any one occasion in the past three months have you have had more than 3 drinks containing alcohol:  No        Functional Ability and Level of Safety    Hearing: Hearing is good.      Activities of Daily Living: The home contains: no safety equipment. Patient does total self care      Ambulation: with no difficulty     Fall Risk:  Fall Risk Assessment, last 12 mths 11/29/2021   Able to walk? Yes   Fall in past 12 months? 0   Do you feel unsteady? 0   Are you worried about falling 0      Abuse Screen:  Patient is not abused       Cognitive Screening    Has your family/caregiver stated any concerns about your memory: no         Health Maintenance Due     Health Maintenance Due   Topic Date Due    Shingrix Vaccine Age 49> (1 of 2) Never done    COVID-19 Vaccine (3 - Booster for Manuela Bill series) 08/27/2021       Patient Care Team   Patient Care Team:  Karrie Knox MD as PCP - General (Internal Medicine)  Karrie Knox MD as PCP - [address] [place] [address] Provider  Destini Luna MD (Cardiology)    History     Patient Active Problem List   Diagnosis Code    Colitis K52.9    Severe sepsis ([address] A41.9, R65.20    Dehydration E86.0    Hypokalemia E87.6    Hypocalcemia E83.51    CAD (coronary artery disease) I25.10    Acute chest pain R07.9    Numbness and tingling in left upper extremity R20.0, R20.2     Past Medical History:   Diagnosis Date    C. difficile colitis     CAD (coronary artery disease)     Colitis     Hypercholesteremia     Sepsis ([address]     Vertigo of central origin of both ears     during allergy season      Past Surgical History:   Procedure Laterality Date    HX TUBAL LIGATION      HX WRIST FRACTURE TX Left 2016    ganglian cyst removal     [place] CARDIAC SURG PROCEDURE UNLIST  02/27/2018    1 stent     Current Outpatient Medications   Medication Sig Dispense Refill    metoprolol tartrate (LOPRESSOR) 25 mg tablet Take 1 Tablet by mouth two (2) times a day. 180 Tablet 1    famotidine (PEPCID) 20 mg tablet Take 1 Tab by mouth two (2) times a day.  (Patient taking differently: Take 40 mg by mouth daily.) 90 Tab 1    aspirin delayed-release 81 mg tablet Take 81 mg by mouth daily.  rosuvastatin (CRESTOR) 40 mg tablet TAKE 1 TABLET NIGHTLY 90 Tab 3    L. acidophilus/Strept/La p-toro (OSCAR-Q PO) Take 1 Tab by mouth daily.  (Patient not taking: Reported on 11/18/2021)       Allergies   Allergen Reactions    Other Medication Other (comments)     Diarrhea from cephalosporins       Family History   Problem Relation Age of Onset    Heart Disease Mother     Heart Attack Mother     Heart Disease Father     Heart Attack Father     Diabetes Brother     No Known Problems Brother     Cancer Brother         throat cancer     Social History     Tobacco Use    Smoking status: Never Smoker    Smokeless tobacco: Never Used   Substance Use Topics    Alcohol use: Not Currently     Comment: special occasions         Nehal Louise MD

## 2021-11-29 NOTE — PATIENT INSTRUCTIONS

## 2021-11-29 NOTE — PROGRESS NOTES
Chief Complaint   Patient presents with   [facility] Wellness Visit     Reviewed record in preparation for visit and have obtained necessary documentation. Identified pt with two pt identifiers(name and ).       Health Maintenance Due   Topic    Shingrix Vaccine Age 50> (1 of 2)    COVID-19 Vaccine (3 - Booster for [employer] series)    Medicare Yearly Exam          Chief Complaint   Patient presents with   [facility] Annual Wellness Visit        Wt Readings from Last 3 Encounters:   21 143 lb (64.9 kg)   21 139 lb (63 kg)   21 144 lb (65.3 kg)     Temp Readings from Last 3 Encounters:   21 97.5 °F (36.4 °C) (Temporal)   21 97.9 °F (36.6 °C) (Temporal)   20 97.7 °F (36.5 °C) (Temporal)     BP Readings from Last 3 Encounters:   21 130/68   21 110/60   21 106/60     Pulse Readings from Last 3 Encounters:   21 (!) 50   21 62   21 (!) 52           Learning Assessment:  :     Learning Assessment 2018   PRIMARY LEARNER Patient   HIGHEST LEVEL OF EDUCATION - PRIMARY LEARNER  4 YEARS OF COLLEGE   BARRIERS PRIMARY LEARNER NONE   PRIMARY LANGUAGE ENGLISH   LEARNER PREFERENCE PRIMARY READING   ANSWERED BY pt   RELATIONSHIP SELF       Depression Screening:  :     3 most recent PHQ Screens 2021   Little interest or pleasure in doing things Not at all   Feeling down, depressed, irritable, or hopeless Not at all   Total Score PHQ 2 0   Trouble falling or staying asleep, or sleeping too much -   Feeling tired or having little energy -   Poor appetite, weight loss, or overeating -   Feeling bad about yourself - or that you are a failure or have let yourself or your family down -   Trouble concentrating on things such as school, work, reading, or watching TV -   Moving or speaking so slowly that other people could have noticed; or the opposite being so fidgety that others notice -   Thoughts of being better off dead, or hurting yourself in some way -   PHQ 9 Score -   How difficult have these problems made it for you to do your work, take care of your home and get along with others -       Fall Risk Assessment:  :     Fall Risk Assessment, last 12 mths 11/29/2021   Able to walk? Yes   Fall in past 12 months? 0   Do you feel unsteady? 0   Are you worried about falling 0       Abuse Screening:  :     Abuse Screening Questionnaire 7/24/2018   Do you ever feel afraid of your partner? N   Are you in a relationship with someone who physically or mentally threatens you? N   Is it safe for you to go home? Y       Coordination of Care Questionnaire:  :     1) Have you been to an emergency room, urgent care clinic since your last visit? no   Hospitalized since your last visit? no             2) Have you seen or consulted any other health care providers outside of [address] [place] [address] since your last visit? no  (Include any pap smears or colon screenings in this section.)    3) Do you have an Advance Directive on file? no    4) Are you interested in receiving information on Advance Directives? NO      Patient is accompanied by self I have received verbal consent from Gabriela Cedillo to discuss any/all medical information while they are present in the room. Reviewed record  In preparation for visit and have obtained necessary documentation.

## 2021-12-13 ENCOUNTER — PATIENT MESSAGE (OUTPATIENT)
Dept: CARDIOLOGY CLINIC | Age: 66
End: 2021-12-13

## 2021-12-13 NOTE — TELEPHONE ENCOUNTER
Heidy Lee MD  You 2 hours ago (12:19 PM)     MG    Lipitor 40 mg if she starts having side effects again let us know we may need to use repatha    Message text      Nanette Zaman MD 5 hours ago (9:20 AM)     ES    Please advise for dosing    Routing comment

## 2021-12-15 RX ORDER — ATORVASTATIN CALCIUM 40 MG/1
40 TABLET, FILM COATED ORAL DAILY
Qty: 90 TABLET | Refills: 1 | Status: SHIPPED | OUTPATIENT
Start: 2021-12-15 | End: 2022-06-13

## 2022-03-18 PROBLEM — R07.9 ACUTE CHEST PAIN: Status: ACTIVE | Noted: 2018-05-23

## 2022-03-18 PROBLEM — A41.9 SEVERE SEPSIS (HCC): Status: ACTIVE | Noted: 2018-02-24

## 2022-03-18 PROBLEM — R65.20 SEVERE SEPSIS (HCC): Status: ACTIVE | Noted: 2018-02-24

## 2022-03-19 PROBLEM — E86.0 DEHYDRATION: Status: ACTIVE | Noted: 2018-02-24

## 2022-03-19 PROBLEM — E83.51 HYPOCALCEMIA: Status: ACTIVE | Noted: 2018-02-24

## 2022-03-19 PROBLEM — I25.10 CAD (CORONARY ARTERY DISEASE): Status: ACTIVE | Noted: 2018-05-23

## 2022-03-19 PROBLEM — K52.9 COLITIS: Status: ACTIVE | Noted: 2018-02-24

## 2022-03-20 PROBLEM — E87.6 HYPOKALEMIA: Status: ACTIVE | Noted: 2018-02-24

## 2022-04-07 ENCOUNTER — HOSPITAL ENCOUNTER (OUTPATIENT)
Dept: MAMMOGRAPHY | Age: 67
Discharge: HOME | End: 2022-04-07
Payer: MEDICARE

## 2022-04-07 ENCOUNTER — TRANSCRIBE ORDER (OUTPATIENT)
Dept: MAMMOGRAPHY | Age: 67
End: 2022-04-07

## 2022-04-07 DIAGNOSIS — Z12.31 VISIT FOR SCREENING MAMMOGRAM: Primary | ICD-10-CM

## 2022-04-07 DIAGNOSIS — Z12.31 VISIT FOR SCREENING MAMMOGRAM: ICD-10-CM

## 2022-04-07 PROCEDURE — 77067 SCR MAMMO BI INCL CAD: CPT

## 2022-04-29 RX ORDER — METOPROLOL TARTRATE 25 MG/1
TABLET, FILM COATED ORAL
Qty: 180 TABLET | Refills: 2 | Status: SHIPPED | OUTPATIENT
Start: 2022-04-29

## 2022-04-29 NOTE — TELEPHONE ENCOUNTER
Cardiologist: Dr. Faisal Kapoor    Last appt: 11/18/2021  Future Appointments   Date Time Provider Georgina Soi   8/18/2022 11:40 AM MD NAOMI Roper BS AMB       Requested Prescriptions     Signed Prescriptions Disp Refills    metoprolol tartrate (LOPRESSOR) 25 mg tablet 180 Tablet 2     Sig: TAKE 1 TABLET TWICE A DAY (DOSE DECREASED)     Authorizing Provider: Skylar Avina     Ordering User: BEN LEES         Refrené VO per Dr. Malone Letters.

## 2022-06-12 DIAGNOSIS — E78.49 OTHER HYPERLIPIDEMIA: ICD-10-CM

## 2022-06-12 DIAGNOSIS — I25.10 CORONARY ARTERY DISEASE INVOLVING NATIVE CORONARY ARTERY OF NATIVE HEART WITHOUT ANGINA PECTORIS: Primary | ICD-10-CM

## 2022-06-13 RX ORDER — ATORVASTATIN CALCIUM 40 MG/1
40 TABLET, FILM COATED ORAL
Qty: 90 TABLET | Refills: 3 | Status: SHIPPED | OUTPATIENT
Start: 2022-06-13

## 2022-06-13 NOTE — TELEPHONE ENCOUNTER
Per VO by MD.    Future Appointments   Date Time Provider Georgina Levin   8/18/2022 11:40 AM MD NAOMI Shields AMB

## 2022-07-15 ENCOUNTER — PATIENT MESSAGE (OUTPATIENT)
Dept: CARDIOLOGY CLINIC | Age: 67
End: 2022-07-15

## 2022-07-15 DIAGNOSIS — E78.49 OTHER HYPERLIPIDEMIA: Primary | ICD-10-CM

## 2022-08-19 ENCOUNTER — OFFICE VISIT (OUTPATIENT)
Dept: CARDIOLOGY CLINIC | Age: 67
End: 2022-08-19
Payer: MEDICARE

## 2022-08-19 VITALS
DIASTOLIC BLOOD PRESSURE: 62 MMHG | RESPIRATION RATE: 16 BRPM | OXYGEN SATURATION: 99 % | HEIGHT: 64 IN | WEIGHT: 139 LBS | HEART RATE: 51 BPM | BODY MASS INDEX: 23.73 KG/M2 | SYSTOLIC BLOOD PRESSURE: 120 MMHG

## 2022-08-19 DIAGNOSIS — I25.10 CORONARY ARTERY DISEASE INVOLVING NATIVE CORONARY ARTERY OF NATIVE HEART WITHOUT ANGINA PECTORIS: Primary | ICD-10-CM

## 2022-08-19 PROCEDURE — 1090F PRES/ABSN URINE INCON ASSESS: CPT | Performed by: SPECIALIST

## 2022-08-19 PROCEDURE — G8420 CALC BMI NORM PARAMETERS: HCPCS | Performed by: SPECIALIST

## 2022-08-19 PROCEDURE — 3017F COLORECTAL CA SCREEN DOC REV: CPT | Performed by: SPECIALIST

## 2022-08-19 PROCEDURE — G8399 PT W/DXA RESULTS DOCUMENT: HCPCS | Performed by: SPECIALIST

## 2022-08-19 PROCEDURE — 1123F ACP DISCUSS/DSCN MKR DOCD: CPT | Performed by: SPECIALIST

## 2022-08-19 PROCEDURE — 1101F PT FALLS ASSESS-DOCD LE1/YR: CPT | Performed by: SPECIALIST

## 2022-08-19 PROCEDURE — G8427 DOCREV CUR MEDS BY ELIG CLIN: HCPCS | Performed by: SPECIALIST

## 2022-08-19 PROCEDURE — G9899 SCRN MAM PERF RSLTS DOC: HCPCS | Performed by: SPECIALIST

## 2022-08-19 PROCEDURE — 99214 OFFICE O/P EST MOD 30 MIN: CPT | Performed by: SPECIALIST

## 2022-08-19 PROCEDURE — G8536 NO DOC ELDER MAL SCRN: HCPCS | Performed by: SPECIALIST

## 2022-08-19 PROCEDURE — G8510 SCR DEP NEG, NO PLAN REQD: HCPCS | Performed by: SPECIALIST

## 2022-08-19 PROCEDURE — 93000 ELECTROCARDIOGRAM COMPLETE: CPT | Performed by: SPECIALIST

## 2022-08-19 NOTE — PROGRESS NOTES
[address] [facility]                                                            OFFICE NOTE        Frederick Santacruz M.D.,BANDAR Ailyn Prieto   1955  [id number]    Date/Time:  8/19/20223:59 PM            SUBJECTIVE:  She is doing well   No cp or sob or palpitations   Not very active       Assessment/Plan  1. CAD: Status post myocardial infarction in February 2018 with subsequent PCI and stenting of the PDA. Repeated cardiac catheterization of August 2018 with no recurrent flow-limiting stenosis. She is doing well and seemingly completely asymptomatic at this time. Nuclear stress test on 11/21 : There is no evidence for ischemia. There is a fixed defect in the inferolateral wall suggestive of previous subendocardial myocardial infarction at the level. Nonetheless the ejection fraction is complete preserved and estimated 80%. Continue aspirin Toprol and lipitor           2. Hyperlipidemia: Unfortunately she was unable to tolerate Crestor  Significant heartburn which is now improved. I have started on Lipitor but unfortunately she is complaining of significant hair loss which she had previously on statin. I have discussed with her and her  potential use of PCSK9 inhibitors. She wants to hold off at this she will continue Lipitor discussed with primary care physician. Obtain lipids and liver function test in 6months. Of course sooner if any changes in her cholesterol medications. 3.  Palpitations: Not recurrent previous Holter with symptomatic PVCs. She does have a history of ventricular bigeminy since 2007. Continue beta-blocker. Discussed need for nutrition and exercise at least walking 150 minutes a week. She tells me she will do that. Otherwise I will see her back in 9 months or sooner if any question problems arise prior to that. HPI   79 y.o. female. patient with h/o HLD, seen by cardiologist about 15 years ago for ventricular bigeminy  moved to [facility] in 2018  On 2/18 she presented to [facility] with nausea vomiting and diarrhea, she had some abx for sore throat and then developed above symptoms  She was diagnosed with cdiff  While in hospital she developed cp and eventually ruled in for MI  Cardiac catheterization on 2/18:left main ok, lad minor irregs, lcx small, occluded distally with faint right to left collaterals. 30% prox rca, 50% mid pda, 85% distal rca treated with 3.25 by 18mm michelle to 10 david. No lv gram.  Echo on 2/18:Systolic function was normal. Ejection fraction was  estimated in the range of 50 % to 55 %. There was dyskinesis, possibly  aneurysmal formation of the apical wall(s). Doppler parameters were  consistent with abnormal left ventricular relaxation (grade 1 diastolic  dysfunction). Tricuspid valve: There was , regurgitation. holter on 7/18:NSR  rare episode of bundle branch block, 52 pacs , 1 episode of non sustained AT at111, 33 pvcs and 1 couplet  Stress echo on 7/18:Impressions: Abnormal study after maximal exercise without reproduction of  symptoms. Findings suggest single vessel coronary artery disease in the  territory of the right coronary artery. CATHETERIZATION on 8/18 with Dr. Gill:left main ok, lad mild irregs, occluded distal lcx with faint right to left collaterals. 30% prox rca, widely patent distal rca stent, 50% mid pda. All unchanged from 3/18 final result. lvef 60%              CARDIAC STUDIES                  11/11/21    NUCLEAR CARDIAC STRESS TEST 11/12/2021 11/15/2021    Interpretation Summary  · SPECT: Left ventricular function post-stress was normal. Calculated ejection fraction is 80% (normal EF value is equal to or greater than 50%). Left ventricular wall motion was normal at stress, no regional wall motion abnormality noted. There is no evidence of transient ischemic dilation (TID). The TID ratio is 0.95. · Baseline ECG: Normal sinus rhythm, non-specific ST-T wave abnormalities. · SPECT: Myocardial perfusion imaging defect 1: There is a defect that is small in size with a mild reduction in uptake present in the apical segment(s) of the apex wall(s) that is non-reversible. There is normal wall motion in the defect area. Viability in the area is good. The defect appears to probably be artifact caused by subdiaphragmatic activity. Myocardial perfusion imaging defect 2: There is a defect that is small in size present in the basal segment(s) of the inferolateral wall(s) that is non-reversible. The defect appears to probably be infarction. · No gross ischemia noted  · Small inferolateral proximal defectlikely related to previous subendocardial myocardial infarcion  · Normal EF at 80%    Signed by: Jesús Hwang MD on 11/12/2021 12:52 PM                    EKG Results       Procedure 720 Value Units Date/Time    AMB POC EKG ROUTINE W/ 12 LEADS, INTER & REP [[id number]] Resulted: 08/19/22 1524    Order Status: Completed Updated: 08/19/22 1526                IMAGING      MRI Results (most recent):  No results found for this or any previous visit. CT Results (most recent):  Results from Hospital Encounter encounter on 05/23/18    CT HEAD WO CONT    Narrative  EXAM:  CT HEAD WO CONT    INDICATION:   left sided numbness    COMPARISON: None. CONTRAST:  None. TECHNIQUE: Unenhanced CT of the head was performed using 5 mm images. Brain and  bone windows were generated. CT dose reduction was achieved through use of a  standardized protocol tailored for this examination and automatic exposure  control for dose modulation. FINDINGS:  The ventricles and sulci are normal in size, shape and configuration and  midline. There is no significant white matter disease. There is no intracranial  hemorrhage, extra-axial collection, mass, mass effect or midline shift. The  basilar cisterns are open.  No acute infarct is identified. The bone windows  demonstrate no abnormalities. The visualized portions of the paranasal sinuses  and mastoid air cells are clear. Impression  IMPRESSION: Unremarkable CT of the head. XR Results (most recent):  Results from Hospital Encounter encounter on 08/06/18    XR CHEST PA LAT    Narrative  EXAM:  XR CHEST PA LAT    INDICATION:  Chest pain and shortness of breath. COMPARISON: 5/23/2018    TECHNIQUE: PA and lateral chest views    FINDINGS: The cardiomediastinal contours are stable. The pulmonary vasculature  is within normal limits. The lungs and pleural spaces are clear. There is no pneumothorax. The bones and  upper abdomen are stable. Impression  IMPRESSION:    No acute process.  Stable exam.          Past Medical History:   Diagnosis Date    C. difficile colitis     CAD (coronary artery disease)     Colitis     Hypercholesteremia     Sepsis ([address]     Vertigo of central origin of both ears     during allergy season     Past Surgical History:   Procedure Laterality Date    HX TUBAL LIGATION      HX WRIST FRACTURE TX Left 2016    ganglian cyst removal     [place] CARDIAC SURG PROCEDURE UNLIST  02/27/2018    1 stent     Social History     Tobacco Use    Smoking status: Never    Smokeless tobacco: Never   Substance Use Topics    Alcohol use: Not Currently     Comment: special occasions    Drug use: No     Family History   Problem Relation Age of Onset    Heart Disease Mother     Heart Attack Mother     Heart Disease Father     Heart Attack Father     Diabetes Brother     No Known Problems Brother     Cancer Brother         throat cancer     Allergies   Allergen Reactions    Other Medication Other (comments)     Diarrhea from cephalosporins         Visit Vitals  /62   Pulse (!) 51   Resp 16   Ht 5' 4\" (1.626 m)   Wt 139 lb (63 kg)   SpO2 99%   BMI 23.86 kg/m²         Last 3 Recorded Weights in this Encounter    08/19/22 1517   Weight: 139 lb (63 kg)            Review of Systems:   Pertinent items are noted in the History of Present Illness. Visit Vitals  /62   Pulse (!) 51   Resp 16   Ht 5' 4\" (1.626 m)   Wt 139 lb (63 kg)   SpO2 99%   BMI 23.86 kg/m²     General Appearance:  Well developed, well nourished,alert and oriented x 3, and individual in no acute distress. Ears/Nose/Mouth/Throat:   Hearing grossly normal.         Neck: Supple. Chest:   Lungs clear to auscultation bilaterally. Cardiovascular:  Regular rate and rhythm, S1, S2 normal, no murmur. Abdomen:   Soft, non-tender, bowel sounds are active. Extremities: No edema bilaterally. Skin: Warm and dry. Current Outpatient Medications on File Prior to Visit   Medication Sig Dispense Refill    atorvastatin (LIPITOR) 40 mg tablet Take 1 Tablet by mouth nightly. 90 Tablet 3    metoprolol tartrate (LOPRESSOR) 25 mg tablet TAKE 1 TABLET TWICE A DAY (DOSE DECREASED) 180 Tablet 2    famotidine (PEPCID) 20 mg tablet Take 1 Tab by mouth two (2) times a day. (Patient taking differently: Take 40 mg by mouth daily.) 90 Tab 1    aspirin delayed-release 81 mg tablet Take 81 mg by mouth daily. L. acidophilus/Strept/La p-toro (OSCAR-Q PO) Take 1 Tab by mouth daily. (Patient not taking: No sig reported)       No current facility-administered medications on file prior to visit. Daphney Sanchez had no medications administered during this visit. Current Outpatient Medications   Medication Sig    atorvastatin (LIPITOR) 40 mg tablet Take 1 Tablet by mouth nightly. metoprolol tartrate (LOPRESSOR) 25 mg tablet TAKE 1 TABLET TWICE A DAY (DOSE DECREASED)    famotidine (PEPCID) 20 mg tablet Take 1 Tab by mouth two (2) times a day. (Patient taking differently: Take 40 mg by mouth daily.)    aspirin delayed-release 81 mg tablet Take 81 mg by mouth daily. L. acidophilus/Strept/La p-toro (OSCAR-Q PO) Take 1 Tab by mouth daily.  (Patient not taking: No sig reported)     No current facility-administered medications for this visit. Lab Results   Component Value Date/Time    Cholesterol, total 155 08/16/2022 11:20 AM    HDL Cholesterol 50 08/16/2022 11:20 AM    LDL, calculated 84.6 08/16/2022 11:20 AM    VLDL, calculated 20.4 08/16/2022 11:20 AM    Triglyceride 102 08/16/2022 11:20 AM    CHOL/HDL Ratio 3.1 08/16/2022 11:20 AM       Lab Results   Component Value Date/Time    Sodium 143 07/12/2021 10:22 AM    Potassium 4.4 07/12/2021 10:22 AM    Chloride 110 (H) 07/12/2021 10:22 AM    CO2 22 07/12/2021 10:22 AM    Anion gap 11 08/07/2018 04:08 AM    Glucose 95 07/12/2021 10:22 AM    BUN 15 07/12/2021 10:22 AM    Creatinine 1.39 (H) 07/12/2021 10:22 AM    BUN/Creatinine ratio 11 (L) 07/12/2021 10:22 AM    GFR est AA 46 (L) 07/12/2021 10:22 AM    GFR est non-AA 40 (L) 07/12/2021 10:22 AM    Calcium 9.1 07/12/2021 10:22 AM       Lab Results   Component Value Date/Time    ALT (SGPT) 44 08/16/2022 11:20 AM    GGT 15 07/12/2021 10:22 AM    Alk.  phosphatase 82 08/16/2022 11:20 AM    Bilirubin, direct 0.3 (H) 08/16/2022 11:20 AM    Bilirubin, total 1.6 (H) 08/16/2022 11:20 AM       Lab Results   Component Value Date/Time    WBC 7.2 12/09/2020 09:44 AM    HGB 11.4 12/09/2020 09:44 AM    HCT 35.2 12/09/2020 09:44 AM    PLATELET 320 60/25/8007 09:44 AM    MCV 96 12/09/2020 09:44 AM       Lab Results   Component Value Date/Time    TSH 1.110 11/05/2019 09:13 AM         Lab Results   Component Value Date/Time    Cholesterol, total 155 08/16/2022 11:20 AM    Cholesterol, total 117 11/16/2021 09:26 AM    Cholesterol, total 130 04/21/2021 09:44 AM    Cholesterol, total 151 06/19/2020 09:46 AM    Cholesterol, total 151 11/05/2019 09:13 AM    HDL Cholesterol 50 08/16/2022 11:20 AM    HDL Cholesterol 35 (L) 11/16/2021 09:26 AM    HDL Cholesterol 37 (L) 04/21/2021 09:44 AM    HDL Cholesterol 49 06/19/2020 09:46 AM    HDL Cholesterol 45 11/05/2019 09:13 AM    LDL, calculated 84.6 08/16/2022 11:20 AM    LDL, calculated 62 11/16/2021 09:26 AM    LDL, calculated 74 04/21/2021 09:44 AM    LDL, calculated 82 06/19/2020 09:46 AM    LDL, calculated 84 11/05/2019 09:13 AM    LDL, calculated 86 11/05/2019 09:07 AM    LDL, calculated 79 06/24/2019 09:03 AM    Triglyceride 102 08/16/2022 11:20 AM    Triglyceride 108 11/16/2021 09:26 AM    Triglyceride 101 04/21/2021 09:44 AM    Triglyceride 101 06/19/2020 09:46 AM    Triglyceride 110 11/05/2019 09:13 AM    CHOL/HDL Ratio 3.1 08/16/2022 11:20 AM                Please note that this dictation was completed with [email or web address], the computer voice recognition software. Quite often unanticipated grammatical, syntax, homophones, and other interpretative errors are inadvertently transcribed by the computer software. Please disregard these errors. Please excuse any errors that have escaped final proofreading.

## 2022-08-19 NOTE — PROGRESS NOTES
Visit Vitals  /62   Pulse (!) 51   Resp 16   Ht 5' 4\" (1.626 m)   Wt 139 lb (63 kg)   SpO2 99%   BMI 23.86 kg/m²

## 2022-09-21 ENCOUNTER — OFFICE VISIT (OUTPATIENT)
Dept: INTERNAL MEDICINE CLINIC | Age: 67
End: 2022-09-21
Payer: MEDICARE

## 2022-09-21 VITALS
TEMPERATURE: 97.5 F | RESPIRATION RATE: 14 BRPM | HEIGHT: 64 IN | BODY MASS INDEX: 24.04 KG/M2 | OXYGEN SATURATION: 97 % | DIASTOLIC BLOOD PRESSURE: 60 MMHG | WEIGHT: 140.8 LBS | SYSTOLIC BLOOD PRESSURE: 130 MMHG | HEART RATE: 65 BPM

## 2022-09-21 DIAGNOSIS — Z12.4 CERVICAL CANCER SCREENING: ICD-10-CM

## 2022-09-21 DIAGNOSIS — Z00.00 MEDICARE ANNUAL WELLNESS VISIT, SUBSEQUENT: Primary | ICD-10-CM

## 2022-09-21 DIAGNOSIS — R79.89 ELEVATED SERUM CREATININE: ICD-10-CM

## 2022-09-21 DIAGNOSIS — Z23 NEEDS FLU SHOT: ICD-10-CM

## 2022-09-21 DIAGNOSIS — I25.10 CORONARY ARTERY DISEASE INVOLVING NATIVE CORONARY ARTERY OF NATIVE HEART WITHOUT ANGINA PECTORIS: ICD-10-CM

## 2022-09-21 PROCEDURE — G8510 SCR DEP NEG, NO PLAN REQD: HCPCS | Performed by: INTERNAL MEDICINE

## 2022-09-21 PROCEDURE — G0439 PPPS, SUBSEQ VISIT: HCPCS | Performed by: INTERNAL MEDICINE

## 2022-09-21 PROCEDURE — G8399 PT W/DXA RESULTS DOCUMENT: HCPCS | Performed by: INTERNAL MEDICINE

## 2022-09-21 PROCEDURE — 3017F COLORECTAL CA SCREEN DOC REV: CPT | Performed by: INTERNAL MEDICINE

## 2022-09-21 PROCEDURE — G9899 SCRN MAM PERF RSLTS DOC: HCPCS | Performed by: INTERNAL MEDICINE

## 2022-09-21 PROCEDURE — G8420 CALC BMI NORM PARAMETERS: HCPCS | Performed by: INTERNAL MEDICINE

## 2022-09-21 PROCEDURE — G8536 NO DOC ELDER MAL SCRN: HCPCS | Performed by: INTERNAL MEDICINE

## 2022-09-21 PROCEDURE — G8427 DOCREV CUR MEDS BY ELIG CLIN: HCPCS | Performed by: INTERNAL MEDICINE

## 2022-09-21 PROCEDURE — G0008 ADMIN INFLUENZA VIRUS VAC: HCPCS | Performed by: INTERNAL MEDICINE

## 2022-09-21 PROCEDURE — 1101F PT FALLS ASSESS-DOCD LE1/YR: CPT | Performed by: INTERNAL MEDICINE

## 2022-09-21 PROCEDURE — 90694 VACC AIIV4 NO PRSRV 0.5ML IM: CPT | Performed by: INTERNAL MEDICINE

## 2022-09-21 NOTE — PATIENT INSTRUCTIONS
Medicare Wellness Visit, Female     The best way to live healthy is to have a lifestyle where you eat a well-balanced diet, exercise regularly, limit alcohol use, and quit all forms of tobacco/nicotine, if applicable. Regular preventive services are another way to keep healthy. Preventive services (vaccines, screening tests, monitoring & exams) can help personalize your care plan, which helps you manage your own care. Screening tests can find health problems at the earliest stages, when they are easiest to treat. [facility] follows the current, evidence-based guidelines published by the [facility] ([facility]) when recommending preventive services for our patients. Because we follow these guidelines, sometimes recommendations change over time as research supports it. (For example, mammograms used to be recommended annually. Even though Medicare will still pay for an annual mammogram, the newer guidelines recommend a mammogram every two years for women of average risk). Of course, you and your doctor may decide to screen more often for some diseases, based on your risk and your co-morbidities (chronic disease you are already diagnosed with). Preventive services for you include:  - Medicare offers their members a free annual wellness visit, which is time for you and your primary care provider to discuss and plan for your preventive service needs. Take advantage of this benefit every year!  -All adults over the age of 72 should receive the recommended pneumonia vaccines. Current USPSTF guidelines recommend a series of two vaccines for the best pneumonia protection.   -All adults should have a flu vaccine yearly and a tetanus vaccine every 10 years.   -All adults age 48 and older should receive the shingles vaccines (series of two vaccines).       -All adults age 38-68 who are overweight should have a diabetes screening test once every three years.   -All adults born between [birth year] and 1965 should be screened once for Hepatitis C.  -Other screening tests and preventive services for persons with diabetes include: an eye exam to screen for diabetic retinopathy, a kidney function test, a foot exam, and stricter control over your cholesterol.   -Cardiovascular screening for adults with routine risk involves an electrocardiogram (ECG) at intervals determined by your doctor.   -Colorectal cancer screenings should be done for adults age 54-65 with no increased risk factors for colorectal cancer. There are a number of acceptable methods of screening for this type of cancer. Each test has its own benefits and drawbacks. Discuss with your doctor what is most appropriate for you during your annual wellness visit. The different tests include: colonoscopy (considered the best screening method), a fecal occult blood test, a fecal DNA test, and sigmoidoscopy.    -A bone mass density test is recommended when a woman turns 65 to screen for osteoporosis. This test is only recommended one time, as a screening. Some providers will use this same test as a disease monitoring tool if you already have osteoporosis. -Breast cancer screenings are recommended every other year for women of normal risk, age 54-69.  -Cervical cancer screenings for women over age 72 are only recommended with certain risk factors. Here is a list of your current Health Maintenance items (your personalized list of preventive services) with a due date:  Health Maintenance Due   Topic Date Due    Shingles Vaccine (1 of 2) Never done    COVID-19 Vaccine (4 - Booster for Moderna series) 03/13/2022    Yearly Flu Vaccine (1) 08/01/2022         Vaccine Information Statement    Influenza (Flu) Vaccine (Inactivated or Recombinant): What You Need to Know    Many vaccine information statements are available in [place] and other languages. See www.immunize.org/vis.   Hojas de información sobre vacunas están disponibles en español y en muchos otros idiomas. Visite www.immunize.org/vis. 1. Why get vaccinated? Influenza vaccine can prevent influenza (flu). Flu is a contagious disease that spreads around the [place] every year, usually between October and May. Anyone can get the flu, but it is more dangerous for some people. Infants and young children, people 72 years and older, pregnant people, and people with certain health conditions or a weakened immune system are at greatest risk of flu complications. Pneumonia, bronchitis, sinus infections, and ear infections are examples of flu-related complications. If you have a medical condition, such as heart disease, cancer, or diabetes, flu can make it worse. Flu can cause fever and chills, sore throat, muscle aches, fatigue, cough, headache, and runny or stuffy nose. Some people may have vomiting and diarrhea, though this is more common in children than adults. In an average year, thousands of people in the [facility] die from flu, and many more are hospitalized. Flu vaccine prevents millions of illnesses and flu-related visits to the doctor each year. 2. Influenza vaccines     CDC recommends everyone 6 months and older get vaccinated every flu season. Children 6 months through 6years of age may need 2 doses during a single flu season. Everyone else needs only 1 dose each flu season. It takes about 2 weeks for protection to develop after vaccination. There are many flu viruses, and they are always changing. Each year a new flu vaccine is made to protect against the influenza viruses believed to be likely to cause disease in the upcoming flu season. Even when the vaccine doesnt exactly match these viruses, it may still provide some protection. Influenza vaccine does not cause flu. Influenza vaccine may be given at the same time as other vaccines.     3. Talk with your health care provider    Tell your vaccination provider if the person getting the vaccine:  Has had an allergic reaction after a previous dose of influenza vaccine, or has any severe, life-threatening allergies   Has ever had Guillain-Barré Syndrome (also called GBS)    In some cases, your health care provider may decide to postpone influenza vaccination until a future visit. Influenza vaccine can be administered at any time during pregnancy. People who are or will be pregnant during influenza season should receive inactivated influenza vaccine. People with minor illnesses, such as a cold, may be vaccinated. People who are moderately or severely ill should usually wait until they recover before getting influenza vaccine. Your health care provider can give you more information. 4. Risks of a vaccine reaction    Soreness, redness, and swelling where the shot is given, fever, muscle aches, and headache can happen after influenza vaccination. There may be a very small increased risk of Guillain-Barré Syndrome (GBS) after inactivated influenza vaccine (the flu shot). Ingrid Clark children who get the flu shot along with pneumococcal vaccine (PCV13) and/or DTaP vaccine at the same time might be slightly more likely to have a seizure caused by fever. Tell your health care provider if a child who is getting flu vaccine has ever had a seizure. People sometimes faint after medical procedures, including vaccination. Tell your provider if you feel dizzy or have vision changes or ringing in the ears. As with any medicine, there is a very remote chance of a vaccine causing a severe allergic reaction, other serious injury, or death. 5. What if there is a serious problem? An allergic reaction could occur after the vaccinated person leaves the clinic.  If you see signs of a severe allergic reaction (hives, swelling of the face and throat, difficulty breathing, a fast heartbeat, dizziness, or weakness), call 9-1-1 and get the person to the nearest hospital.    For other signs that concern you, call your health care provider. Adverse reactions should be reported to the Vaccine Adverse Event Reporting System (VAERS). Your health care provider will usually file this report, or you can do it yourself. Visit the VAERS website at www.vaers. [facility].gov or call 2-485.264.2386. VAERS is only for reporting reactions, and VAERS staff members do not give medical advice. 6. The National Vaccine Injury Compensation Program    The [facility] Vaccine Injury Compensation Program (VICP) is a federal program that was created to compensate people who may have been injured by certain vaccines. Claims regarding alleged injury or death due to vaccination have a time limit for filing, which may be as short as two years. Visit the VICP website at www.[facility].gov/vaccinecompensation or call 8-176.443.4887 to learn about the program and about filing a claim. 7. How can I learn more? Ask your health care provider. Call your local or state health department. Visit the website of the Food and Drug Administration (FDA) for vaccine package inserts and additional information at www.fda.gov/vaccines-blood-biologics/vaccines. Contact the Centers for Disease Control and Prevention (CDC): Call 9-333.783.7791 (2-901-KZJ-INFO) or  Visit CDCs influenza website at www.cdc.gov/flu. Vaccine Information Statement   Inactivated Influenza Vaccine   8/6/2021  42 BEBETO Penny [id number]   Department of Health and Human Services  Centers for Disease Control and Prevention    Office Use Only

## 2022-09-21 NOTE — PROGRESS NOTES
This is the Subsequent Medicare Annual Wellness Exam, performed 12 months or more after the Initial AWV or the last Subsequent AWV    I have reviewed the patient's medical history in detail and updated the computerized patient record. No acute complaints. She continues to monitor her blood pressure. Doing well. We discussed the need for vaccinations. She will obtain the flu shot today. No other complaints. Assessment/Plan   Education and counseling provided:    1. Medicare annual wellness visit, subsequent  2. Coronary artery disease involving native coronary artery of native heart without angina pectoris  3. Elevated serum creatinine  -     METABOLIC PANEL, COMPREHENSIVE; Future  4.  Cervical cancer screening  -     REFERRAL TO OBSTETRICS AND GYNECOLOGY  5. Needs flu shot  -     INFLUENZA, FLUAD, (AGE 72 Y+), IM, PF, 0.5 ML     Depression Risk Factor Screening     3 most recent PHQ Screens 9/21/2022   Little interest or pleasure in doing things Not at all   Feeling down, depressed, irritable, or hopeless Not at all   Total Score PHQ 2 0   Trouble falling or staying asleep, or sleeping too much -   Feeling tired or having little energy -   Poor appetite, weight loss, or overeating -   Feeling bad about yourself - or that you are a failure or have let yourself or your family down -   Trouble concentrating on things such as school, work, reading, or watching TV -   Moving or speaking so slowly that other people could have noticed; or the opposite being so fidgety that others notice -   Thoughts of being better off dead, or hurting yourself in some way -   PHQ 9 Score -   How difficult have these problems made it for you to do your work, take care of your home and get along with others -       Alcohol & Drug Abuse Risk Screen   Do you average more than 1 drink per night or more than 7 drinks a week?: (P) No  On any one occasion in the past three months have you had more than 3 drinks containing alcohol?: (P) Yes          Functional Ability and Level of Safety   Hearing:  Hearing: (P) additional comments below  Hearing comments: (P) My hearing could be better    Activities of Daily Living: The home contains: (P) no safety equipment  Functional ADLs: (P) Patient does total self care   Ambulation:  Patient ambulates: (P) with no difficulty   Fall Risk:  Fall Risk Assessment, last 12 mths 9/21/2022   Able to walk? Yes   Fall in past 12 months? 0   Do you feel unsteady?  0   Are you worried about falling 0     Abuse Screen:  Do you ever feel afraid of your partner?: No  Are you in a relationship with someone who physically or mentally threatens you?: No  Is it safe for you to go home?: Yes      Cognitive Screening   Has your family/caregiver stated any concerns about your memory?: (P) No       Health Maintenance Due     Health Maintenance Due   Topic Date Due    Shingrix Vaccine Age 49> (1 of 2) Never done    COVID-19 Vaccine (4 - Booster for Onofre Tho series) 03/13/2022    Flu Vaccine (1) 08/01/2022       Patient Care Team   Patient Care Team:  John Peter MD as PCP - General (Internal Medicine Physician)  John Peter MD as PCP [facility]  Madeleine Sheffield MD (Cardiovascular Disease Physician)    History     Patient Active Problem List   Diagnosis Code    Colitis K52.9    Severe sepsis ([address] A41.9, R65.20    Dehydration E86.0    Hypokalemia E87.6    Hypocalcemia E83.51    CAD (coronary artery disease) I25.10    Acute chest pain R07.9    Numbness and tingling in left upper extremity R20.0, R20.2     Past Medical History:   Diagnosis Date    C. difficile colitis     CAD (coronary artery disease)     Colitis     Hypercholesteremia     Sepsis ([address]     Vertigo of central origin of both ears     during allergy season      Past Surgical History:   Procedure Laterality Date    HX TUBAL LIGATION      HX WRIST FRACTURE TX Left 2016    ganglian cyst removal     VT CARDIAC SURG PROCEDURE UNLIST 02/27/2018    1 stent     Current Outpatient Medications   Medication Sig Dispense Refill    atorvastatin (LIPITOR) 40 mg tablet Take 1 Tablet by mouth nightly. 90 Tablet 3    metoprolol tartrate (LOPRESSOR) 25 mg tablet TAKE 1 TABLET TWICE A DAY (DOSE DECREASED) 180 Tablet 2    famotidine (PEPCID) 20 mg tablet Take 1 Tab by mouth two (2) times a day. (Patient taking differently: Take 40 mg by mouth daily.) 90 Tab 1    aspirin delayed-release 81 mg tablet Take 81 mg by mouth daily.        Allergies   Allergen Reactions    Other Medication Other (comments)     Diarrhea from cephalosporins       Family History   Problem Relation Age of Onset    Heart Disease Mother     Heart Attack Mother     Heart Disease Father     Heart Attack Father     Diabetes Brother     No Known Problems Brother     Cancer Brother         throat cancer     Social History     Tobacco Use    Smoking status: Never    Smokeless tobacco: Never   Substance Use Topics    Alcohol use: Not Currently     Comment: special occasions         J Carlos Nieto MD

## 2022-09-21 NOTE — PROGRESS NOTES
Chief Complaint   Patient presents with    Annual Wellness Visit     Reviewed record in preparation for visit and have obtained necessary documentation. Identified pt with two pt identifiers(name and ).       Health Maintenance Due   Topic    Shingrix Vaccine Age 49> (1 of 2)    COVID-19 Vaccine (4 - Booster for Moderna series)    Flu Vaccine (1)         Chief Complaint   Patient presents with    Annual Wellness Visit        Wt Readings from Last 3 Encounters:   22 140 lb 12.8 oz (63.9 kg)   22 139 lb (63 kg)   21 143 lb (64.9 kg)     Temp Readings from Last 3 Encounters:   22 97.5 °F (36.4 °C) (Temporal)   21 97.5 °F (36.4 °C) (Temporal)   21 97.9 °F (36.6 °C) (Temporal)     BP Readings from Last 3 Encounters:   22 130/60   22 120/62   21 130/68     Pulse Readings from Last 3 Encounters:   22 65   22 (!) 51   21 (!) 50           Learning Assessment:  :     Learning Assessment 2018   PRIMARY LEARNER Patient   HIGHEST LEVEL OF EDUCATION - PRIMARY LEARNER  4 YEARS OF COLLEGE   BARRIERS PRIMARY LEARNER NONE   PRIMARY LANGUAGE ENGLISH   LEARNER PREFERENCE PRIMARY READING   ANSWERED BY pt   RELATIONSHIP SELF       Depression Screening:  :     3 most recent PHQ Screens 2022   Little interest or pleasure in doing things Not at all   Feeling down, depressed, irritable, or hopeless Not at all   Total Score PHQ 2 0   Trouble falling or staying asleep, or sleeping too much -   Feeling tired or having little energy -   Poor appetite, weight loss, or overeating -   Feeling bad about yourself - or that you are a failure or have let yourself or your family down -   Trouble concentrating on things such as school, work, reading, or watching TV -   Moving or speaking so slowly that other people could have noticed; or the opposite being so fidgety that others notice -   Thoughts of being better off dead, or hurting yourself in some way -   PHQ 9 Score -   How difficult have these problems made it for you to do your work, take care of your home and get along with others -       Fall Risk Assessment:  :     Fall Risk Assessment, last 12 mths 9/21/2022   Able to walk? Yes   Fall in past 12 months? 0   Do you feel unsteady? 0   Are you worried about falling 0       Abuse Screening:  :     Abuse Screening Questionnaire 9/21/2022 9/19/2022 7/24/2018   Do you ever feel afraid of your partner? N N N   Are you in a relationship with someone who physically or mentally threatens you? N N N   Is it safe for you to go home? Y Y Y       Coordination of Care Questionnaire:  :     1) Have you been to an emergency room, urgent care clinic since your last visit? no   Hospitalized since your last visit? no             2) Have you seen or consulted any other health care providers outside of [address] [place] [address] since your last visit? no  (Include any pap smears or colon screenings in this section.)    3) Do you have an Advance Directive on file? no    4) Are you interested in receiving information on Advance Directives? NO      Patient is accompanied by self I have received verbal consent from Eleanor Marie to discuss any/all medical information while they are present in the room. Reviewed record  In preparation for visit and have obtained necessary documentation.

## 2023-01-20 LAB
ALBUMIN SERPL-MCNC: 4.1 G/DL (ref 3.8–4.8)
ALBUMIN/GLOB SERPL: 1.6 {RATIO} (ref 1.2–2.2)
ALP SERPL-CCNC: 79 IU/L (ref 44–121)
ALT SERPL-CCNC: 13 IU/L (ref 0–32)
AST SERPL-CCNC: 20 IU/L (ref 0–40)
BILIRUB SERPL-MCNC: 1.5 MG/DL (ref 0–1.2)
BUN SERPL-MCNC: 17 MG/DL (ref 8–27)
BUN/CREAT SERPL: 15 (ref 12–28)
CALCIUM SERPL-MCNC: 9 MG/DL (ref 8.7–10.3)
CHLORIDE SERPL-SCNC: 104 MMOL/L (ref 96–106)
CO2 SERPL-SCNC: 25 MMOL/L (ref 20–29)
CREAT SERPL-MCNC: 1.12 MG/DL (ref 0.57–1)
EGFR: 54 ML/MIN/1.73
GLOBULIN SER CALC-MCNC: 2.6 G/DL (ref 1.5–4.5)
GLUCOSE SERPL-MCNC: 98 MG/DL (ref 70–99)
POTASSIUM SERPL-SCNC: 4.5 MMOL/L (ref 3.5–5.2)
PROT SERPL-MCNC: 6.7 G/DL (ref 6–8.5)
SODIUM SERPL-SCNC: 143 MMOL/L (ref 134–144)

## 2023-01-25 RX ORDER — METOPROLOL TARTRATE 25 MG/1
TABLET, FILM COATED ORAL
Qty: 180 TABLET | Refills: 1 | Status: SHIPPED | OUTPATIENT
Start: 2023-01-25

## 2023-01-25 NOTE — TELEPHONE ENCOUNTER
Cardiologist: Dr. Jerica Rojas    Last appt: 8/19/2022  Future Appointments   Date Time Provider Georgina Levin   2/20/2023 10:00 AM MD NAOMI Prescott BS AMB       Requested Prescriptions     Signed Prescriptions Disp Refills    metoprolol tartrate (LOPRESSOR) 25 mg tablet 180 Tablet 1     Sig: TAKE 1 TABLET TWICE A DAY (DOSE DECREASED)     Authorizing Provider: Alyssa Benton     Ordering User: BEN LEES         Refrené VO per Dr. Jerica Rojas.

## 2023-02-11 ENCOUNTER — PATIENT MESSAGE (OUTPATIENT)
Dept: CARDIOLOGY CLINIC | Age: 68
End: 2023-02-11

## 2023-02-11 DIAGNOSIS — I25.10 CORONARY ARTERY DISEASE INVOLVING NATIVE CORONARY ARTERY OF NATIVE HEART WITHOUT ANGINA PECTORIS: Primary | ICD-10-CM

## 2023-02-15 NOTE — TELEPHONE ENCOUNTER
Stephy Knight MD  You 15 hours ago (5:45 PM)     MG  If completely asymptomatic and she wants to hold off appointment to a later date 4-6 months isok   Lipids and lft normal   I think hair thinning from lipitor not very likely before changing follow with pcp for other causes of thinning hair

## 2023-02-23 NOTE — TELEPHONE ENCOUNTER
Rafael Simon MD  You 21 hours ago (4:56 PM)     MG  Lft lipids      You  Rafael Simon MD 3 days ago     ES  Do you want any blood work at her august appt?

## 2023-04-22 DIAGNOSIS — I25.10 CORONARY ARTERY DISEASE INVOLVING NATIVE CORONARY ARTERY OF NATIVE HEART WITHOUT ANGINA PECTORIS: Primary | ICD-10-CM

## 2023-04-24 DIAGNOSIS — I25.10 CORONARY ARTERY DISEASE INVOLVING NATIVE CORONARY ARTERY OF NATIVE HEART WITHOUT ANGINA PECTORIS: Primary | ICD-10-CM

## 2023-06-20 ENCOUNTER — HOSPITAL ENCOUNTER (OUTPATIENT)
Facility: HOSPITAL | Age: 68
Discharge: HOME | End: 2023-06-23
Payer: MEDICARE

## 2023-06-20 DIAGNOSIS — Z12.31 SCREENING MAMMOGRAM FOR HIGH-RISK PATIENT: ICD-10-CM

## 2023-06-20 PROCEDURE — 77063 BREAST TOMOSYNTHESIS BI: CPT

## 2023-06-20 RX ORDER — ATORVASTATIN CALCIUM 40 MG/1
TABLET, FILM COATED ORAL
Qty: 90 TABLET | Refills: 3 | Status: SHIPPED | OUTPATIENT
Start: 2023-06-20

## 2023-06-20 NOTE — TELEPHONE ENCOUNTER
Cardiologist: Dr. Farzaneh Quach   Date Time Provider Xavi Brown   6/20/2023 10:00 AM [facility] [place] Dx   8/3/2023  9:20 AM Tian Hahn MD [facility] BS AMB       Requested Prescriptions     Signed Prescriptions Disp Refills    atorvastatin (LIPITOR) 40 MG tablet 90 tablet 3     Sig: TAKE 1 TABLET NIGHTLY     Authorizing Provider: Mariia Brennan     Ordering User: GIOVANNI MORENO         Refills VO per Dr. Toro Gomez.

## 2023-07-21 NOTE — TELEPHONE ENCOUNTER
Requested Prescriptions     Signed Prescriptions Disp Refills    metoprolol tartrate (LOPRESSOR) 25 MG tablet 180 tablet 1     Sig: TAKE 1 TABLET TWICE A DAY (DOSE DECREASED)     Authorizing Provider: Og Mazariegos     Ordering User: Alec olivarez MD    Future Appointments   Date Time Provider [address]   8/3/2023  9:20 AM Og Mazariegos MD [facility] BS AMB

## 2023-07-26 LAB
ALBUMIN SERPL-MCNC: 4.2 G/DL (ref 3.9–4.9)
ALP SERPL-CCNC: 75 IU/L (ref 44–121)
ALT SERPL-CCNC: 14 IU/L (ref 0–32)
AST SERPL-CCNC: 19 IU/L (ref 0–40)
BILIRUB DIRECT SERPL-MCNC: 0.34 MG/DL (ref 0–0.4)
BILIRUB SERPL-MCNC: 1.6 MG/DL (ref 0–1.2)
CHOLEST SERPL-MCNC: 159 MG/DL (ref 100–199)
HDLC SERPL-MCNC: 51 MG/DL
IMP & REVIEW OF LAB RESULTS: NORMAL
LDLC SERPL CALC-MCNC: 93 MG/DL (ref 0–99)
PROT SERPL-MCNC: 7 G/DL (ref 6–8.5)
TRIGL SERPL-MCNC: 80 MG/DL (ref 0–149)
VLDLC SERPL CALC-MCNC: 15 MG/DL (ref 5–40)

## 2023-08-03 ENCOUNTER — OFFICE VISIT (OUTPATIENT)
Age: 68
End: 2023-08-03
Payer: MEDICARE

## 2023-08-03 VITALS
DIASTOLIC BLOOD PRESSURE: 76 MMHG | WEIGHT: 137 LBS | SYSTOLIC BLOOD PRESSURE: 138 MMHG | BODY MASS INDEX: 23.39 KG/M2 | HEIGHT: 64 IN | HEART RATE: 55 BPM | OXYGEN SATURATION: 98 %

## 2023-08-03 DIAGNOSIS — I25.10 ATHEROSCLEROSIS OF NATIVE CORONARY ARTERY WITHOUT ANGINA PECTORIS, UNSPECIFIED WHETHER NATIVE OR TRANSPLANTED HEART: ICD-10-CM

## 2023-08-03 DIAGNOSIS — E78.49 OTHER HYPERLIPIDEMIA: Primary | ICD-10-CM

## 2023-08-03 PROCEDURE — 99214 OFFICE O/P EST MOD 30 MIN: CPT | Performed by: SPECIALIST

## 2023-08-03 PROCEDURE — G8427 DOCREV CUR MEDS BY ELIG CLIN: HCPCS | Performed by: SPECIALIST

## 2023-08-03 PROCEDURE — G8399 PT W/DXA RESULTS DOCUMENT: HCPCS | Performed by: SPECIALIST

## 2023-08-03 PROCEDURE — G8420 CALC BMI NORM PARAMETERS: HCPCS | Performed by: SPECIALIST

## 2023-08-03 PROCEDURE — 1090F PRES/ABSN URINE INCON ASSESS: CPT | Performed by: SPECIALIST

## 2023-08-03 PROCEDURE — 1036F TOBACCO NON-USER: CPT | Performed by: SPECIALIST

## 2023-08-03 PROCEDURE — 1123F ACP DISCUSS/DSCN MKR DOCD: CPT | Performed by: SPECIALIST

## 2023-08-03 PROCEDURE — 3017F COLORECTAL CA SCREEN DOC REV: CPT | Performed by: SPECIALIST

## 2023-08-03 RX ORDER — EZETIMIBE 10 MG/1
10 TABLET ORAL DAILY
Qty: 90 TABLET | Refills: 3 | Status: SHIPPED | OUTPATIENT
Start: 2023-08-03

## 2023-08-03 RX ORDER — EZETIMIBE 10 MG/1
10 TABLET ORAL DAILY
Qty: 30 TABLET | Refills: 0 | Status: SHIPPED | OUTPATIENT
Start: 2023-08-03

## 2023-08-03 NOTE — PROGRESS NOTES
[address] [facility]                                                            OFFICE NOTE        Erlinda Gerber M.D.,NARINDER. Omid Mckeon   1955  [id number]    Date/Time:  8/3/934045:02 AM            SUBJECTIVE:  She remains completely asymptomatic cardiac wise. She is very active she walks on a treadmill at 3.5 to 4 miles an hour for about 30 minutes with no issues. Still having some hair loss problems with atorvastatin. Again no chest pain shortness of breath palpitation presyncopal syncopal episode extreme fatigue reported. Assessment/Plan  1. CAD: Status post myocardial infarction in February 2018 with subsequent PCI and stenting of the PDA. Repeated cardiac catheterization of August 2018 with no recurrent flow-limiting stenosis. She is doing well and seemingly completely asymptomatic at this time. Nuclear stress test on 11/21 : There is no evidence for ischemia. There is a fixed defect in the inferolateral wall suggestive of previous subendocardial myocardial infarction at the level. Nonetheless the ejection fraction is complete preserved and estimated 80%. Continue aspirin metoprolol and lipitor    Proceed with stress echo at the next office visit in 6 months. Her EKG today is unchanged with sinus bradycardia nonspecific T wave abnormalities. 2.  Hyperlipidemia: Unfortunately she was unable to tolerate Crestor  Significant heartburn which is now improved. I have started on Lipitor but unfortunately she is complaining of significant hair loss which she had previously on statin. She wants to continue atorvastatin for now she wants to hold off PCSK9 inhibitors. She says she is dealing with the hair loss at this point. We have discussed recent labs of July 2023 with LDL still above 70. Discussed options.     Start Zetia 10 mg daily in addition of

## 2023-08-03 NOTE — PROGRESS NOTES
Per Dr. Fariba Burgess- zetia 10mg po QD, NMR and hepatic in 2 months, follow up in 6 months with stress echo one week prior for coronary disease.

## 2023-09-03 DIAGNOSIS — E78.49 OTHER HYPERLIPIDEMIA: ICD-10-CM

## 2023-09-03 DIAGNOSIS — I25.10 ATHEROSCLEROSIS OF NATIVE CORONARY ARTERY WITHOUT ANGINA PECTORIS, UNSPECIFIED WHETHER NATIVE OR TRANSPLANTED HEART: ICD-10-CM

## 2023-09-23 SDOH — ECONOMIC STABILITY: TRANSPORTATION INSECURITY
IN THE PAST 12 MONTHS, HAS LACK OF TRANSPORTATION KEPT YOU FROM MEETINGS, WORK, OR FROM GETTING THINGS NEEDED FOR DAILY LIVING?: NO

## 2023-09-23 SDOH — HEALTH STABILITY: PHYSICAL HEALTH: ON AVERAGE, HOW MANY MINUTES DO YOU ENGAGE IN EXERCISE AT THIS LEVEL?: 30 MIN

## 2023-09-23 SDOH — ECONOMIC STABILITY: FOOD INSECURITY: WITHIN THE PAST 12 MONTHS, THE FOOD YOU BOUGHT JUST DIDN'T LAST AND YOU DIDN'T HAVE MONEY TO GET MORE.: NEVER TRUE

## 2023-09-23 SDOH — HEALTH STABILITY: PHYSICAL HEALTH: ON AVERAGE, HOW MANY DAYS PER WEEK DO YOU ENGAGE IN MODERATE TO STRENUOUS EXERCISE (LIKE A BRISK WALK)?: 3 DAYS

## 2023-09-23 SDOH — ECONOMIC STABILITY: INCOME INSECURITY: HOW HARD IS IT FOR YOU TO PAY FOR THE VERY BASICS LIKE FOOD, HOUSING, MEDICAL CARE, AND HEATING?: NOT HARD AT ALL

## 2023-09-23 SDOH — ECONOMIC STABILITY: FOOD INSECURITY: WITHIN THE PAST 12 MONTHS, YOU WORRIED THAT YOUR FOOD WOULD RUN OUT BEFORE YOU GOT MONEY TO BUY MORE.: NEVER TRUE

## 2023-09-23 SDOH — ECONOMIC STABILITY: HOUSING INSECURITY
IN THE LAST 12 MONTHS, WAS THERE A TIME WHEN YOU DID NOT HAVE A STEADY PLACE TO SLEEP OR SLEPT IN A SHELTER (INCLUDING NOW)?: NO

## 2023-09-23 ASSESSMENT — LIFESTYLE VARIABLES
HOW OFTEN DO YOU HAVE A DRINK CONTAINING ALCOHOL: MONTHLY OR LESS
HOW MANY STANDARD DRINKS CONTAINING ALCOHOL DO YOU HAVE ON A TYPICAL DAY: 1
HOW MANY STANDARD DRINKS CONTAINING ALCOHOL DO YOU HAVE ON A TYPICAL DAY: 1 OR 2
HOW OFTEN DO YOU HAVE SIX OR MORE DRINKS ON ONE OCCASION: 1
HOW OFTEN DO YOU HAVE A DRINK CONTAINING ALCOHOL: 2

## 2023-09-23 ASSESSMENT — PATIENT HEALTH QUESTIONNAIRE - PHQ9
1. LITTLE INTEREST OR PLEASURE IN DOING THINGS: 0
SUM OF ALL RESPONSES TO PHQ QUESTIONS 1-9: 0
2. FEELING DOWN, DEPRESSED OR HOPELESS: 0
SUM OF ALL RESPONSES TO PHQ QUESTIONS 1-9: 0
SUM OF ALL RESPONSES TO PHQ QUESTIONS 1-9: 0
SUM OF ALL RESPONSES TO PHQ9 QUESTIONS 1 & 2: 0
SUM OF ALL RESPONSES TO PHQ QUESTIONS 1-9: 0

## 2023-09-26 ENCOUNTER — OFFICE VISIT (OUTPATIENT)
Age: 68
End: 2023-09-26
Payer: MEDICARE

## 2023-09-26 VITALS
DIASTOLIC BLOOD PRESSURE: 75 MMHG | TEMPERATURE: 98.6 F | BODY MASS INDEX: 23.9 KG/M2 | OXYGEN SATURATION: 95 % | WEIGHT: 140 LBS | HEART RATE: 49 BPM | RESPIRATION RATE: 16 BRPM | HEIGHT: 64 IN | SYSTOLIC BLOOD PRESSURE: 121 MMHG

## 2023-09-26 DIAGNOSIS — I25.10 CORONARY ARTERY DISEASE DUE TO LIPID RICH PLAQUE: ICD-10-CM

## 2023-09-26 DIAGNOSIS — Z23 NEED FOR IMMUNIZATION AGAINST INFLUENZA: ICD-10-CM

## 2023-09-26 DIAGNOSIS — Z00.00 MEDICARE ANNUAL WELLNESS VISIT, SUBSEQUENT: Primary | ICD-10-CM

## 2023-09-26 DIAGNOSIS — I25.83 CORONARY ARTERY DISEASE DUE TO LIPID RICH PLAQUE: ICD-10-CM

## 2023-09-26 PROCEDURE — 99214 OFFICE O/P EST MOD 30 MIN: CPT | Performed by: INTERNAL MEDICINE

## 2023-09-26 PROCEDURE — 90694 VACC AIIV4 NO PRSRV 0.5ML IM: CPT | Performed by: INTERNAL MEDICINE

## 2023-10-03 ENCOUNTER — OFFICE VISIT (OUTPATIENT)
Age: 68
End: 2023-10-03
Payer: MEDICARE

## 2023-10-03 VITALS — BODY MASS INDEX: 24.37 KG/M2 | SYSTOLIC BLOOD PRESSURE: 116 MMHG | DIASTOLIC BLOOD PRESSURE: 80 MMHG | WEIGHT: 142 LBS

## 2023-10-03 DIAGNOSIS — Z11.51 ENCOUNTER FOR SCREENING FOR HUMAN PAPILLOMAVIRUS (HPV): ICD-10-CM

## 2023-10-03 DIAGNOSIS — Z87.39 HISTORY OF OSTEOPENIA: ICD-10-CM

## 2023-10-03 DIAGNOSIS — Z01.419 WELL WOMAN EXAM WITH ROUTINE GYNECOLOGICAL EXAM: Primary | ICD-10-CM

## 2023-10-03 DIAGNOSIS — Z78.0 ASYMPTOMATIC MENOPAUSAL STATE: ICD-10-CM

## 2023-10-03 DIAGNOSIS — Z12.31 ENCOUNTER FOR SCREENING MAMMOGRAM FOR MALIGNANT NEOPLASM OF BREAST: ICD-10-CM

## 2023-10-03 PROCEDURE — 99397 PER PM REEVAL EST PAT 65+ YR: CPT | Performed by: OBSTETRICS & GYNECOLOGY

## 2023-10-03 PROCEDURE — G8484 FLU IMMUNIZE NO ADMIN: HCPCS | Performed by: OBSTETRICS & GYNECOLOGY

## 2023-10-03 NOTE — PROGRESS NOTES
Annual exam    Anirudh Houston is a 76 y.o.  presenting for annual exam. No concerns today. Former [facility] patient. Has 2 kids and 2 grandkids! Grandkids live in [place], [place]. 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 Ob/Gyn Hx:    Menopause- age unsure; irregular prior to menopause  ? PMB- denies  ?hot flashes-no complaints  ? Vag dryness-denies  ? HRT-none  STI- denies  ? SA- yes    Health maintenance:  Pap- 21 NILM; 10/18/18 NILM, no longer indicated due to age and life-long history of normal pap smears  Mammo- 23 B1   Colonoscopy- 01/10/19 diverticulosis and hemorrhoids, f/u 10 years  Dexa- 01/10/20 osteopenia --> referral provided today, discussed weight bearing exercise, dietary calcium and vitD supplementation 600IU daily.     Past Medical History:   Diagnosis Date    C. difficile colitis     CAD (coronary artery disease)     Colitis     Hypercholesteremia     Sepsis ([address])     Vertigo of central origin of both ears     during allergy season       Past Surgical History:   Procedure Laterality Date    [place] UNLISTED PROCEDURE CARDIAC SURGERY  2018    1 stent    TUBAL LIGATION      WRIST FRACTURE SURGERY Left 2016    ganglian cyst removal        Family History   Problem Relation Age of Onset    No Known Problems Brother     Cancer Brother         throat cancer    Heart Disease Father     Heart Attack Father     Heart Attack Mother     Heart Disease Mother     Diabetes Brother        Social History     Socioeconomic History    Marital status:      Spouse name: Not on file    Number of children: Not on file    Years of education: Not on file    Highest education level: Not on file   Occupational History    Not on file   Tobacco Use    Smoking status: Never    Smokeless tobacco: Never   Substance and Sexual Activity    Alcohol use: Not Currently     Alcohol/week: 1.0 standard drink of alcohol     Types: 1 Glasses of wine per week     Comment: Occasionally    Drug

## 2023-11-02 ENCOUNTER — HOSPITAL ENCOUNTER (OUTPATIENT)
Facility: HOSPITAL | Age: 68
Discharge: HOME | End: 2023-11-02
Attending: OBSTETRICS & GYNECOLOGY
Payer: MEDICARE

## 2023-11-02 DIAGNOSIS — Z87.39 HISTORY OF OSTEOPENIA: ICD-10-CM

## 2023-11-02 DIAGNOSIS — Z78.0 ASYMPTOMATIC MENOPAUSAL STATE: ICD-10-CM

## 2023-11-02 PROCEDURE — 77080 DXA BONE DENSITY AXIAL: CPT

## 2023-12-11 NOTE — PROGRESS NOTES
TRANSFER - IN REPORT:    Verbal report received from [facility] on Rola Botello  being received from procedure area for routine progression of care. Report consisted of patients Situation, Background, Assessment and Recommendations(SBAR). Information from the following report(s) SBAR, Procedure Summary, MAR, Recent Results and Cardiac Rhythm Sinus Fredrick Lowry was reviewed with the receiving clinician. Opportunity for questions and clarification was provided. Assessment completed upon patients arrival to [address] [place] [address] and care assumed. Cardiac Cath Lab Recovery Arrival Note:    Rola Botello arrived to [facility] recovery area. Patient procedure= LHC. Patient on cardiac monitor, non-invasive blood pressure, SPO2 monitor. On Room Air . IV  of NS on pump at 75 ml/hr. Patient status doing well without problems. Patient is A&Ox 4. Patient reports No Pain. PROCEDURE SITE CHECK:    Procedure site:without any bleeding and No Hematoma, No pain/discomfort reported at procedure site. No change in patient status. Continue to monitor patient and status. Laps, needles and instruments count was reported as correct.

## 2024-01-23 NOTE — TELEPHONE ENCOUNTER
Cardiologist: Dr. Perez    Future Appointments   Date Time Provider Department Center   1/29/2024  9:00 AM [facility] 1 [facility]   2/5/2024  9:20 AM Yasir Perez MD [facility] BS AMB       Requested Prescriptions     Signed Prescriptions Disp Refills    metoprolol tartrate (LOPRESSOR) 25 MG tablet 180 tablet 3     Sig: TAKE 1 TABLET TWICE A DAY (DOSE DECREASED)     Authorizing Provider: YASIR PEREZ     Ordering User: GIOVANNI MORENO         Refills VO per Dr. Perez.

## 2024-01-29 ENCOUNTER — ANCILLARY PROCEDURE (OUTPATIENT)
Age: 69
End: 2024-01-29
Payer: MEDICARE

## 2024-01-29 ENCOUNTER — TELEPHONE (OUTPATIENT)
Age: 69
End: 2024-01-29

## 2024-01-29 VITALS
HEART RATE: 69 BPM | DIASTOLIC BLOOD PRESSURE: 70 MMHG | WEIGHT: 142 LBS | BODY MASS INDEX: 24.24 KG/M2 | SYSTOLIC BLOOD PRESSURE: 126 MMHG | HEIGHT: 64 IN

## 2024-01-29 DIAGNOSIS — E78.49 OTHER HYPERLIPIDEMIA: ICD-10-CM

## 2024-01-29 DIAGNOSIS — I25.10 ATHEROSCLEROSIS OF NATIVE CORONARY ARTERY WITHOUT ANGINA PECTORIS, UNSPECIFIED WHETHER NATIVE OR TRANSPLANTED HEART: ICD-10-CM

## 2024-01-29 PROCEDURE — 93351 STRESS TTE COMPLETE: CPT | Performed by: SPECIALIST

## 2024-01-31 ENCOUNTER — TELEPHONE (OUTPATIENT)
Age: 69
End: 2024-01-31

## 2024-02-01 NOTE — TELEPHONE ENCOUNTER
Attempted to reach patient by telephone. A message was left for return call.     To discuss possibility of seeing nurse practitioner to discuss echo results.

## 2024-02-01 NOTE — TELEPHONE ENCOUNTER
Spoke to patient's .  Identifiers x 2.  Would like to see Dr. Perez for office visit or virtual visit.  Did not want to see NP.     To discuss with MD.

## 2024-02-15 LAB
ECHO BSA: 1.71 M2
STRESS ANGINA INDEX: 0
STRESS BASELINE DIAS BP: 70 MMHG
STRESS BASELINE HR: 64 BPM
STRESS BASELINE SYS BP: 126 MMHG
STRESS ESTIMATED WORKLOAD: 12.3 METS
STRESS EXERCISE DUR MIN: 9 MIN
STRESS EXERCISE DUR SEC: 15 SEC
STRESS O2 SAT PEAK: 97 %
STRESS O2 SAT REST: 100 %
STRESS PEAK DIAS BP: 76 MMHG
STRESS PEAK SYS BP: 156 MMHG
STRESS PERCENT HR ACHIEVED: 86 %
STRESS POST PEAK HR: 130 BPM
STRESS RATE PRESSURE PRODUCT: NORMAL BPM*MMHG
STRESS TARGET HR: 151 BPM

## 2024-02-16 NOTE — TELEPHONE ENCOUNTER
Per Dr. Perez:    Stress echo with no major abnormalities new at least scheduled to see me in office when possible if any symptoms of course or she will need to go to the emergency room       Attempted to reach patient by telephone. A message was left for return call.

## 2024-02-23 ENCOUNTER — OFFICE VISIT (OUTPATIENT)
Age: 69
End: 2024-02-23
Payer: MEDICARE

## 2024-02-23 VITALS
SYSTOLIC BLOOD PRESSURE: 120 MMHG | HEIGHT: 64 IN | BODY MASS INDEX: 24.07 KG/M2 | HEART RATE: 57 BPM | RESPIRATION RATE: 16 BRPM | DIASTOLIC BLOOD PRESSURE: 70 MMHG | WEIGHT: 141 LBS

## 2024-02-23 DIAGNOSIS — I25.10 ATHEROSCLEROSIS OF NATIVE CORONARY ARTERY WITHOUT ANGINA PECTORIS, UNSPECIFIED WHETHER NATIVE OR TRANSPLANTED HEART: Primary | ICD-10-CM

## 2024-02-23 PROCEDURE — 1036F TOBACCO NON-USER: CPT | Performed by: SPECIALIST

## 2024-02-23 PROCEDURE — G8399 PT W/DXA RESULTS DOCUMENT: HCPCS | Performed by: SPECIALIST

## 2024-02-23 PROCEDURE — G8484 FLU IMMUNIZE NO ADMIN: HCPCS | Performed by: SPECIALIST

## 2024-02-23 PROCEDURE — 1090F PRES/ABSN URINE INCON ASSESS: CPT | Performed by: SPECIALIST

## 2024-02-23 PROCEDURE — 3017F COLORECTAL CA SCREEN DOC REV: CPT | Performed by: SPECIALIST

## 2024-02-23 PROCEDURE — 93000 ELECTROCARDIOGRAM COMPLETE: CPT | Performed by: SPECIALIST

## 2024-02-23 PROCEDURE — 99214 OFFICE O/P EST MOD 30 MIN: CPT | Performed by: SPECIALIST

## 2024-02-23 PROCEDURE — 1123F ACP DISCUSS/DSCN MKR DOCD: CPT | Performed by: SPECIALIST

## 2024-02-23 PROCEDURE — G8420 CALC BMI NORM PARAMETERS: HCPCS | Performed by: SPECIALIST

## 2024-02-23 PROCEDURE — G8427 DOCREV CUR MEDS BY ELIG CLIN: HCPCS | Performed by: SPECIALIST

## 2024-02-23 RX ORDER — ATORVASTATIN CALCIUM 80 MG/1
80 TABLET, FILM COATED ORAL NIGHTLY
Qty: 90 TABLET | Refills: 2 | Status: SHIPPED | OUTPATIENT
Start: 2024-02-23

## 2024-02-23 RX ORDER — CLOBETASOL PROPIONATE 0.46 MG/ML
SOLUTION TOPICAL
COMMUNITY
Start: 2024-02-20

## 2024-02-23 ASSESSMENT — PATIENT HEALTH QUESTIONNAIRE - PHQ9
1. LITTLE INTEREST OR PLEASURE IN DOING THINGS: 0
2. FEELING DOWN, DEPRESSED OR HOPELESS: 0
SUM OF ALL RESPONSES TO PHQ9 QUESTIONS 1 & 2: 0
SUM OF ALL RESPONSES TO PHQ QUESTIONS 1-9: 0

## 2024-02-23 NOTE — PATIENT INSTRUCTIONS
Please INCREASE your Lipitor to 80mg daily    Please have FASTING labs in 8 weeks    Follow up in 6 months and have FASTING labs 1 week prior

## 2024-02-23 NOTE — PROGRESS NOTES
PROCEDURE CARDIAC SURGERY  02/27/2018    1 stent    TUBAL LIGATION      WRIST FRACTURE SURGERY Left 2016    ganglian cyst removal      Social History     Tobacco Use    Smoking status: Never    Smokeless tobacco: Never   Substance Use Topics    Alcohol use: Not Currently     Alcohol/week: 1.0 standard drink of alcohol     Types: 1 Glasses of wine per week     Comment: Occasionally    Drug use: No     Family History   Problem Relation Age of Onset    No Known Problems Brother     Cancer Brother         throat cancer    Heart Disease Father     Heart Attack Father     Heart Attack Mother     Heart Disease Mother     Diabetes Brother      No Known Allergies      /70   Pulse 57   Resp 16   Ht 1.626 m (5' 4\")   Wt 64 kg (141 lb)   BMI 24.20 kg/m²         Wt Readings from Last 3 Encounters:   02/23/24 64 kg (141 lb)   01/29/24 64.4 kg (142 lb)   10/03/23 64.4 kg (142 lb)             Review of Systems:   Pertinent items are noted in the History of Present Illness.       PHYSICAL EXAM    Physical Exam  Neck:      Vascular: No carotid bruit.   Cardiovascular:      Rate and Rhythm: Normal rate and regular rhythm.   Pulmonary:      Effort: Pulmonary effort is normal.      Breath sounds: Normal breath sounds.   Abdominal:      Palpations: Abdomen is soft.   Musculoskeletal:      Right lower leg: No edema.      Left lower leg: No edema.          Current Outpatient Medications   Medication Sig Dispense Refill    clobetasol (TEMOVATE) 0.05 % external solution       Multiple Vitamin (MULTIVITAMIN ADULT PO) Take by mouth      metoprolol tartrate (LOPRESSOR) 25 MG tablet TAKE 1 TABLET TWICE A DAY (DOSE DECREASED) 180 tablet 3    ezetimibe (ZETIA) 10 MG tablet Take 1 tablet by mouth daily 30 tablet 0    atorvastatin (LIPITOR) 40 MG tablet TAKE 1 TABLET NIGHTLY 90 tablet 3    aspirin 81 MG EC tablet Take 1 tablet by mouth daily      famotidine (PEPCID) 20 MG tablet Take 1 tablet by mouth 2 times daily       No current

## 2024-04-15 ENCOUNTER — TELEPHONE (OUTPATIENT)
Age: 69
End: 2024-04-15

## 2024-04-19 LAB
ALBUMIN SERPL-MCNC: 4.2 G/DL (ref 3.9–4.9)
ALP SERPL-CCNC: 67 IU/L (ref 44–121)
ALT SERPL-CCNC: 18 IU/L (ref 0–32)
AST SERPL-CCNC: 24 IU/L (ref 0–40)
BILIRUB DIRECT SERPL-MCNC: 0.27 MG/DL (ref 0–0.4)
BILIRUB SERPL-MCNC: 2.3 MG/DL (ref 0–1.2)
CHOLEST SERPL-MCNC: 126 MG/DL (ref 100–199)
HDLC SERPL-MCNC: 48 MG/DL
IMP & REVIEW OF LAB RESULTS: NORMAL
LDLC SERPL CALC-MCNC: 62 MG/DL (ref 0–99)
PROT SERPL-MCNC: 7.1 G/DL (ref 6–8.5)
TRIGL SERPL-MCNC: 79 MG/DL (ref 0–149)
VLDLC SERPL CALC-MCNC: 16 MG/DL (ref 5–40)

## 2024-04-25 DIAGNOSIS — I25.10 ATHEROSCLEROSIS OF NATIVE CORONARY ARTERY WITHOUT ANGINA PECTORIS, UNSPECIFIED WHETHER NATIVE OR TRANSPLANTED HEART: ICD-10-CM

## 2024-04-25 DIAGNOSIS — E78.49 OTHER HYPERLIPIDEMIA: Primary | ICD-10-CM

## 2024-06-28 ENCOUNTER — HOSPITAL ENCOUNTER (OUTPATIENT)
Facility: HOSPITAL | Age: 69
End: 2024-06-28
Payer: MEDICARE

## 2024-06-28 VITALS — BODY MASS INDEX: 23.56 KG/M2 | HEIGHT: 64 IN | WEIGHT: 138 LBS

## 2024-06-28 DIAGNOSIS — Z12.31 VISIT FOR SCREENING MAMMOGRAM: ICD-10-CM

## 2024-06-28 PROCEDURE — 77063 BREAST TOMOSYNTHESIS BI: CPT

## 2024-07-24 ENCOUNTER — TELEPHONE (OUTPATIENT)
Age: 69
End: 2024-07-24

## 2024-07-24 DIAGNOSIS — E78.49 OTHER HYPERLIPIDEMIA: ICD-10-CM

## 2024-07-24 DIAGNOSIS — I25.10 ATHEROSCLEROSIS OF NATIVE CORONARY ARTERY WITHOUT ANGINA PECTORIS, UNSPECIFIED WHETHER NATIVE OR TRANSPLANTED HEART: ICD-10-CM

## 2024-07-24 RX ORDER — EZETIMIBE 10 MG/1
10 TABLET ORAL DAILY
Qty: 90 TABLET | Refills: 1 | Status: SHIPPED | OUTPATIENT
Start: 2024-07-24

## 2024-07-24 NOTE — TELEPHONE ENCOUNTER
Requested Prescriptions     Signed Prescriptions Disp Refills    ezetimibe (ZETIA) 10 MG tablet 90 tablet 1     Sig: Take 1 tablet by mouth daily     Authorizing Provider: YASIR PEREZ     Ordering User: JEROME HAYWOOD     Verbal order per Dr. Perez.    Future Appointments   Date Time Provider Department Center   8/23/2024 10:40 [facility]   8/26/2024 10:30 AM Hu Wheat MD [facility] BS AMB

## 2024-08-09 NOTE — PROGRESS NOTES
Procedure Note    Patient: Juany Hooper  76 year old  female    MRN:  [id number]     Pre-op Dx: This 76-year-old female presents for colonoscopy to investigate 2 years of diarrhea bloating.  Generalized abdominal tenderness without rebound tenderness [R10.817]  Flatulence, eructation, and gas pain [R14.3, R14.1, R14.2]  Pain, abdominal, generalized [R10.84]  Diarrhea due to malabsorption (CMD) [K90.9, R19.7]     Post-op Dx: Same     Procedure: Procedure:    EGD (ESOPHAGOGASTRODUODENOSCOPY) WITH BIOPSY  CPT(R) Code:  15159 - ESOPHAGOGASTRODUODENOSCOPY TRANSORAL FLEX DIAG    Procedure:    EGD, WITH BIOPSY    Procedure:    COLONOSCOPY,WITH BIOPSY      Surgeon(s): Frandy Gill MD  Phone Number: [phone]                       Surgeons and Role:     * Frandy Gill MD - Primary    Assistants: none    Assistant Tasks: none    Anesthesia Staff: CRNA: Constance Bangura CRNA  Anesthesiologist: Edwardo Wheatley MD     Anesthesia Type: MAC    Description and Findings:  Informed consent was obtained for the procedure, including sedation, risks of perforation, hemorrhage, adverse drug reaction and aspiration were discussed.  The patient was taken to the procedure room and placed in a left lateral decubitus position. Sedation performed by anesthesia.  The patient was monitored continuously with ECG tracing, pulse oximetry, blood pressure monitoring, and direct observations.  After an adequate level of anesthesia was attained digital rectal exam shows numerous hemorrhoidal tissue and skin tags around the anal opening.  The anus was of loose tone.  Rectal vault felt smooth.  Then the Olympus PCF Q1 90L colonoscope was inserted.  Retroflex view reveals small internal hemorrhoids.  Scope was straightened and advanced to the cecum and then into the terminal ileum.  The sigmoid contained numerous diverticula.  No signs of infections or inflammation.  Water insufflation technique was used to negotiate this with slow  Visit Vitals  /60 (BP 1 Location: Left arm, BP Patient Position: Sitting)   Pulse 73   Resp 18   Ht 5' 4\" (1.626 m)   Wt 149 lb (67.6 kg)   SpO2 98%   BMI 25.58 kg/m² advancement.  The remainder of the colon was relatively easy to intubate.  The prep was excellent.  Counterpressure maneuver loop reduction maneuvers needed to move from the right colon into the cecum and then into the terminal ileum.  Terminal ileum appeared unremarkable.  As the scope was being slowly withdrawn various biopsies were taken.  First of the terminal ileum then of the ascending colon then transverse colon and sigmoid colon.  Blood loss for all was minimal.  The scope was withdrawn and the patient tolerated the procedure well.    Summary sigmoid diverticulosis extensive but no evidence grossly of colitis.  Various biopsies pending as above.  Follow-up pending pathology.    Complications: None    Estimated Blood Loss:  No Value minimal.    Implants: * No implants in log *     Specimens Removed:    Order Name Source Comment Collection Info Order Time   SURGICAL PATHOLOGY Small Intestine  Collected By: Frandy Gill MD 8/9/2024  9:42 AM     How should test results be released to the patient's MyChart portal?   Manual release only          What is the reason for preventing automatic release?   Sharing the test result is likely to endanger the safety of the patient                 Dictating Provider,   Frandy Gill MD  10:20 AM 8/9/2024

## 2024-08-16 LAB
ALBUMIN SERPL-MCNC: 4.2 G/DL (ref 3.9–4.9)
ALP SERPL-CCNC: 74 IU/L (ref 44–121)
ALT SERPL-CCNC: 22 IU/L (ref 0–32)
AST SERPL-CCNC: 23 IU/L (ref 0–40)
BILIRUB DIRECT SERPL-MCNC: 0.38 MG/DL (ref 0–0.4)
BILIRUB SERPL-MCNC: 1.7 MG/DL (ref 0–1.2)
PROT SERPL-MCNC: 6.9 G/DL (ref 6–8.5)

## 2024-08-17 LAB
HDL SERPL-SCNC: 31.9 UMOL/L
LDL SERPL QN: 21.7 NM
LDL SERPL-SCNC: 670 NMOL/L
LDL SMALL SERPL-SCNC: 163 NMOL/L
LP-IR SCORE SERPL: 37

## 2024-08-23 ENCOUNTER — OFFICE VISIT (OUTPATIENT)
Age: 69
End: 2024-08-23
Payer: MEDICARE

## 2024-08-23 VITALS
RESPIRATION RATE: 16 BRPM | WEIGHT: 143 LBS | OXYGEN SATURATION: 97 % | SYSTOLIC BLOOD PRESSURE: 130 MMHG | DIASTOLIC BLOOD PRESSURE: 70 MMHG | HEIGHT: 64 IN | HEART RATE: 48 BPM | BODY MASS INDEX: 24.41 KG/M2

## 2024-08-23 DIAGNOSIS — E78.49 OTHER HYPERLIPIDEMIA: Primary | ICD-10-CM

## 2024-08-23 PROCEDURE — G8428 CUR MEDS NOT DOCUMENT: HCPCS | Performed by: SPECIALIST

## 2024-08-23 PROCEDURE — 93005 ELECTROCARDIOGRAM TRACING: CPT | Performed by: SPECIALIST

## 2024-08-23 PROCEDURE — 3017F COLORECTAL CA SCREEN DOC REV: CPT | Performed by: SPECIALIST

## 2024-08-23 PROCEDURE — 1123F ACP DISCUSS/DSCN MKR DOCD: CPT | Performed by: SPECIALIST

## 2024-08-23 PROCEDURE — 1090F PRES/ABSN URINE INCON ASSESS: CPT | Performed by: SPECIALIST

## 2024-08-23 PROCEDURE — 1036F TOBACCO NON-USER: CPT | Performed by: SPECIALIST

## 2024-08-23 PROCEDURE — 93010 ELECTROCARDIOGRAM REPORT: CPT | Performed by: SPECIALIST

## 2024-08-23 PROCEDURE — 99214 OFFICE O/P EST MOD 30 MIN: CPT | Performed by: SPECIALIST

## 2024-08-23 PROCEDURE — G8420 CALC BMI NORM PARAMETERS: HCPCS | Performed by: SPECIALIST

## 2024-08-23 PROCEDURE — G8399 PT W/DXA RESULTS DOCUMENT: HCPCS | Performed by: SPECIALIST

## 2024-08-23 SDOH — HEALTH STABILITY: PHYSICAL HEALTH: ON AVERAGE, HOW MANY MINUTES DO YOU ENGAGE IN EXERCISE AT THIS LEVEL?: 30 MIN

## 2024-08-23 SDOH — HEALTH STABILITY: PHYSICAL HEALTH: ON AVERAGE, HOW MANY DAYS PER WEEK DO YOU ENGAGE IN MODERATE TO STRENUOUS EXERCISE (LIKE A BRISK WALK)?: 4 DAYS

## 2024-08-23 ASSESSMENT — PATIENT HEALTH QUESTIONNAIRE - PHQ9
SUM OF ALL RESPONSES TO PHQ QUESTIONS 1-9: 0
SUM OF ALL RESPONSES TO PHQ QUESTIONS 1-9: 0
2. FEELING DOWN, DEPRESSED OR HOPELESS: NOT AT ALL
SUM OF ALL RESPONSES TO PHQ QUESTIONS 1-9: 0
SUM OF ALL RESPONSES TO PHQ QUESTIONS 1-9: 0
1. LITTLE INTEREST OR PLEASURE IN DOING THINGS: NOT AT ALL
SUM OF ALL RESPONSES TO PHQ9 QUESTIONS 1 & 2: 0

## 2024-08-23 NOTE — PROGRESS NOTES
[facility]                                     DIVISION OF CARDIOLOGY                                                                             OFFICE NOTE                  THA JACINTO M.D. , SAROJ            MARCI OLIVARES   1955  [id number]    Date/Time:  8/23/202411:58 AM      /70   Pulse (!) 48   Resp 16   Ht 1.626 m (5' 4\")   Wt 64.9 kg (143 lb)   SpO2 97%   BMI 24.55 kg/m²        Wt Readings from Last 3 Encounters:   08/23/24 64.9 kg (143 lb)   06/28/24 62.6 kg (138 lb)   02/23/24 64 kg (141 lb)          Lab Results   Component Value Date    CHOL 126 04/18/2024    TRIG 79 04/18/2024    HDL 48 04/18/2024    LDL 62 04/18/2024    VLDL 16 04/18/2024    CHOLHDLRATIO 3.1 08/16/2022     Lab Results   Component Value Date    ALT 22 08/15/2024    AST 23 08/15/2024    GGT 15 07/12/2021    ALKPHOS 74 08/15/2024    BILITOT 1.7 (H) 08/15/2024             SUBJECTIVE:  Ms. Olivares is doing very well she is completely symptomatic she denies any chest pain chest discomfort searing or shortness of breath upper PND reported she walks on a treadmill 30 minutes 3 times a week otherwise she does some other type of exercise the rest of the week with overall she feels fine.       Assessment/Plan      1.  CAD: Status post myocardial infarction in February 2018 with subsequent PCI and stenting of the PDA.     Repeated cardiac catheterization of August 2018 with no recurrent flow-limiting stenosis.     She is doing well and seemingly completely asymptomatic at this time.       Nuclear stress test on 11/21 :There is no evidence for ischemia.  There is a fixed defect in the inferolateral wall suggestive of previous subendocardial myocardial infarction at the level.  Nonetheless the ejection fraction is complete preserved and estimated 80%.     Continue aspirin metoprolol and lipitor     Stress echo February 2024 with no evidence for ischemia fixed defect in the inferobasilar wall

## 2024-08-26 ENCOUNTER — OFFICE VISIT (OUTPATIENT)
Dept: PRIMARY CARE CLINIC | Facility: CLINIC | Age: 69
End: 2024-08-26
Payer: MEDICARE

## 2024-08-26 VITALS
DIASTOLIC BLOOD PRESSURE: 71 MMHG | HEART RATE: 89 BPM | TEMPERATURE: 98.8 F | WEIGHT: 145 LBS | SYSTOLIC BLOOD PRESSURE: 130 MMHG | HEIGHT: 64 IN | OXYGEN SATURATION: 98 % | BODY MASS INDEX: 24.75 KG/M2 | RESPIRATION RATE: 16 BRPM

## 2024-08-26 DIAGNOSIS — I25.10 CORONARY ARTERY DISEASE INVOLVING NATIVE CORONARY ARTERY OF NATIVE HEART WITHOUT ANGINA PECTORIS: Primary | ICD-10-CM

## 2024-08-26 DIAGNOSIS — E80.4 GILBERT'S DISEASE: ICD-10-CM

## 2024-08-26 DIAGNOSIS — N18.31 CHRONIC KIDNEY DISEASE, STAGE 3A (HCC): ICD-10-CM

## 2024-08-26 PROBLEM — R07.9 ACUTE CHEST PAIN: Status: RESOLVED | Noted: 2018-05-23 | Resolved: 2024-08-26

## 2024-08-26 PROBLEM — A41.9 SEVERE SEPSIS (HCC): Status: RESOLVED | Noted: 2018-02-24 | Resolved: 2024-08-26

## 2024-08-26 PROBLEM — E86.0 DEHYDRATION: Status: RESOLVED | Noted: 2018-02-24 | Resolved: 2024-08-26

## 2024-08-26 PROBLEM — R65.20 SEVERE SEPSIS (HCC): Status: RESOLVED | Noted: 2018-02-24 | Resolved: 2024-08-26

## 2024-08-26 PROBLEM — K52.9 COLITIS: Status: RESOLVED | Noted: 2018-02-24 | Resolved: 2024-08-26

## 2024-08-26 PROBLEM — E83.51 HYPOCALCEMIA: Status: RESOLVED | Noted: 2018-02-24 | Resolved: 2024-08-26

## 2024-08-26 PROBLEM — E87.6 HYPOKALEMIA: Status: RESOLVED | Noted: 2018-02-24 | Resolved: 2024-08-26

## 2024-08-26 PROCEDURE — G8427 DOCREV CUR MEDS BY ELIG CLIN: HCPCS | Performed by: FAMILY MEDICINE

## 2024-08-26 PROCEDURE — 1090F PRES/ABSN URINE INCON ASSESS: CPT | Performed by: FAMILY MEDICINE

## 2024-08-26 PROCEDURE — 99204 OFFICE O/P NEW MOD 45 MIN: CPT | Performed by: FAMILY MEDICINE

## 2024-08-26 PROCEDURE — 1123F ACP DISCUSS/DSCN MKR DOCD: CPT | Performed by: FAMILY MEDICINE

## 2024-08-26 PROCEDURE — 1036F TOBACCO NON-USER: CPT | Performed by: FAMILY MEDICINE

## 2024-08-26 PROCEDURE — 3017F COLORECTAL CA SCREEN DOC REV: CPT | Performed by: FAMILY MEDICINE

## 2024-08-26 PROCEDURE — G8420 CALC BMI NORM PARAMETERS: HCPCS | Performed by: FAMILY MEDICINE

## 2024-08-26 PROCEDURE — G8399 PT W/DXA RESULTS DOCUMENT: HCPCS | Performed by: FAMILY MEDICINE

## 2024-08-26 NOTE — PROGRESS NOTES
Subjective  Chief Complaint   Patient presents with    New Patient     Establish      HPI:  Tiffany Olivares is a 69 y.o. female.    Patient presents to establish new PCP.    Elevated unconjugated bilirubin-present for many many years.  She wanted to make sure nothing else is going on.  Cardiologist mentioned Guilbert's disease.  CT abdomen pelvis 2018 did not reveal any liver abnormalities.  Stone seen in gallbladder.    CAD-status post MI in 2018.  Followed by cardiology.  No complaints.  Recent cholesterol numbers are great.    CKD 3-present on labs following event in 2018 at that time GFR was in the low to mid 40s.  Over time some kidney function recovered and currently she sits in the mid 50s.  She reports she had never been notified about her lower kidney function.    Past Medical History:   Diagnosis Date    C. 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     No Known Allergies    Objective  Vitals:    08/26/24 1031   BP: 130/71   Pulse: 89   Resp: 16   Temp: 98.8 °F (37.1 °C)   SpO2: 98%     Wt Readings from Last 3 Encounters:   08/26/24 65.8 kg (145 lb)   08/23/24 64.9 kg (143 lb)   06/28/24 62.6 kg (138 lb)     Physical Exam  Constitutional:       Appearance: Normal appearance.   HENT:      Head: Normocephalic and  03-May-2021 08:30

## 2024-08-26 NOTE — PROGRESS NOTES
Tiffany Olivares (:  1955) is a 69 y.o. female,New patient, here for evaluation of the following chief complaint(s):  New Patient (Establish )         Assessment & Plan    Pt did not have any concerns about cardiovascular heart, although she admits to eating more chips.     Pt was notified about possibly having Gilbert disease due to increased bilirubin for years. Pt was also notified about having chronic kidney disease due to decreased GFR. Her GFR has been stable for years since her myocardial infarction.     Return in about 1 year (around 2025).       Subjective   HPI    Coronary artery disease- Pt did not voice concerns about her cardiovascular health.     Pt denies knowing she had chronic kidney disease.     Bilirubin- Pt voiced concerns about increased bilirubin. Pt stated that when she drinks more water her eyes are non-icteric.     Review of Systems       Objective   Physical Exam  Vitals reviewed.   Constitutional:       Appearance: Normal appearance.   Cardiovascular:      Rate and Rhythm: Normal rate and regular rhythm.      Heart sounds: Normal heart sounds.   Pulmonary:      Effort: Pulmonary effort is normal.      Breath sounds: Normal breath sounds.   Neurological:      General: No focal deficit present.      Mental Status: She is alert and oriented to person, place, and time.                  An electronic signature was used to authenticate this note.    --Radha Gonsales

## 2024-08-26 NOTE — ASSESSMENT & PLAN NOTE
Given unconjugated bilirubinemia less than 4 mg/dL, very likely to be [place].  No need to treat.  Will observe with regular lab work.

## 2024-08-26 NOTE — ASSESSMENT & PLAN NOTE
GFR stable for years.  It has actually recovered since her cardiac event in 2018.  Continue with great blood pressure control.

## 2024-08-26 NOTE — PROGRESS NOTES
\"Have you been to the ER, urgent care clinic since your last visit?  Hospitalized since your last visit?\"    NO    “Have you seen or consulted any other health care providers outside of [facility] since your last visit?”    NO

## 2024-11-11 RX ORDER — ATORVASTATIN CALCIUM 80 MG/1
TABLET, FILM COATED ORAL
Qty: 90 TABLET | Refills: 3 | Status: SHIPPED | OUTPATIENT
Start: 2024-11-11

## 2024-11-11 NOTE — TELEPHONE ENCOUNTER
Cardiologist: Dr. Perez    Future Appointments   Date Time Provider Department Center   2/28/2025 10:40 AM Yasir Perez MD [facility]       Requested Prescriptions     Signed Prescriptions Disp Refills    atorvastatin (LIPITOR) 80 MG tablet 90 tablet 3     Sig: TAKE 1 TABLET AT BEDTIME (DOSE INCREASED TO 80 MG NIGHTLY 2/23/24)     Authorizing Provider: YASIR PEREZ     Ordering User: GIOVANNI MORENO         Refills VO per Dr. Perez.

## 2024-11-19 ENCOUNTER — OFFICE VISIT (OUTPATIENT)
Dept: PRIMARY CARE CLINIC | Facility: CLINIC | Age: 69
End: 2024-11-19
Payer: MEDICARE

## 2024-11-19 VITALS
HEIGHT: 64 IN | WEIGHT: 145 LBS | HEART RATE: 54 BPM | BODY MASS INDEX: 24.75 KG/M2 | TEMPERATURE: 98.9 F | DIASTOLIC BLOOD PRESSURE: 67 MMHG | SYSTOLIC BLOOD PRESSURE: 123 MMHG

## 2024-11-19 DIAGNOSIS — H81.10 BENIGN PAROXYSMAL POSITIONAL VERTIGO, UNSPECIFIED LATERALITY: Primary | ICD-10-CM

## 2024-11-19 PROCEDURE — G8427 DOCREV CUR MEDS BY ELIG CLIN: HCPCS | Performed by: FAMILY MEDICINE

## 2024-11-19 PROCEDURE — 1159F MED LIST DOCD IN RCRD: CPT | Performed by: FAMILY MEDICINE

## 2024-11-19 PROCEDURE — 1036F TOBACCO NON-USER: CPT | Performed by: FAMILY MEDICINE

## 2024-11-19 PROCEDURE — 99213 OFFICE O/P EST LOW 20 MIN: CPT | Performed by: FAMILY MEDICINE

## 2024-11-19 PROCEDURE — 1090F PRES/ABSN URINE INCON ASSESS: CPT | Performed by: FAMILY MEDICINE

## 2024-11-19 PROCEDURE — G8399 PT W/DXA RESULTS DOCUMENT: HCPCS | Performed by: FAMILY MEDICINE

## 2024-11-19 PROCEDURE — 1123F ACP DISCUSS/DSCN MKR DOCD: CPT | Performed by: FAMILY MEDICINE

## 2024-11-19 PROCEDURE — G8484 FLU IMMUNIZE NO ADMIN: HCPCS | Performed by: FAMILY MEDICINE

## 2024-11-19 PROCEDURE — G8420 CALC BMI NORM PARAMETERS: HCPCS | Performed by: FAMILY MEDICINE

## 2024-11-19 PROCEDURE — 3017F COLORECTAL CA SCREEN DOC REV: CPT | Performed by: FAMILY MEDICINE

## 2024-11-19 PROCEDURE — 1160F RVW MEDS BY RX/DR IN RCRD: CPT | Performed by: FAMILY MEDICINE

## 2024-11-19 RX ORDER — MECLIZINE HYDROCHLORIDE 25 MG/1
25 TABLET ORAL 3 TIMES DAILY PRN
Qty: 30 TABLET | Refills: 2 | Status: SHIPPED | OUTPATIENT
Start: 2024-11-19 | End: 2024-12-19

## 2024-11-19 SDOH — ECONOMIC STABILITY: FOOD INSECURITY: WITHIN THE PAST 12 MONTHS, YOU WORRIED THAT YOUR FOOD WOULD RUN OUT BEFORE YOU GOT MONEY TO BUY MORE.: NEVER TRUE

## 2024-11-19 SDOH — ECONOMIC STABILITY: FOOD INSECURITY: WITHIN THE PAST 12 MONTHS, THE FOOD YOU BOUGHT JUST DIDN'T LAST AND YOU DIDN'T HAVE MONEY TO GET MORE.: NEVER TRUE

## 2024-11-19 SDOH — ECONOMIC STABILITY: INCOME INSECURITY: HOW HARD IS IT FOR YOU TO PAY FOR THE VERY BASICS LIKE FOOD, HOUSING, MEDICAL CARE, AND HEATING?: NOT HARD AT ALL

## 2024-11-19 NOTE — PROGRESS NOTES
\"Have you been to the ER, urgent care clinic since your last visit?  Hospitalized since your last visit?\"    NO    “Have you seen or consulted any other health care providers outside our system since your last visit?”    NO           
unspecified laterality  Comments:  Currently flared.  Will treat with meclizine.  Declines vestibular therapy for now.  Orders:  -     meclizine (ANTIVERT) 25 MG tablet; Take 1 tablet by mouth 3 times daily as needed for Dizziness, Disp-30 tablet, R-2Normal        Aspects of this note have been generated using voice recognition software. Despite editing, there may be some syntax errors    Hu Wheat MD

## 2025-01-14 DIAGNOSIS — I25.10 ATHEROSCLEROSIS OF NATIVE CORONARY ARTERY WITHOUT ANGINA PECTORIS, UNSPECIFIED WHETHER NATIVE OR TRANSPLANTED HEART: ICD-10-CM

## 2025-01-14 DIAGNOSIS — E78.49 OTHER HYPERLIPIDEMIA: ICD-10-CM

## 2025-01-15 RX ORDER — EZETIMIBE 10 MG/1
10 TABLET ORAL DAILY
Qty: 90 TABLET | Refills: 1 | Status: SHIPPED | OUTPATIENT
Start: 2025-01-15

## 2025-01-15 RX ORDER — METOPROLOL TARTRATE 25 MG/1
25 TABLET, FILM COATED ORAL 2 TIMES DAILY
Qty: 180 TABLET | Refills: 1 | Status: SHIPPED | OUTPATIENT
Start: 2025-01-15

## 2025-01-15 NOTE — TELEPHONE ENCOUNTER
Requested Prescriptions     Signed Prescriptions Disp Refills    metoprolol tartrate (LOPRESSOR) 25 MG tablet 180 tablet 1     Sig: Take 1 tablet by mouth 2 times daily     Authorizing Provider: YASIR PEREZ     Ordering User: JEROME HAYWOOD    ezetimibe (ZETIA) 10 MG tablet 90 tablet 1     Sig: TAKE 1 TABLET DAILY     Authorizing Provider: YASIR PEREZ     Ordering User: JEROME HAYWOOD     Verbal order per Dr. Perez.     Future Appointments   Date Time Provider Department Center   2/28/2025 10:40 AM Yasir Perez MD [facility]

## 2025-02-25 LAB
ALBUMIN SERPL-MCNC: 4 G/DL (ref 3.9–4.9)
ALP SERPL-CCNC: 82 IU/L (ref 44–121)
ALT SERPL-CCNC: 19 IU/L (ref 0–32)
AST SERPL-CCNC: 23 IU/L (ref 0–40)
BILIRUB DIRECT SERPL-MCNC: 0.42 MG/DL (ref 0–0.4)
BILIRUB SERPL-MCNC: 1.6 MG/DL (ref 0–1.2)
CHOLEST SERPL-MCNC: 134 MG/DL (ref 100–199)
HDLC SERPL-MCNC: 40 MG/DL
IMP & REVIEW OF LAB RESULTS: NORMAL
LDLC SERPL CALC-MCNC: 78 MG/DL (ref 0–99)
PROT SERPL-MCNC: 6.8 G/DL (ref 6–8.5)
TRIGL SERPL-MCNC: 85 MG/DL (ref 0–149)
VLDLC SERPL CALC-MCNC: 16 MG/DL (ref 5–40)

## 2025-02-28 ENCOUNTER — OFFICE VISIT (OUTPATIENT)
Age: 70
End: 2025-02-28
Payer: MEDICARE

## 2025-02-28 VITALS
DIASTOLIC BLOOD PRESSURE: 70 MMHG | RESPIRATION RATE: 16 BRPM | HEIGHT: 64 IN | BODY MASS INDEX: 24.92 KG/M2 | WEIGHT: 146 LBS | OXYGEN SATURATION: 97 % | SYSTOLIC BLOOD PRESSURE: 110 MMHG | HEART RATE: 50 BPM

## 2025-02-28 DIAGNOSIS — I25.10 ATHEROSCLEROSIS OF NATIVE CORONARY ARTERY WITHOUT ANGINA PECTORIS, UNSPECIFIED WHETHER NATIVE OR TRANSPLANTED HEART: Primary | ICD-10-CM

## 2025-02-28 PROCEDURE — G8399 PT W/DXA RESULTS DOCUMENT: HCPCS | Performed by: SPECIALIST

## 2025-02-28 PROCEDURE — 99214 OFFICE O/P EST MOD 30 MIN: CPT | Performed by: SPECIALIST

## 2025-02-28 PROCEDURE — 1160F RVW MEDS BY RX/DR IN RCRD: CPT | Performed by: SPECIALIST

## 2025-02-28 PROCEDURE — 93005 ELECTROCARDIOGRAM TRACING: CPT | Performed by: SPECIALIST

## 2025-02-28 PROCEDURE — 1126F AMNT PAIN NOTED NONE PRSNT: CPT | Performed by: SPECIALIST

## 2025-02-28 PROCEDURE — 1090F PRES/ABSN URINE INCON ASSESS: CPT | Performed by: SPECIALIST

## 2025-02-28 PROCEDURE — G8419 CALC BMI OUT NRM PARAM NOF/U: HCPCS | Performed by: SPECIALIST

## 2025-02-28 PROCEDURE — 1159F MED LIST DOCD IN RCRD: CPT | Performed by: SPECIALIST

## 2025-02-28 PROCEDURE — 1036F TOBACCO NON-USER: CPT | Performed by: SPECIALIST

## 2025-02-28 PROCEDURE — 3017F COLORECTAL CA SCREEN DOC REV: CPT | Performed by: SPECIALIST

## 2025-02-28 PROCEDURE — 93010 ELECTROCARDIOGRAM REPORT: CPT | Performed by: SPECIALIST

## 2025-02-28 PROCEDURE — G8427 DOCREV CUR MEDS BY ELIG CLIN: HCPCS | Performed by: SPECIALIST

## 2025-02-28 PROCEDURE — 1123F ACP DISCUSS/DSCN MKR DOCD: CPT | Performed by: SPECIALIST

## 2025-02-28 ASSESSMENT — PATIENT HEALTH QUESTIONNAIRE - PHQ9
SUM OF ALL RESPONSES TO PHQ QUESTIONS 1-9: 0
1. LITTLE INTEREST OR PLEASURE IN DOING THINGS: NOT AT ALL
SUM OF ALL RESPONSES TO PHQ9 QUESTIONS 1 & 2: 0
SUM OF ALL RESPONSES TO PHQ QUESTIONS 1-9: 0
2. FEELING DOWN, DEPRESSED OR HOPELESS: NOT AT ALL

## 2025-02-28 NOTE — PROGRESS NOTES
[facility]                                     DIVISION OF CARDIOLOGY                                                                             OFFICE NOTE                  THA JACINTO M.D. , SAROJ            MARCI LAZARO   1955  [id number]    Date/Time:  2/28/202511:40 AM      /70   Pulse 50   Resp 16   Ht 1.626 m (5' 4\")   Wt 66.2 kg (146 lb)   SpO2 97%   BMI 25.06 kg/m²        Wt Readings from Last 3 Encounters:   02/28/25 66.2 kg (146 lb)   11/19/24 65.8 kg (145 lb)   08/26/24 65.8 kg (145 lb)        Lab Results   Component Value Date    CHOL 134 02/24/2025    TRIG 85 02/24/2025    HDL 40 02/24/2025    LDL 78 02/24/2025    VLDL 16 02/24/2025    CHOLHDLRATIO 3.1 08/16/2022     Lab Results   Component Value Date    ALT 19 02/24/2025    AST 23 02/24/2025    GGT 15 07/12/2021    ALKPHOS 82 02/24/2025    BILITOT 1.6 (H) 02/24/2025             SUBJECTIVE:  She is doing great complete asymptomatic no chest pain chest discomfort Asimia no shortness of breath after PND reported no palpitation presyncopal syncopal episodes.  She started doing exercise on the treadmill 3 times a week 3.5 miles an hour for 40 minutes at 2% incline with no issues.       Assessment/Plan    1.  CAD: Status post myocardial infarction in February 2018 with subsequent PCI and stenting of the PDA.     Repeated cardiac catheterization of August 2018 with no recurrent flow-limiting stenosis.     She is doing well and seemingly completely asymptomatic at this time.       Nuclear stress test on 11/21 :There is no evidence for ischemia.  There is a fixed defect in the inferolateral wall suggestive of previous subendocardial myocardial infarction at the level.  Nonetheless the ejection fraction is complete preserved and estimated 80%.     Continue aspirin metoprolol and lipitor     Stress echo February 2024 with no evidence for ischemia fixed defect in the inferobasilar wall borderline EKG changes

## 2025-07-08 DIAGNOSIS — I25.10 ATHEROSCLEROSIS OF NATIVE CORONARY ARTERY WITHOUT ANGINA PECTORIS, UNSPECIFIED WHETHER NATIVE OR TRANSPLANTED HEART: ICD-10-CM

## 2025-07-08 DIAGNOSIS — I10 HTN (HYPERTENSION): Primary | ICD-10-CM

## 2025-07-08 DIAGNOSIS — E78.49 OTHER HYPERLIPIDEMIA: ICD-10-CM

## 2025-07-09 RX ORDER — EZETIMIBE 10 MG/1
10 TABLET ORAL DAILY
Qty: 90 TABLET | Refills: 1 | Status: SHIPPED | OUTPATIENT
Start: 2025-07-09

## 2025-07-09 RX ORDER — METOPROLOL TARTRATE 25 MG/1
25 TABLET, FILM COATED ORAL 2 TIMES DAILY
Qty: 180 TABLET | Refills: 1 | Status: SHIPPED | OUTPATIENT
Start: 2025-07-09

## 2025-07-09 NOTE — TELEPHONE ENCOUNTER
Requested Prescriptions     Signed Prescriptions Disp Refills    metoprolol tartrate (LOPRESSOR) 25 MG tablet 180 tablet 1     Sig: TAKE 1 TABLET TWICE A DAY     Authorizing Provider: YASIR PEREZ     Ordering User: JEROME HAYWOOD    ezetimibe (ZETIA) 10 MG tablet 90 tablet 1     Sig: TAKE 1 TABLET DAILY     Authorizing Provider: YASIR PEREZ     Ordering User: JEROME HAYWOOD     Verbal order per Dr Perez.     Future Appointments   Date Time Provider Department Center   7[facility]29[facility]2025 10:20 [facility]   9/8/2025  8:40 AM Yasir Perez MD [facility]

## 2025-07-29 ENCOUNTER — HOSPITAL ENCOUNTER (OUTPATIENT)
Facility: HOSPITAL | Age: 70
Discharge: HOME | End: 2025-08-01
Payer: MEDICARE

## 2025-07-29 DIAGNOSIS — Z12.31 VISIT FOR SCREENING MAMMOGRAM: ICD-10-CM

## 2025-07-29 PROCEDURE — 77063 BREAST TOMOSYNTHESIS BI: CPT

## 2025-09-04 LAB
ALBUMIN SERPL-MCNC: 4.3 G/DL (ref 3.9–4.9)
ALP SERPL-CCNC: 76 IU/L (ref 44–121)
ALT SERPL-CCNC: 15 IU/L (ref 0–32)
AST SERPL-CCNC: 21 IU/L (ref 0–40)
BILIRUB DIRECT SERPL-MCNC: 0.54 MG/DL (ref 0–0.4)
BILIRUB SERPL-MCNC: 2.4 MG/DL (ref 0–1.2)
CHOLEST SERPL-MCNC: 136 MG/DL (ref 100–199)
HDLC SERPL-MCNC: 43 MG/DL
IMP & REVIEW OF LAB RESULTS: NORMAL
LDLC SERPL CALC-MCNC: 74 MG/DL (ref 0–99)
PROT SERPL-MCNC: 7.1 G/DL (ref 6–8.5)
TRIGL SERPL-MCNC: 102 MG/DL (ref 0–149)
VLDLC SERPL CALC-MCNC: 19 MG/DL (ref 5–40)